# Patient Record
Sex: MALE | Race: WHITE | Employment: FULL TIME | URBAN - METROPOLITAN AREA
[De-identification: names, ages, dates, MRNs, and addresses within clinical notes are randomized per-mention and may not be internally consistent; named-entity substitution may affect disease eponyms.]

---

## 2017-01-11 RX ORDER — TAMSULOSIN HYDROCHLORIDE 0.4 MG/1
CAPSULE ORAL
Qty: 180 CAPSULE | Refills: 0 | Status: SHIPPED | OUTPATIENT
Start: 2017-01-11 | End: 2017-04-19 | Stop reason: SDUPTHER

## 2017-01-11 RX ORDER — VENLAFAXINE HYDROCHLORIDE 75 MG/1
CAPSULE, EXTENDED RELEASE ORAL
Qty: 90 CAPSULE | Refills: 0 | Status: SHIPPED | OUTPATIENT
Start: 2017-01-11 | End: 2017-05-06 | Stop reason: SDUPTHER

## 2017-01-19 ENCOUNTER — OFFICE VISIT (OUTPATIENT)
Dept: FAMILY MEDICINE CLINIC | Age: 62
End: 2017-01-19

## 2017-01-19 VITALS
HEART RATE: 93 BPM | WEIGHT: 283 LBS | TEMPERATURE: 98.8 F | SYSTOLIC BLOOD PRESSURE: 130 MMHG | RESPIRATION RATE: 16 BRPM | BODY MASS INDEX: 41.92 KG/M2 | HEIGHT: 69 IN | DIASTOLIC BLOOD PRESSURE: 80 MMHG | OXYGEN SATURATION: 97 %

## 2017-01-19 DIAGNOSIS — E66.01 OBESITY, CLASS III, BMI 40-49.9 (MORBID OBESITY) (HCC): Primary | ICD-10-CM

## 2017-01-19 DIAGNOSIS — F41.9 ANXIETY: ICD-10-CM

## 2017-01-19 DIAGNOSIS — M48.061 SPINAL STENOSIS OF LUMBAR REGION: ICD-10-CM

## 2017-01-19 PROCEDURE — 99214 OFFICE O/P EST MOD 30 MIN: CPT | Performed by: FAMILY MEDICINE

## 2017-01-19 PROCEDURE — 3017F COLORECTAL CA SCREEN DOC REV: CPT | Performed by: FAMILY MEDICINE

## 2017-01-19 PROCEDURE — G8484 FLU IMMUNIZE NO ADMIN: HCPCS | Performed by: FAMILY MEDICINE

## 2017-01-19 PROCEDURE — G8427 DOCREV CUR MEDS BY ELIG CLIN: HCPCS | Performed by: FAMILY MEDICINE

## 2017-01-19 PROCEDURE — G8419 CALC BMI OUT NRM PARAM NOF/U: HCPCS | Performed by: FAMILY MEDICINE

## 2017-01-19 PROCEDURE — 1036F TOBACCO NON-USER: CPT | Performed by: FAMILY MEDICINE

## 2017-01-19 RX ORDER — PHENTERMINE HYDROCHLORIDE 37.5 MG/1
37.5 CAPSULE ORAL EVERY MORNING
Qty: 30 CAPSULE | Refills: 0 | Status: SHIPPED | OUTPATIENT
Start: 2017-01-19 | End: 2017-02-21 | Stop reason: SDUPTHER

## 2017-01-19 RX ORDER — CLONAZEPAM 1 MG/1
TABLET ORAL
Qty: 30 TABLET | Refills: 2 | Status: SHIPPED | OUTPATIENT
Start: 2017-01-19 | End: 2017-01-24 | Stop reason: SDUPTHER

## 2017-01-19 ASSESSMENT — ENCOUNTER SYMPTOMS
NAUSEA: 0
GASTROINTESTINAL NEGATIVE: 1
VOMITING: 0
RESPIRATORY NEGATIVE: 1
ALLERGIC/IMMUNOLOGIC NEGATIVE: 1
COUGH: 0
SORE THROAT: 0
ABDOMINAL PAIN: 0
EYES NEGATIVE: 1

## 2017-02-02 PROBLEM — M47.816 SPONDYLOSIS OF LUMBAR REGION WITHOUT MYELOPATHY OR RADICULOPATHY: Status: ACTIVE | Noted: 2017-02-02

## 2017-02-21 ENCOUNTER — OFFICE VISIT (OUTPATIENT)
Dept: FAMILY MEDICINE CLINIC | Age: 62
End: 2017-02-21

## 2017-02-21 VITALS
WEIGHT: 273 LBS | DIASTOLIC BLOOD PRESSURE: 66 MMHG | HEIGHT: 69 IN | SYSTOLIC BLOOD PRESSURE: 120 MMHG | OXYGEN SATURATION: 99 % | BODY MASS INDEX: 40.43 KG/M2 | TEMPERATURE: 98.1 F | HEART RATE: 99 BPM | RESPIRATION RATE: 12 BRPM

## 2017-02-21 DIAGNOSIS — E66.01 OBESITY, CLASS III, BMI 40-49.9 (MORBID OBESITY) (HCC): ICD-10-CM

## 2017-02-21 DIAGNOSIS — L03.032: ICD-10-CM

## 2017-02-21 DIAGNOSIS — F41.9 ANXIETY: Primary | ICD-10-CM

## 2017-02-21 PROBLEM — E66.813 OBESITY, CLASS III, BMI 40-49.9 (MORBID OBESITY) (HCC): Status: ACTIVE | Noted: 2017-02-21

## 2017-02-21 PROCEDURE — 99213 OFFICE O/P EST LOW 20 MIN: CPT | Performed by: FAMILY MEDICINE

## 2017-02-21 PROCEDURE — G8427 DOCREV CUR MEDS BY ELIG CLIN: HCPCS | Performed by: FAMILY MEDICINE

## 2017-02-21 PROCEDURE — G8417 CALC BMI ABV UP PARAM F/U: HCPCS | Performed by: FAMILY MEDICINE

## 2017-02-21 PROCEDURE — 1036F TOBACCO NON-USER: CPT | Performed by: FAMILY MEDICINE

## 2017-02-21 PROCEDURE — G8484 FLU IMMUNIZE NO ADMIN: HCPCS | Performed by: FAMILY MEDICINE

## 2017-02-21 PROCEDURE — 3017F COLORECTAL CA SCREEN DOC REV: CPT | Performed by: FAMILY MEDICINE

## 2017-02-21 RX ORDER — PHENTERMINE HYDROCHLORIDE 37.5 MG/1
37.5 CAPSULE ORAL EVERY MORNING
Qty: 30 CAPSULE | Refills: 0 | Status: SHIPPED | OUTPATIENT
Start: 2017-02-21 | End: 2017-03-22 | Stop reason: SDUPTHER

## 2017-02-21 ASSESSMENT — ENCOUNTER SYMPTOMS
RESPIRATORY NEGATIVE: 1
VOMITING: 0
EYES NEGATIVE: 1
ALLERGIC/IMMUNOLOGIC NEGATIVE: 1
GASTROINTESTINAL NEGATIVE: 1
ABDOMINAL PAIN: 0
COUGH: 0
SORE THROAT: 0
NAUSEA: 0

## 2017-03-09 RX ORDER — AMLODIPINE BESYLATE AND BENAZEPRIL HYDROCHLORIDE 10; 20 MG/1; MG/1
CAPSULE ORAL
Qty: 90 CAPSULE | Refills: 1 | Status: SHIPPED | OUTPATIENT
Start: 2017-03-09 | End: 2017-09-05 | Stop reason: SDUPTHER

## 2017-03-12 ENCOUNTER — HOSPITAL ENCOUNTER (OUTPATIENT)
Dept: MRI IMAGING | Age: 62
Discharge: HOME | End: 2017-03-12
Payer: COMMERCIAL

## 2017-03-12 DIAGNOSIS — M47.816 SPONDYLOSIS OF LUMBAR REGION WITHOUT MYELOPATHY OR RADICULOPATHY: ICD-10-CM

## 2017-03-12 DIAGNOSIS — M48.061 SPINAL STENOSIS OF LUMBAR REGION: ICD-10-CM

## 2017-03-12 PROCEDURE — 72148 MRI LUMBAR SPINE W/O DYE: CPT

## 2017-03-16 DIAGNOSIS — L30.0 NUMMULAR ECZEMA: ICD-10-CM

## 2017-03-16 RX ORDER — BETAMETHASONE DIPROPIONATE 0.05 %
OINTMENT (GRAM) TOPICAL
Qty: 45 G | Refills: 1 | Status: SHIPPED | OUTPATIENT
Start: 2017-03-16 | End: 2018-02-19 | Stop reason: SDUPTHER

## 2017-03-22 ENCOUNTER — OFFICE VISIT (OUTPATIENT)
Dept: FAMILY MEDICINE CLINIC | Age: 62
End: 2017-03-22

## 2017-03-22 VITALS
HEART RATE: 80 BPM | HEIGHT: 69 IN | SYSTOLIC BLOOD PRESSURE: 108 MMHG | BODY MASS INDEX: 39.25 KG/M2 | WEIGHT: 265 LBS | DIASTOLIC BLOOD PRESSURE: 60 MMHG | TEMPERATURE: 96.7 F | RESPIRATION RATE: 14 BRPM

## 2017-03-22 DIAGNOSIS — E66.01 OBESITY, CLASS III, BMI 40-49.9 (MORBID OBESITY) (HCC): ICD-10-CM

## 2017-03-22 PROCEDURE — G8417 CALC BMI ABV UP PARAM F/U: HCPCS | Performed by: FAMILY MEDICINE

## 2017-03-22 PROCEDURE — 3017F COLORECTAL CA SCREEN DOC REV: CPT | Performed by: FAMILY MEDICINE

## 2017-03-22 PROCEDURE — 1036F TOBACCO NON-USER: CPT | Performed by: FAMILY MEDICINE

## 2017-03-22 PROCEDURE — G8484 FLU IMMUNIZE NO ADMIN: HCPCS | Performed by: FAMILY MEDICINE

## 2017-03-22 PROCEDURE — G8427 DOCREV CUR MEDS BY ELIG CLIN: HCPCS | Performed by: FAMILY MEDICINE

## 2017-03-22 PROCEDURE — 99213 OFFICE O/P EST LOW 20 MIN: CPT | Performed by: FAMILY MEDICINE

## 2017-03-22 RX ORDER — PHENTERMINE HYDROCHLORIDE 37.5 MG/1
37.5 CAPSULE ORAL EVERY MORNING
Qty: 30 CAPSULE | Refills: 0 | Status: SHIPPED | OUTPATIENT
Start: 2017-03-22 | End: 2017-04-21

## 2017-03-22 ASSESSMENT — ENCOUNTER SYMPTOMS
ABDOMINAL PAIN: 0
GASTROINTESTINAL NEGATIVE: 1
SORE THROAT: 0
VOMITING: 0
RESPIRATORY NEGATIVE: 1
EYES NEGATIVE: 1
ALLERGIC/IMMUNOLOGIC NEGATIVE: 1
COUGH: 0
NAUSEA: 0

## 2017-04-19 RX ORDER — TAMSULOSIN HYDROCHLORIDE 0.4 MG/1
CAPSULE ORAL
Qty: 180 CAPSULE | Refills: 0 | Status: SHIPPED | OUTPATIENT
Start: 2017-04-19 | End: 2017-08-07 | Stop reason: SDUPTHER

## 2017-04-24 ENCOUNTER — OFFICE VISIT (OUTPATIENT)
Dept: FAMILY MEDICINE CLINIC | Age: 62
End: 2017-04-24

## 2017-04-24 VITALS
WEIGHT: 258 LBS | TEMPERATURE: 97.7 F | HEIGHT: 69 IN | DIASTOLIC BLOOD PRESSURE: 80 MMHG | OXYGEN SATURATION: 99 % | HEART RATE: 105 BPM | SYSTOLIC BLOOD PRESSURE: 140 MMHG | BODY MASS INDEX: 38.21 KG/M2 | RESPIRATION RATE: 16 BRPM

## 2017-04-24 DIAGNOSIS — F41.9 ANXIETY: ICD-10-CM

## 2017-04-24 DIAGNOSIS — E66.9 OBESITY (BMI 30-39.9): ICD-10-CM

## 2017-04-24 DIAGNOSIS — L57.0 AK (ACTINIC KERATOSIS): Primary | ICD-10-CM

## 2017-04-24 PROCEDURE — 3017F COLORECTAL CA SCREEN DOC REV: CPT | Performed by: FAMILY MEDICINE

## 2017-04-24 PROCEDURE — 1036F TOBACCO NON-USER: CPT | Performed by: FAMILY MEDICINE

## 2017-04-24 PROCEDURE — G8427 DOCREV CUR MEDS BY ELIG CLIN: HCPCS | Performed by: FAMILY MEDICINE

## 2017-04-24 PROCEDURE — G8417 CALC BMI ABV UP PARAM F/U: HCPCS | Performed by: FAMILY MEDICINE

## 2017-04-24 PROCEDURE — 99214 OFFICE O/P EST MOD 30 MIN: CPT | Performed by: FAMILY MEDICINE

## 2017-04-24 RX ORDER — CLONAZEPAM 1 MG/1
TABLET ORAL
Qty: 30 TABLET | Refills: 2 | Status: SHIPPED | OUTPATIENT
Start: 2017-04-24 | End: 2017-07-21 | Stop reason: SDUPTHER

## 2017-04-24 ASSESSMENT — ENCOUNTER SYMPTOMS
ABDOMINAL PAIN: 0
EYES NEGATIVE: 1
GASTROINTESTINAL NEGATIVE: 1
NAUSEA: 0
VOMITING: 0
RESPIRATORY NEGATIVE: 1
SORE THROAT: 0
COUGH: 0
ALLERGIC/IMMUNOLOGIC NEGATIVE: 1

## 2017-05-08 RX ORDER — VENLAFAXINE HYDROCHLORIDE 75 MG/1
CAPSULE, EXTENDED RELEASE ORAL
Qty: 90 CAPSULE | Refills: 1 | Status: SHIPPED | OUTPATIENT
Start: 2017-05-08 | End: 2017-10-30 | Stop reason: SDUPTHER

## 2017-05-20 DIAGNOSIS — E29.1 HYPOGONADISM MALE: ICD-10-CM

## 2017-05-20 DIAGNOSIS — R97.20 ELEVATED PSA: ICD-10-CM

## 2017-05-20 LAB — PROSTATE SPECIFIC ANTIGEN: 0.73 NG/ML (ref 0–5.4)

## 2017-05-23 ENCOUNTER — OFFICE VISIT (OUTPATIENT)
Dept: FAMILY MEDICINE CLINIC | Age: 62
End: 2017-05-23

## 2017-05-23 VITALS
BODY MASS INDEX: 37.47 KG/M2 | SYSTOLIC BLOOD PRESSURE: 120 MMHG | TEMPERATURE: 98.2 F | HEIGHT: 69 IN | DIASTOLIC BLOOD PRESSURE: 80 MMHG | HEART RATE: 90 BPM | RESPIRATION RATE: 16 BRPM | WEIGHT: 253 LBS | OXYGEN SATURATION: 98 %

## 2017-05-23 DIAGNOSIS — F41.9 ANXIETY: ICD-10-CM

## 2017-05-23 DIAGNOSIS — E66.9 OBESITY (BMI 30-39.9): ICD-10-CM

## 2017-05-23 DIAGNOSIS — E29.1 HYPOGONADISM MALE: Primary | ICD-10-CM

## 2017-05-23 DIAGNOSIS — M47.816 SPONDYLOSIS OF LUMBAR REGION WITHOUT MYELOPATHY OR RADICULOPATHY: ICD-10-CM

## 2017-05-23 PROBLEM — S83.289A TEAR OF LATERAL MENISCUS OF KNEE: Status: ACTIVE | Noted: 2017-03-07

## 2017-05-23 PROBLEM — S83.249A TEAR OF MEDIAL MENISCUS OF KNEE: Status: ACTIVE | Noted: 2017-03-07

## 2017-05-23 PROCEDURE — G8417 CALC BMI ABV UP PARAM F/U: HCPCS | Performed by: FAMILY MEDICINE

## 2017-05-23 PROCEDURE — 99213 OFFICE O/P EST LOW 20 MIN: CPT | Performed by: FAMILY MEDICINE

## 2017-05-23 PROCEDURE — 1036F TOBACCO NON-USER: CPT | Performed by: FAMILY MEDICINE

## 2017-05-23 PROCEDURE — G8427 DOCREV CUR MEDS BY ELIG CLIN: HCPCS | Performed by: FAMILY MEDICINE

## 2017-05-23 PROCEDURE — 3017F COLORECTAL CA SCREEN DOC REV: CPT | Performed by: FAMILY MEDICINE

## 2017-05-23 ASSESSMENT — ENCOUNTER SYMPTOMS
ABDOMINAL PAIN: 0
ALLERGIC/IMMUNOLOGIC NEGATIVE: 1
VOMITING: 0
NAUSEA: 0
RESPIRATORY NEGATIVE: 1
COUGH: 0
SORE THROAT: 0
GASTROINTESTINAL NEGATIVE: 1
EYES NEGATIVE: 1

## 2017-05-23 ASSESSMENT — PATIENT HEALTH QUESTIONNAIRE - PHQ9
SUM OF ALL RESPONSES TO PHQ QUESTIONS 1-9: 0
1. LITTLE INTEREST OR PLEASURE IN DOING THINGS: 0
SUM OF ALL RESPONSES TO PHQ9 QUESTIONS 1 & 2: 0
2. FEELING DOWN, DEPRESSED OR HOPELESS: 0

## 2017-06-23 ENCOUNTER — OFFICE VISIT (OUTPATIENT)
Dept: FAMILY MEDICINE CLINIC | Age: 62
End: 2017-06-23

## 2017-06-23 VITALS
SYSTOLIC BLOOD PRESSURE: 120 MMHG | RESPIRATION RATE: 16 BRPM | WEIGHT: 252 LBS | OXYGEN SATURATION: 98 % | BODY MASS INDEX: 37.33 KG/M2 | HEIGHT: 69 IN | TEMPERATURE: 98.7 F | DIASTOLIC BLOOD PRESSURE: 80 MMHG | HEART RATE: 87 BPM

## 2017-06-23 DIAGNOSIS — E66.9 OBESITY (BMI 30-39.9): ICD-10-CM

## 2017-06-23 DIAGNOSIS — L03.032 PARONYCHIA OF GREAT TOE, LEFT: Primary | ICD-10-CM

## 2017-06-23 PROCEDURE — 99213 OFFICE O/P EST LOW 20 MIN: CPT | Performed by: FAMILY MEDICINE

## 2017-06-23 PROCEDURE — G8427 DOCREV CUR MEDS BY ELIG CLIN: HCPCS | Performed by: FAMILY MEDICINE

## 2017-06-23 PROCEDURE — 3017F COLORECTAL CA SCREEN DOC REV: CPT | Performed by: FAMILY MEDICINE

## 2017-06-23 PROCEDURE — 1036F TOBACCO NON-USER: CPT | Performed by: FAMILY MEDICINE

## 2017-06-23 PROCEDURE — G8417 CALC BMI ABV UP PARAM F/U: HCPCS | Performed by: FAMILY MEDICINE

## 2017-06-23 RX ORDER — DOXYCYCLINE HYCLATE 100 MG
100 TABLET ORAL 2 TIMES DAILY
Qty: 20 TABLET | Refills: 0 | Status: SHIPPED | OUTPATIENT
Start: 2017-06-23 | End: 2017-07-03

## 2017-06-23 ASSESSMENT — ENCOUNTER SYMPTOMS
EYES NEGATIVE: 1
ALLERGIC/IMMUNOLOGIC NEGATIVE: 1
GASTROINTESTINAL NEGATIVE: 1
VOMITING: 0
COUGH: 0
NAUSEA: 0
ABDOMINAL PAIN: 0
SORE THROAT: 0
RESPIRATORY NEGATIVE: 1

## 2017-07-21 ENCOUNTER — OFFICE VISIT (OUTPATIENT)
Dept: FAMILY MEDICINE CLINIC | Age: 62
End: 2017-07-21

## 2017-07-21 VITALS
BODY MASS INDEX: 37.18 KG/M2 | SYSTOLIC BLOOD PRESSURE: 126 MMHG | TEMPERATURE: 98.3 F | RESPIRATION RATE: 14 BRPM | HEART RATE: 74 BPM | DIASTOLIC BLOOD PRESSURE: 80 MMHG | WEIGHT: 251 LBS | HEIGHT: 69 IN

## 2017-07-21 DIAGNOSIS — E66.9 OBESITY (BMI 30-39.9): ICD-10-CM

## 2017-07-21 DIAGNOSIS — F41.9 ANXIETY: ICD-10-CM

## 2017-07-21 DIAGNOSIS — J45.30 MILD PERSISTENT ASTHMA WITHOUT COMPLICATION: Primary | ICD-10-CM

## 2017-07-21 PROCEDURE — 1036F TOBACCO NON-USER: CPT | Performed by: FAMILY MEDICINE

## 2017-07-21 PROCEDURE — 3017F COLORECTAL CA SCREEN DOC REV: CPT | Performed by: FAMILY MEDICINE

## 2017-07-21 PROCEDURE — G8417 CALC BMI ABV UP PARAM F/U: HCPCS | Performed by: FAMILY MEDICINE

## 2017-07-21 PROCEDURE — 99213 OFFICE O/P EST LOW 20 MIN: CPT | Performed by: FAMILY MEDICINE

## 2017-07-21 PROCEDURE — G8427 DOCREV CUR MEDS BY ELIG CLIN: HCPCS | Performed by: FAMILY MEDICINE

## 2017-07-21 RX ORDER — DICYCLOMINE HCL 20 MG
20 TABLET ORAL 3 TIMES DAILY
COMMUNITY
Start: 2017-07-15

## 2017-07-21 RX ORDER — CLONAZEPAM 1 MG/1
TABLET ORAL
Qty: 30 TABLET | Refills: 2 | Status: SHIPPED | OUTPATIENT
Start: 2017-07-21 | End: 2017-10-26 | Stop reason: SDUPTHER

## 2017-07-21 ASSESSMENT — ENCOUNTER SYMPTOMS
EYES NEGATIVE: 1
RESPIRATORY NEGATIVE: 1
GASTROINTESTINAL NEGATIVE: 1
SINUS PRESSURE: 1
ALLERGIC/IMMUNOLOGIC NEGATIVE: 1

## 2017-08-07 RX ORDER — TAMSULOSIN HYDROCHLORIDE 0.4 MG/1
CAPSULE ORAL
Qty: 180 CAPSULE | Refills: 0 | Status: SHIPPED | OUTPATIENT
Start: 2017-08-07 | End: 2017-12-13 | Stop reason: SDUPTHER

## 2017-08-22 ENCOUNTER — OFFICE VISIT (OUTPATIENT)
Dept: FAMILY MEDICINE CLINIC | Age: 62
End: 2017-08-22

## 2017-08-22 VITALS
HEIGHT: 69 IN | RESPIRATION RATE: 16 BRPM | BODY MASS INDEX: 37.56 KG/M2 | SYSTOLIC BLOOD PRESSURE: 122 MMHG | OXYGEN SATURATION: 98 % | WEIGHT: 253.6 LBS | DIASTOLIC BLOOD PRESSURE: 86 MMHG | TEMPERATURE: 98.4 F | HEART RATE: 88 BPM

## 2017-08-22 DIAGNOSIS — J01.90 ACUTE NON-RECURRENT SINUSITIS, UNSPECIFIED LOCATION: Primary | ICD-10-CM

## 2017-08-22 DIAGNOSIS — E66.9 OBESITY (BMI 30-39.9): ICD-10-CM

## 2017-08-22 PROCEDURE — G8427 DOCREV CUR MEDS BY ELIG CLIN: HCPCS | Performed by: FAMILY MEDICINE

## 2017-08-22 PROCEDURE — G8417 CALC BMI ABV UP PARAM F/U: HCPCS | Performed by: FAMILY MEDICINE

## 2017-08-22 PROCEDURE — 99213 OFFICE O/P EST LOW 20 MIN: CPT | Performed by: FAMILY MEDICINE

## 2017-08-22 PROCEDURE — 3017F COLORECTAL CA SCREEN DOC REV: CPT | Performed by: FAMILY MEDICINE

## 2017-08-22 PROCEDURE — 1036F TOBACCO NON-USER: CPT | Performed by: FAMILY MEDICINE

## 2017-08-22 RX ORDER — DOXYCYCLINE HYCLATE 100 MG
100 TABLET ORAL 2 TIMES DAILY
Qty: 30 TABLET | Refills: 0 | Status: SHIPPED | OUTPATIENT
Start: 2017-08-22 | End: 2017-09-01

## 2017-08-22 ASSESSMENT — ENCOUNTER SYMPTOMS
HOARSE VOICE: 0
SWOLLEN GLANDS: 0
COUGH: 0
RESPIRATORY NEGATIVE: 1
SORE THROAT: 1
SINUS PRESSURE: 1
GASTROINTESTINAL NEGATIVE: 1
SHORTNESS OF BREATH: 0

## 2017-09-06 RX ORDER — AMLODIPINE BESYLATE AND BENAZEPRIL HYDROCHLORIDE 10; 20 MG/1; MG/1
CAPSULE ORAL
Qty: 90 CAPSULE | Refills: 1 | Status: SHIPPED | OUTPATIENT
Start: 2017-09-06 | End: 2018-02-20 | Stop reason: SDUPTHER

## 2017-09-18 RX ORDER — OXYBUTYNIN CHLORIDE 10 MG/1
TABLET, EXTENDED RELEASE ORAL
Qty: 90 TABLET | Refills: 3 | Status: SHIPPED | OUTPATIENT
Start: 2017-09-18 | End: 2018-09-02 | Stop reason: SDUPTHER

## 2017-09-19 DIAGNOSIS — M15.9 PRIMARY OSTEOARTHRITIS INVOLVING MULTIPLE JOINTS: ICD-10-CM

## 2017-09-19 RX ORDER — MELOXICAM 15 MG/1
TABLET ORAL
Qty: 90 TABLET | Refills: 1 | Status: SHIPPED | OUTPATIENT
Start: 2017-09-19 | End: 2018-03-19 | Stop reason: SDUPTHER

## 2017-09-27 ENCOUNTER — OFFICE VISIT (OUTPATIENT)
Dept: FAMILY MEDICINE CLINIC | Age: 62
End: 2017-09-27

## 2017-09-27 VITALS
BODY MASS INDEX: 38.51 KG/M2 | HEIGHT: 69 IN | WEIGHT: 260 LBS | TEMPERATURE: 98.5 F | HEART RATE: 85 BPM | SYSTOLIC BLOOD PRESSURE: 126 MMHG | DIASTOLIC BLOOD PRESSURE: 70 MMHG | RESPIRATION RATE: 12 BRPM

## 2017-09-27 DIAGNOSIS — E66.9 OBESITY (BMI 30-39.9): ICD-10-CM

## 2017-09-27 DIAGNOSIS — J30.1 SEASONAL ALLERGIC RHINITIS DUE TO POLLEN: Primary | ICD-10-CM

## 2017-09-27 PROCEDURE — G8427 DOCREV CUR MEDS BY ELIG CLIN: HCPCS | Performed by: FAMILY MEDICINE

## 2017-09-27 PROCEDURE — 99213 OFFICE O/P EST LOW 20 MIN: CPT | Performed by: FAMILY MEDICINE

## 2017-09-27 PROCEDURE — 1036F TOBACCO NON-USER: CPT | Performed by: FAMILY MEDICINE

## 2017-09-27 PROCEDURE — 3017F COLORECTAL CA SCREEN DOC REV: CPT | Performed by: FAMILY MEDICINE

## 2017-09-27 PROCEDURE — G8417 CALC BMI ABV UP PARAM F/U: HCPCS | Performed by: FAMILY MEDICINE

## 2017-09-27 ASSESSMENT — ENCOUNTER SYMPTOMS
RESPIRATORY NEGATIVE: 1
EYE ITCHING: 1
SINUS PRESSURE: 1
SORE THROAT: 1
GASTROINTESTINAL NEGATIVE: 1
SHORTNESS OF BREATH: 0
COUGH: 0

## 2017-10-26 ENCOUNTER — OFFICE VISIT (OUTPATIENT)
Dept: FAMILY MEDICINE CLINIC | Age: 62
End: 2017-10-26

## 2017-10-26 VITALS
TEMPERATURE: 98 F | HEART RATE: 91 BPM | WEIGHT: 257 LBS | SYSTOLIC BLOOD PRESSURE: 122 MMHG | OXYGEN SATURATION: 98 % | BODY MASS INDEX: 38.06 KG/M2 | HEIGHT: 69 IN | DIASTOLIC BLOOD PRESSURE: 80 MMHG | RESPIRATION RATE: 16 BRPM

## 2017-10-26 DIAGNOSIS — T48.5X5A RHINITIS MEDICAMENTOSA: ICD-10-CM

## 2017-10-26 DIAGNOSIS — J01.91 ACUTE RECURRENT SINUSITIS, UNSPECIFIED LOCATION: Primary | ICD-10-CM

## 2017-10-26 DIAGNOSIS — J31.0 RHINITIS MEDICAMENTOSA: ICD-10-CM

## 2017-10-26 DIAGNOSIS — Z23 NEED FOR INFLUENZA VACCINATION: ICD-10-CM

## 2017-10-26 DIAGNOSIS — E66.9 OBESITY (BMI 30-39.9): ICD-10-CM

## 2017-10-26 DIAGNOSIS — F41.9 ANXIETY: ICD-10-CM

## 2017-10-26 DIAGNOSIS — I10 ESSENTIAL HYPERTENSION: ICD-10-CM

## 2017-10-26 PROCEDURE — G8417 CALC BMI ABV UP PARAM F/U: HCPCS | Performed by: FAMILY MEDICINE

## 2017-10-26 PROCEDURE — G8484 FLU IMMUNIZE NO ADMIN: HCPCS | Performed by: FAMILY MEDICINE

## 2017-10-26 PROCEDURE — 1036F TOBACCO NON-USER: CPT | Performed by: FAMILY MEDICINE

## 2017-10-26 PROCEDURE — 99214 OFFICE O/P EST MOD 30 MIN: CPT | Performed by: FAMILY MEDICINE

## 2017-10-26 PROCEDURE — G8427 DOCREV CUR MEDS BY ELIG CLIN: HCPCS | Performed by: FAMILY MEDICINE

## 2017-10-26 PROCEDURE — 90471 IMMUNIZATION ADMIN: CPT | Performed by: FAMILY MEDICINE

## 2017-10-26 PROCEDURE — 90688 IIV4 VACCINE SPLT 0.5 ML IM: CPT | Performed by: FAMILY MEDICINE

## 2017-10-26 PROCEDURE — 3017F COLORECTAL CA SCREEN DOC REV: CPT | Performed by: FAMILY MEDICINE

## 2017-10-26 RX ORDER — DOXYCYCLINE HYCLATE 100 MG
100 TABLET ORAL 2 TIMES DAILY
Qty: 20 TABLET | Refills: 0 | Status: SHIPPED | OUTPATIENT
Start: 2017-10-26 | End: 2017-11-05

## 2017-10-26 RX ORDER — CLONAZEPAM 1 MG/1
TABLET ORAL
Qty: 30 TABLET | Refills: 2 | Status: SHIPPED | OUTPATIENT
Start: 2017-10-26 | End: 2017-12-27 | Stop reason: SDUPTHER

## 2017-10-26 ASSESSMENT — ENCOUNTER SYMPTOMS
ALLERGIC/IMMUNOLOGIC NEGATIVE: 1
SORE THROAT: 1
COUGH: 0
GASTROINTESTINAL NEGATIVE: 1
SHORTNESS OF BREATH: 0
SINUS PRESSURE: 1
EYES NEGATIVE: 1
RESPIRATORY NEGATIVE: 1

## 2017-10-26 NOTE — PROGRESS NOTES
injected. No middle ear effusion. Nose: Nose normal. No mucosal edema or rhinorrhea. Right sinus exhibits no maxillary sinus tenderness and no frontal sinus tenderness. Left sinus exhibits no maxillary sinus tenderness and no frontal sinus tenderness. Mouth/Throat: Mucous membranes are normal. No uvula swelling (absent uvula). Posterior oropharyngeal edema and posterior oropharyngeal erythema present. Oropharyngeal exudate: PND. Eyes: Conjunctivae, EOM and lids are normal. Pupils are equal, round, and reactive to light. Neck: Normal range of motion. Neck supple. No JVD present. No muscular tenderness present. No neck rigidity. No tracheal deviation present. No thyroid mass and no thyromegaly present. Cardiovascular: Normal rate, regular rhythm and intact distal pulses. Pulmonary/Chest: Effort normal and breath sounds normal. He has no decreased breath sounds. He has no wheezes. He has no rhonchi. He has no rales. He exhibits no tenderness and no deformity. Abdominal: Soft. Normal appearance and bowel sounds are normal. He exhibits no distension and no mass. There is no splenomegaly or hepatomegaly. There is no tenderness. There is no rigidity, no rebound and no guarding. No hernia. Musculoskeletal: Normal range of motion. Right knee: Normal.        Left knee: Normal.        Cervical back: Normal.        Thoracic back: Normal.        Lumbar back: Normal.        Lymphadenopathy:     He has no cervical adenopathy. Neurological: He is alert and oriented to person, place, and time. He has normal strength. He displays no atrophy and no tremor. No cranial nerve deficit or sensory deficit. Coordination and gait normal.   Skin: Skin is warm, dry and intact. No rash noted. He is not diaphoretic. Psychiatric: He has a normal mood and affect. His speech is normal and behavior is normal. Judgment and thought content normal. Cognition and memory are normal.   Nursing note and vitals reviewed. Assessment & Plan   1. Acute recurrent sinusitis, unspecified location  doxycycline hyclate (VIBRA-TABS) 100 MG tablet   2. Need for influenza vaccination  INFLUENZA, QUADV, 3 YRS AND OLDER, IM, MDV, 0.5ML (FLUZONE QUADV)   3. Anxiety  clonazePAM (KLONOPIN) 1 MG tablet   4. Obesity (BMI 30-39. 9)  Phentermine-Topiramate (QSYMIA) 15-92 MG CP24   5. Essential hypertension  CBC Auto Differential    Comprehensive Metabolic Panel    Lipid Panel    TSH ULTRASENSITIVE, DIRECTED   6. Rhinitis medicamentosa  doxycycline hyclate (VIBRA-TABS) 100 MG tablet     Orders Placed This Encounter   Procedures    INFLUENZA, QUADV, 3 YRS AND OLDER, IM, MDV, 0.5ML (FLUZONE QUADV)    CBC Auto Differential     Standing Status:   Future     Standing Expiration Date:   10/26/2018    Comprehensive Metabolic Panel     Standing Status:   Future     Standing Expiration Date:   10/26/2018    Lipid Panel     Standing Status:   Future     Standing Expiration Date:   10/26/2018     Order Specific Question:   Is Patient Fasting?/# of Hours     Answer:   8    TSH ULTRASENSITIVE, DIRECTED     Standing Status:   Future     Standing Expiration Date:   10/26/2018     Orders Placed This Encounter   Medications    clonazePAM (KLONOPIN) 1 MG tablet     Sig: TAKE 1 TABLET EVERY DAY AS NEEDED FOR ANXIETY.      Dispense:  30 tablet     Refill:  2    Phentermine-Topiramate (QSYMIA) 15-92 MG CP24     Sig: Take 1 capsule by mouth daily     Dispense:  30 capsule     Refill:  0    doxycycline hyclate (VIBRA-TABS) 100 MG tablet     Sig: Take 1 tablet by mouth 2 times daily for 10 days     Dispense:  20 tablet     Refill:  0     Medications Discontinued During This Encounter   Medication Reason    clonazePAM (KLONOPIN) 1 MG tablet Reorder    Phentermine-Topiramate (QSYMIA) 15-92 MG CP24 Reorder   Recommend OTC neilmed neti pot/ sinus rinse at least 4 times daily follow instructions on package  May use afrin ONLY 3 doses weekly to prevent rebound nasal congestion  Counseling given: Yes      Return in about 1 month (around 11/26/2017).     Mark Ferrer, DO

## 2017-10-30 RX ORDER — OMEPRAZOLE 20 MG/1
CAPSULE, DELAYED RELEASE ORAL
Qty: 90 CAPSULE | Refills: 3 | Status: SHIPPED | OUTPATIENT
Start: 2017-10-30 | End: 2018-10-30 | Stop reason: SDUPTHER

## 2017-10-30 RX ORDER — VENLAFAXINE HYDROCHLORIDE 75 MG/1
CAPSULE, EXTENDED RELEASE ORAL
Qty: 90 CAPSULE | Refills: 3 | Status: SHIPPED | OUTPATIENT
Start: 2017-10-30 | End: 2018-01-29 | Stop reason: SDUPTHER

## 2017-11-18 DIAGNOSIS — I10 ESSENTIAL HYPERTENSION: ICD-10-CM

## 2017-11-18 LAB
ALBUMIN SERPL-MCNC: 4.1 G/DL (ref 3.9–4.9)
ALP BLD-CCNC: 91 U/L (ref 35–104)
ALT SERPL-CCNC: 16 U/L (ref 0–41)
ANION GAP SERPL CALCULATED.3IONS-SCNC: 12 MEQ/L (ref 7–13)
AST SERPL-CCNC: 15 U/L (ref 0–40)
BASOPHILS ABSOLUTE: 0 K/UL (ref 0–0.2)
BASOPHILS RELATIVE PERCENT: 0.6 %
BILIRUB SERPL-MCNC: 0.4 MG/DL (ref 0–1.2)
BUN BLDV-MCNC: 19 MG/DL (ref 8–23)
CALCIUM SERPL-MCNC: 8.3 MG/DL (ref 8.6–10.2)
CHLORIDE BLD-SCNC: 106 MEQ/L (ref 98–107)
CHOLESTEROL, TOTAL: 149 MG/DL (ref 0–199)
CO2: 21 MEQ/L (ref 22–29)
CREAT SERPL-MCNC: 0.83 MG/DL (ref 0.7–1.2)
EOSINOPHILS ABSOLUTE: 0.2 K/UL (ref 0–0.7)
EOSINOPHILS RELATIVE PERCENT: 2.8 %
GFR AFRICAN AMERICAN: >60
GFR NON-AFRICAN AMERICAN: >60
GLOBULIN: 2.1 G/DL (ref 2.3–3.5)
GLUCOSE BLD-MCNC: 85 MG/DL (ref 74–109)
HCT VFR BLD CALC: 43.2 % (ref 42–52)
HDLC SERPL-MCNC: 48 MG/DL (ref 40–59)
HEMOGLOBIN: 14 G/DL (ref 14–18)
LDL CHOLESTEROL CALCULATED: 89 MG/DL (ref 0–129)
LYMPHOCYTES ABSOLUTE: 2 K/UL (ref 1–4.8)
LYMPHOCYTES RELATIVE PERCENT: 26.6 %
MCH RBC QN AUTO: 27.3 PG (ref 27–31.3)
MCHC RBC AUTO-ENTMCNC: 32.5 % (ref 33–37)
MCV RBC AUTO: 84.3 FL (ref 80–100)
MONOCYTES ABSOLUTE: 0.5 K/UL (ref 0.2–0.8)
MONOCYTES RELATIVE PERCENT: 7.2 %
NEUTROPHILS ABSOLUTE: 4.7 K/UL (ref 1.4–6.5)
NEUTROPHILS RELATIVE PERCENT: 62.8 %
PDW BLD-RTO: 13.9 % (ref 11.5–14.5)
PLATELET # BLD: 211 K/UL (ref 130–400)
POTASSIUM SERPL-SCNC: 3.8 MEQ/L (ref 3.5–5.1)
RBC # BLD: 5.13 M/UL (ref 4.7–6.1)
SODIUM BLD-SCNC: 139 MEQ/L (ref 132–144)
TOTAL PROTEIN: 6.2 G/DL (ref 6.4–8.1)
TRIGL SERPL-MCNC: 58 MG/DL (ref 0–200)
TSH REFLEX: 2.65 UIU/ML (ref 0.27–4.2)
WBC # BLD: 7.5 K/UL (ref 4.8–10.8)

## 2017-11-27 ENCOUNTER — OFFICE VISIT (OUTPATIENT)
Dept: FAMILY MEDICINE CLINIC | Age: 62
End: 2017-11-27

## 2017-11-27 VITALS
HEART RATE: 87 BPM | DIASTOLIC BLOOD PRESSURE: 80 MMHG | SYSTOLIC BLOOD PRESSURE: 120 MMHG | TEMPERATURE: 97.5 F | RESPIRATION RATE: 16 BRPM | BODY MASS INDEX: 37.92 KG/M2 | HEIGHT: 69 IN | WEIGHT: 256 LBS | OXYGEN SATURATION: 98 %

## 2017-11-27 DIAGNOSIS — E55.9 VITAMIN D DEFICIENCY: ICD-10-CM

## 2017-11-27 DIAGNOSIS — J31.0 CHRONIC RHINITIS, UNSPECIFIED TYPE: ICD-10-CM

## 2017-11-27 DIAGNOSIS — E83.51 HYPOCALCEMIA: ICD-10-CM

## 2017-11-27 DIAGNOSIS — E66.9 OBESITY (BMI 30-39.9): ICD-10-CM

## 2017-11-27 DIAGNOSIS — I10 ESSENTIAL HYPERTENSION: Primary | ICD-10-CM

## 2017-11-27 DIAGNOSIS — Z12.5 SPECIAL SCREENING EXAMINATION FOR NEOPLASM OF PROSTATE: ICD-10-CM

## 2017-11-27 PROCEDURE — G8484 FLU IMMUNIZE NO ADMIN: HCPCS | Performed by: FAMILY MEDICINE

## 2017-11-27 PROCEDURE — G8427 DOCREV CUR MEDS BY ELIG CLIN: HCPCS | Performed by: FAMILY MEDICINE

## 2017-11-27 PROCEDURE — G8417 CALC BMI ABV UP PARAM F/U: HCPCS | Performed by: FAMILY MEDICINE

## 2017-11-27 PROCEDURE — 1036F TOBACCO NON-USER: CPT | Performed by: FAMILY MEDICINE

## 2017-11-27 PROCEDURE — 99214 OFFICE O/P EST MOD 30 MIN: CPT | Performed by: FAMILY MEDICINE

## 2017-11-27 PROCEDURE — 3017F COLORECTAL CA SCREEN DOC REV: CPT | Performed by: FAMILY MEDICINE

## 2017-11-27 ASSESSMENT — ENCOUNTER SYMPTOMS
SINUS COMPLAINT: 1
EYES NEGATIVE: 1
ALLERGIC/IMMUNOLOGIC NEGATIVE: 1
HOARSE VOICE: 1
GASTROINTESTINAL NEGATIVE: 1
SHORTNESS OF BREATH: 0
SINUS PRESSURE: 0
COUGH: 0
RESPIRATORY NEGATIVE: 1
SORE THROAT: 1
SWOLLEN GLANDS: 0

## 2017-11-27 NOTE — PROGRESS NOTES
Subjective  Rosario Alba, 58 y.o. male presents today with:  Chief Complaint   Patient presents with    Follow-up     sinus     Results     labs        Sinus Problem   This is a chronic problem. The current episode started more than 1 month ago. The problem is unchanged. There has been no fever. The pain is mild. Associated symptoms include congestion, a hoarse voice and a sore throat. Pertinent negatives include no chills, coughing, diaphoresis, ear pain, headaches, neck pain, shortness of breath, sinus pressure, sneezing or swollen glands. Past treatments include antibiotics, saline sprays, sitting up, spray decongestants, oral decongestants, acetaminophen and lying down. The treatment provided mild relief. Review of Systems   Constitutional: Negative. Negative for chills, diaphoresis and fever. HENT: Positive for congestion, hoarse voice and sore throat. Negative for ear pain, sinus pressure and sneezing. Eyes: Negative. Respiratory: Negative. Negative for cough and shortness of breath. Cardiovascular: Negative. Gastrointestinal: Negative. Endocrine: Negative. Genitourinary: Negative. Musculoskeletal: Negative. Negative for arthralgias, joint swelling, myalgias and neck pain. Skin: Negative. Allergic/Immunologic: Negative. Neurological: Negative. Negative for weakness, numbness and headaches. Hematological: Negative. Psychiatric/Behavioral: Nervous/anxious: anxiety, well controlled with meds.           Past Medical History:   Diagnosis Date    Anxiety     Asthma     Depression     DVT (deep venous thrombosis) ([facility])     L LEG    Hypertension     Hypogonadism male     Overactive bladder      Past Surgical History:   Procedure Laterality Date    KNEE SURGERY      right    NASAL SEPTUM SURGERY      TONSILLECTOMY AND ADENOIDECTOMY  1961    UPPER GASTROINTESTINAL ENDOSCOPY  1/20/14    DR. Chacko He     Social History     Social History    Marital status: 87   Resp: 16   Temp: 97.5 °F (36.4 °C)   TempSrc: Tympanic   SpO2: 98%   Weight: 256 lb (116.1 kg)   Height: 5' 9\" (1.753 m)     Physical Exam   Constitutional: He is oriented to person, place, and time. Vital signs are normal. He appears well-developed and well-nourished. No distress. HENT:   Head: Normocephalic and atraumatic. Right Ear: Tympanic membrane, external ear and ear canal normal. Tympanic membrane is not injected. No middle ear effusion. Left Ear: Tympanic membrane, external ear and ear canal normal. Tympanic membrane is not injected. No middle ear effusion. Nose: Mucosal edema and rhinorrhea present. Right sinus exhibits no maxillary sinus tenderness and no frontal sinus tenderness. Left sinus exhibits no maxillary sinus tenderness and no frontal sinus tenderness. Mouth/Throat: Mucous membranes are normal. Uvula swelling: absent uvula. Posterior oropharyngeal erythema present. No posterior oropharyngeal edema. Oropharyngeal exudate: PND. Eyes: Conjunctivae, EOM and lids are normal. Pupils are equal, round, and reactive to light. Neck: Normal range of motion. Neck supple. No JVD present. No muscular tenderness present. No neck rigidity. No tracheal deviation present. No thyroid mass and no thyromegaly present. Cardiovascular: Normal rate, regular rhythm and intact distal pulses. Pulmonary/Chest: Effort normal and breath sounds normal. He has no decreased breath sounds. He has no wheezes. He has no rhonchi. He has no rales. He exhibits no tenderness and no deformity. Abdominal: Soft. Normal appearance and bowel sounds are normal. He exhibits no distension and no mass. There is no splenomegaly or hepatomegaly. There is no tenderness. There is no rigidity, no rebound and no guarding. No hernia. Musculoskeletal: Normal range of motion.         Right knee: Normal.        Left knee: Normal.        Cervical back: Normal.        Thoracic back: Normal.        Lumbar back: Normal. Lymphadenopathy:     He has no cervical adenopathy. Neurological: He is alert and oriented to person, place, and time. He has normal strength. He displays no atrophy and no tremor. No cranial nerve deficit or sensory deficit. Coordination and gait normal.   Skin: Skin is warm, dry and intact. No rash noted. He is not diaphoretic. Psychiatric: He has a normal mood and affect. His speech is normal and behavior is normal. Judgment and thought content normal. Cognition and memory are normal.   Nursing note and vitals reviewed. Assessment & Plan   1. Essential hypertension  CBC Auto Differential    Comprehensive Metabolic Panel    Lipid Panel    TSH ULTRASENSITIVE, DIRECTED   2. Vitamin D deficiency  Comprehensive Metabolic Panel    Vitamin D 25 Hydrox, D2 & D3   3. Hypocalcemia  Comprehensive Metabolic Panel    Vitamin D 25 Hydrox, D2 & D3   4. Special screening examination for neoplasm of prostate  Psa screening   5. Obesity (BMI 30-39. 9)  Comprehensive Metabolic Panel    Lipid Panel    TSH ULTRASENSITIVE, DIRECTED    Phentermine-Topiramate (QSYMIA) 15-92 MG CP24   6.  Chronic rhinitis, unspecified type  Allergen, Region 5 Respiratory Panel    Common Food Allergen Profile     Orders Placed This Encounter   Procedures    CBC Auto Differential     Standing Status:   Future     Standing Expiration Date:   11/27/2018    Comprehensive Metabolic Panel     Standing Status:   Future     Standing Expiration Date:   11/27/2018    Lipid Panel     Standing Status:   Future     Standing Expiration Date:   11/27/2018     Order Specific Question:   Is Patient Fasting?/# of Hours     Answer:   8    TSH ULTRASENSITIVE, DIRECTED     Standing Status:   Future     Standing Expiration Date:   11/27/2018    Psa screening     Standing Status:   Future     Standing Expiration Date:   11/27/2018    Vitamin D 25 Hydrox, D2 & D3     Standing Status:   Future     Standing Expiration Date:   11/27/2018    Allergen, Region 5 Respiratory Panel     Standing Status:   Future     Standing Expiration Date:   11/27/2018    Common Food Allergen Profile     Standing Status:   Future     Standing Expiration Date:   11/27/2018     Orders Placed This Encounter   Medications    Phentermine-Topiramate (QSYMIA) 15-92 MG CP24     Sig: Take 1 capsule by mouth daily . Dispense:  30 capsule     Refill:  0     Medications Discontinued During This Encounter   Medication Reason    Phentermine-Topiramate (QSYMIA) 15-92 MG CP24 Reorder   chronicity of uri symptoms likely d/t , vasomotor, allergic or reflux etiology    Counseling given: Yes      Return in about 6 months (around 5/27/2018).     Mark Ferrer, DO

## 2017-11-30 LAB
2000687N OAK TREE IGE: <0.1 KU/L
ALLERGEN ASPERGILLUS ALTERNATA IGE: <0.1 KU/L
ALLERGEN ASPERGILLUS FUMIGATUS IGE: <0.1 KU/L
ALLERGEN BERMUDA GRASS IGE: <0.1 KU/L
ALLERGEN BIRCH IGE: <0.1 KU/L
ALLERGEN CAT DANDER IGE: <0.1 KU/L
ALLERGEN CLAMS IGE: <0.1 KU/L
ALLERGEN CODFISH IGE: <0.1 KU/L
ALLERGEN COMMON SHORT RAGWEED IGE: <0.1 KU/L
ALLERGEN CORN IGE: <0.1 KU/L
ALLERGEN COTTONWOOD: <0.1 KU/L
ALLERGEN COW MILK IGE: <0.1 KU/L
ALLERGEN DOG DANDER IGE: <0.1 KU/L
ALLERGEN EGG WHITE IGE: <0.1 KU/L
ALLERGEN ELM IGE: <0.1 KU/L
ALLERGEN FUNGI/MOLD M.RACEMOSUS IGE: <0.1 KU/L
ALLERGEN GERMAN COCKROACH IGE: <0.1 KU/L
ALLERGEN HORMODENDRUM HORDEI IGE: <0.1 KU/L
ALLERGEN MAPLE/BOX ELDER IGE: <0.1 KU/L
ALLERGEN MITE DUST FARINAE IGE: <0.1 KU/L
ALLERGEN MITE DUST PTERONYSSINUS IGE: <0.1 KU/L
ALLERGEN MOUNTAIN CEDAR: <0.1 KU/L
ALLERGEN MOUSE EPITHELIA IGE: <0.1 KU/L
ALLERGEN PEANUT (F13) IGE: <0.1 KU/L
ALLERGEN PECAN TREE IGE: <0.1 KU/L
ALLERGEN PENICILLIUM NOTATUM: <0.1 KU/L
ALLERGEN ROUGH PIGWEED (W14) IGE: <0.1 KU/L
ALLERGEN RUSSIAN THISTLE IGE: <0.1 KU/L
ALLERGEN SCALLOP IGE: <0.1 KU/L
ALLERGEN SEE NOTE: NORMAL
ALLERGEN SHEEP SORREL (W18) IGE: <0.1 KU/L
ALLERGEN SHRIMP IGE: <0.1 KU/L
ALLERGEN SOYBEAN IGE: <0.1 KU/L
ALLERGEN TIMOTHY GRASS: <0.1 KU/L
ALLERGEN TREE SYCAMORE: <0.1 KU/L
ALLERGEN WALNUT IGE: <0.1 KU/L
ALLERGEN WALNUT TREE IGE: <0.1 KU/L
ALLERGEN WHEAT IGE: <0.1 KU/L
ALLERGEN WHITE MULBERRY TREE, IGE: <0.1 KU/L
ALLERGEN, TREE, WHITE ASH IGE: <0.1 KU/L
IGE: 144 KU/L

## 2017-12-13 RX ORDER — TAMSULOSIN HYDROCHLORIDE 0.4 MG/1
CAPSULE ORAL
Qty: 180 CAPSULE | Refills: 1 | Status: SHIPPED | OUTPATIENT
Start: 2017-12-13 | End: 2018-09-02 | Stop reason: SDUPTHER

## 2017-12-27 ENCOUNTER — OFFICE VISIT (OUTPATIENT)
Dept: FAMILY MEDICINE CLINIC | Age: 62
End: 2017-12-27

## 2017-12-27 VITALS
HEART RATE: 90 BPM | RESPIRATION RATE: 16 BRPM | BODY MASS INDEX: 38.8 KG/M2 | WEIGHT: 256 LBS | TEMPERATURE: 97.3 F | HEIGHT: 68 IN | DIASTOLIC BLOOD PRESSURE: 80 MMHG | SYSTOLIC BLOOD PRESSURE: 130 MMHG

## 2017-12-27 DIAGNOSIS — G47.00 INSOMNIA, UNSPECIFIED TYPE: ICD-10-CM

## 2017-12-27 DIAGNOSIS — F41.9 ANXIETY: ICD-10-CM

## 2017-12-27 DIAGNOSIS — E66.9 OBESITY (BMI 30-39.9): Primary | ICD-10-CM

## 2017-12-27 PROCEDURE — G8484 FLU IMMUNIZE NO ADMIN: HCPCS | Performed by: FAMILY MEDICINE

## 2017-12-27 PROCEDURE — 99213 OFFICE O/P EST LOW 20 MIN: CPT | Performed by: FAMILY MEDICINE

## 2017-12-27 PROCEDURE — 3017F COLORECTAL CA SCREEN DOC REV: CPT | Performed by: FAMILY MEDICINE

## 2017-12-27 PROCEDURE — G8417 CALC BMI ABV UP PARAM F/U: HCPCS | Performed by: FAMILY MEDICINE

## 2017-12-27 PROCEDURE — 1036F TOBACCO NON-USER: CPT | Performed by: FAMILY MEDICINE

## 2017-12-27 PROCEDURE — G8427 DOCREV CUR MEDS BY ELIG CLIN: HCPCS | Performed by: FAMILY MEDICINE

## 2017-12-27 RX ORDER — CLONAZEPAM 1 MG/1
TABLET ORAL
Qty: 30 TABLET | Refills: 2 | Status: SHIPPED | OUTPATIENT
Start: 2017-12-27 | End: 2018-04-24 | Stop reason: SDUPTHER

## 2017-12-27 ASSESSMENT — ENCOUNTER SYMPTOMS
GASTROINTESTINAL NEGATIVE: 1
RESPIRATORY NEGATIVE: 1

## 2017-12-27 NOTE — PROGRESS NOTES
Subjective  Yanira So, 58 y.o. male presents today with:  Chief Complaint   Patient presents with    Medication Refill     csm       Here for refill on qsymia. Weight has maintained over the past month. Also needs refill on Klonopin for anxiety. Has been having some issues with insomnia. He has no problems getting to sleep, but he has been having a lot of early morning awakening. Review of Systems   Constitutional: Positive for fatigue. Respiratory: Negative. Cardiovascular: Negative. Gastrointestinal: Negative. Musculoskeletal: Negative. Skin: Negative. Psychiatric/Behavioral: Positive for sleep disturbance. The patient is nervous/anxious. Past Medical History:   Diagnosis Date    Anxiety     Asthma     Depression     [facility]deep [facility] ([facility])     L LEG    Hypertension     Hypogonadism male     Overactive bladder      Past Surgical History:   Procedure Laterality Date    KNEE SURGERY      right    NASAL SEPTUM SURGERY      TONSILLECTOMY AND ADENOIDECTOMY  1961    UPPER GASTROINTESTINAL ENDOSCOPY  1/20/14    DR. Tejinder Mesa     Social History     Social History    Marital status:      Spouse name: N/A    Number of children: N/A    Years of education: N/A     Occupational History    Not on file.      Social History Main Topics    Smoking status: Never Smoker    Smokeless tobacco: Never Used    Alcohol use Not on file    Drug use: Unknown    Sexual activity: Not on file     Other Topics Concern    Not on file     Social History Narrative    No narrative on file     Family History   Problem Relation Age of Onset    High Blood Pressure Mother     Emphysema Father      Allergies   Allergen Reactions    Belviq [Lorcaserin Hcl]      Increased appetite and weight gain     Current Outpatient Prescriptions on File Prior to Visit   Medication Sig Dispense Refill    tamsulosin (FLOMAX) 0.4 MG capsule TAKE 1 CAPSULE TWICE A  capsule 1    omeprazole (PRILOSEC) 20 MG delayed release capsule TAKE 1 CAPSULE DAILY 90 capsule 3    venlafaxine (EFFEXOR XR) 75 MG extended release capsule TAKE 1 CAPSULE DAILY 90 capsule 3    meloxicam (MOBIC) 15 MG tablet TAKE 1 TABLET BY MOUTH DAILY 90 tablet 1    oxybutynin (DITROPAN-XL) 10 MG extended release tablet TAKE 1 TABLET DAILY 90 tablet 3    amLODIPine-benazepril (LOTREL) 10-20 MG per capsule TAKE 1 CAPSULE DAILY 90 capsule 1    dicyclomine (BENTYL) 20 MG tablet       mupirocin (BACTROBAN) 2 % ointment APPLY TO NOSE THREE TIMES A DAY. 0    betamethasone dipropionate (DIPROLENE) 0.05 % ointment Apply topically daily. 45 g 1    buPROPion (WELLBUTRIN SR) 150 MG extended release tablet TAKE 1 TABLET TWICE A  tablet 3    Cholecalciferol (VITAMIN D) 2000 UNITS CAPS capsule Take  by mouth daily. 30 capsule     [DISCONTINUED] oxybutynin (DITROPAN-XL) 10 MG CR tablet TAKE 1 TABLET DAILY 90 tablet 1     No current facility-administered medications on file prior to visit. Objective    Vitals:    12/27/17 1615   BP: 130/80   Pulse: 90   Resp: 16   Temp: 97.3 °F (36.3 °C)   TempSrc: Tympanic   Weight: 256 lb (116.1 kg)   Height: 5' 8\" (1.727 m)     Physical Exam   Constitutional: He is oriented to person, place, and time. He appears well-developed and well-nourished. No distress. HENT:   Head: Normocephalic and atraumatic. Cardiovascular: Normal rate and regular rhythm. Pulmonary/Chest: Effort normal and breath sounds normal.   Abdominal: Soft. Bowel sounds are normal.   Neurological: He is alert and oriented to person, place, and time. Skin: Skin is warm and dry. He is not diaphoretic. Nursing note and vitals reviewed. Assessment & Plan   1. Obesity (BMI 30-39. 9)  Phentermine-Topiramate (QSYMIA) 15-92 MG CP24   2. Anxiety  clonazePAM (KLONOPIN) 1 MG tablet    Aaron Ortiz, PhD [facility])   3.  Insomnia, unspecified type  Aaron Ortiz, PhD [facility]) Orders Placed This Encounter   Procedures   [address] [employer] Tevin, PhD [facility])     Referral Priority:   Routine     Referral Type:   Consult for Advice and Opinion     Referral Reason:   Specialty Services Required     Referred to Provider:   Sally Escobar, PhD     Requested Specialty:   Psychology     Number of Visits Requested:   1     Orders Placed This Encounter   Medications    clonazePAM (KLONOPIN) 1 MG tablet     Sig: TAKE 1 TABLET EVERY DAY AS NEEDED FOR ANXIETY. .     Dispense:  30 tablet     Refill:  2    Phentermine-Topiramate (QSYMIA) 15-92 MG CP24     Sig: Take 1 capsule by mouth daily . Dispense:  30 capsule     Refill:  0     Medications Discontinued During This Encounter   Medication Reason    clonazePAM (KLONOPIN) 1 MG tablet Reorder    Phentermine-Topiramate (QSYMIA) 15-92 MG CP24 Reorder   arrange for behavioral health for insomnia issues  Diet and exercise again reviewed-. Reminded patient to make sure he \"double down \"in January/February and March with diet and exercise    Counseling given: Not Answered      Return in about 1 month (around 1/27/2018).     Nick Hernandez, DO

## 2018-01-16 RX ORDER — BUPROPION HYDROCHLORIDE 150 MG/1
TABLET, EXTENDED RELEASE ORAL
Qty: 180 TABLET | Refills: 3 | Status: SHIPPED | OUTPATIENT
Start: 2018-01-16 | End: 2019-03-11 | Stop reason: SDUPTHER

## 2018-01-29 ENCOUNTER — OFFICE VISIT (OUTPATIENT)
Dept: FAMILY MEDICINE CLINIC | Age: 63
End: 2018-01-29
Payer: COMMERCIAL

## 2018-01-29 VITALS
RESPIRATION RATE: 16 BRPM | BODY MASS INDEX: 38.8 KG/M2 | OXYGEN SATURATION: 99 % | HEIGHT: 68 IN | SYSTOLIC BLOOD PRESSURE: 122 MMHG | HEART RATE: 99 BPM | WEIGHT: 256 LBS | TEMPERATURE: 97.2 F | DIASTOLIC BLOOD PRESSURE: 64 MMHG

## 2018-01-29 DIAGNOSIS — F41.9 ANXIETY: ICD-10-CM

## 2018-01-29 DIAGNOSIS — E66.9 OBESITY (BMI 30-39.9): Primary | ICD-10-CM

## 2018-01-29 DIAGNOSIS — F34.1 DYSTHYMIA: ICD-10-CM

## 2018-01-29 DIAGNOSIS — L30.9 ECZEMA OF SCALP: ICD-10-CM

## 2018-01-29 PROCEDURE — G8427 DOCREV CUR MEDS BY ELIG CLIN: HCPCS | Performed by: FAMILY MEDICINE

## 2018-01-29 PROCEDURE — 99214 OFFICE O/P EST MOD 30 MIN: CPT | Performed by: FAMILY MEDICINE

## 2018-01-29 PROCEDURE — G8417 CALC BMI ABV UP PARAM F/U: HCPCS | Performed by: FAMILY MEDICINE

## 2018-01-29 PROCEDURE — G8484 FLU IMMUNIZE NO ADMIN: HCPCS | Performed by: FAMILY MEDICINE

## 2018-01-29 PROCEDURE — 3017F COLORECTAL CA SCREEN DOC REV: CPT | Performed by: FAMILY MEDICINE

## 2018-01-29 PROCEDURE — 1036F TOBACCO NON-USER: CPT | Performed by: FAMILY MEDICINE

## 2018-01-29 RX ORDER — VENLAFAXINE HYDROCHLORIDE 75 MG/1
150 CAPSULE, EXTENDED RELEASE ORAL DAILY
Qty: 180 CAPSULE | Refills: 3 | Status: SHIPPED | OUTPATIENT
Start: 2018-01-29 | End: 2018-01-30 | Stop reason: SDUPTHER

## 2018-01-29 RX ORDER — CLOBETASOL PROPIONATE 0.5 MG/G
AEROSOL, FOAM TOPICAL
Qty: 100 G | Refills: 5 | Status: SHIPPED | OUTPATIENT
Start: 2018-01-29 | End: 2018-10-02

## 2018-01-29 ASSESSMENT — ENCOUNTER SYMPTOMS
GASTROINTESTINAL NEGATIVE: 1
EYES NEGATIVE: 1
ALLERGIC/IMMUNOLOGIC NEGATIVE: 1
RESPIRATORY NEGATIVE: 1

## 2018-01-29 NOTE — PROGRESS NOTES
Subjective  Otero Fly, 58 y.o. male presents today with:  Chief Complaint   Patient presents with    1 Month Follow-Up    Weight Management    Rash     neck x 10 days        Patient in follow-up on qsymia/weight management. He has been maintaining his weight at the same level over the past 2-3 months. He also has some increase in seasonal depression and would like to increase Effexor. also has rash on scalp. We will also do controlled substance monitoring for anxiety/Klonopin therapy. Patient reports good improvement in his underlying anxiety with no medication side effects noted. Review of Systems   Constitutional: Negative. HENT: Negative. Eyes: Negative. Respiratory: Negative. Cardiovascular: Negative. Gastrointestinal: Negative. Endocrine: Negative. Genitourinary: Negative. Musculoskeletal: Negative. Skin: Positive for rash (scalp). Allergic/Immunologic: Negative. Neurological: Negative. Hematological: Negative. Psychiatric/Behavioral: Positive for dysphoric mood. Negative for agitation, decreased concentration, self-injury, sleep disturbance and suicidal ideas. Nervous/anxious: well-controlled with medications. Past Medical History:   Diagnosis Date    Anxiety     Asthma     Depression     DVT (deep venous thrombosis) ([facility])     L LEG    Hypertension     Hypogonadism male     Overactive bladder      Past Surgical History:   Procedure Laterality Date    KNEE SURGERY      right    NASAL SEPTUM SURGERY      TONSILLECTOMY AND ADENOIDECTOMY  1961    UPPER GASTROINTESTINAL ENDOSCOPY  1/20/14    DR. Kai Chandra     Social History     Social History    Marital status:      Spouse name: N/A    Number of children: N/A    Years of education: N/A     Occupational History    Not on file.      Social History Main Topics    Smoking status: Never Smoker    Smokeless tobacco: Never Used    Alcohol use Not on file    Drug use: Unknown    Sexual activity: Not on file     Other Topics Concern    Not on file     Social History Narrative    No narrative on file     Family History   Problem Relation Age of Onset    High Blood Pressure Mother     Emphysema Father      Allergies   Allergen Reactions    Belviq [Lorcaserin Hcl]      Increased appetite and weight gain     Current Outpatient Prescriptions on File Prior to Visit   Medication Sig Dispense Refill    buPROPion (WELLBUTRIN SR) 150 MG extended release tablet TAKE 1 TABLET TWICE A  tablet 3    clonazePAM (KLONOPIN) 1 MG tablet TAKE 1 TABLET EVERY DAY AS NEEDED FOR ANXIETY. . 30 tablet 2    tamsulosin (FLOMAX) 0.4 MG capsule TAKE 1 CAPSULE TWICE A  capsule 1    omeprazole (PRILOSEC) 20 MG delayed release capsule TAKE 1 CAPSULE DAILY 90 capsule 3    meloxicam (MOBIC) 15 MG tablet TAKE 1 TABLET BY MOUTH DAILY 90 tablet 1    oxybutynin (DITROPAN-XL) 10 MG extended release tablet TAKE 1 TABLET DAILY 90 tablet 3    amLODIPine-benazepril (LOTREL) 10-20 MG per capsule TAKE 1 CAPSULE DAILY 90 capsule 1    dicyclomine (BENTYL) 20 MG tablet       mupirocin (BACTROBAN) 2 % ointment APPLY TO NOSE THREE TIMES A DAY. 0    betamethasone dipropionate (DIPROLENE) 0.05 % ointment Apply topically daily. 45 g 1    Cholecalciferol (VITAMIN D) 2000 UNITS CAPS capsule Take  by mouth daily. 30 capsule     [DISCONTINUED] oxybutynin (DITROPAN-XL) 10 MG CR tablet TAKE 1 TABLET DAILY 90 tablet 1     No current facility-administered medications on file prior to visit. Objective    Vitals:    01/29/18 1609   BP: 122/64   Pulse: 99   Resp: 16   Temp: 97.2 °F (36.2 °C)   TempSrc: Tympanic   SpO2: 99%   Weight: 256 lb (116.1 kg)   Height: 5' 8\" (1.727 m)     Physical Exam   Constitutional: Vital signs are normal. He appears well-developed. HENT:   Head: Normocephalic and atraumatic. Right Ear: Tympanic membrane, external ear and ear canal normal. Tympanic membrane is not injected.  No middle ear 2. Anxiety  venlafaxine (EFFEXOR XR) 75 MG extended release capsule   3. Eczema of scalp  clobetasol (OLUX) 0.05 % foam   4. Dysthymia  venlafaxine (EFFEXOR XR) 75 MG extended release capsule     No orders of the defined types were placed in this encounter. Orders Placed This Encounter   Medications    clobetasol (OLUX) 0.05 % foam     Sig: Apply topically 2 times daily. Dispense:  100 g     Refill:  5    venlafaxine (EFFEXOR XR) 75 MG extended release capsule     Sig: Take 2 capsules by mouth daily TAKE 1 CAPSULE DAILY     Dispense:  180 capsule     Refill:  3    Phentermine-Topiramate (QSYMIA) 15-92 MG CP24     Sig: Take 1 capsule by mouth daily for 30 days. Dispense:  30 capsule     Refill:  0     Medications Discontinued During This Encounter   Medication Reason    venlafaxine (EFFEXOR XR) 75 MG extended release capsule Reorder    Phentermine-Topiramate (QSYMIA) 15-92 MG CP24 Reorder   discussed light therapy as well but will increase Effexor XR to 2 tablets of the 75's daily I.e. 150 mg. Once patient is out and exercising on a daily basis. In April, when he starts umpiring again, I would like him to taper back to the 75 mg daily at that point.-Patient stated understanding and acceptance of that plan. Counseling given: Yes      Return if symptoms worsen or fail to improve.     Marco Gift, DO

## 2018-01-30 ENCOUNTER — TELEPHONE (OUTPATIENT)
Dept: FAMILY MEDICINE CLINIC | Age: 63
End: 2018-01-30

## 2018-01-30 DIAGNOSIS — F41.9 ANXIETY: ICD-10-CM

## 2018-01-30 DIAGNOSIS — F34.1 DYSTHYMIA: ICD-10-CM

## 2018-01-30 RX ORDER — VENLAFAXINE HYDROCHLORIDE 75 MG/1
150 CAPSULE, EXTENDED RELEASE ORAL DAILY
Qty: 180 CAPSULE | Refills: 3 | Status: SHIPPED | OUTPATIENT
Start: 2018-01-30 | End: 2019-02-28 | Stop reason: SDUPTHER

## 2018-02-19 DIAGNOSIS — L30.0 NUMMULAR ECZEMA: ICD-10-CM

## 2018-02-19 RX ORDER — BETAMETHASONE DIPROPIONATE 0.05 %
OINTMENT (GRAM) TOPICAL
Qty: 45 G | Refills: 2 | Status: SHIPPED | OUTPATIENT
Start: 2018-02-19 | End: 2018-10-02

## 2018-02-20 RX ORDER — AMLODIPINE BESYLATE AND BENAZEPRIL HYDROCHLORIDE 10; 20 MG/1; MG/1
CAPSULE ORAL
Qty: 90 CAPSULE | Refills: 1 | Status: SHIPPED | OUTPATIENT
Start: 2018-02-20 | End: 2018-08-25 | Stop reason: SDUPTHER

## 2018-03-05 PROBLEM — M47.817 LUMBOSACRAL SPONDYLOSIS WITHOUT MYELOPATHY: Status: ACTIVE | Noted: 2017-02-02

## 2018-03-19 DIAGNOSIS — M15.9 PRIMARY OSTEOARTHRITIS INVOLVING MULTIPLE JOINTS: ICD-10-CM

## 2018-03-19 RX ORDER — MELOXICAM 15 MG/1
TABLET ORAL
Qty: 90 TABLET | Refills: 0 | Status: SHIPPED | OUTPATIENT
Start: 2018-03-19 | End: 2018-06-15 | Stop reason: SDUPTHER

## 2018-04-24 DIAGNOSIS — F41.9 ANXIETY: ICD-10-CM

## 2018-04-24 RX ORDER — CLONAZEPAM 1 MG/1
TABLET ORAL
Qty: 30 TABLET | Refills: 2 | OUTPATIENT
Start: 2018-04-24

## 2018-04-24 RX ORDER — CLONAZEPAM 1 MG/1
TABLET ORAL
Qty: 5 TABLET | Refills: 0 | Status: SHIPPED | OUTPATIENT
Start: 2018-04-24 | End: 2018-04-30 | Stop reason: SDUPTHER

## 2018-04-30 ENCOUNTER — OFFICE VISIT (OUTPATIENT)
Dept: FAMILY MEDICINE CLINIC | Age: 63
End: 2018-04-30
Payer: COMMERCIAL

## 2018-04-30 VITALS
TEMPERATURE: 98.7 F | HEIGHT: 69 IN | WEIGHT: 268 LBS | OXYGEN SATURATION: 98 % | HEART RATE: 98 BPM | SYSTOLIC BLOOD PRESSURE: 122 MMHG | DIASTOLIC BLOOD PRESSURE: 80 MMHG | RESPIRATION RATE: 16 BRPM | BODY MASS INDEX: 39.69 KG/M2

## 2018-04-30 DIAGNOSIS — F41.9 ANXIETY: Primary | ICD-10-CM

## 2018-04-30 PROCEDURE — G8417 CALC BMI ABV UP PARAM F/U: HCPCS | Performed by: FAMILY MEDICINE

## 2018-04-30 PROCEDURE — 99213 OFFICE O/P EST LOW 20 MIN: CPT | Performed by: FAMILY MEDICINE

## 2018-04-30 PROCEDURE — 3017F COLORECTAL CA SCREEN DOC REV: CPT | Performed by: FAMILY MEDICINE

## 2018-04-30 PROCEDURE — 1036F TOBACCO NON-USER: CPT | Performed by: FAMILY MEDICINE

## 2018-04-30 PROCEDURE — G8427 DOCREV CUR MEDS BY ELIG CLIN: HCPCS | Performed by: FAMILY MEDICINE

## 2018-04-30 RX ORDER — CLONAZEPAM 1 MG/1
1 TABLET ORAL DAILY PRN
Qty: 30 TABLET | Refills: 2 | Status: SHIPPED | OUTPATIENT
Start: 2018-04-30 | End: 2018-07-29 | Stop reason: SDUPTHER

## 2018-04-30 ASSESSMENT — ENCOUNTER SYMPTOMS
RESPIRATORY NEGATIVE: 1
GASTROINTESTINAL NEGATIVE: 1

## 2018-05-24 ENCOUNTER — TELEPHONE (OUTPATIENT)
Dept: FAMILY MEDICINE CLINIC | Age: 63
End: 2018-05-24

## 2018-05-24 DIAGNOSIS — F34.1 DYSTHYMIA: ICD-10-CM

## 2018-05-24 DIAGNOSIS — I10 ESSENTIAL HYPERTENSION: Primary | ICD-10-CM

## 2018-05-24 DIAGNOSIS — F41.9 ANXIETY: ICD-10-CM

## 2018-05-26 DIAGNOSIS — F34.1 DYSTHYMIA: ICD-10-CM

## 2018-05-26 DIAGNOSIS — E55.9 VITAMIN D DEFICIENCY: ICD-10-CM

## 2018-05-26 DIAGNOSIS — I10 ESSENTIAL HYPERTENSION: ICD-10-CM

## 2018-05-26 DIAGNOSIS — F41.9 ANXIETY: ICD-10-CM

## 2018-05-26 DIAGNOSIS — Z12.5 SPECIAL SCREENING EXAMINATION FOR NEOPLASM OF PROSTATE: ICD-10-CM

## 2018-05-26 DIAGNOSIS — E83.51 HYPOCALCEMIA: ICD-10-CM

## 2018-05-26 DIAGNOSIS — E66.9 OBESITY (BMI 30-39.9): ICD-10-CM

## 2018-05-26 LAB
ALBUMIN SERPL-MCNC: 4.3 G/DL (ref 3.9–4.9)
ALP BLD-CCNC: 94 U/L (ref 35–104)
ALT SERPL-CCNC: 20 U/L (ref 0–41)
ANION GAP SERPL CALCULATED.3IONS-SCNC: 14 MEQ/L (ref 7–13)
AST SERPL-CCNC: 16 U/L (ref 0–40)
BILIRUB SERPL-MCNC: 0.3 MG/DL (ref 0–1.2)
BUN BLDV-MCNC: 21 MG/DL (ref 8–23)
CALCIUM SERPL-MCNC: 8.7 MG/DL (ref 8.6–10.2)
CHLORIDE BLD-SCNC: 103 MEQ/L (ref 98–107)
CHOLESTEROL, TOTAL: 179 MG/DL (ref 0–199)
CO2: 24 MEQ/L (ref 22–29)
CREAT SERPL-MCNC: 0.81 MG/DL (ref 0.7–1.2)
GFR AFRICAN AMERICAN: >60
GFR NON-AFRICAN AMERICAN: >60
GLOBULIN: 2 G/DL (ref 2.3–3.5)
GLUCOSE BLD-MCNC: 86 MG/DL (ref 74–109)
HDLC SERPL-MCNC: 63 MG/DL (ref 40–59)
LDL CHOLESTEROL CALCULATED: 106 MG/DL (ref 0–129)
POTASSIUM SERPL-SCNC: 4.3 MEQ/L (ref 3.5–5.1)
PROSTATE SPECIFIC ANTIGEN: 1.03 NG/ML (ref 0–5.4)
SODIUM BLD-SCNC: 141 MEQ/L (ref 132–144)
TOTAL PROTEIN: 6.3 G/DL (ref 6.4–8.1)
TRIGL SERPL-MCNC: 48 MG/DL (ref 0–200)
TSH SERPL DL<=0.05 MIU/L-ACNC: 2.44 UIU/ML (ref 0.27–4.2)

## 2018-05-29 ENCOUNTER — OFFICE VISIT (OUTPATIENT)
Dept: FAMILY MEDICINE CLINIC | Age: 63
End: 2018-05-29
Payer: COMMERCIAL

## 2018-05-29 VITALS
HEART RATE: 105 BPM | SYSTOLIC BLOOD PRESSURE: 102 MMHG | RESPIRATION RATE: 16 BRPM | OXYGEN SATURATION: 98 % | HEIGHT: 69 IN | TEMPERATURE: 99 F | BODY MASS INDEX: 40.61 KG/M2 | DIASTOLIC BLOOD PRESSURE: 70 MMHG | WEIGHT: 274.2 LBS

## 2018-05-29 DIAGNOSIS — Z12.5 SPECIAL SCREENING EXAMINATION FOR NEOPLASM OF PROSTATE: ICD-10-CM

## 2018-05-29 DIAGNOSIS — I10 ESSENTIAL HYPERTENSION: ICD-10-CM

## 2018-05-29 DIAGNOSIS — F41.9 ANXIETY: Primary | ICD-10-CM

## 2018-05-29 PROCEDURE — 1036F TOBACCO NON-USER: CPT | Performed by: FAMILY MEDICINE

## 2018-05-29 PROCEDURE — G8427 DOCREV CUR MEDS BY ELIG CLIN: HCPCS | Performed by: FAMILY MEDICINE

## 2018-05-29 PROCEDURE — G8417 CALC BMI ABV UP PARAM F/U: HCPCS | Performed by: FAMILY MEDICINE

## 2018-05-29 PROCEDURE — 3017F COLORECTAL CA SCREEN DOC REV: CPT | Performed by: FAMILY MEDICINE

## 2018-05-29 PROCEDURE — 99214 OFFICE O/P EST MOD 30 MIN: CPT | Performed by: FAMILY MEDICINE

## 2018-05-29 ASSESSMENT — ENCOUNTER SYMPTOMS
ALLERGIC/IMMUNOLOGIC NEGATIVE: 1
RESPIRATORY NEGATIVE: 1
EYES NEGATIVE: 1
GASTROINTESTINAL NEGATIVE: 1

## 2018-05-29 ASSESSMENT — PATIENT HEALTH QUESTIONNAIRE - PHQ9
1. LITTLE INTEREST OR PLEASURE IN DOING THINGS: 0
SUM OF ALL RESPONSES TO PHQ9 QUESTIONS 1 & 2: 0
2. FEELING DOWN, DEPRESSED OR HOPELESS: 0
SUM OF ALL RESPONSES TO PHQ QUESTIONS 1-9: 0

## 2018-05-31 LAB
VITAMIN D2 AND D3, TOTAL: 29.9 NG/ML (ref 30–80)
VITAMIN D2, 25 HYDROXY: <1 NG/ML
VITAMIN D3,25 HYDROXY: 29.9 NG/ML

## 2018-07-18 ENCOUNTER — OFFICE VISIT (OUTPATIENT)
Dept: FAMILY MEDICINE CLINIC | Age: 63
End: 2018-07-18
Payer: COMMERCIAL

## 2018-07-18 VITALS
RESPIRATION RATE: 14 BRPM | TEMPERATURE: 98.6 F | OXYGEN SATURATION: 97 % | DIASTOLIC BLOOD PRESSURE: 80 MMHG | BODY MASS INDEX: 41.47 KG/M2 | SYSTOLIC BLOOD PRESSURE: 140 MMHG | HEART RATE: 78 BPM | WEIGHT: 280 LBS | HEIGHT: 69 IN

## 2018-07-18 DIAGNOSIS — M15.9 PRIMARY OSTEOARTHRITIS INVOLVING MULTIPLE JOINTS: ICD-10-CM

## 2018-07-18 DIAGNOSIS — R60.0 EDEMA OF LOWER EXTREMITY: ICD-10-CM

## 2018-07-18 DIAGNOSIS — L03.116 CELLULITIS OF LEFT LOWER EXTREMITY: Primary | ICD-10-CM

## 2018-07-18 DIAGNOSIS — M48.062 SPINAL STENOSIS OF LUMBAR REGION WITH NEUROGENIC CLAUDICATION: ICD-10-CM

## 2018-07-18 PROCEDURE — G8427 DOCREV CUR MEDS BY ELIG CLIN: HCPCS | Performed by: FAMILY MEDICINE

## 2018-07-18 PROCEDURE — 3017F COLORECTAL CA SCREEN DOC REV: CPT | Performed by: FAMILY MEDICINE

## 2018-07-18 PROCEDURE — 1036F TOBACCO NON-USER: CPT | Performed by: FAMILY MEDICINE

## 2018-07-18 PROCEDURE — G8417 CALC BMI ABV UP PARAM F/U: HCPCS | Performed by: FAMILY MEDICINE

## 2018-07-18 PROCEDURE — 99214 OFFICE O/P EST MOD 30 MIN: CPT | Performed by: FAMILY MEDICINE

## 2018-07-18 RX ORDER — DOXYCYCLINE HYCLATE 100 MG
100 TABLET ORAL 2 TIMES DAILY
Qty: 20 TABLET | Refills: 0 | Status: SHIPPED | OUTPATIENT
Start: 2018-07-18 | End: 2018-07-28

## 2018-07-18 RX ORDER — POTASSIUM CHLORIDE 20 MEQ/1
20 TABLET, EXTENDED RELEASE ORAL DAILY PRN
Qty: 30 TABLET | Refills: 0 | Status: SHIPPED | OUTPATIENT
Start: 2018-07-18 | End: 2018-08-14 | Stop reason: SDUPTHER

## 2018-07-18 RX ORDER — FUROSEMIDE 40 MG/1
40 TABLET ORAL DAILY PRN
Qty: 30 TABLET | Refills: 0 | Status: SHIPPED | OUTPATIENT
Start: 2018-07-18 | End: 2018-08-14 | Stop reason: SDUPTHER

## 2018-07-18 ASSESSMENT — ENCOUNTER SYMPTOMS
EYES NEGATIVE: 1
RESPIRATORY NEGATIVE: 1
EYE PAIN: 0
SORE THROAT: 0
SHORTNESS OF BREATH: 0
ALLERGIC/IMMUNOLOGIC NEGATIVE: 1
DIARRHEA: 0
VOMITING: 0
GASTROINTESTINAL NEGATIVE: 1
RHINORRHEA: 0
COUGH: 0
NAIL CHANGES: 0

## 2018-07-18 NOTE — PROGRESS NOTES
CAPSULE DAILY 90 capsule 3    oxybutynin (DITROPAN-XL) 10 MG extended release tablet TAKE 1 TABLET DAILY 90 tablet 3    dicyclomine (BENTYL) 20 MG tablet       mupirocin (BACTROBAN) 2 % ointment APPLY TO NOSE THREE TIMES A DAY. 0    Cholecalciferol (VITAMIN D) 2000 UNITS CAPS capsule Take  by mouth daily. 30 capsule     [DISCONTINUED] oxybutynin (DITROPAN-XL) 10 MG CR tablet TAKE 1 TABLET DAILY 90 tablet 1     No current facility-administered medications on file prior to visit. Objective    Vitals:    07/18/18 1606 07/18/18 1609   BP: (!) 142/80 (!) 140/80   Pulse: 78    Resp: 14    Temp: 98.6 °F (37 °C)    SpO2: 97%    Weight: 280 lb (127 kg)    Height: 5' 9\" (1.753 m)      Physical Exam   Constitutional: Vital signs are normal. He appears well-developed. HENT:   Head: Normocephalic and atraumatic. Right Ear: Tympanic membrane, external ear and ear canal normal. Tympanic membrane is not injected. No middle ear effusion. Left Ear: Tympanic membrane, external ear and ear canal normal. Tympanic membrane is not injected. No middle ear effusion. Nose: Nose normal. No mucosal edema or rhinorrhea. Right sinus exhibits no maxillary sinus tenderness and no frontal sinus tenderness. Left sinus exhibits no maxillary sinus tenderness and no frontal sinus tenderness. Eyes: Conjunctivae, EOM and lids are normal. Pupils are equal, round, and reactive to light. Neck: Normal range of motion. Neck supple. No JVD present. No muscular tenderness present. No neck rigidity. No tracheal deviation present. No thyroid mass and no thyromegaly present. Cardiovascular: Normal rate, regular rhythm and intact distal pulses. Pulmonary/Chest: Effort normal. He has no decreased breath sounds. He has no wheezes. He has no rhonchi. He has no rales. He exhibits no tenderness and no deformity. Abdominal: Soft. Normal appearance and bowel sounds are normal. He exhibits no distension and no mass.  There is no splenomegaly or hepatomegaly. There is no tenderness. There is no rigidity, no rebound and no guarding. No hernia. Musculoskeletal: Normal range of motion. Right knee: Normal.        Left knee: Normal.        Cervical back: Normal.        Thoracic back: Normal.        Lumbar back: Normal.        Lymphadenopathy:     He has no cervical adenopathy. Neurological: He is alert. He has normal strength. He displays no atrophy and no tremor. No cranial nerve deficit or sensory deficit. Coordination and gait normal.   Skin: Skin is warm, dry and intact. Rash noted. There is erythema. Psychiatric: He has a normal mood and affect. His speech is normal and behavior is normal. Judgment and thought content normal. Cognition and memory are normal.            Assessment & Plan    Diagnosis Orders   1. Cellulitis of left lower extremity  doxycycline hyclate (VIBRA-TABS) 100 MG tablet   2. Edema of lower extremity  furosemide (LASIX) 40 MG tablet    potassium chloride (KLOR-CON M) 20 MEQ extended release tablet   3. Primary osteoarthritis involving multiple joints     4. Spinal stenosis of lumbar region with neurogenic claudication       No orders of the defined types were placed in this encounter. Orders Placed This Encounter   Medications    furosemide (LASIX) 40 MG tablet     Sig: Take 1 tablet by mouth daily as needed (edema)     Dispense:  30 tablet     Refill:  0    potassium chloride (KLOR-CON M) 20 MEQ extended release tablet     Sig: Take 1 tablet by mouth daily as needed (edema)     Dispense:  30 tablet     Refill:  0    doxycycline hyclate (VIBRA-TABS) 100 MG tablet     Sig: Take 1 tablet by mouth 2 times daily for 10 days     Dispense:  20 tablet     Refill:  0     There are no discontinued medications. Counseling given: Yes  keep legs elevated x 1-2 days then start using compression stockins    Return if symptoms worsen or fail to improve.     Mani Cabrera, DO

## 2018-07-29 DIAGNOSIS — F41.9 ANXIETY: ICD-10-CM

## 2018-07-30 RX ORDER — CLONAZEPAM 1 MG/1
TABLET ORAL
Qty: 30 TABLET | Refills: 0 | Status: SHIPPED | OUTPATIENT
Start: 2018-07-30 | End: 2018-08-29 | Stop reason: SDUPTHER

## 2018-08-27 RX ORDER — AMLODIPINE BESYLATE AND BENAZEPRIL HYDROCHLORIDE 10; 20 MG/1; MG/1
CAPSULE ORAL
Qty: 90 CAPSULE | Refills: 3 | Status: SHIPPED | OUTPATIENT
Start: 2018-08-27

## 2018-08-29 ENCOUNTER — OFFICE VISIT (OUTPATIENT)
Dept: FAMILY MEDICINE CLINIC | Age: 63
End: 2018-08-29
Payer: COMMERCIAL

## 2018-08-29 VITALS
RESPIRATION RATE: 15 BRPM | DIASTOLIC BLOOD PRESSURE: 86 MMHG | TEMPERATURE: 97.8 F | WEIGHT: 288 LBS | HEART RATE: 80 BPM | BODY MASS INDEX: 42.65 KG/M2 | HEIGHT: 69 IN | SYSTOLIC BLOOD PRESSURE: 138 MMHG

## 2018-08-29 DIAGNOSIS — F41.9 ANXIETY: ICD-10-CM

## 2018-08-29 DIAGNOSIS — R60.0 EDEMA OF LOWER EXTREMITY: ICD-10-CM

## 2018-08-29 DIAGNOSIS — M54.16 LUMBAR RADICULOPATHY: Primary | ICD-10-CM

## 2018-08-29 PROCEDURE — G8427 DOCREV CUR MEDS BY ELIG CLIN: HCPCS | Performed by: FAMILY MEDICINE

## 2018-08-29 PROCEDURE — 1036F TOBACCO NON-USER: CPT | Performed by: FAMILY MEDICINE

## 2018-08-29 PROCEDURE — 3017F COLORECTAL CA SCREEN DOC REV: CPT | Performed by: FAMILY MEDICINE

## 2018-08-29 PROCEDURE — G8417 CALC BMI ABV UP PARAM F/U: HCPCS | Performed by: FAMILY MEDICINE

## 2018-08-29 PROCEDURE — 99214 OFFICE O/P EST MOD 30 MIN: CPT | Performed by: FAMILY MEDICINE

## 2018-08-29 RX ORDER — GABAPENTIN 300 MG/1
300 CAPSULE ORAL 3 TIMES DAILY
Qty: 90 CAPSULE | Refills: 2 | Status: SHIPPED | OUTPATIENT
Start: 2018-08-29 | End: 2018-09-07

## 2018-08-29 RX ORDER — POTASSIUM CHLORIDE 20 MEQ/1
20 TABLET, EXTENDED RELEASE ORAL DAILY PRN
Qty: 30 TABLET | Refills: 11 | Status: SHIPPED | OUTPATIENT
Start: 2018-08-29

## 2018-08-29 RX ORDER — CLONAZEPAM 0.5 MG/1
0.5 TABLET ORAL 2 TIMES DAILY PRN
Qty: 60 TABLET | Refills: 2 | Status: SHIPPED | OUTPATIENT
Start: 2018-08-29 | End: 2018-10-02

## 2018-08-29 ASSESSMENT — ENCOUNTER SYMPTOMS
GASTROINTESTINAL NEGATIVE: 1
ALLERGIC/IMMUNOLOGIC NEGATIVE: 1
RESPIRATORY NEGATIVE: 1
EYES NEGATIVE: 1

## 2018-09-05 RX ORDER — OXYBUTYNIN CHLORIDE 10 MG/1
TABLET, EXTENDED RELEASE ORAL
Qty: 90 TABLET | Refills: 3 | Status: SHIPPED | OUTPATIENT
Start: 2018-09-05

## 2018-09-05 RX ORDER — TAMSULOSIN HYDROCHLORIDE 0.4 MG/1
CAPSULE ORAL
Qty: 180 CAPSULE | Refills: 3 | Status: SHIPPED | OUTPATIENT
Start: 2018-09-05

## 2018-09-07 ENCOUNTER — OFFICE VISIT (OUTPATIENT)
Dept: FAMILY MEDICINE CLINIC | Age: 63
End: 2018-09-07
Payer: COMMERCIAL

## 2018-09-07 VITALS
OXYGEN SATURATION: 98 % | DIASTOLIC BLOOD PRESSURE: 80 MMHG | HEART RATE: 68 BPM | HEIGHT: 69 IN | BODY MASS INDEX: 42.8 KG/M2 | TEMPERATURE: 98.6 F | WEIGHT: 289 LBS | SYSTOLIC BLOOD PRESSURE: 122 MMHG | RESPIRATION RATE: 16 BRPM

## 2018-09-07 DIAGNOSIS — Z23 NEED FOR INFLUENZA VACCINATION: ICD-10-CM

## 2018-09-07 DIAGNOSIS — I87.2 VENOUS (PERIPHERAL) INSUFFICIENCY: ICD-10-CM

## 2018-09-07 DIAGNOSIS — M48.062 SPINAL STENOSIS OF LUMBAR REGION WITH NEUROGENIC CLAUDICATION: ICD-10-CM

## 2018-09-07 DIAGNOSIS — E66.01 CLASS 3 SEVERE OBESITY DUE TO EXCESS CALORIES WITH SERIOUS COMORBIDITY AND BODY MASS INDEX (BMI) OF 40.0 TO 44.9 IN ADULT (HCC): Primary | ICD-10-CM

## 2018-09-07 DIAGNOSIS — R60.0 EDEMA OF LOWER EXTREMITY: ICD-10-CM

## 2018-09-07 PROBLEM — E66.813 CLASS 3 SEVERE OBESITY DUE TO EXCESS CALORIES WITH SERIOUS COMORBIDITY AND BODY MASS INDEX (BMI) OF 40.0 TO 44.9 IN ADULT: Status: ACTIVE | Noted: 2017-02-21

## 2018-09-07 PROCEDURE — G8417 CALC BMI ABV UP PARAM F/U: HCPCS | Performed by: FAMILY MEDICINE

## 2018-09-07 PROCEDURE — 3017F COLORECTAL CA SCREEN DOC REV: CPT | Performed by: FAMILY MEDICINE

## 2018-09-07 PROCEDURE — 90686 IIV4 VACC NO PRSV 0.5 ML IM: CPT | Performed by: FAMILY MEDICINE

## 2018-09-07 PROCEDURE — 99214 OFFICE O/P EST MOD 30 MIN: CPT | Performed by: FAMILY MEDICINE

## 2018-09-07 PROCEDURE — 1036F TOBACCO NON-USER: CPT | Performed by: FAMILY MEDICINE

## 2018-09-07 PROCEDURE — 90471 IMMUNIZATION ADMIN: CPT | Performed by: FAMILY MEDICINE

## 2018-09-07 PROCEDURE — G8427 DOCREV CUR MEDS BY ELIG CLIN: HCPCS | Performed by: FAMILY MEDICINE

## 2018-09-07 RX ORDER — METOLAZONE 5 MG/1
5 TABLET ORAL DAILY
Qty: 30 TABLET | Refills: 0 | Status: SHIPPED | OUTPATIENT
Start: 2018-09-07 | End: 2018-10-11 | Stop reason: SDUPTHER

## 2018-09-07 ASSESSMENT — ENCOUNTER SYMPTOMS
ALLERGIC/IMMUNOLOGIC NEGATIVE: 1
GASTROINTESTINAL NEGATIVE: 1
RESPIRATORY NEGATIVE: 1
EYES NEGATIVE: 1

## 2018-09-07 NOTE — PROGRESS NOTES
Occupational History    Not on file. Social History Main Topics    Smoking status: Never Smoker    Smokeless tobacco: Never Used    Alcohol use Not on file    Drug use: Unknown    Sexual activity: Not on file     Other Topics Concern    Not on file     Social History Narrative    No narrative on file     Family History   Problem Relation Age of Onset    High Blood Pressure Mother     Emphysema Father      Allergies   Allergen Reactions    Belviq [Lorcaserin Hcl]      Increased appetite and weight gain     Current Outpatient Prescriptions on File Prior to Visit   Medication Sig Dispense Refill    oxybutynin (DITROPAN-XL) 10 MG extended release tablet TAKE 1 TABLET DAILY 90 tablet 3    tamsulosin (FLOMAX) 0.4 MG capsule TAKE 1 CAPSULE TWICE A  capsule 3    potassium chloride (KLOR-CON M20) 20 MEQ extended release tablet Take 1 tablet by mouth daily as needed (edema) 30 tablet 11    clonazePAM (KLONOPIN) 0.5 MG tablet Take 1 tablet by mouth 2 times daily as needed for Anxiety for up to 90 days. . 60 tablet 2    amLODIPine-benazepril (LOTREL) 10-20 MG per capsule TAKE 1 CAPSULE DAILY 90 capsule 3    furosemide (LASIX) 40 MG tablet TAKE 1 TABLET BY MOUTH DAILY AS NEEDED (EDEMA) 30 tablet 11    meloxicam (MOBIC) 15 MG tablet TAKE 1 TABLET BY MOUTH DAILY 90 tablet 3    betamethasone dipropionate (DIPROLENE) 0.05 % ointment Apply topically daily. 45 g 2    venlafaxine (EFFEXOR XR) 75 MG extended release capsule Take 2 capsules by mouth daily 180 capsule 3    clobetasol (OLUX) 0.05 % foam Apply topically 2 times daily. 100 g 5    buPROPion (WELLBUTRIN SR) 150 MG extended release tablet TAKE 1 TABLET TWICE A  tablet 3    omeprazole (PRILOSEC) 20 MG delayed release capsule TAKE 1 CAPSULE DAILY 90 capsule 3    dicyclomine (BENTYL) 20 MG tablet       mupirocin (BACTROBAN) 2 % ointment APPLY TO NOSE THREE TIMES A DAY.   0    Cholecalciferol (VITAMIN D) 2000 UNITS CAPS capsule Take  by He has no cervical adenopathy. Neurological: He is alert. He has normal strength. He displays no atrophy and no tremor. No cranial nerve deficit or sensory deficit. Coordination and gait normal.   Skin: Skin is warm, dry and intact. No rash noted. Psychiatric: He has a normal mood and affect. His speech is normal and behavior is normal. Judgment and thought content normal. Cognition and memory are normal.            Assessment & Plan    Diagnosis Orders   1. Class 3 severe obesity due to excess calories with serious comorbidity and body mass index (BMI) of 40.0 to 44.9 in adult [facility])  Amb Referral to Nutrition Services   2. Need for influenza vaccination  INFLUENZA, QUADV, 3 YRS AND OLDER, IM, PF, PREFILL SYR OR SDV, 0.5ML (FLUZONE QUADV, PF)   3. Spinal stenosis of lumbar region with neurogenic claudication     4. Edema of lower extremity  metolazone (ZAROXOLYN) 5 MG tablet    Ambulatory referral to Interventional Radiology   5.  Venous (peripheral) insufficiency  metolazone (ZAROXOLYN) 5 MG tablet    Ambulatory referral to Interventional Radiology     Orders Placed This Encounter   Procedures    INFLUENZA, QUADV, 3 YRS AND OLDER, IM, PF, PREFILL SYR OR SDV, 0.5ML (FLUZONE QUADV, PF)    Amb Referral to Nutrition Services     Referral Priority:   Routine     Referral Type:   Consult for Advice and Opinion     Referral Reason:   Specialty Services Required     Requested Specialty:   Dietitian     Number of Visits Requested:   1    Ambulatory referral to Interventional Radiology     Referral Priority:   Routine     Referral Type:   Consult for Advice and Opinion     Referral Reason:   Specialty Services Required     Referred to Provider:   Mary Rust MD     Requested Specialty:   Radiology     Number of Visits Requested:   1     Orders Placed This Encounter   Medications    metolazone (ZAROXOLYN) 5 MG tablet     Sig: Take 1 tablet by mouth daily     Dispense:  30 tablet     Refill:  0     Medications

## 2018-09-07 NOTE — PROGRESS NOTES
Vaccine Information Sheet, \"Influenza - Inactivated\"  given to Gilberto Johnson, or parent/legal guardian of  Gilberto Johnson and verbalized understanding. Patient responses:    Have you ever had a reaction to a flu vaccine? NO  Are you able to eat eggs without adverse effects? YES Do you have any current illness? NO  Have you ever had Guillian Avalon Syndrome? NO    Flu vaccine given per order. Please see immunization tab.

## 2018-10-02 ENCOUNTER — OFFICE VISIT (OUTPATIENT)
Dept: INTERVENTIONAL RADIOLOGY/VASCULAR | Age: 63
End: 2018-10-02
Payer: COMMERCIAL

## 2018-10-02 VITALS
WEIGHT: 281.8 LBS | OXYGEN SATURATION: 98 % | RESPIRATION RATE: 14 BRPM | BODY MASS INDEX: 41.61 KG/M2 | HEART RATE: 94 BPM | DIASTOLIC BLOOD PRESSURE: 60 MMHG | SYSTOLIC BLOOD PRESSURE: 118 MMHG

## 2018-10-02 DIAGNOSIS — M79.669 PAIN AND SWELLING OF LOWER LEG, UNSPECIFIED LATERALITY: Primary | ICD-10-CM

## 2018-10-02 DIAGNOSIS — M47.817 LUMBOSACRAL SPONDYLOSIS WITHOUT MYELOPATHY: ICD-10-CM

## 2018-10-02 DIAGNOSIS — M79.606 PAIN AND SWELLING OF LOWER EXTREMITY, UNSPECIFIED LATERALITY: Primary | ICD-10-CM

## 2018-10-02 DIAGNOSIS — M79.89 PAIN AND SWELLING OF LOWER LEG, UNSPECIFIED LATERALITY: Primary | ICD-10-CM

## 2018-10-02 DIAGNOSIS — M79.89 PAIN AND SWELLING OF LOWER EXTREMITY, UNSPECIFIED LATERALITY: Primary | ICD-10-CM

## 2018-10-02 DIAGNOSIS — E66.01 CLASS 3 SEVERE OBESITY DUE TO EXCESS CALORIES WITH SERIOUS COMORBIDITY AND BODY MASS INDEX (BMI) OF 40.0 TO 44.9 IN ADULT (HCC): ICD-10-CM

## 2018-10-02 PROCEDURE — G8482 FLU IMMUNIZE ORDER/ADMIN: HCPCS | Performed by: NURSE PRACTITIONER

## 2018-10-02 PROCEDURE — G8417 CALC BMI ABV UP PARAM F/U: HCPCS | Performed by: NURSE PRACTITIONER

## 2018-10-02 PROCEDURE — 99244 OFF/OP CNSLTJ NEW/EST MOD 40: CPT | Performed by: NURSE PRACTITIONER

## 2018-10-02 PROCEDURE — G8427 DOCREV CUR MEDS BY ELIG CLIN: HCPCS | Performed by: NURSE PRACTITIONER

## 2018-10-02 PROCEDURE — 3017F COLORECTAL CA SCREEN DOC REV: CPT | Performed by: NURSE PRACTITIONER

## 2018-10-02 ASSESSMENT — ENCOUNTER SYMPTOMS
EYE PAIN: 0
SORE THROAT: 0
NAUSEA: 0
HEARTBURN: 0
ABDOMINAL PAIN: 0
BACK PAIN: 1
COUGH: 0
DIARRHEA: 0
BLURRED VISION: 0
CONSTIPATION: 0
SINUS PAIN: 0
WHEEZING: 0
DOUBLE VISION: 0
VOMITING: 0
SHORTNESS OF BREATH: 0

## 2018-10-11 ENCOUNTER — OFFICE VISIT (OUTPATIENT)
Dept: FAMILY MEDICINE CLINIC | Age: 63
End: 2018-10-11
Payer: COMMERCIAL

## 2018-10-11 VITALS
DIASTOLIC BLOOD PRESSURE: 74 MMHG | BODY MASS INDEX: 41.95 KG/M2 | HEIGHT: 69 IN | OXYGEN SATURATION: 97 % | SYSTOLIC BLOOD PRESSURE: 132 MMHG | HEART RATE: 90 BPM | TEMPERATURE: 98.7 F | RESPIRATION RATE: 20 BRPM | WEIGHT: 283.2 LBS

## 2018-10-11 DIAGNOSIS — F41.9 ANXIETY: ICD-10-CM

## 2018-10-11 DIAGNOSIS — E66.01 CLASS 3 SEVERE OBESITY DUE TO EXCESS CALORIES WITH SERIOUS COMORBIDITY AND BODY MASS INDEX (BMI) OF 40.0 TO 44.9 IN ADULT (HCC): ICD-10-CM

## 2018-10-11 DIAGNOSIS — I87.2 VENOUS (PERIPHERAL) INSUFFICIENCY: ICD-10-CM

## 2018-10-11 DIAGNOSIS — R60.0 EDEMA OF LOWER EXTREMITY: Primary | ICD-10-CM

## 2018-10-11 PROCEDURE — G8482 FLU IMMUNIZE ORDER/ADMIN: HCPCS | Performed by: FAMILY MEDICINE

## 2018-10-11 PROCEDURE — G8417 CALC BMI ABV UP PARAM F/U: HCPCS | Performed by: FAMILY MEDICINE

## 2018-10-11 PROCEDURE — G8427 DOCREV CUR MEDS BY ELIG CLIN: HCPCS | Performed by: FAMILY MEDICINE

## 2018-10-11 PROCEDURE — 3017F COLORECTAL CA SCREEN DOC REV: CPT | Performed by: FAMILY MEDICINE

## 2018-10-11 PROCEDURE — 1036F TOBACCO NON-USER: CPT | Performed by: FAMILY MEDICINE

## 2018-10-11 PROCEDURE — 99214 OFFICE O/P EST MOD 30 MIN: CPT | Performed by: FAMILY MEDICINE

## 2018-10-11 RX ORDER — PHENTERMINE HYDROCHLORIDE 37.5 MG/1
37.5 TABLET ORAL
Qty: 30 TABLET | Refills: 0 | Status: SHIPPED | OUTPATIENT
Start: 2018-10-11 | End: 2018-11-12 | Stop reason: SDUPTHER

## 2018-10-11 RX ORDER — CLONAZEPAM 0.5 MG/1
1 TABLET ORAL 2 TIMES DAILY PRN
Refills: 2 | COMMUNITY
Start: 2018-08-30 | End: 2018-12-05 | Stop reason: SDUPTHER

## 2018-10-11 RX ORDER — CLONAZEPAM 0.5 MG/1
0.5 TABLET ORAL DAILY
Qty: 30 TABLET | Refills: 2 | Status: SHIPPED | OUTPATIENT
Start: 2018-10-11 | End: 2018-11-30 | Stop reason: SDUPTHER

## 2018-10-11 RX ORDER — METOLAZONE 5 MG/1
5 TABLET ORAL DAILY
Qty: 30 TABLET | Refills: 0 | Status: SHIPPED | OUTPATIENT
Start: 2018-10-11 | End: 2018-12-15 | Stop reason: SDUPTHER

## 2018-10-11 ASSESSMENT — ENCOUNTER SYMPTOMS
ALLERGIC/IMMUNOLOGIC NEGATIVE: 1
EYES NEGATIVE: 1
GASTROINTESTINAL NEGATIVE: 1
RESPIRATORY NEGATIVE: 1

## 2018-10-16 ENCOUNTER — TELEPHONE (OUTPATIENT)
Dept: FAMILY MEDICINE CLINIC | Age: 63
End: 2018-10-16

## 2018-10-16 DIAGNOSIS — R60.0 EDEMA OF LOWER EXTREMITY: ICD-10-CM

## 2018-10-30 RX ORDER — OMEPRAZOLE 20 MG/1
CAPSULE, DELAYED RELEASE ORAL
Qty: 90 CAPSULE | Refills: 3 | Status: SHIPPED | OUTPATIENT
Start: 2018-10-30

## 2018-11-10 DIAGNOSIS — I10 ESSENTIAL HYPERTENSION: ICD-10-CM

## 2018-11-10 DIAGNOSIS — E66.01 CLASS 3 SEVERE OBESITY DUE TO EXCESS CALORIES WITH SERIOUS COMORBIDITY AND BODY MASS INDEX (BMI) OF 40.0 TO 44.9 IN ADULT (HCC): ICD-10-CM

## 2018-11-10 DIAGNOSIS — Z12.5 SPECIAL SCREENING EXAMINATION FOR NEOPLASM OF PROSTATE: ICD-10-CM

## 2018-11-10 LAB
ALBUMIN SERPL-MCNC: 4 G/DL (ref 3.9–4.9)
ALP BLD-CCNC: 98 U/L (ref 35–104)
ALT SERPL-CCNC: 25 U/L (ref 0–41)
ANION GAP SERPL CALCULATED.3IONS-SCNC: 12 MEQ/L (ref 7–13)
AST SERPL-CCNC: 19 U/L (ref 0–40)
BASOPHILS ABSOLUTE: 0.1 K/UL (ref 0–0.2)
BASOPHILS RELATIVE PERCENT: 0.8 %
BILIRUB SERPL-MCNC: <0.2 MG/DL (ref 0–1.2)
BUN BLDV-MCNC: 17 MG/DL (ref 8–23)
CALCIUM SERPL-MCNC: 8.6 MG/DL (ref 8.6–10.2)
CHLORIDE BLD-SCNC: 105 MEQ/L (ref 98–107)
CHOLESTEROL, TOTAL: 163 MG/DL (ref 0–199)
CO2: 24 MEQ/L (ref 22–29)
CREAT SERPL-MCNC: 0.93 MG/DL (ref 0.7–1.2)
EOSINOPHILS ABSOLUTE: 0.3 K/UL (ref 0–0.7)
EOSINOPHILS RELATIVE PERCENT: 3.5 %
GFR AFRICAN AMERICAN: >60
GFR NON-AFRICAN AMERICAN: >60
GLOBULIN: 2.5 G/DL (ref 2.3–3.5)
GLUCOSE BLD-MCNC: 98 MG/DL (ref 74–109)
HCT VFR BLD CALC: 43.1 % (ref 42–52)
HDLC SERPL-MCNC: 49 MG/DL (ref 40–59)
HEMOGLOBIN: 14.7 G/DL (ref 14–18)
LDL CHOLESTEROL CALCULATED: 105 MG/DL (ref 0–129)
LYMPHOCYTES ABSOLUTE: 1.7 K/UL (ref 1–4.8)
LYMPHOCYTES RELATIVE PERCENT: 22.2 %
MCH RBC QN AUTO: 28.5 PG (ref 27–31.3)
MCHC RBC AUTO-ENTMCNC: 34.2 % (ref 33–37)
MCV RBC AUTO: 83.3 FL (ref 80–100)
MONOCYTES ABSOLUTE: 0.6 K/UL (ref 0.2–0.8)
MONOCYTES RELATIVE PERCENT: 8.6 %
NEUTROPHILS ABSOLUTE: 4.9 K/UL (ref 1.4–6.5)
NEUTROPHILS RELATIVE PERCENT: 64.9 %
PDW BLD-RTO: 14.9 % (ref 11.5–14.5)
PLATELET # BLD: 221 K/UL (ref 130–400)
POTASSIUM SERPL-SCNC: 4 MEQ/L (ref 3.5–5.1)
PROSTATE SPECIFIC ANTIGEN: 1.14 NG/ML (ref 0–5.4)
RBC # BLD: 5.18 M/UL (ref 4.7–6.1)
SODIUM BLD-SCNC: 141 MEQ/L (ref 132–144)
TOTAL PROTEIN: 6.5 G/DL (ref 6.4–8.1)
TRIGL SERPL-MCNC: 45 MG/DL (ref 0–200)
TSH REFLEX: 2.46 UIU/ML (ref 0.27–4.2)
WBC # BLD: 7.5 K/UL (ref 4.8–10.8)

## 2018-11-12 ENCOUNTER — OFFICE VISIT (OUTPATIENT)
Dept: FAMILY MEDICINE CLINIC | Age: 63
End: 2018-11-12
Payer: COMMERCIAL

## 2018-11-12 VITALS
HEIGHT: 69 IN | BODY MASS INDEX: 41.77 KG/M2 | OXYGEN SATURATION: 98 % | DIASTOLIC BLOOD PRESSURE: 70 MMHG | RESPIRATION RATE: 16 BRPM | SYSTOLIC BLOOD PRESSURE: 122 MMHG | HEART RATE: 92 BPM | WEIGHT: 282 LBS | TEMPERATURE: 97.8 F

## 2018-11-12 DIAGNOSIS — Z12.5 SPECIAL SCREENING EXAMINATION FOR NEOPLASM OF PROSTATE: ICD-10-CM

## 2018-11-12 DIAGNOSIS — F41.9 ANXIETY: ICD-10-CM

## 2018-11-12 DIAGNOSIS — E66.01 CLASS 3 SEVERE OBESITY DUE TO EXCESS CALORIES WITH SERIOUS COMORBIDITY AND BODY MASS INDEX (BMI) OF 40.0 TO 44.9 IN ADULT (HCC): ICD-10-CM

## 2018-11-12 DIAGNOSIS — I10 ESSENTIAL HYPERTENSION: Primary | ICD-10-CM

## 2018-11-12 PROCEDURE — 3017F COLORECTAL CA SCREEN DOC REV: CPT | Performed by: FAMILY MEDICINE

## 2018-11-12 PROCEDURE — G8427 DOCREV CUR MEDS BY ELIG CLIN: HCPCS | Performed by: FAMILY MEDICINE

## 2018-11-12 PROCEDURE — G8482 FLU IMMUNIZE ORDER/ADMIN: HCPCS | Performed by: FAMILY MEDICINE

## 2018-11-12 PROCEDURE — 1036F TOBACCO NON-USER: CPT | Performed by: FAMILY MEDICINE

## 2018-11-12 PROCEDURE — G8417 CALC BMI ABV UP PARAM F/U: HCPCS | Performed by: FAMILY MEDICINE

## 2018-11-12 PROCEDURE — 99214 OFFICE O/P EST MOD 30 MIN: CPT | Performed by: FAMILY MEDICINE

## 2018-11-12 RX ORDER — PHENTERMINE HYDROCHLORIDE 37.5 MG/1
37.5 TABLET ORAL
Qty: 30 TABLET | Refills: 0 | Status: SHIPPED | OUTPATIENT
Start: 2018-11-12 | End: 2018-12-05

## 2018-11-12 ASSESSMENT — ENCOUNTER SYMPTOMS
RESPIRATORY NEGATIVE: 1
ALLERGIC/IMMUNOLOGIC NEGATIVE: 1
EYES NEGATIVE: 1
GASTROINTESTINAL NEGATIVE: 1

## 2018-11-12 NOTE — PROGRESS NOTES
Emphysema Father      Allergies   Allergen Reactions    Belviq [Lorcaserin Hcl]      Increased appetite and weight gain     Current Outpatient Prescriptions on File Prior to Visit   Medication Sig Dispense Refill    omeprazole (PRILOSEC) 20 MG delayed release capsule TAKE 1 CAPSULE DAILY 90 capsule 3    Elastic Bandages & Supports (MEDICAL COMPRESSION STOCKINGS) MISC Thigh high 18-22 mmHg on in am off at hs (IE HORACIO hose) 3 each 3    clonazePAM (KLONOPIN) 0.5 MG tablet Take 1 tablet by mouth 2 times daily as needed. .  2    clonazePAM (KLONOPIN) 0.5 MG tablet Take 1 tablet by mouth daily for 90 days. . 30 tablet 2    metolazone (ZAROXOLYN) 5 MG tablet Take 1 tablet by mouth daily 30 tablet 0    oxybutynin (DITROPAN-XL) 10 MG extended release tablet TAKE 1 TABLET DAILY 90 tablet 3    tamsulosin (FLOMAX) 0.4 MG capsule TAKE 1 CAPSULE TWICE A  capsule 3    potassium chloride (KLOR-CON M20) 20 MEQ extended release tablet Take 1 tablet by mouth daily as needed (edema) 30 tablet 11    amLODIPine-benazepril (LOTREL) 10-20 MG per capsule TAKE 1 CAPSULE DAILY 90 capsule 3    meloxicam (MOBIC) 15 MG tablet TAKE 1 TABLET BY MOUTH DAILY 90 tablet 3    venlafaxine (EFFEXOR XR) 75 MG extended release capsule Take 2 capsules by mouth daily 180 capsule 3    buPROPion (WELLBUTRIN SR) 150 MG extended release tablet TAKE 1 TABLET TWICE A  tablet 3    dicyclomine (BENTYL) 20 MG tablet       Cholecalciferol (VITAMIN D) 2000 UNITS CAPS capsule Take  by mouth daily. 30 capsule     [DISCONTINUED] oxybutynin (DITROPAN-XL) 10 MG CR tablet TAKE 1 TABLET DAILY 90 tablet 1     No current facility-administered medications on file prior to visit.         Objective    Vitals:    11/12/18 1515   BP: 122/70   Pulse: 92   Resp: 16   Temp: 97.8 °F (36.6 °C)   TempSrc: Tympanic   SpO2: 98%   Weight: 282 lb (127.9 kg)   Height: 5' 9\" (1.753 m)     Physical Exam   Constitutional: Vital signs are normal. He appears Yes      Return in about 1 year (around 11/12/2019), or if symptoms worsen or fail to improve.     Evelyn Antes, DO

## 2018-11-27 ENCOUNTER — TELEPHONE (OUTPATIENT)
Dept: FAMILY MEDICINE CLINIC | Age: 63
End: 2018-11-27

## 2018-11-30 DIAGNOSIS — F41.9 ANXIETY: ICD-10-CM

## 2018-11-30 RX ORDER — CLONAZEPAM 0.5 MG/1
0.5 TABLET ORAL DAILY
Qty: 30 TABLET | Refills: 2 | Status: SHIPPED | OUTPATIENT
Start: 2018-11-30 | End: 2018-12-05 | Stop reason: SDUPTHER

## 2018-12-05 ENCOUNTER — OFFICE VISIT (OUTPATIENT)
Dept: FAMILY MEDICINE CLINIC | Age: 63
End: 2018-12-05
Payer: COMMERCIAL

## 2018-12-05 VITALS
SYSTOLIC BLOOD PRESSURE: 132 MMHG | HEART RATE: 103 BPM | DIASTOLIC BLOOD PRESSURE: 72 MMHG | TEMPERATURE: 97.4 F | BODY MASS INDEX: 40.88 KG/M2 | OXYGEN SATURATION: 97 % | HEIGHT: 69 IN | WEIGHT: 276 LBS | RESPIRATION RATE: 15 BRPM

## 2018-12-05 DIAGNOSIS — M79.18 RHOMBOID MUSCLE PAIN: ICD-10-CM

## 2018-12-05 DIAGNOSIS — F41.9 ANXIETY: ICD-10-CM

## 2018-12-05 DIAGNOSIS — J45.21 MILD INTERMITTENT ASTHMA WITH ACUTE EXACERBATION: Primary | ICD-10-CM

## 2018-12-05 PROCEDURE — 99214 OFFICE O/P EST MOD 30 MIN: CPT | Performed by: FAMILY MEDICINE

## 2018-12-05 PROCEDURE — G8427 DOCREV CUR MEDS BY ELIG CLIN: HCPCS | Performed by: FAMILY MEDICINE

## 2018-12-05 PROCEDURE — 1036F TOBACCO NON-USER: CPT | Performed by: FAMILY MEDICINE

## 2018-12-05 PROCEDURE — 3017F COLORECTAL CA SCREEN DOC REV: CPT | Performed by: FAMILY MEDICINE

## 2018-12-05 PROCEDURE — G8417 CALC BMI ABV UP PARAM F/U: HCPCS | Performed by: FAMILY MEDICINE

## 2018-12-05 PROCEDURE — G8482 FLU IMMUNIZE ORDER/ADMIN: HCPCS | Performed by: FAMILY MEDICINE

## 2018-12-05 RX ORDER — ALBUTEROL SULFATE 90 UG/1
2 AEROSOL, METERED RESPIRATORY (INHALATION) EVERY 6 HOURS PRN
Qty: 1 INHALER | Refills: 3 | Status: SHIPPED | OUTPATIENT
Start: 2018-12-05

## 2018-12-05 RX ORDER — CLONAZEPAM 0.5 MG/1
0.5 TABLET ORAL 2 TIMES DAILY PRN
Qty: 60 TABLET | Refills: 2 | Status: SHIPPED | OUTPATIENT
Start: 2018-12-05 | End: 2019-03-11 | Stop reason: SDUPTHER

## 2018-12-05 ASSESSMENT — ENCOUNTER SYMPTOMS
SHORTNESS OF BREATH: 1
BACK PAIN: 1
GASTROINTESTINAL NEGATIVE: 1
HEMOPTYSIS: 0
HEARTBURN: 0
WHEEZING: 1
RHINORRHEA: 0
EYES NEGATIVE: 1
SORE THROAT: 0
COUGH: 1

## 2018-12-05 NOTE — PROGRESS NOTES
Subjective  Delonte Mendez, 61 y.o. male presents today with:  Chief Complaint   Patient presents with    Chest Congestion     productive cough with clear mucous x 5 days. Has taken OTC meds mucinex    Shoulder Pain     left side pain towards his shoulder blade x 4 days        Comes into the office today for follow-up on anxiety. He has been getting more agitated over the past month. Has done trial of tapering off of Klonopin and is currently on 0.5 mg daily. His wife is asking if he should do anything different, because he has been getting unbearable to live with      Shoulder Pain    The pain is present in the left shoulder and back. This is a new problem. The current episode started in the past 7 days. There has been no history of extremity trauma. The problem occurs constantly. The problem has been unchanged. The quality of the pain is described as aching. The pain is mild. Associated symptoms include stiffness. Pertinent negatives include no fever, inability to bear weight, itching, joint locking, joint swelling, limited range of motion, numbness or tingling. The symptoms are aggravated by activity and lying down. He has tried cold, heat and movement for the symptoms. The treatment provided no relief. Family history does not include gout or rheumatoid arthritis. His past medical history is significant for osteoarthritis. There is no history of diabetes, gout or rheumatoid arthritis. Cough   This is a recurrent problem. The current episode started 1 to 4 weeks ago. The problem has been waxing and waning. The problem occurs hourly. The cough is productive of sputum. Associated symptoms include myalgias, shortness of breath and wheezing. Pertinent negatives include no chest pain, chills, ear congestion, ear pain, fever, headaches, heartburn, hemoptysis, nasal congestion, postnasal drip, rash, rhinorrhea, sore throat, sweats or weight loss.  The symptoms are aggravated by cold air, pollens and dust. He has Concern    Not on file     Social History Narrative    No narrative on file     Family History   Problem Relation Age of Onset    High Blood Pressure Mother     Emphysema Father      Allergies   Allergen Reactions    Belviq [Lorcaserin Hcl]      Increased appetite and weight gain     Current Outpatient Prescriptions on File Prior to Visit   Medication Sig Dispense Refill    omeprazole (PRILOSEC) 20 MG delayed release capsule TAKE 1 CAPSULE DAILY 90 capsule 3    Elastic Bandages & Supports (MEDICAL COMPRESSION STOCKINGS) MISC Thigh high 18-22 mmHg on in am off at hs (IE HORACIO hose) 3 each 3    metolazone (ZAROXOLYN) 5 MG tablet Take 1 tablet by mouth daily 30 tablet 0    oxybutynin (DITROPAN-XL) 10 MG extended release tablet TAKE 1 TABLET DAILY 90 tablet 3    tamsulosin (FLOMAX) 0.4 MG capsule TAKE 1 CAPSULE TWICE A  capsule 3    amLODIPine-benazepril (LOTREL) 10-20 MG per capsule TAKE 1 CAPSULE DAILY 90 capsule 3    meloxicam (MOBIC) 15 MG tablet TAKE 1 TABLET BY MOUTH DAILY 90 tablet 3    venlafaxine (EFFEXOR XR) 75 MG extended release capsule Take 2 capsules by mouth daily 180 capsule 3    buPROPion (WELLBUTRIN SR) 150 MG extended release tablet TAKE 1 TABLET TWICE A  tablet 3    dicyclomine (BENTYL) 20 MG tablet       Cholecalciferol (VITAMIN D) 2000 UNITS CAPS capsule Take  by mouth daily. 30 capsule     potassium chloride (KLOR-CON M20) 20 MEQ extended release tablet Take 1 tablet by mouth daily as needed (edema) 30 tablet 11    [DISCONTINUED] oxybutynin (DITROPAN-XL) 10 MG CR tablet TAKE 1 TABLET DAILY 90 tablet 1     No current facility-administered medications on file prior to visit.         Objective    Vitals:    12/05/18 1555   BP: 132/72   Site: Left Upper Arm   Position: Sitting   Cuff Size: Large Adult   Pulse: 103   Resp: 15   Temp: 97.4 °F (36.3 °C)   TempSrc: Tympanic   SpO2: 97%   Weight: 276 lb (125.2 kg)   Height: 5' 9\" (1.753 m)     Physical Exam   Constitutional:

## 2018-12-06 ENCOUNTER — TELEPHONE (OUTPATIENT)
Dept: FAMILY MEDICINE CLINIC | Age: 63
End: 2018-12-06

## 2018-12-06 NOTE — TELEPHONE ENCOUNTER
Pt called stated that he was swabbed for DNA testing and was told to call in after checking with his insurance. He said he already checked and it's a \"go\". You can send in his sample.

## 2018-12-10 ENCOUNTER — TELEPHONE (OUTPATIENT)
Dept: FAMILY MEDICINE CLINIC | Age: 63
End: 2018-12-10

## 2018-12-10 NOTE — TELEPHONE ENCOUNTER
Qvar 2 puffs twice daily at all times.-Baseline, regular, Inhaler  Ventolin 2 puffs 4 times daily only as needed for wheezing or shortness of breath.  May be used at the same time as Qvar but not necessary

## 2018-12-15 DIAGNOSIS — I87.2 VENOUS (PERIPHERAL) INSUFFICIENCY: ICD-10-CM

## 2018-12-15 DIAGNOSIS — R60.0 EDEMA OF LOWER EXTREMITY: ICD-10-CM

## 2018-12-17 RX ORDER — METOLAZONE 5 MG/1
TABLET ORAL
Qty: 30 TABLET | Refills: 5 | Status: SHIPPED | OUTPATIENT
Start: 2018-12-17

## 2019-01-22 ENCOUNTER — OFFICE VISIT (OUTPATIENT)
Dept: FAMILY MEDICINE CLINIC | Age: 64
End: 2019-01-22
Payer: COMMERCIAL

## 2019-01-22 VITALS
DIASTOLIC BLOOD PRESSURE: 80 MMHG | OXYGEN SATURATION: 98 % | SYSTOLIC BLOOD PRESSURE: 150 MMHG | HEART RATE: 95 BPM | HEIGHT: 69 IN | BODY MASS INDEX: 42.65 KG/M2 | TEMPERATURE: 97.8 F | RESPIRATION RATE: 16 BRPM | WEIGHT: 288 LBS

## 2019-01-22 DIAGNOSIS — G89.29 CHRONIC RIGHT-SIDED LOW BACK PAIN WITH RIGHT-SIDED SCIATICA: Primary | ICD-10-CM

## 2019-01-22 DIAGNOSIS — M54.41 CHRONIC RIGHT-SIDED LOW BACK PAIN WITH RIGHT-SIDED SCIATICA: Primary | ICD-10-CM

## 2019-01-22 DIAGNOSIS — F41.9 ANXIETY: ICD-10-CM

## 2019-01-22 DIAGNOSIS — Z15.89 HETEROZYGOUS MTHFR MUTATION C677T: ICD-10-CM

## 2019-01-22 PROCEDURE — 99214 OFFICE O/P EST MOD 30 MIN: CPT | Performed by: FAMILY MEDICINE

## 2019-01-22 PROCEDURE — G8427 DOCREV CUR MEDS BY ELIG CLIN: HCPCS | Performed by: FAMILY MEDICINE

## 2019-01-22 PROCEDURE — 3017F COLORECTAL CA SCREEN DOC REV: CPT | Performed by: FAMILY MEDICINE

## 2019-01-22 PROCEDURE — G8482 FLU IMMUNIZE ORDER/ADMIN: HCPCS | Performed by: FAMILY MEDICINE

## 2019-01-22 PROCEDURE — 1036F TOBACCO NON-USER: CPT | Performed by: FAMILY MEDICINE

## 2019-01-22 PROCEDURE — G8417 CALC BMI ABV UP PARAM F/U: HCPCS | Performed by: FAMILY MEDICINE

## 2019-01-22 RX ORDER — FOLIC ACID 1 MG/1
1 TABLET ORAL DAILY
Qty: 90 TABLET | Refills: 11 | Status: SHIPPED | OUTPATIENT
Start: 2019-01-22

## 2019-01-22 ASSESSMENT — ENCOUNTER SYMPTOMS
BOWEL INCONTINENCE: 0
GASTROINTESTINAL NEGATIVE: 1
EYES NEGATIVE: 1
ALLERGIC/IMMUNOLOGIC NEGATIVE: 1
ABDOMINAL PAIN: 0
BACK PAIN: 1
RESPIRATORY NEGATIVE: 1

## 2019-01-25 DIAGNOSIS — L30.0 NUMMULAR ECZEMA: ICD-10-CM

## 2019-01-25 RX ORDER — BETAMETHASONE DIPROPIONATE 0.05 %
OINTMENT (GRAM) TOPICAL
Qty: 45 G | Refills: 1 | Status: SHIPPED | OUTPATIENT
Start: 2019-01-25

## 2019-02-28 ENCOUNTER — TELEPHONE (OUTPATIENT)
Dept: ADMINISTRATIVE | Age: 64
End: 2019-02-28

## 2019-02-28 DIAGNOSIS — F41.9 ANXIETY: ICD-10-CM

## 2019-02-28 DIAGNOSIS — F34.1 DYSTHYMIA: ICD-10-CM

## 2019-02-28 RX ORDER — VENLAFAXINE HYDROCHLORIDE 75 MG/1
CAPSULE, EXTENDED RELEASE ORAL
Qty: 180 CAPSULE | Refills: 3 | Status: SHIPPED | OUTPATIENT
Start: 2019-02-28

## 2019-03-11 ENCOUNTER — OFFICE VISIT (OUTPATIENT)
Dept: FAMILY MEDICINE CLINIC | Age: 64
End: 2019-03-11
Payer: COMMERCIAL

## 2019-03-11 VITALS
HEIGHT: 69 IN | HEART RATE: 102 BPM | DIASTOLIC BLOOD PRESSURE: 70 MMHG | WEIGHT: 292 LBS | RESPIRATION RATE: 16 BRPM | BODY MASS INDEX: 43.25 KG/M2 | SYSTOLIC BLOOD PRESSURE: 122 MMHG | TEMPERATURE: 98.4 F | OXYGEN SATURATION: 97 %

## 2019-03-11 DIAGNOSIS — M54.41 CHRONIC BILATERAL LOW BACK PAIN WITH RIGHT-SIDED SCIATICA: ICD-10-CM

## 2019-03-11 DIAGNOSIS — F41.9 ANXIETY: ICD-10-CM

## 2019-03-11 DIAGNOSIS — G89.29 CHRONIC BILATERAL LOW BACK PAIN WITH RIGHT-SIDED SCIATICA: ICD-10-CM

## 2019-03-11 DIAGNOSIS — J45.40 MODERATE PERSISTENT ASTHMA WITHOUT COMPLICATION: Primary | ICD-10-CM

## 2019-03-11 PROCEDURE — G8482 FLU IMMUNIZE ORDER/ADMIN: HCPCS | Performed by: FAMILY MEDICINE

## 2019-03-11 PROCEDURE — G8427 DOCREV CUR MEDS BY ELIG CLIN: HCPCS | Performed by: FAMILY MEDICINE

## 2019-03-11 PROCEDURE — 99214 OFFICE O/P EST MOD 30 MIN: CPT | Performed by: FAMILY MEDICINE

## 2019-03-11 PROCEDURE — G8417 CALC BMI ABV UP PARAM F/U: HCPCS | Performed by: FAMILY MEDICINE

## 2019-03-11 PROCEDURE — 3017F COLORECTAL CA SCREEN DOC REV: CPT | Performed by: FAMILY MEDICINE

## 2019-03-11 PROCEDURE — 1036F TOBACCO NON-USER: CPT | Performed by: FAMILY MEDICINE

## 2019-03-11 RX ORDER — BUPROPION HYDROCHLORIDE 150 MG/1
TABLET, EXTENDED RELEASE ORAL
Qty: 180 TABLET | Refills: 3 | Status: SHIPPED | OUTPATIENT
Start: 2019-03-11

## 2019-03-11 RX ORDER — BUDESONIDE AND FORMOTEROL FUMARATE DIHYDRATE 160; 4.5 UG/1; UG/1
2 AEROSOL RESPIRATORY (INHALATION) 2 TIMES DAILY
Qty: 1 INHALER | Refills: 5 | Status: SHIPPED | OUTPATIENT
Start: 2019-03-11

## 2019-03-11 RX ORDER — BUPROPION HYDROCHLORIDE 150 MG/1
TABLET, EXTENDED RELEASE ORAL
Qty: 180 TABLET | Refills: 3 | Status: SHIPPED | OUTPATIENT
Start: 2019-03-11 | End: 2019-03-11 | Stop reason: SDUPTHER

## 2019-03-11 RX ORDER — CLONAZEPAM 0.5 MG/1
0.5 TABLET ORAL 2 TIMES DAILY PRN
Qty: 60 TABLET | Refills: 2 | Status: SHIPPED | OUTPATIENT
Start: 2019-03-11 | End: 2019-06-09

## 2019-03-11 ASSESSMENT — ENCOUNTER SYMPTOMS
BACK PAIN: 1
ALLERGIC/IMMUNOLOGIC NEGATIVE: 1
GASTROINTESTINAL NEGATIVE: 1
RESPIRATORY NEGATIVE: 1
EYES NEGATIVE: 1
ABDOMINAL PAIN: 0

## 2019-06-11 ENCOUNTER — TELEPHONE (OUTPATIENT)
Dept: ADMINISTRATIVE | Age: 64
End: 2019-06-11

## 2019-06-19 PROBLEM — G58.7 MONONEURITIS MULTIPLEX: Chronic | Status: ACTIVE | Noted: 2019-06-19

## 2019-06-19 PROBLEM — M43.06 LUMBAR SPONDYLOLYSIS: Status: ACTIVE | Noted: 2019-06-19

## 2019-09-13 ENCOUNTER — OFFICE VISIT (OUTPATIENT)
Dept: PODIATRY | Facility: CLINIC | Age: 64
End: 2019-09-13

## 2019-09-13 VITALS — WEIGHT: 285 LBS | HEIGHT: 68 IN | BODY MASS INDEX: 43.19 KG/M2 | TEMPERATURE: 98.5 F

## 2019-09-13 DIAGNOSIS — I87.009 POST-PHLEBITIC SYNDROME: ICD-10-CM

## 2019-09-13 DIAGNOSIS — M79.674 PAIN IN TOES OF BOTH FEET: Primary | ICD-10-CM

## 2019-09-13 DIAGNOSIS — L60.3 NAIL DYSTROPHY: ICD-10-CM

## 2019-09-13 DIAGNOSIS — I87.2 VENOUS STASIS ULCER OF LEG WITHOUT VARICOSE VEINS (HCC): ICD-10-CM

## 2019-09-13 DIAGNOSIS — L97.909 VENOUS STASIS ULCER OF LEG WITHOUT VARICOSE VEINS (HCC): ICD-10-CM

## 2019-09-13 DIAGNOSIS — M79.675 PAIN IN TOES OF BOTH FEET: Primary | ICD-10-CM

## 2019-09-13 DIAGNOSIS — I89.0 SECONDARY LYMPHEDEMA: ICD-10-CM

## 2019-09-13 DIAGNOSIS — L60.0 INGROWING NAIL: ICD-10-CM

## 2019-09-13 ASSESSMENT — ENCOUNTER SYMPTOMS
NAUSEA: 0
DIARRHEA: 0
CONSTIPATION: 0
BACK PAIN: 1
SHORTNESS OF BREATH: 0

## 2019-09-13 NOTE — PROGRESS NOTES
daily for 30 days. 60 capsule 0    gabapentin (NEURONTIN) 300 MG capsule Take 1 capsule by mouth nightly for 30 days. 30 capsule 0    clonazePAM (KLONOPIN) 0.5 MG tablet Take 1 tablet by mouth 2 times daily as needed for Anxiety for up to 90 days. 60 tablet 2    budesonide-formoterol (SYMBICORT) 160-4.5 MCG/ACT AERO Inhale 2 puffs into the lungs 2 times daily 1 Inhaler 5    buPROPion (WELLBUTRIN SR) 150 MG extended release tablet TAKE 1 TABLET TWICE A  tablet 3    venlafaxine (EFFEXOR XR) 75 MG extended release capsule TAKE 2 CAPSULES DAILY 180 capsule 3    betamethasone dipropionate (DIPROLENE) 0.05 % ointment Apply topically daily. 45 g 1    folic acid (FOLVITE) 1 MG tablet Take 1 tablet by mouth daily 90 tablet 11    metolazone (ZAROXOLYN) 5 MG tablet TAKE 1 TABLET BY MOUTH EVERY DAY 30 tablet 5    albuterol sulfate HFA (VENTOLIN HFA) 108 (90 Base) MCG/ACT inhaler Inhale 2 puffs into the lungs every 6 hours as needed for Wheezing or Shortness of Breath 1 Inhaler 3    omeprazole (PRILOSEC) 20 MG delayed release capsule TAKE 1 CAPSULE DAILY 90 capsule 3    Elastic Bandages & Supports (MEDICAL COMPRESSION STOCKINGS) MISC Thigh high 18-22 mmHg on in am off at hs (IE HORACIO hose) 3 each 3    oxybutynin (DITROPAN-XL) 10 MG extended release tablet TAKE 1 TABLET DAILY 90 tablet 3    tamsulosin (FLOMAX) 0.4 MG capsule TAKE 1 CAPSULE TWICE A  capsule 3    potassium chloride (KLOR-CON M20) 20 MEQ extended release tablet Take 1 tablet by mouth daily as needed (edema) 30 tablet 11    amLODIPine-benazepril (LOTREL) 10-20 MG per capsule TAKE 1 CAPSULE DAILY 90 capsule 3    meloxicam (MOBIC) 15 MG tablet TAKE 1 TABLET BY MOUTH DAILY 90 tablet 3    dicyclomine (BENTYL) 20 MG tablet Take 20 mg by mouth 3 times daily       Cholecalciferol (VITAMIN D) 2000 UNITS CAPS capsule Take  by mouth daily.  30 capsule     [DISCONTINUED] oxybutynin (DITROPAN-XL) 10 MG CR tablet TAKE 1 TABLET DAILY 90 tablet 1 Objective:   Vitals:  Temp 98.5 °F (36.9 °C)   Ht 5' 8\" (1.727 m)   Wt 285 lb (129.3 kg)   BMI 43.33 kg/m² Pain Score:   4    Physical Exam    Constitutional:     Well nourished and well developed, obese. Appears neat and clean. Patient is alert, oriented x3, and in no apparent distress. Vascular:   Dorsalis pedis pulse is palpable bilateral foot. Posterior tibial pulse is palpable bilateral foot. There is 2+ pitting edema noted to the bilateral lower extremity. Capillary refill is immediate bilateral hallux. There are no varicosities noted bilaterally. There are no telangiectasia noted bilaterally. Skin temperature gradient is noted to be warm to warm bilaterally. There are no signs of ischemia or skin atrophy noted bilaterally. The mild signs of chronic venous stasis changes to the skin at the bilateral leg and medial ankle. Hair growth is present bilaterally. Neurological:   Light touch and protective sensation is intact bilaterally. Vibratory sensation is intact bilaterally. Sharp/dull sensation is intact bilaterally. Patellar deep tendon reflex is graded at 2/4 bilaterally. Achilles tendon deep tendon reflex is graded at 2/4 bilaterally. The Babinski response is negative bilaterally. No ankle clonus is noted bilaterally. Positive Tinel sign elicited with percussion of the bilateral deep peroneal nerve at the dorsum of the foot. Dermatological:  Multiple small venous ulcerations noted to the bilateral lower extremity (anterior right leg, posterior left leg). No lesions are larger than 0.5 cm in diameter. There are no signs of infection. Stable crusts noted to the lesions, no serous weeping noted. The toenails are dystrophic and are elongated bilaterally. The nail plates are clear, thickened, are incurvated, and there is no subungual debris.   There is pain upon compression of the bilateral nail groove of the bilateral hallux, there are no apparent signs of secondary lymphedema with recurrent venous ulcerations bilateral lower extremity: The patient is instructed to monitor the resolving wounds for signs of infection or recurrence of serous weeping. Call immediately if this occurs. Patient started to continue elevation and use of compression garments. As elevation and use of compression garments has been ineffective in stopping the recurrence of the venous ulcerations I recommended/ ordered an in home lymphedema pump. Follow up:  Return in about 3 months (around 12/13/2019). Chele Jones DPM      Please note that this report has been partially produced using speech recognition software which may cause errors including grammar, punctuation, and spelling or words and phrases that may seem inappropriate. If there are questions or concerns please feel free to contact me for clarification.

## 2019-09-15 ASSESSMENT — ENCOUNTER SYMPTOMS: COLOR CHANGE: 1

## 2022-06-05 ENCOUNTER — HOSPITAL ENCOUNTER (EMERGENCY)
Age: 67
Discharge: HOME | End: 2022-06-05
Attending: EMERGENCY MEDICINE
Payer: COMMERCIAL

## 2022-06-05 ENCOUNTER — APPOINTMENT (OUTPATIENT)
Dept: GENERAL RADIOLOGY | Age: 67
End: 2022-06-05
Payer: COMMERCIAL

## 2022-06-05 VITALS
BODY MASS INDEX: 43.95 KG/M2 | HEART RATE: 76 BPM | WEIGHT: 290 LBS | SYSTOLIC BLOOD PRESSURE: 100 MMHG | TEMPERATURE: 97.8 F | OXYGEN SATURATION: 98 % | RESPIRATION RATE: 16 BRPM | DIASTOLIC BLOOD PRESSURE: 65 MMHG | HEIGHT: 68 IN

## 2022-06-05 DIAGNOSIS — R10.13 EPIGASTRIC PAIN: Primary | ICD-10-CM

## 2022-06-05 LAB
ALBUMIN SERPL-MCNC: 4.3 G/DL (ref 3.5–4.6)
ALP BLD-CCNC: 119 U/L (ref 35–104)
ALT SERPL-CCNC: 36 U/L (ref 0–41)
ANION GAP SERPL CALCULATED.3IONS-SCNC: 10 MEQ/L (ref 9–15)
AST SERPL-CCNC: 39 U/L (ref 0–40)
BASOPHILS ABSOLUTE: 0.1 K/UL (ref 0–0.2)
BASOPHILS RELATIVE PERCENT: 1.3 %
BILIRUB SERPL-MCNC: 0.5 MG/DL (ref 0.2–0.7)
BUN BLDV-MCNC: 20 MG/DL (ref 8–23)
CALCIUM SERPL-MCNC: 8.7 MG/DL (ref 8.5–9.9)
CHLORIDE BLD-SCNC: 100 MEQ/L (ref 95–107)
CO2: 24 MEQ/L (ref 20–31)
CREAT SERPL-MCNC: 0.88 MG/DL (ref 0.7–1.2)
EOSINOPHILS ABSOLUTE: 0.2 K/UL (ref 0–0.7)
EOSINOPHILS RELATIVE PERCENT: 2.1 %
GFR AFRICAN AMERICAN: >60
GFR NON-AFRICAN AMERICAN: >60
GLOBULIN: 2.5 G/DL (ref 2.3–3.5)
GLUCOSE BLD-MCNC: 137 MG/DL (ref 70–99)
HCT VFR BLD CALC: 45 % (ref 42–52)
HEMOGLOBIN: 14.8 G/DL (ref 14–18)
LIPASE: 407 U/L (ref 12–95)
LYMPHOCYTES ABSOLUTE: 2.1 K/UL (ref 1–4.8)
LYMPHOCYTES RELATIVE PERCENT: 21.5 %
MCH RBC QN AUTO: 27 PG (ref 27–31.3)
MCHC RBC AUTO-ENTMCNC: 33 % (ref 33–37)
MCV RBC AUTO: 81.9 FL (ref 80–100)
MONOCYTES ABSOLUTE: 0.7 K/UL (ref 0.2–0.8)
MONOCYTES RELATIVE PERCENT: 6.9 %
NEUTROPHILS ABSOLUTE: 6.8 K/UL (ref 1.4–6.5)
NEUTROPHILS RELATIVE PERCENT: 68.2 %
PDW BLD-RTO: 15.4 % (ref 11.5–14.5)
PLATELET # BLD: 228 K/UL (ref 130–400)
POTASSIUM SERPL-SCNC: 3.8 MEQ/L (ref 3.4–4.9)
RBC # BLD: 5.49 M/UL (ref 4.7–6.1)
SODIUM BLD-SCNC: 134 MEQ/L (ref 135–144)
TOTAL PROTEIN: 6.8 G/DL (ref 6.3–8)
TROPONIN: <0.01 NG/ML (ref 0–0.01)
WBC # BLD: 10 K/UL (ref 4.8–10.8)

## 2022-06-05 PROCEDURE — 85025 COMPLETE CBC W/AUTO DIFF WBC: CPT

## 2022-06-05 PROCEDURE — 80053 COMPREHEN METABOLIC PANEL: CPT

## 2022-06-05 PROCEDURE — 36415 COLL VENOUS BLD VENIPUNCTURE: CPT

## 2022-06-05 PROCEDURE — 84484 ASSAY OF TROPONIN QUANT: CPT

## 2022-06-05 PROCEDURE — 6370000000 HC RX 637 (ALT 250 FOR IP): Performed by: EMERGENCY MEDICINE

## 2022-06-05 PROCEDURE — 93005 ELECTROCARDIOGRAM TRACING: CPT

## 2022-06-05 PROCEDURE — 71045 X-RAY EXAM CHEST 1 VIEW: CPT

## 2022-06-05 PROCEDURE — 99284 EMERGENCY DEPT VISIT MOD MDM: CPT

## 2022-06-05 PROCEDURE — 83690 ASSAY OF LIPASE: CPT

## 2022-06-05 RX ORDER — ASPIRIN 81 MG/1
162 TABLET, CHEWABLE ORAL ONCE
Status: COMPLETED | OUTPATIENT
Start: 2022-06-05 | End: 2022-06-05

## 2022-06-05 RX ORDER — FAMOTIDINE 20 MG/1
20 TABLET, FILM COATED ORAL 2 TIMES DAILY
Qty: 14 TABLET | Refills: 1 | Status: SHIPPED | OUTPATIENT
Start: 2022-06-05

## 2022-06-05 RX ADMIN — LIDOCAINE HYDROCHLORIDE: 20 SOLUTION ORAL; TOPICAL at 02:52

## 2022-06-05 RX ADMIN — ASPIRIN 162 MG: 81 TABLET, CHEWABLE ORAL at 02:52

## 2022-06-05 ASSESSMENT — PAIN DESCRIPTION - FREQUENCY
FREQUENCY: CONTINUOUS
FREQUENCY: INTERMITTENT

## 2022-06-05 ASSESSMENT — PAIN - FUNCTIONAL ASSESSMENT
PAIN_FUNCTIONAL_ASSESSMENT: ACTIVITIES ARE NOT PREVENTED
PAIN_FUNCTIONAL_ASSESSMENT: 0-10
PAIN_FUNCTIONAL_ASSESSMENT: NONE - DENIES PAIN

## 2022-06-05 ASSESSMENT — PAIN DESCRIPTION - LOCATION: LOCATION: CHEST

## 2022-06-05 ASSESSMENT — PAIN SCALES - GENERAL
PAINLEVEL_OUTOF10: 4
PAINLEVEL_OUTOF10: 6

## 2022-06-05 ASSESSMENT — ENCOUNTER SYMPTOMS: ABDOMINAL PAIN: 1

## 2022-06-05 ASSESSMENT — PAIN DESCRIPTION - DESCRIPTORS: DESCRIPTORS: BURNING;ACHING

## 2022-06-05 NOTE — ED TRIAGE NOTES
Pt c/o chest pain that started approx 2 hours PTA, Pt is A&OX3, calm, ambulatory, afebrile, breathes are equal and unlabored,

## 2022-06-05 NOTE — ED PROVIDER NOTES
[address] [facility] ED  EMERGENCY DEPARTMENT ENCOUNTER      Pt Name: Willy Rushing  MRN: [id number]  Jeffreygfvonnie 1955  Date of evaluation: 6/5/2022  Provider: Giuliano Huerta MD    [address] [place] [address]       Chief Complaint   Patient presents with    Chest Pain     pt c/o chest pain that started tonight         HISTORY OF PRESENT ILLNESS   (Location/Symptom, Timing/Onset, Context/Setting, Quality, Duration, Modifying Factors, Severity)  Note limiting factors. 25-year-old male presenting with epigastric abdominal pain. Symptoms started about 3 hours prior to arrival in the ER. Notes an intense pain that is nonradiating. No history of this in the past.  Took Maalox prior to arrival without much improvement. No noted heart or lung problems. Symptoms were nonexertional.  Not made better or worse by anything in particular. Nursing Notes were reviewed. REVIEW OF SYSTEMS    (2-9 systems for level 4, 10 or more for level 5)     Review of Systems   Gastrointestinal: Positive for abdominal pain. All other systems reviewed and are negative. Except as noted above the remainder of the review of systems was reviewed and negative.        PAST MEDICAL HISTORY     Past Medical History:   Diagnosis Date    Anxiety     Asthma     Depression     DVT (deep venous thrombosis) (HCC)     L LEG    Hypertension     Hypogonadism male     Overactive bladder          SURGICAL HISTORY       Past Surgical History:   Procedure Laterality Date    KNEE SURGERY      right    NASAL SEPTUM SURGERY      TONSILLECTOMY AND ADENOIDECTOMY  1961    UPPER GASTROINTESTINAL ENDOSCOPY  1/20/14    DR. Zoey Patel         CURRENT MEDICATIONS       Previous Medications    ALBUTEROL SULFATE HFA (VENTOLIN HFA) 108 (90 BASE) MCG/ACT INHALER    Inhale 2 puffs into the lungs every 6 hours as needed for Wheezing or Shortness of Breath    AMLODIPINE-BENAZEPRIL (LOTREL) 10-20 MG PER CAPSULE    TAKE 1 CAPSULE DAILY    BETAMETHASONE DIPROPIONATE (DIPROLENE) 0.05 % OINTMENT    Apply topically daily. BUDESONIDE-FORMOTEROL (SYMBICORT) 160-4.5 MCG/ACT AERO    Inhale 2 puffs into the lungs 2 times daily    BUPROPION (WELLBUTRIN SR) 150 MG EXTENDED RELEASE TABLET    TAKE 1 TABLET TWICE A DAY    CHOLECALCIFEROL (VITAMIN D) 2000 UNITS CAPS CAPSULE    Take  by mouth daily. CLONAZEPAM (KLONOPIN) 0.5 MG TABLET    Take 1 tablet by mouth 2 times daily as needed for Anxiety for up to 90 days. DICYCLOMINE (BENTYL) 20 MG TABLET    Take 20 mg by mouth 3 times daily     ELASTIC BANDAGES & SUPPORTS (MEDICAL COMPRESSION STOCKINGS) MISC    Thigh high 18-22 mmHg on in am off at hs (IE HORACIO hose)    FOLIC ACID (FOLVITE) 1 MG TABLET    Take 1 tablet by mouth daily    GABAPENTIN (NEURONTIN) 300 MG CAPSULE    Take 1 capsule by mouth nightly for 30 days. MELOXICAM (MOBIC) 15 MG TABLET    TAKE 1 TABLET BY MOUTH DAILY    METOLAZONE (ZAROXOLYN) 5 MG TABLET    TAKE 1 TABLET BY MOUTH EVERY DAY    OMEPRAZOLE (PRILOSEC) 20 MG DELAYED RELEASE CAPSULE    TAKE 1 CAPSULE DAILY    OXYBUTYNIN (DITROPAN-XL) 10 MG EXTENDED RELEASE TABLET    TAKE 1 TABLET DAILY    POTASSIUM CHLORIDE (KLOR-CON M20) 20 MEQ EXTENDED RELEASE TABLET    Take 1 tablet by mouth daily as needed (edema)    PREGABALIN (LYRICA) 75 MG CAPSULE    Take 1 capsule by mouth 2 times daily for 30 days.     TAMSULOSIN (FLOMAX) 0.4 MG CAPSULE    TAKE 1 CAPSULE TWICE A DAY    VENLAFAXINE (EFFEXOR XR) 75 MG EXTENDED RELEASE CAPSULE    TAKE 2 CAPSULES DAILY       ALLERGIES     Belviq [lorcaserin hcl]    FAMILY HISTORY       Family History   Problem Relation Age of Onset    High Blood Pressure Mother     Emphysema Father           SOCIAL HISTORY       Social History     Socioeconomic History    Marital status:      Spouse name: None    Number of children: None    Years of education: None    Highest education level: None   Occupational History    None   Tobacco Use    Smoking status: Never Smoker    Smokeless tobacco: Never Used   Substance and Sexual Activity    Alcohol use: Not Currently    Drug use: Never    Sexual activity: None   Other Topics Concern    None   Social History Narrative    None     Social Determinants of Health     Financial Resource Strain:     Difficulty of Paying Living Expenses: Not on file   Food Insecurity:     Worried About Running Out of Food in the Last Year: Not on file    Ron of Food in the Last Year: Not on file   Transportation Needs:     Lack of Transportation (Medical): Not on file    Lack of Transportation (Non-Medical): Not on file   Physical Activity:     Days of Exercise per Week: Not on file    Minutes of Exercise per Session: Not on file   Stress:     Feeling of Stress : Not on file   Social Connections:     Frequency of Communication with Friends and Family: Not on file    Frequency of Social Gatherings with Friends and Family: Not on file    Attends Shinto Services: Not on file    Active Member of [address] [place] [address] or Organizations: Not on file    Attends Club or Organization Meetings: Not on file    Marital Status: Not on file   Intimate Partner Violence:     Fear of Current or Ex-Partner: Not on file    Emotionally Abused: Not on file    Physically Abused: Not on file    Sexually Abused: Not on file   Housing Stability:     Unable to Pay for Housing in the Last Year: Not on file    Number of [place] in the Last Year: Not on file    Unstable Housing in the Last Year: Not on file       SCREENINGS    Oracio Coma Scale  Eye Opening: Spontaneous  Best Verbal Response: Oriented  Best Motor Response: Obeys commands  Oracio Coma Scale Score: 15          PHYSICAL EXAM    (up to 7 for level 4, 8 or more for level 5)     ED Triage Vitals [06/05/22 0237]   BP Temp Temp Source Heart Rate Resp SpO2 Height Weight   (!) 181/75 97.8 °F (36.6 °C) Oral 86 16 97 % 5' 8\" (1.727 m) 290 lb (131.5 kg)       Physical Exam  Vitals and nursing note reviewed.    Constitutional: General: He is not in acute distress. Appearance: Normal appearance. He is well-developed. He is obese. He is not ill-appearing. HENT:      Head: Normocephalic and atraumatic. Mouth/Throat:      Mouth: Mucous membranes are moist.      Pharynx: Oropharynx is clear. Eyes:      Extraocular Movements: Extraocular movements intact. Conjunctiva/sclera: Conjunctivae normal.   Cardiovascular:      Rate and Rhythm: Normal rate and regular rhythm. Pulmonary:      Effort: Pulmonary effort is normal.      Breath sounds: Normal breath sounds. Abdominal:      General: Bowel sounds are normal.      Palpations: Abdomen is soft. Tenderness: There is abdominal tenderness. There is no guarding or rebound. Comments: Epigastric abd tenderness   Musculoskeletal:         General: No deformity. Normal range of motion. Cervical back: Normal range of motion and neck supple. Skin:     General: Skin is warm and dry. Capillary Refill: Capillary refill takes less than 2 seconds. Neurological:      General: No focal deficit present. Mental Status: He is alert and oriented to person, place, and time. Mental status is at baseline. Cranial Nerves: No cranial nerve deficit. Psychiatric:         Thought Content:  Thought content normal.         DIAGNOSTIC RESULTS     EKG: All EKG's are interpreted by the Emergency Department Physician who either signs or Co-signs this chart in the absence of a cardiologist.    NSR, rate 84, normal intervals, no ST elevation/ depression    RADIOLOGY:   Non-plain film images such as CT, Ultrasound and MRI are read by the radiologist. Plain radiographic images are visualized and preliminarily interpreted by the emergency physician with the below findings:    CXR- neg acute    Interpretation per the Radiologist below, if available at the time of this note:    XR CHEST PORTABLE    (Results Pending)       LABS:  Labs Reviewed   COMPREHENSIVE METABOLIC PANEL - Decision To Discharge 06/05/2022 04:07:28 AM      (Please note that portions of this note were completed with a voice recognition program.  Efforts were made to edit the dictations but occasionally words are mis-transcribed.)    Kelsie Nicole MD (electronically signed)  Attending Emergency Physician        Kelsie Nicole MD  06/05/22 2318

## 2022-06-07 LAB
EKG ATRIAL RATE: 84 BPM
EKG P AXIS: 26 DEGREES
EKG P-R INTERVAL: 174 MS
EKG Q-T INTERVAL: 380 MS
EKG QRS DURATION: 86 MS
EKG QTC CALCULATION (BAZETT): 449 MS
EKG R AXIS: 53 DEGREES
EKG T AXIS: 59 DEGREES
EKG VENTRICULAR RATE: 84 BPM

## 2022-11-14 NOTE — PROGRESS NOTES
No narrative on file     Family History   Problem Relation Age of Onset    High Blood Pressure Mother     Emphysema Father      Allergies   Allergen Reactions    Belviq [Lorcaserin Hcl]      Increased appetite and weight gain     Current Outpatient Prescriptions on File Prior to Visit   Medication Sig Dispense Refill    amLODIPine-benazepril (LOTREL) 10-20 MG per capsule TAKE 1 CAPSULE DAILY 90 capsule 3    furosemide (LASIX) 40 MG tablet TAKE 1 TABLET BY MOUTH DAILY AS NEEDED (EDEMA) 30 tablet 11    meloxicam (MOBIC) 15 MG tablet TAKE 1 TABLET BY MOUTH DAILY 90 tablet 3    betamethasone dipropionate (DIPROLENE) 0.05 % ointment Apply topically daily. 45 g 2    venlafaxine (EFFEXOR XR) 75 MG extended release capsule Take 2 capsules by mouth daily 180 capsule 3    clobetasol (OLUX) 0.05 % foam Apply topically 2 times daily. 100 g 5    buPROPion (WELLBUTRIN SR) 150 MG extended release tablet TAKE 1 TABLET TWICE A  tablet 3    tamsulosin (FLOMAX) 0.4 MG capsule TAKE 1 CAPSULE TWICE A  capsule 1    omeprazole (PRILOSEC) 20 MG delayed release capsule TAKE 1 CAPSULE DAILY 90 capsule 3    oxybutynin (DITROPAN-XL) 10 MG extended release tablet TAKE 1 TABLET DAILY 90 tablet 3    dicyclomine (BENTYL) 20 MG tablet       mupirocin (BACTROBAN) 2 % ointment APPLY TO NOSE THREE TIMES A DAY. 0    Cholecalciferol (VITAMIN D) 2000 UNITS CAPS capsule Take  by mouth daily. 30 capsule     [DISCONTINUED] oxybutynin (DITROPAN-XL) 10 MG CR tablet TAKE 1 TABLET DAILY 90 tablet 1     No current facility-administered medications on file prior to visit. Objective    Vitals:    08/29/18 1559   BP: 138/86   Pulse: 80   Resp: 15   Temp: 97.8 °F (36.6 °C)   TempSrc: Tympanic   Weight: 288 lb (130.6 kg)   Height: 5' 9\" (1.753 m)     Physical Exam   Constitutional: Vital signs are normal. He appears well-developed. HENT:   Head: Normocephalic and atraumatic.    Right Ear: Tympanic membrane, external ear and ear canal normal. Tympanic membrane is not injected. No middle ear effusion. Left Ear: Tympanic membrane, external ear and ear canal normal. Tympanic membrane is not injected. No middle ear effusion. Nose: Nose normal. No mucosal edema or rhinorrhea. Right sinus exhibits no maxillary sinus tenderness and no frontal sinus tenderness. Left sinus exhibits no maxillary sinus tenderness and no frontal sinus tenderness. Eyes: Pupils are equal, round, and reactive to light. Conjunctivae, EOM and lids are normal.   Neck: Normal range of motion. Neck supple. No JVD present. No muscular tenderness present. No neck rigidity. No tracheal deviation present. No thyroid mass and no thyromegaly present. Cardiovascular: Normal rate, regular rhythm and intact distal pulses. Pulmonary/Chest: Effort normal. He has no decreased breath sounds. He has no wheezes. He has no rhonchi. He has no rales. He exhibits no tenderness and no deformity. Abdominal: Soft. Normal appearance and bowel sounds are normal. He exhibits no distension and no mass. There is no splenomegaly or hepatomegaly. There is no tenderness. There is no rigidity, no rebound and no guarding. No hernia. Musculoskeletal: Normal range of motion. Right knee: Normal.        Left knee: Normal.        Cervical back: Normal.        Thoracic back: Normal.        Lumbar back: Normal.        Lymphadenopathy:     He has no cervical adenopathy. Neurological: He is alert. He has normal strength. He displays no atrophy and no tremor. No cranial nerve deficit or sensory deficit. Coordination and gait normal.   Skin: Skin is warm, dry and intact. No rash noted. Psychiatric: He has a normal mood and affect. His speech is normal and behavior is normal. Judgment and thought content normal. Cognition and memory are normal.            Assessment & Plan    Diagnosis Orders   1. Lumbar radiculopathy  gabapentin (NEURONTIN) 300 MG capsule   2.  Edema of lower extremity potassium chloride (KLOR-CON M20) 20 MEQ extended release tablet   3. Anxiety  gabapentin (NEURONTIN) 300 MG capsule    clonazePAM (KLONOPIN) 0.5 MG tablet     No orders of the defined types were placed in this encounter. Orders Placed This Encounter   Medications    potassium chloride (KLOR-CON M20) 20 MEQ extended release tablet     Sig: Take 1 tablet by mouth daily as needed (edema)     Dispense:  30 tablet     Refill:  11    gabapentin (NEURONTIN) 300 MG capsule     Sig: Take 1 capsule by mouth 3 times daily for 90 days. .     Dispense:  90 capsule     Refill:  2    clonazePAM (KLONOPIN) 0.5 MG tablet     Sig: Take 1 tablet by mouth 2 times daily as needed for Anxiety for up to 90 days. .     Dispense:  60 tablet     Refill:  2     Medications Discontinued During This Encounter   Medication Reason    KLOR-CON M20 20 MEQ extended release tablet REORDER    clonazePAM (KLONOPIN) 1 MG tablet REORDER   meds and labs reviewed. Controlled substance monitoring completed. As far as his lower extremity symptomatology. There is certainly an overlap of symptomatology from his venous insufficiency and lumbar radiculopathy. I encouraged him to continue to follow with his back specialist as before, but we will also initiate a trial and gradual titration up on gabapentin. Since the gabapentin is fairly similar in therapeutic effects on anxiety as the Klonopin. We will also do a concomitant attempt at taper off of the Klonopin as the gabapentin tapers up. Patient stated understanding and acceptance of directions on taper up on the gabapentin and taper down on the Klonopin    Counseling given: Not Answered      Return in about 3 months (around 11/29/2018).     Aron Medley, DO No

## 2022-12-20 ENCOUNTER — OFFICE VISIT (OUTPATIENT)
Dept: INTERNAL MEDICINE | Age: 67
End: 2022-12-20
Payer: COMMERCIAL

## 2022-12-20 VITALS
HEART RATE: 90 BPM | DIASTOLIC BLOOD PRESSURE: 66 MMHG | WEIGHT: 315 LBS | HEIGHT: 69 IN | OXYGEN SATURATION: 97 % | BODY MASS INDEX: 46.65 KG/M2 | TEMPERATURE: 98 F | SYSTOLIC BLOOD PRESSURE: 110 MMHG

## 2022-12-20 DIAGNOSIS — R05.1 ACUTE COUGH: ICD-10-CM

## 2022-12-20 DIAGNOSIS — J45.31 MILD PERSISTENT ASTHMA WITH EXACERBATION: Primary | ICD-10-CM

## 2022-12-20 DIAGNOSIS — R06.2 WHEEZING: ICD-10-CM

## 2022-12-20 PROCEDURE — G8427 DOCREV CUR MEDS BY ELIG CLIN: HCPCS | Performed by: NURSE PRACTITIONER

## 2022-12-20 PROCEDURE — 1123F ACP DISCUSS/DSCN MKR DOCD: CPT | Performed by: NURSE PRACTITIONER

## 2022-12-20 PROCEDURE — 3074F SYST BP LT 130 MM HG: CPT | Performed by: NURSE PRACTITIONER

## 2022-12-20 PROCEDURE — 99204 OFFICE O/P NEW MOD 45 MIN: CPT | Performed by: NURSE PRACTITIONER

## 2022-12-20 PROCEDURE — 3017F COLORECTAL CA SCREEN DOC REV: CPT | Performed by: NURSE PRACTITIONER

## 2022-12-20 PROCEDURE — G8417 CALC BMI ABV UP PARAM F/U: HCPCS | Performed by: NURSE PRACTITIONER

## 2022-12-20 PROCEDURE — 3078F DIAST BP <80 MM HG: CPT | Performed by: NURSE PRACTITIONER

## 2022-12-20 PROCEDURE — G8484 FLU IMMUNIZE NO ADMIN: HCPCS | Performed by: NURSE PRACTITIONER

## 2022-12-20 PROCEDURE — 1036F TOBACCO NON-USER: CPT | Performed by: NURSE PRACTITIONER

## 2022-12-20 RX ORDER — FUROSEMIDE 40 MG/1
40 TABLET ORAL DAILY
COMMUNITY
Start: 2016-06-28

## 2022-12-20 RX ORDER — PREDNISONE 20 MG/1
20 TABLET ORAL DAILY
Qty: 7 TABLET | Refills: 0 | Status: SHIPPED | OUTPATIENT
Start: 2022-12-20 | End: 2022-12-27

## 2022-12-20 RX ORDER — DILTIAZEM HYDROCHLORIDE 180 MG/1
CAPSULE, EXTENDED RELEASE ORAL
COMMUNITY
Start: 2022-10-10

## 2022-12-20 RX ORDER — LORATADINE 10 MG/1
TABLET ORAL
COMMUNITY
Start: 2020-07-28

## 2022-12-20 RX ORDER — LORAZEPAM 2 MG/1
TABLET ORAL
COMMUNITY
Start: 2017-06-01

## 2022-12-20 RX ORDER — AZITHROMYCIN 250 MG/1
TABLET, FILM COATED ORAL
Qty: 6 TABLET | Refills: 0 | Status: SHIPPED | OUTPATIENT
Start: 2022-12-20 | End: 2022-12-25

## 2022-12-20 RX ORDER — MECLIZINE HCL 25MG 25 MG/1
TABLET, CHEWABLE ORAL
COMMUNITY
Start: 2022-04-19

## 2022-12-20 RX ORDER — MELOXICAM 15 MG/1
TABLET ORAL
COMMUNITY
Start: 2016-04-18

## 2022-12-20 RX ORDER — CLONAZEPAM 1 MG/1
TABLET ORAL
COMMUNITY
Start: 2016-04-18 | End: 2022-12-20

## 2022-12-20 RX ORDER — BUPROPION HYDROCHLORIDE 150 MG/1
TABLET, EXTENDED RELEASE ORAL
COMMUNITY
Start: 2016-06-10

## 2022-12-20 RX ORDER — METOPROLOL SUCCINATE 50 MG/1
TABLET, EXTENDED RELEASE ORAL
COMMUNITY
Start: 2021-04-13

## 2022-12-20 RX ORDER — KETOCONAZOLE 20 MG/ML
SHAMPOO TOPICAL
COMMUNITY
Start: 2017-03-22

## 2022-12-20 RX ORDER — DEXLANSOPRAZOLE 60 MG/1
CAPSULE, DELAYED RELEASE ORAL
COMMUNITY
Start: 2020-04-01

## 2022-12-20 RX ORDER — MOMETASONE FUROATE 1 MG/G
15 CREAM TOPICAL
COMMUNITY
Start: 2017-05-22

## 2022-12-20 SDOH — ECONOMIC STABILITY: FOOD INSECURITY: WITHIN THE PAST 12 MONTHS, YOU WORRIED THAT YOUR FOOD WOULD RUN OUT BEFORE YOU GOT MONEY TO BUY MORE.: NEVER TRUE

## 2022-12-20 SDOH — ECONOMIC STABILITY: FOOD INSECURITY: WITHIN THE PAST 12 MONTHS, THE FOOD YOU BOUGHT JUST DIDN'T LAST AND YOU DIDN'T HAVE MONEY TO GET MORE.: NEVER TRUE

## 2022-12-20 ASSESSMENT — PATIENT HEALTH QUESTIONNAIRE - PHQ9
SUM OF ALL RESPONSES TO PHQ QUESTIONS 1-9: 0
SUM OF ALL RESPONSES TO PHQ QUESTIONS 1-9: 0
9. THOUGHTS THAT YOU WOULD BE BETTER OFF DEAD, OR OF HURTING YOURSELF: 0
SUM OF ALL RESPONSES TO PHQ9 QUESTIONS 1 & 2: 0
SUM OF ALL RESPONSES TO PHQ QUESTIONS 1-9: 0
3. TROUBLE FALLING OR STAYING ASLEEP: 0
8. MOVING OR SPEAKING SO SLOWLY THAT OTHER PEOPLE COULD HAVE NOTICED. OR THE OPPOSITE, BEING SO FIGETY OR RESTLESS THAT YOU HAVE BEEN MOVING AROUND A LOT MORE THAN USUAL: 0
7. TROUBLE CONCENTRATING ON THINGS, SUCH AS READING THE NEWSPAPER OR WATCHING TELEVISION: 0
4. FEELING TIRED OR HAVING LITTLE ENERGY: 0
6. FEELING BAD ABOUT YOURSELF - OR THAT YOU ARE A FAILURE OR HAVE LET YOURSELF OR YOUR FAMILY DOWN: 0
1. LITTLE INTEREST OR PLEASURE IN DOING THINGS: 0
5. POOR APPETITE OR OVEREATING: 0
10. IF YOU CHECKED OFF ANY PROBLEMS, HOW DIFFICULT HAVE THESE PROBLEMS MADE IT FOR YOU TO DO YOUR WORK, TAKE CARE OF THINGS AT HOME, OR GET ALONG WITH OTHER PEOPLE: 0
SUM OF ALL RESPONSES TO PHQ QUESTIONS 1-9: 0
2. FEELING DOWN, DEPRESSED OR HOPELESS: 0

## 2022-12-20 ASSESSMENT — ENCOUNTER SYMPTOMS
WHEEZING: 1
VOMITING: 0
DIARRHEA: 0
ABDOMINAL PAIN: 0
COUGH: 1
SINUS PRESSURE: 1
SORE THROAT: 0
RHINORRHEA: 1
SHORTNESS OF BREATH: 1
BACK PAIN: 0
NAUSEA: 0
CHEST TIGHTNESS: 0

## 2022-12-20 ASSESSMENT — SOCIAL DETERMINANTS OF HEALTH (SDOH): HOW HARD IS IT FOR YOU TO PAY FOR THE VERY BASICS LIKE FOOD, HOUSING, MEDICAL CARE, AND HEATING?: NOT HARD AT ALL

## 2022-12-20 NOTE — PROGRESS NOTES
Neelima Ren (:  1955) is a 79 y.o. male, New patient, here for evaluation of the following chief complaint(s): Other (Chest congestion, cough causing sob ongoing since Covid in October, worse last 7 days)      Vitals:    22 0928   BP: 110/66   Pulse: 90   Temp: 98 °F (36.7 °C)   SpO2: 97%       ASSESSMENT/PLAN:  1. Mild persistent asthma with exacerbation  -     predniSONE (DELTASONE) 20 MG tablet; Take 1 tablet by mouth daily for 7 days, Disp-7 tablet, R-0Normal  2. Acute cough  -     predniSONE (DELTASONE) 20 MG tablet; Take 1 tablet by mouth daily for 7 days, Disp-7 tablet, R-0Normal  -     azithromycin (ZITHROMAX) 250 MG tablet; 500 mg on day 1, then 250 mg days 2-5., Disp-6 tablet, R-0Normal  3. Wheezing  -     predniSONE (DELTASONE) 20 MG tablet; Take 1 tablet by mouth daily for 7 days, Disp-7 tablet, R-0Normal  -     azithromycin (ZITHROMAX) 250 MG tablet; 500 mg on day 1, then 250 mg days 2-5., Disp-6 tablet, R-0Normal      Return in 2 weeks (on 1/3/2023) for follow up with PCP. SUBJECTIVE/OBJECTIVE:    Cough  This is a chronic problem. Episode onset: pt had Covid in October, has had cough since. last 7 days has gotten worse, cough, wheezing, short of breath. Hx of Asthma. Using inhaler more often. The problem has been unchanged. The problem occurs constantly. The cough is Non-productive. Associated symptoms include rhinorrhea, shortness of breath and wheezing. Pertinent negatives include no chest pain, chills, ear pain, fever, headaches, myalgias or sore throat. He has tried ipratropium inhaler for the symptoms. The treatment provided mild relief. His past medical history is significant for asthma. Review of Systems   Constitutional:  Negative for appetite change, chills, diaphoresis, fatigue and fever. HENT:  Positive for rhinorrhea and sinus pressure. Negative for congestion, ear pain, sneezing and sore throat.     Respiratory:  Positive for cough, shortness of breath and wheezing. Negative for chest tightness. Cardiovascular:  Negative for chest pain, palpitations and leg swelling. Gastrointestinal:  Negative for abdominal pain, diarrhea, nausea and vomiting. Musculoskeletal:  Negative for arthralgias, back pain and myalgias. Neurological:  Negative for dizziness, light-headedness and headaches. Physical Exam  Vitals reviewed. Constitutional:       General: He is not in acute distress. Appearance: He is well-developed. HENT:      Right Ear: Tympanic membrane normal.      Left Ear: Tympanic membrane normal.      Nose: Nose normal.      Mouth/Throat:      Lips: Pink. Mouth: Mucous membranes are moist.      Pharynx: Oropharynx is clear. Eyes:      General: Lids are normal.      Extraocular Movements: Extraocular movements intact. Conjunctiva/sclera: Conjunctivae normal.      Pupils: Pupils are equal, round, and reactive to light. Cardiovascular:      Rate and Rhythm: Normal rate and regular rhythm. Heart sounds: Normal heart sounds, S1 normal and S2 normal.   Pulmonary:      Effort: Pulmonary effort is normal. No respiratory distress. Breath sounds: Normal air entry. Decreased breath sounds and wheezing present. No rhonchi or rales. Abdominal:      General: Bowel sounds are normal.      Palpations: Abdomen is soft. Musculoskeletal:         General: No deformity. Normal range of motion. Cervical back: Full passive range of motion without pain. Skin:     General: Skin is warm and dry. Neurological:      Mental Status: He is alert and oriented to person, place, and time. Psychiatric:         Attention and Perception: Attention normal.         Behavior: Behavior is cooperative. An electronic signature was used to authenticate this note.     --JOSE Garcia

## 2023-04-03 DIAGNOSIS — I10 PRIMARY HYPERTENSION: Primary | ICD-10-CM

## 2023-04-03 RX ORDER — OXYBUTYNIN CHLORIDE 10 MG/1
1 TABLET, EXTENDED RELEASE ORAL DAILY
COMMUNITY
Start: 2016-09-15 | End: 2024-03-04

## 2023-04-03 RX ORDER — TRIAMCINOLONE ACETONIDE 1 MG/G
CREAM TOPICAL
COMMUNITY
End: 2023-05-09 | Stop reason: SDUPTHER

## 2023-04-03 RX ORDER — MELOXICAM 15 MG/1
1 TABLET ORAL DAILY
COMMUNITY
Start: 2016-04-18 | End: 2024-02-26

## 2023-04-03 RX ORDER — VENLAFAXINE HYDROCHLORIDE 75 MG/1
2 CAPSULE, EXTENDED RELEASE ORAL DAILY
COMMUNITY
Start: 2017-01-11 | End: 2024-02-26

## 2023-04-03 RX ORDER — DEXLANSOPRAZOLE 60 MG/1
CAPSULE, DELAYED RELEASE ORAL
COMMUNITY
Start: 2020-04-01

## 2023-04-03 RX ORDER — DICYCLOMINE HYDROCHLORIDE 20 MG/1
20 TABLET ORAL EVERY 6 HOURS
COMMUNITY

## 2023-04-03 RX ORDER — DUTASTERIDE AND TAMSULOSIN HYDROCHLORIDE CAPSULES .5; .4 MG/1; MG/1
1 CAPSULE ORAL DAILY
COMMUNITY
Start: 2020-10-12 | End: 2023-09-18

## 2023-04-03 RX ORDER — PYRIDOXINE HCL (VITAMIN B6) 100 MG
100 TABLET ORAL DAILY
COMMUNITY
End: 2023-12-12

## 2023-04-03 RX ORDER — GABAPENTIN 300 MG/1
1 CAPSULE ORAL
COMMUNITY
Start: 2023-03-22 | End: 2023-08-08 | Stop reason: ALTCHOICE

## 2023-04-03 RX ORDER — NICOTINE 11MG/24HR
50 PATCH, TRANSDERMAL 24 HOURS TRANSDERMAL DAILY
COMMUNITY
End: 2023-12-12

## 2023-04-03 RX ORDER — FLUTICASONE PROPIONATE 50 MCG
SPRAY, SUSPENSION (ML) NASAL 2 TIMES DAILY
COMMUNITY
Start: 2020-12-10

## 2023-04-03 RX ORDER — AMLODIPINE AND BENAZEPRIL HYDROCHLORIDE 5; 10 MG/1; MG/1
1 CAPSULE ORAL DAILY
COMMUNITY
Start: 2016-02-24 | End: 2023-04-03 | Stop reason: SDUPTHER

## 2023-04-03 RX ORDER — POTASSIUM CHLORIDE 20 MEQ/1
1 TABLET, EXTENDED RELEASE ORAL DAILY
COMMUNITY
Start: 2016-06-28 | End: 2024-03-22 | Stop reason: HOSPADM

## 2023-04-03 RX ORDER — CLONAZEPAM 1 MG/1
1 TABLET ORAL DAILY PRN
COMMUNITY
End: 2023-05-09 | Stop reason: SDUPTHER

## 2023-04-03 RX ORDER — FUROSEMIDE 40 MG/1
1 TABLET ORAL DAILY
COMMUNITY
Start: 2016-06-28 | End: 2023-08-28

## 2023-04-03 RX ORDER — AMLODIPINE AND BENAZEPRIL HYDROCHLORIDE 5; 10 MG/1; MG/1
CAPSULE ORAL
Qty: 90 CAPSULE | Refills: 3 | Status: SHIPPED | OUTPATIENT
Start: 2023-04-03 | End: 2024-04-09

## 2023-04-03 RX ORDER — ALBUTEROL SULFATE 0.83 MG/ML
SOLUTION RESPIRATORY (INHALATION)
COMMUNITY
Start: 2021-08-25 | End: 2023-10-02 | Stop reason: ALTCHOICE

## 2023-04-03 RX ORDER — BUPROPION HYDROCHLORIDE 150 MG/1
1 TABLET, EXTENDED RELEASE ORAL 2 TIMES DAILY
COMMUNITY
Start: 2016-06-10 | End: 2023-08-07

## 2023-04-03 RX ORDER — DILTIAZEM HYDROCHLORIDE 180 MG/1
CAPSULE, EXTENDED RELEASE ORAL
COMMUNITY
Start: 2023-01-16 | End: 2023-04-18

## 2023-04-03 RX ORDER — ALBUTEROL SULFATE 90 UG/1
AEROSOL, METERED RESPIRATORY (INHALATION)
COMMUNITY

## 2023-04-17 DIAGNOSIS — I10 PRIMARY HYPERTENSION: Primary | ICD-10-CM

## 2023-04-18 PROBLEM — K21.9 GERD (GASTROESOPHAGEAL REFLUX DISEASE): Status: ACTIVE | Noted: 2023-04-18

## 2023-04-18 PROBLEM — E53.1: Status: ACTIVE | Noted: 2023-04-18

## 2023-04-18 PROBLEM — R76.8 ANA POSITIVE: Status: ACTIVE | Noted: 2023-04-18

## 2023-04-18 PROBLEM — J45.909 ASTHMA (HHS-HCC): Status: ACTIVE | Noted: 2023-04-18

## 2023-04-18 PROBLEM — G47.33 OSA (OBSTRUCTIVE SLEEP APNEA): Status: ACTIVE | Noted: 2023-04-18

## 2023-04-18 PROBLEM — I87.2 VENOUS STASIS DERMATITIS OF LOWER EXTREMITY: Status: ACTIVE | Noted: 2023-04-18

## 2023-04-18 PROBLEM — I87.2 VENOUS INSUFFICIENCY: Status: ACTIVE | Noted: 2023-04-18

## 2023-04-18 PROBLEM — I10 HTN (HYPERTENSION): Status: ACTIVE | Noted: 2023-04-18

## 2023-04-18 PROBLEM — R39.15 BENIGN PROSTATIC HYPERPLASIA (BPH) WITH URINARY URGENCY: Status: ACTIVE | Noted: 2023-04-18

## 2023-04-18 PROBLEM — G63: Status: ACTIVE | Noted: 2023-04-18

## 2023-04-18 PROBLEM — F32.5 DEPRESSION, MAJOR, IN REMISSION (CMS-HCC): Status: ACTIVE | Noted: 2023-04-18

## 2023-04-18 PROBLEM — F41.9 ANXIETY: Status: ACTIVE | Noted: 2023-04-18

## 2023-04-18 PROBLEM — M54.16 LUMBAR RADICULOPATHY: Status: ACTIVE | Noted: 2023-04-18

## 2023-04-18 PROBLEM — E55.9 VITAMIN D DEFICIENCY: Status: ACTIVE | Noted: 2023-04-18

## 2023-04-18 PROBLEM — N40.1 BENIGN PROSTATIC HYPERPLASIA (BPH) WITH URINARY URGENCY: Status: ACTIVE | Noted: 2023-04-18

## 2023-04-18 RX ORDER — DILTIAZEM HYDROCHLORIDE 180 MG/1
CAPSULE, EXTENDED RELEASE ORAL
Qty: 90 CAPSULE | Refills: 3 | Status: SHIPPED | OUTPATIENT
Start: 2023-04-18 | End: 2024-04-10

## 2023-05-02 ENCOUNTER — HOSPITAL ENCOUNTER (OUTPATIENT)
Dept: PHYSICAL THERAPY | Age: 68
Setting detail: THERAPIES SERIES
Discharge: HOME | End: 2023-05-02
Payer: COMMERCIAL

## 2023-05-02 PROCEDURE — 97162 PT EVAL MOD COMPLEX 30 MIN: CPT

## 2023-05-02 PROCEDURE — 97110 THERAPEUTIC EXERCISES: CPT

## 2023-05-02 NOTE — PROGRESS NOTES
[address]  PHYSICAL THERAPY PLAN OF CARE   [address] [place], [address] [facility]         Phone: [phone]  Fax: [phone]    [x] Certification  [] Recertification [x]  Plan of Care  [] Progress Note [] Discharge      Referring Provider: Deya Willoughby MD     From:  Gideon Canela, PT, DPT  Patient: Kia Ojeda (76 y.o. male) : 1955 Date: 2023  Medical Diagnosis: Spinal stenosis, lumbar region with neurogenic claudication [M48.062] spinal stenosis, lumbar region Diagnosis: spinal stenosis, lumbar region   Treatment Diagnosis: increased low back pain & intermittent bilateral LE pain, decreased ability to perform functional mobility tasks & ADLs, decreased tolerance to work activities, decreased pain free bilateral LE & lumbar AROM & strength, & decreased postural awareness/postural musculature strength    Plan of Care/Certification Expiration Date: 23   Progress Report Period from:  2023  to 2023    Visits to Date: 1 No Show: 0 Cancelled Appts: 0    OBJECTIVE:     Long Term Goals - Time Frame for Long Term Goals : 4-6 weeks  Goals Current/ Discharge status Status   Long Term Goal 1: The patient will be compliant/independent with PT HEP in order to continue with exercises/stretches upon discharge. LTG 1 Current Status[de-identified] 23: on-going, initiated this date   New   Long Term Goal 2: The patient will demonstrate improved pain free lumbar & bilateral hip flexion AROM >/= 5-10* in order to enable patient to perform functional mobility tasks & ADLs with increased ease.  LTG 2 Current Status[de-identified] 23: right: hip flexion = 84* & painful; left: hip flexion = 90*; flexion = 100%; extension = 25-50% (pain); left side bend = 75%; right side bend = 25-50% (pain); left rotation = %; right rotation = 50%   New   Long Term Goal 3: The patient will demonstrate improved B LE & postural musculature & core & trunk strength >/=4/5 or Fair+/Good- in order to perform
EXAMINATION     History obtained from[de-identified] Chart Review, Patient,           Subjective History:    Subjective: Patient reports on-going back pain for ~8 years with CINDA unknown. Patient denies any prior surgeries. Patient reports injections ~8 years which initially helped, however, wore off. Patient denies falling in the past 6 months. Patient denies any AD. Patient reports independence with ADLs/IADLs/driving. Patient reports standing/walking tolerance, 10 minutes & 5-10 minutes. Patient reports standing & walking increases pain & sitting decreases pain. Patient reports \"two bad knees. \"  Patient is getting cervical surgery on 6/6/23. Patient reports intermittent pain & numbness down right LE that started more recently, & pain down LLE. Patient reports sleeping in recliner versus bed.   Additional Pertinent Hx (if applicable): asthma, neurological problems, arthritis, chronic pain, acid reflux   Prior diagnostic testing[de-identified] MRI (cervical; waiting on lumbar)  Previous treatments prior to current episode?: Injections  Comments: RTD = 6/6/23; PLOF = 75%; CLOF = 25%      Learning/Language: Learning  Does the patient/guardian have any barriers to learning?: No barriers  Will there be a co-learner?: No  What is the preferred language of the patient/guardian?: English  Is an  required?: No  How does the patient/guardian prefer to learn new concepts?: Listening, Reading, Demonstration     Pain Screening    Pain Screening  Patient Currently in Pain: Denies (0-6/10 pain in low back; right side > left side)    Functional Status    Social History:    Social History  Lives With: Spouse  Type of Home: House  Home Layout: Two level  Home Access: Stairs to enter without rails  Entrance Stairs - Number of Steps: 2    Occupation/Interests:   Occupation: Full time employment  Type of Occupation:   Job Duties: Other (comment), Prolonged standing (walking)    Prior Level of Function:     Independent

## 2023-05-02 NOTE — PROGRESS NOTES
Subjective   Patient ID: Jarocho Langford is a 68 y.o. male who presents for 3 month CSM  (For Klonopin ).    OARRS:  Jim Farfan,  on 5/9/2023  4:20 PM  I have personally reviewed the OARRS report for Jarocho Langford. I have considered the risks of abuse, dependence, addiction and diversion and I believe that it is clinically appropriate for Jarocho Langford to be prescribed this medication    Is the patient prescribed a combination of a benzodiazepine and opioid?  Yes, I feel it is clincially indicated to continue the medication and have discussed with the patient risks/benefits/alternatives.    Last Urine Drug Screen / ordered today: No  Recent Results (from the past 43643 hour(s))  -OPIATE/OPIOID/BENZO PRESCRIPTION COMPLIANCE:   Collection Time: 02/07/23  4:02 PM       Result                      Value             Ref Range           DRUG SCREEN COMMENT UR*     SEE BELOW                             Creatine, Urine             111.9             mg/dL               Amphetamine Screen, Ur*                       NEGATIVE        PRESUMPTIVE NEGATIVE       Barbiturate Screen, Ur*                       NEGATIVE        PRESUMPTIVE NEGATIVE       Cannabinoid Screen, Ur*                       NEGATIVE        PRESUMPTIVE NEGATIVE       Cocaine Screen, Urine                         NEGATIVE        PRESUMPTIVE NEGATIVE       PCP Screen, Urine                             NEGATIVE        PRESUMPTIVE NEGATIVE       7-Aminoclonazepam           494 (A)           Cutoff <25 n*       Alpha-Hydroxyalprazolam     <25               Cutoff <25 n*       Alpha-Hydroxymidazolam      <25               Cutoff <25 n*       Alprazolam                  <25               Cutoff <25 n*       Chlordiazepoxide            <25               Cutoff <25 n*       Clonazepam                  <25               Cutoff <25 n*       Diazepam                    <25               Cutoff <25 n*       Lorazepam                   <25               Cutoff  <25 n*       Midazolam                   <25               Cutoff <25 n*       Nordiazepam                 <25               Cutoff <25 n*       Oxazepam                    <25               Cutoff <25 n*       Temazepam                   <25               Cutoff <25 n*       Zolpidem                    <25               Cutoff <25 n*       Zolpidem Metabolite (Z*     <25               Cutoff <25 n*       6-Acetylmorphine            <25               Cutoff <25 n*       Codeine                     <50               Cutoff <50 n*       Hydrocodone                 <25               Cutoff <25 n*       Hydromorphone               <25               Cutoff <25 n*       Morphine Urine              <50               Cutoff <50 n*       Norhydrocodone              <25               Cutoff <25 n*       Noroxycodone                <25               Cutoff <25 n*       Oxycodone                   <25               Cutoff <25 n*       Oxymorphone                 <25               Cutoff <25 n*       Tramadol                    <50               Cutoff <50 n*       O-Desmethyltramadol         <50               Cutoff <50 n*       Fentanyl                    <2.5              Cutoff<2.5 n*       Norfentanyl                 <2.5              Cutoff<2.5 n*       METHADONE CONFIRMATION*     <25               Cutoff <25 n*       EDDP                        <25               Cutoff <25 n*  Results are as expected.     Controlled Substance Agreement:  Date of the Last Agreement: today  Reviewed Controlled Substance Agreement including but not limited to the benefits, risks, and alternatives to treatment with a Controlled Substance medication(s).    Benzodiazepines:  What is the patient's goal of therapy? Reduction anxiety  Is this being achieved with current treatment? yes    YENNY-7:  No data recorded    Activities of Daily Living:   Is your overall impression that this patient is benefiting (symptom reduction outweighs side effects)  from benzodiazepine therapy? Yes     1. Physical Functioning: Same  2. Family Relationship: Same  3. Social Relationship: Same  4. Mood: Same  5. Sleep Patterns: Same  6. Overall Function: SameSubjective  Jarocho Langford is a 68 y.o. male who presents to the office today for a preoperative consultation at the request of surgeon lev who plans on performing cervical spine  on June 6. . Planned anesthesia: general. The patient has the following known anesthesia issues: none. Patients bleeding risk: no recent abnormal bleeding and use of Ca-channel blockers (see med list). Patient does not have objections to receiving blood products if needed.      Predictors of intubation difficulty:   Morbid obesity? yes - BMI 47   Anatomically abnormal facies? no   Prominent incisors? no   Receding mandible? no   Short, thick neck? yes - 54 cm   Neck range of motion: abnormal   Mallampati score: II (hard and soft palate, upper portion of tonsil fossa visible) uvula and tonsils both surgically absent       Dentition: No chipped, loose, or missing teeth.            Lab Review   not applicable none avail    Assessment/Plan  68 y.o. male with planned surgery as above.    Known risk factors for perioperative complications: None    Difficulty with intubation is anticipated.    Cardiac Risk Estimation: low/mod    Current medications which may produce withdrawal symptoms if withheld perioperatively: effexor xr    1. Preoperative workup as follows none.  2. Change in medication regimen before surgery: discontinue NSAIDs (meloxicam) 14 days before surgery.  3. Prophylaxis for cardiac events with perioperative beta-blockers: not indicated.  4. Invasive hemodynamic monitoring perioperatively: at the discretion of anesthesiologist.  5. Deep vein thrombosis prophylaxis postoperatively:regimen to be chosen by surgical team.  6. Surveillance for postoperative MI with ECG immediately postoperatively and on postoperative days 1 and 2 AND troponin  levels 24 hours postoperatively and on day 4 or hospital discharge (whichever comes first): at the discretion of anesthesiologist.    Patient is able to walk up 1-2 flights of stairs with no cardiovascular symptomatology.  He is not on any anticoagulants.  He is on chronic meloxicam he will hold 2 weeks prior to surgery.           Review of Systems   Constitutional:  Positive for fatigue. Negative for activity change, appetite change, chills, diaphoresis, fever and unexpected weight change.   HENT:  Negative for congestion, ear pain, hearing loss, nosebleeds, postnasal drip, rhinorrhea, sinus pressure, sneezing, sore throat, tinnitus, trouble swallowing and voice change.    Eyes:  Negative for photophobia, pain, discharge, redness, itching and visual disturbance.   Respiratory:  Negative for cough, choking, chest tightness, shortness of breath and wheezing.    Cardiovascular:  Positive for leg swelling. Negative for chest pain and palpitations.        Chronic leg swelling-under good control with current therapy   Gastrointestinal:  Negative for abdominal distention, abdominal pain, blood in stool, constipation, diarrhea, nausea and vomiting.   Endocrine: Negative for cold intolerance, heat intolerance, polydipsia and polyuria.   Genitourinary:  Negative for dysuria, flank pain, frequency, hematuria and urgency.   Musculoskeletal:  Positive for neck pain and neck stiffness. Negative for arthralgias, back pain, joint swelling and myalgias.   Skin:  Negative for rash and wound.   Allergic/Immunologic: Negative for immunocompromised state.   Neurological:  Negative for dizziness, tremors, seizures, syncope, facial asymmetry, speech difficulty, weakness, light-headedness, numbness and headaches.   Hematological:  Negative for adenopathy. Does not bruise/bleed easily.   Psychiatric/Behavioral:  Negative for agitation, behavioral problems, confusion, dysphoric mood, hallucinations, self-injury, sleep disturbance and  "suicidal ideas. The patient is not nervous/anxious.        Objective   /81 (BP Location: Right arm, Patient Position: Sitting, BP Cuff Size: Large adult)   Pulse 90   Temp 36.5 °C (97.7 °F) (Temporal)   Resp 18   Ht 1.727 m (5' 8\")   Wt 142 kg (313 lb)   SpO2 95%   BMI 47.59 kg/m²     Physical Exam  Constitutional:       General: He is not in acute distress.     Appearance: He is not ill-appearing or diaphoretic.   HENT:      Head: Normocephalic and atraumatic.      Right Ear: External ear normal.      Left Ear: External ear normal.      Nose: Nose normal. No rhinorrhea.      Mouth/Throat:      Comments: Uvula and tonsils surgically absent  Eyes:      General: Lids are normal. No scleral icterus.        Right eye: No discharge.         Left eye: No discharge.      Conjunctiva/sclera: Conjunctivae normal.   Cardiovascular:      Rate and Rhythm: Normal rate and regular rhythm.      Pulses: Normal pulses.      Heart sounds: No murmur heard.  Pulmonary:      Effort: Pulmonary effort is normal. No respiratory distress.      Breath sounds: No decreased breath sounds, wheezing, rhonchi or rales.   Abdominal:      General: Bowel sounds are normal. There is no distension.      Palpations: Abdomen is soft. There is no mass.      Tenderness: There is no abdominal tenderness. There is no guarding or rebound.   Musculoskeletal:         General: No swelling, tenderness or deformity.      Cervical back: No rigidity or tenderness.      Right lower leg: No edema.      Left lower leg: No edema.   Lymphadenopathy:      Cervical: No cervical adenopathy.      Upper Body:      Right upper body: No supraclavicular adenopathy.      Left upper body: No supraclavicular adenopathy.   Skin:     General: Skin is warm and dry.      Coloration: Skin is not jaundiced or pale.      Findings: No erythema, lesion or rash.   Neurological:      General: No focal deficit present.      Mental Status: He is alert and oriented to person, " place, and time.      Sensory: No sensory deficit.      Motor: No weakness or tremor.      Coordination: Coordination normal.      Gait: Gait normal.   Psychiatric:         Mood and Affect: Mood normal. Affect is not inappropriate.         Behavior: Behavior normal.         Assessment/Plan   Diagnoses and all orders for this visit:  Anxiety  -     clonazePAM (KlonoPIN) 1 mg tablet; Take 1 tablet (1 mg) by mouth once daily as needed for anxiety.  -     Follow Up In [facility] - PCP; Future  Venous stasis dermatitis of lower extremity  -     triamcinolone (Kenalog) 0.1 % cream; APPLY A THIN LAYER TO AFFECTED AREA(S) TWICE DAILY AS NEEDED.  Medication management  -     Follow Up In [facility] - PCP; Future  Cervical stenosis of spine

## 2023-05-05 ENCOUNTER — HOSPITAL ENCOUNTER (OUTPATIENT)
Dept: PHYSICAL THERAPY | Age: 68
Setting detail: THERAPIES SERIES
Discharge: HOME | End: 2023-05-05
Payer: COMMERCIAL

## 2023-05-09 ENCOUNTER — OFFICE VISIT (OUTPATIENT)
Dept: PRIMARY CARE | Facility: CLINIC | Age: 68
End: 2023-05-09
Payer: COMMERCIAL

## 2023-05-09 ENCOUNTER — APPOINTMENT (OUTPATIENT)
Dept: PHYSICAL THERAPY | Age: 68
End: 2023-05-09
Payer: COMMERCIAL

## 2023-05-09 VITALS
DIASTOLIC BLOOD PRESSURE: 81 MMHG | BODY MASS INDEX: 47.44 KG/M2 | WEIGHT: 313 LBS | HEART RATE: 90 BPM | TEMPERATURE: 97.7 F | HEIGHT: 68 IN | SYSTOLIC BLOOD PRESSURE: 125 MMHG | RESPIRATION RATE: 18 BRPM | OXYGEN SATURATION: 95 %

## 2023-05-09 DIAGNOSIS — Z79.899 MEDICATION MANAGEMENT: ICD-10-CM

## 2023-05-09 DIAGNOSIS — F41.9 ANXIETY: Primary | ICD-10-CM

## 2023-05-09 DIAGNOSIS — I87.2 VENOUS STASIS DERMATITIS OF LOWER EXTREMITY: ICD-10-CM

## 2023-05-09 DIAGNOSIS — M48.02 CERVICAL STENOSIS OF SPINE: ICD-10-CM

## 2023-05-09 PROCEDURE — 3079F DIAST BP 80-89 MM HG: CPT | Performed by: FAMILY MEDICINE

## 2023-05-09 PROCEDURE — 1159F MED LIST DOCD IN RCRD: CPT | Performed by: FAMILY MEDICINE

## 2023-05-09 PROCEDURE — 3074F SYST BP LT 130 MM HG: CPT | Performed by: FAMILY MEDICINE

## 2023-05-09 PROCEDURE — 99214 OFFICE O/P EST MOD 30 MIN: CPT | Performed by: FAMILY MEDICINE

## 2023-05-09 RX ORDER — TRIAMCINOLONE ACETONIDE 1 MG/G
CREAM TOPICAL
Qty: 1 G | Refills: 11 | Status: SHIPPED | OUTPATIENT
Start: 2023-05-09 | End: 2023-05-11 | Stop reason: SDUPTHER

## 2023-05-09 RX ORDER — CLONAZEPAM 1 MG/1
1 TABLET ORAL DAILY PRN
Qty: 30 TABLET | Refills: 2 | Status: SHIPPED | OUTPATIENT
Start: 2023-05-09 | End: 2023-08-08 | Stop reason: SDUPTHER

## 2023-05-09 ASSESSMENT — ENCOUNTER SYMPTOMS
CONSTIPATION: 0
FACIAL ASYMMETRY: 0
UNEXPECTED WEIGHT CHANGE: 0
ACTIVITY CHANGE: 0
ADENOPATHY: 0
EYE REDNESS: 0
BACK PAIN: 0
SPEECH DIFFICULTY: 0
BLOOD IN STOOL: 0
FATIGUE: 1
CONFUSION: 0
EYE PAIN: 0
NECK PAIN: 1
NAUSEA: 0
FEVER: 0
VOICE CHANGE: 0
SORE THROAT: 0
ARTHRALGIAS: 0
DYSPHORIC MOOD: 0
CHEST TIGHTNESS: 0
NUMBNESS: 0
PALPITATIONS: 0
SEIZURES: 0
EYE DISCHARGE: 0
DIAPHORESIS: 0
WHEEZING: 0
EYE ITCHING: 0
ABDOMINAL PAIN: 0
NERVOUS/ANXIOUS: 0
BRUISES/BLEEDS EASILY: 0
WOUND: 0
NECK STIFFNESS: 1
FLANK PAIN: 0
HEMATURIA: 0
PHOTOPHOBIA: 0
FREQUENCY: 0
LIGHT-HEADEDNESS: 0
APPETITE CHANGE: 0
HALLUCINATIONS: 0
CHILLS: 0
TREMORS: 0
SINUS PRESSURE: 0
WEAKNESS: 0
MYALGIAS: 0
HEADACHES: 0
POLYDIPSIA: 0
COUGH: 0
SHORTNESS OF BREATH: 0
DYSURIA: 0
CHOKING: 0
JOINT SWELLING: 0
SLEEP DISTURBANCE: 0
DIZZINESS: 0
DIARRHEA: 0
ABDOMINAL DISTENTION: 0
VOMITING: 0
AGITATION: 0
RHINORRHEA: 0
TROUBLE SWALLOWING: 0

## 2023-05-09 NOTE — PATIENT INSTRUCTIONS
Patient has surgery scheduled 6/6.  No preop testing currently.  Last dose of meloxicam 5/22.  May hold all other medications on morning of surgery and resume all medications postoperatively when okay with surgeon.

## 2023-05-10 DIAGNOSIS — I87.2 VENOUS STASIS DERMATITIS OF LOWER EXTREMITY: ICD-10-CM

## 2023-05-11 RX ORDER — TRIAMCINOLONE ACETONIDE 1 MG/G
CREAM TOPICAL
Qty: 454 G | Refills: 11 | Status: SHIPPED | OUTPATIENT
Start: 2023-05-11

## 2023-05-12 ENCOUNTER — APPOINTMENT (OUTPATIENT)
Dept: PHYSICAL THERAPY | Age: 68
End: 2023-05-12
Payer: COMMERCIAL

## 2023-05-16 ENCOUNTER — APPOINTMENT (OUTPATIENT)
Dept: PHYSICAL THERAPY | Age: 68
End: 2023-05-16
Payer: COMMERCIAL

## 2023-05-18 ENCOUNTER — APPOINTMENT (OUTPATIENT)
Dept: PHYSICAL THERAPY | Age: 68
End: 2023-05-18
Payer: COMMERCIAL

## 2023-05-23 ENCOUNTER — APPOINTMENT (OUTPATIENT)
Dept: PHYSICAL THERAPY | Age: 68
End: 2023-05-23
Payer: COMMERCIAL

## 2023-05-25 ENCOUNTER — APPOINTMENT (OUTPATIENT)
Dept: PHYSICAL THERAPY | Age: 68
End: 2023-05-25
Payer: COMMERCIAL

## 2023-05-26 LAB
ABO GROUP (TYPE) IN BLOOD: NORMAL
ACTIVATED PARTIAL THROMBOPLASTIN TIME IN PPP BY COAGULATION ASSAY: 33 SEC (ref 26–39)
ALANINE AMINOTRANSFERASE (SGPT) (U/L) IN SER/PLAS: 21 U/L (ref 10–52)
ALBUMIN (G/DL) IN SER/PLAS: 4.1 G/DL (ref 3.4–5)
ALKALINE PHOSPHATASE (U/L) IN SER/PLAS: 81 U/L (ref 33–136)
ANION GAP IN SER/PLAS: 10 MMOL/L (ref 10–20)
ANTIBODY SCREEN: NORMAL
ASPARTATE AMINOTRANSFERASE (SGOT) (U/L) IN SER/PLAS: 15 U/L (ref 9–39)
BASOPHILS (10*3/UL) IN BLOOD BY AUTOMATED COUNT: 0.04 X10E9/L (ref 0–0.1)
BASOPHILS/100 LEUKOCYTES IN BLOOD BY AUTOMATED COUNT: 0.5 % (ref 0–2)
BILIRUBIN TOTAL (MG/DL) IN SER/PLAS: 0.4 MG/DL (ref 0–1.2)
CALCIUM (MG/DL) IN SER/PLAS: 8.7 MG/DL (ref 8.6–10.3)
CARBON DIOXIDE, TOTAL (MMOL/L) IN SER/PLAS: 30 MMOL/L (ref 21–32)
CHLORIDE (MMOL/L) IN SER/PLAS: 103 MMOL/L (ref 98–107)
CREATININE (MG/DL) IN SER/PLAS: 0.96 MG/DL (ref 0.5–1.3)
EOSINOPHILS (10*3/UL) IN BLOOD BY AUTOMATED COUNT: 0.23 X10E9/L (ref 0–0.7)
EOSINOPHILS/100 LEUKOCYTES IN BLOOD BY AUTOMATED COUNT: 2.7 % (ref 0–6)
ERYTHROCYTE DISTRIBUTION WIDTH (RATIO) BY AUTOMATED COUNT: 14.6 % (ref 11.5–14.5)
ERYTHROCYTE MEAN CORPUSCULAR HEMOGLOBIN CONCENTRATION (G/DL) BY AUTOMATED: 32.3 G/DL (ref 32–36)
ERYTHROCYTE MEAN CORPUSCULAR VOLUME (FL) BY AUTOMATED COUNT: 83 FL (ref 80–100)
ERYTHROCYTES (10*6/UL) IN BLOOD BY AUTOMATED COUNT: 5.46 X10E12/L (ref 4.5–5.9)
GFR MALE: 86 ML/MIN/1.73M2
GLUCOSE (MG/DL) IN SER/PLAS: 86 MG/DL (ref 74–99)
HEMATOCRIT (%) IN BLOOD BY AUTOMATED COUNT: 45.5 % (ref 41–52)
HEMOGLOBIN (G/DL) IN BLOOD: 14.7 G/DL (ref 13.5–17.5)
IMMATURE GRANULOCYTES/100 LEUKOCYTES IN BLOOD BY AUTOMATED COUNT: 0.4 % (ref 0–0.9)
INR IN PPP BY COAGULATION ASSAY: 1.1 (ref 0.9–1.1)
LEUKOCYTES (10*3/UL) IN BLOOD BY AUTOMATED COUNT: 8.5 X10E9/L (ref 4.4–11.3)
LYMPHOCYTES (10*3/UL) IN BLOOD BY AUTOMATED COUNT: 2.06 X10E9/L (ref 1.2–4.8)
LYMPHOCYTES/100 LEUKOCYTES IN BLOOD BY AUTOMATED COUNT: 24.3 % (ref 13–44)
MONOCYTES (10*3/UL) IN BLOOD BY AUTOMATED COUNT: 0.67 X10E9/L (ref 0.1–1)
MONOCYTES/100 LEUKOCYTES IN BLOOD BY AUTOMATED COUNT: 7.9 % (ref 2–10)
NEUTROPHILS (10*3/UL) IN BLOOD BY AUTOMATED COUNT: 5.43 X10E9/L (ref 1.2–7.7)
NEUTROPHILS/100 LEUKOCYTES IN BLOOD BY AUTOMATED COUNT: 64.2 % (ref 40–80)
NRBC (PER 100 WBCS) BY AUTOMATED COUNT: 0 /100 WBC (ref 0–0)
PLATELETS (10*3/UL) IN BLOOD AUTOMATED COUNT: 292 X10E9/L (ref 150–450)
POTASSIUM (MMOL/L) IN SER/PLAS: 4.4 MMOL/L (ref 3.5–5.3)
PROTEIN TOTAL: 6.9 G/DL (ref 6.4–8.2)
PROTHROMBIN TIME (PT) IN PPP BY COAGULATION ASSAY: 12.3 SEC (ref 9.8–13.4)
RBC, URINE: NORMAL /HPF (ref 0–5)
RH FACTOR: NORMAL
SODIUM (MMOL/L) IN SER/PLAS: 139 MMOL/L (ref 136–145)
SQUAMOUS EPITHELIAL CELLS, URINE: <1 /HPF
STAPH/MRSA SCREEN, CULTURE: NORMAL
UREA NITROGEN (MG/DL) IN SER/PLAS: 20 MG/DL (ref 6–23)
WBC, URINE: NORMAL /HPF (ref 0–5)

## 2023-06-03 LAB — SARS-COV-2 RESULT: NOT DETECTED

## 2023-06-28 ENCOUNTER — HOSPITAL ENCOUNTER (OUTPATIENT)
Dept: PHYSICAL THERAPY | Age: 68
Setting detail: THERAPIES SERIES
Discharge: HOME | End: 2023-06-28
Payer: COMMERCIAL

## 2023-06-28 PROCEDURE — 97162 PT EVAL MOD COMPLEX 30 MIN: CPT

## 2023-06-28 ASSESSMENT — PAIN DESCRIPTION - ORIENTATION: ORIENTATION: LOWER;RIGHT;LEFT

## 2023-06-28 ASSESSMENT — PAIN DESCRIPTION - PAIN TYPE: TYPE: CHRONIC PAIN;SURGICAL PAIN

## 2023-06-28 ASSESSMENT — PAIN DESCRIPTION - LOCATION: LOCATION: NECK;BACK;LEG

## 2023-06-28 ASSESSMENT — PAIN DESCRIPTION - DESCRIPTORS: DESCRIPTORS: ACHING

## 2023-06-28 ASSESSMENT — PAIN SCALES - GENERAL: PAINLEVEL_OUTOF10: 0

## 2023-06-29 ENCOUNTER — HOSPITAL ENCOUNTER (OUTPATIENT)
Dept: PHYSICAL THERAPY | Age: 68
Setting detail: THERAPIES SERIES
Discharge: HOME | End: 2023-06-29
Payer: COMMERCIAL

## 2023-06-29 PROCEDURE — 97110 THERAPEUTIC EXERCISES: CPT

## 2023-06-29 ASSESSMENT — PAIN DESCRIPTION - ORIENTATION: ORIENTATION: LEFT;RIGHT;LOWER

## 2023-06-29 ASSESSMENT — PAIN DESCRIPTION - PAIN TYPE: TYPE: CHRONIC PAIN;SURGICAL PAIN

## 2023-06-29 ASSESSMENT — PAIN SCALES - GENERAL: PAINLEVEL_OUTOF10: 5

## 2023-06-29 ASSESSMENT — PAIN DESCRIPTION - LOCATION: LOCATION: NECK;BACK;LEG

## 2023-07-05 ENCOUNTER — HOSPITAL ENCOUNTER (OUTPATIENT)
Dept: PHYSICAL THERAPY | Age: 68
Setting detail: THERAPIES SERIES
Discharge: HOME | End: 2023-07-05
Payer: COMMERCIAL

## 2023-07-05 PROCEDURE — 97110 THERAPEUTIC EXERCISES: CPT

## 2023-07-05 ASSESSMENT — PAIN DESCRIPTION - PAIN TYPE: TYPE: CHRONIC PAIN;SURGICAL PAIN

## 2023-07-05 ASSESSMENT — PAIN DESCRIPTION - LOCATION: LOCATION: NECK;BACK;LEG

## 2023-07-05 ASSESSMENT — PAIN DESCRIPTION - ORIENTATION: ORIENTATION: RIGHT;LEFT;LOWER

## 2023-07-05 ASSESSMENT — PAIN SCALES - GENERAL: PAINLEVEL_OUTOF10: 7

## 2023-07-05 NOTE — PROGRESS NOTES
[facility]  Outpatient Physical Therapy    Treatment Note        Date: 2023  Patient: Oliva Terrazas  : 1955   Confirmed: Yes  MRN: [id number]  Referring Provider: BEBETO Cutler    Medical Diagnosis: Cervical myelopathy ([address]) [G95.9]  Radiculopathy, lumbar region [M54.16]  Spinal stenosis, lumbar region with neurogenic claudication [M48.062]       Treatment Diagnosis: decreased cervical AROM, decreased thoracic AROM, decreased lumbar ROM, decreased B LE strength, decreased core/trunk extensor strength, decreased gait mechanics, decreased posture, decreased tolerance to standing/walking, and increased pain    Visit Information:  Insurance: Payor: [employer] / Plan: [employer] [address] / Product Type: *No Product type* /   PT Visit Information  Onset Date: 23 (cervical fusion 23; LBP onset ~8 years with no CINDA)  Total # of Visits Approved: 39  Total # of Visits to Date: 3  Plan of Care/Certification Expiration Date: 23  No Show: 0  Canceled Appointment: 0  Progress Note Counter: 3/12-16    Subjective Information:  Subjective:  It has been a rough few weeks  HEP Compliance:  [x] Good [] Fair [] Poor [] Reports not doing due to:    Pain Screening  Patient Currently in Pain: Yes  Pain Level: 7  Pain Type: Chronic pain, Surgical pain  Pain Location: Neck, Back, Leg  Pain Orientation: Right, Left, Lower    Treatment:  Exercises:  Exercises  Exercise 1: TA iso with bridges, 1 set x 10 reps x 5 second hold  Exercise 2: open book x 10 x 10\"  Exercise 3: SLR, bilaterally, 1 set x 10 reps x 5 second hold  Exercise 4: LTR x 10 x 5\"  Exercise 5: hip 4-way*  Exercise 6: cervical rotation, supine, 1 set x 10 reps x 5 second hold bilaterally  Exercise 7: paloff press*  Exercise 8: HS stretch*  Exercise 9: hip flexor stretch x 30\" x 3  Exercise 10: UT stretch 6 x 15\"  Exercise 11: thoracic extension*  Exercise 12: pulleys, 2 minutes flexion & 2 minute

## 2023-07-07 ENCOUNTER — HOSPITAL ENCOUNTER (OUTPATIENT)
Dept: PHYSICAL THERAPY | Age: 68
Setting detail: THERAPIES SERIES
Discharge: HOME | End: 2023-07-07
Payer: COMMERCIAL

## 2023-07-07 PROCEDURE — 97110 THERAPEUTIC EXERCISES: CPT

## 2023-07-07 ASSESSMENT — PAIN DESCRIPTION - LOCATION: LOCATION: NECK

## 2023-07-07 ASSESSMENT — PAIN DESCRIPTION - PAIN TYPE: TYPE: CHRONIC PAIN

## 2023-07-07 ASSESSMENT — PAIN DESCRIPTION - DESCRIPTORS: DESCRIPTORS: ACHING;SORE

## 2023-07-07 ASSESSMENT — PAIN SCALES - GENERAL: PAINLEVEL_OUTOF10: 4

## 2023-07-07 ASSESSMENT — PAIN DESCRIPTION - ORIENTATION: ORIENTATION: RIGHT;LEFT

## 2023-07-07 NOTE — PROGRESS NOTES
walking and other functional activities. In progress        Long Term Goals Completed by 6-8 weeks Goal Status   LTG 1 Patient will be compliant and demonstrate independent understanding of HEP in order to self-manage symptoms at time of discharge. In progress   LTG 2 Patient will demonstrate increase PROM B SLR by >/=15* cervical AROM flexion, extension, and B SB by >/=15*, cervical AROM B rotation by >/= 25%, lumbar AROM by >/=25% in all planes and B thoracic AROM rotation and extension by >/=25% in order to perform functional mobility tasks with increased ease. In progress   LTG 3 Patient will demonstrate B shoulder flexion 5/5, B LE strength >/=4+/5 and trunk/core strength >/=3/5 in order to improve ease with performing usual activities and ADL's. In progress   LTG 4 Patient will decrease score on NDI by >/=6 points in order to improve functional activity tolerance. In progress   LTG 5 Patient will be able to walk community distances with minimal gait deviations in order to return to usual community activities. In progress   LTG 6 Patient will demonstrate B SLS for >/=3 s in order to improve stability during ambulation and stairs. In progress   LTG 7 Patient will demonstrate good postural alignment requiring </= 25% verbal cueing in order to increase postural awareness.  In progress               Plan:  Frequency/Duration:  Plan  Plan Frequency: 1-2  Plan weeks: 6-8  Current Treatment Recommendations: Strengthening, ROM, Balance training, Functional mobility training, Return to work related activity, Pain management, Manual, Neuromuscular re-education, Stair training, Gait training, Home exercise program, Safety education & training, Patient/Caregiver education & training, Modalities, Positioning, Dry needling  Modalities: Heat/Cold, E-stim - unattended, Ultrasound  Additional Comments: Potentially decrease to once per week once patient returns to work depending on schedule availability  Pt to continue current

## 2023-07-10 ENCOUNTER — HOSPITAL ENCOUNTER (OUTPATIENT)
Dept: PHYSICAL THERAPY | Age: 68
Setting detail: THERAPIES SERIES
Discharge: HOME | End: 2023-07-10
Payer: COMMERCIAL

## 2023-07-10 NOTE — PROGRESS NOTES
Therapy                            Cancellation/No-show Note      Date: 07/10/2023  Patient: Ima Epley (51 y.o. male)  : 1955  MRN:  [id number]  Referring Physician: BEBETO Ogden    Medical Diagnosis: Cervical myelopathy ([address]) [G95.9]  Radiculopathy, lumbar region [M54.16]  Spinal stenosis, lumbar region with neurogenic claudication [M48.062]      Visit Information:  Visits to Date 4   No Show/Cancelled Appts: 0       For today's appointment patient:  [x]  Cancelled  []  Rescheduled appointment  []  No-show   []  Called pt to remind of next appointment     Reason given by patient:  []  Patient ill  [x]  Conflicting appointment  []  No transportation    []  Conflict with work  []  No reason given  []  Other:      [x] Pt has future appointments scheduled, no follow up needed  [] Pt requests to be on hold.     Reason:   If > 2 weeks please discuss with therapist.  [] Therapist to call pt for follow up     Comments:       Signature: Electronically signed by Jose Aly PTA on 7/10/23 at 1:20 PM EDT

## 2023-07-12 NOTE — PROGRESS NOTES
Therapy                            Cancellation/No-show Note      Date: 2023  Patient: Ima Epley (70 y.o. male)  : 1955  MRN:  [id number]  Referring Physician: BEBETO Ogden    Medical Diagnosis: Cervical myelopathy ([address]) [G95.9]  Radiculopathy, lumbar region [M54.16]  Spinal stenosis, lumbar region with neurogenic claudication [M48.062]      Visit Information:  Visits to Date 4   No Show/Cancelled Appts: 0 / 2      For today's appointment patient:  [x]  Cancelled  []  Rescheduled appointment  []  No-show   []  Called pt to remind of next appointment     Reason given by patient:  []  Patient ill  []  Conflicting appointment  []  No transportation    []  Conflict with work  []  No reason given  [x]  Other:  out of town    [x] Pt has future appointments scheduled, no follow up needed  [] Pt requests to be on hold.     Reason:   If > 2 weeks please discuss with therapist.  [] Therapist to call pt for follow up     Comments:       Signature: Electronically signed by Monica Ross PT on 23 at 1:55 PM EDT

## 2023-07-13 ENCOUNTER — HOSPITAL ENCOUNTER (OUTPATIENT)
Dept: PHYSICAL THERAPY | Age: 68
Setting detail: THERAPIES SERIES
Discharge: HOME | End: 2023-07-13
Payer: COMMERCIAL

## 2023-07-20 ENCOUNTER — HOSPITAL ENCOUNTER (OUTPATIENT)
Dept: PHYSICAL THERAPY | Age: 68
Setting detail: THERAPIES SERIES
Discharge: HOME | End: 2023-07-20
Payer: COMMERCIAL

## 2023-07-20 NOTE — PROGRESS NOTES
Therapy                            Cancellation/No-show Note      Date: 2023  Patient: Susana Shook (40 y.o. male)  : 1955  MRN:  [id number]  Referring Physician: BEBETO Jeffers    Medical Diagnosis: Cervical myelopathy ([address]) [G95.9]  Radiculopathy, lumbar region [M54.16]  Spinal stenosis, lumbar region with neurogenic claudication [M48.062]      Visit Information:  Visits to Date 4   No Show/Cancelled Appts: 1 / 3      For today's appointment patient:  [x]  Cancelled  []  Rescheduled appointment  []  No-show   []  Called pt to remind of next appointment     Reason given by patient:  []  Patient ill  []  Conflicting appointment  []  No transportation    [x]  Conflict with work  []  No reason given  []  Other:      [] Pt has future appointments scheduled, no follow up needed  [x] Pt requests to be on hold. Reason:   If > 2 weeks please discuss with therapist.  [] Therapist to call pt for follow up     Comments:   Hold for 2 weeks per patient's request as he has returned back to wo work.     Signature: Electronically signed by Stacy Leal PT on 23 at 7:43 AM EDT

## 2023-07-24 ENCOUNTER — APPOINTMENT (OUTPATIENT)
Dept: PHYSICAL THERAPY | Age: 68
End: 2023-07-24
Payer: COMMERCIAL

## 2023-07-25 NOTE — PROGRESS NOTES
Subjective   Patient ID: Jarocho Langford is a 68 y.o. male who presents for 3 month CSM (For Klonopin ).    OARRS:  No data recorded  I have personally reviewed the OARRS report for Jarocho Langford. I have considered the risks of abuse, dependence, addiction and diversion and I believe that it is clinically appropriate for Jarocho Langford to be prescribed this medication    Is the patient prescribed a combination of a benzodiazepine and opioid?  No    Last Urine Drug Screen / ordered today: No  Recent Results (from the past 83509 hour(s))  -OPIATE/OPIOID/BENZO PRESCRIPTION COMPLIANCE:   Collection Time: 02/07/23  4:02 PM       Result                      Value             Ref Range           DRUG SCREEN COMMENT UR*     SEE BELOW                             Creatine, Urine             111.9             mg/dL               Amphetamine Screen, Ur*                       NEGATIVE        PRESUMPTIVE NEGATIVE       Barbiturate Screen, Ur*                       NEGATIVE        PRESUMPTIVE NEGATIVE       Cannabinoid Screen, Ur*                       NEGATIVE        PRESUMPTIVE NEGATIVE       Cocaine Screen, Urine                         NEGATIVE        PRESUMPTIVE NEGATIVE       PCP Screen, Urine                             NEGATIVE        PRESUMPTIVE NEGATIVE       7-Aminoclonazepam           494 (A)           Cutoff <25 n*       Alpha-Hydroxyalprazolam     <25               Cutoff <25 n*       Alpha-Hydroxymidazolam      <25               Cutoff <25 n*       Alprazolam                  <25               Cutoff <25 n*       Chlordiazepoxide            <25               Cutoff <25 n*       Clonazepam                  <25               Cutoff <25 n*       Diazepam                    <25               Cutoff <25 n*       Lorazepam                   <25               Cutoff <25 n*       Midazolam                   <25               Cutoff <25 n*       Nordiazepam                 <25               Cutoff <25 n*       Oxazepam                     <25               Cutoff <25 n*       Temazepam                   <25               Cutoff <25 n*       Zolpidem                    <25               Cutoff <25 n*       Zolpidem Metabolite (Z*     <25               Cutoff <25 n*       6-Acetylmorphine            <25               Cutoff <25 n*       Codeine                     <50               Cutoff <50 n*       Hydrocodone                 <25               Cutoff <25 n*       Hydromorphone               <25               Cutoff <25 n*       Morphine Urine              <50               Cutoff <50 n*       Norhydrocodone              <25               Cutoff <25 n*       Noroxycodone                <25               Cutoff <25 n*       Oxycodone                   <25               Cutoff <25 n*       Oxymorphone                 <25               Cutoff <25 n*       Tramadol                    <50               Cutoff <50 n*       O-Desmethyltramadol         <50               Cutoff <50 n*       Fentanyl                    <2.5              Cutoff<2.5 n*       Norfentanyl                 <2.5              Cutoff<2.5 n*       METHADONE CONFIRMATION*     <25               Cutoff <25 n*       EDDP                        <25               Cutoff <25 n*  Results are as expected.     Controlled Substance Agreement:  Date of the Last Agreement: 2023  Reviewed Controlled Substance Agreement including but not limited to the benefits, risks, and alternatives to treatment with a Controlled Substance medication(s).    Benzodiazepines:  What is the patient's goal of therapy? Anxiety reduction  Is this being achieved with current treatment? yes    YENNY-7:  Over the last 2 weeks, how often have you been bothered by any of the following problems?  Feeling nervous, anxious, or on edge: 1  Not being able to stop or control worryin  Worrying too much about different things: 1  Trouble relaxin  Being so restless that it is hard to sit still: 0  Becoming  easily annoyed or irritable: 2  Feeling afraid as if something awful might happen: 0  YENNY-7 Total Score: 6      Activities of Daily Living:   Is your overall impression that this patient is benefiting (symptom reduction outweighs side effects) from benzodiazepine therapy? Yes     1. Physical Functioning: Same  2. Family Relationship: Same  3. Social Relationship: Same  4. Mood: Same  5. Sleep Patterns: Same  6. Overall Function: Same           Review of Systems   Constitutional:  Positive for fatigue. Negative for activity change, appetite change, chills, diaphoresis, fever and unexpected weight change.   HENT:  Negative for congestion, ear pain, hearing loss, nosebleeds, postnasal drip, rhinorrhea, sinus pressure, sneezing, sore throat, tinnitus, trouble swallowing and voice change.    Eyes:  Negative for photophobia, pain, discharge, redness, itching and visual disturbance.   Respiratory:  Negative for cough, choking, chest tightness, shortness of breath and wheezing.    Cardiovascular:  Positive for leg swelling. Negative for chest pain and palpitations.        Chronic leg swelling-under good control with current therapy   Gastrointestinal:  Negative for abdominal distention, abdominal pain, blood in stool, constipation, diarrhea, nausea and vomiting.   Endocrine: Negative for cold intolerance, heat intolerance, polydipsia and polyuria.   Genitourinary:  Negative for dysuria, flank pain, frequency, hematuria and urgency.   Musculoskeletal:  Positive for neck pain and neck stiffness. Negative for arthralgias, back pain, joint swelling and myalgias.   Skin:  Negative for rash and wound.   Allergic/Immunologic: Negative for immunocompromised state.   Neurological:  Negative for dizziness, tremors, seizures, syncope, facial asymmetry, speech difficulty, weakness, light-headedness, numbness and headaches.   Hematological:  Negative for adenopathy. Does not bruise/bleed easily.   Psychiatric/Behavioral:  Negative for  "agitation, behavioral problems, confusion, dysphoric mood, hallucinations, self-injury, sleep disturbance and suicidal ideas. The patient is not nervous/anxious.        Objective   /83 (BP Location: Left arm, Patient Position: Sitting, BP Cuff Size: Large adult)   Pulse 95   Temp 37 °C (98.6 °F) (Temporal)   Resp 16   Ht 1.715 m (5' 7.5\")   Wt 140 kg (307 lb 12.8 oz)   SpO2 96%   BMI 47.50 kg/m²     Physical Exam  Constitutional:       General: He is not in acute distress.     Appearance: He is not ill-appearing or diaphoretic.   HENT:      Head: Normocephalic and atraumatic.      Right Ear: External ear normal.      Left Ear: External ear normal.      Nose: Nose normal. No rhinorrhea.   Eyes:      General: Lids are normal. No scleral icterus.        Right eye: No discharge.         Left eye: No discharge.      Conjunctiva/sclera: Conjunctivae normal.   Cardiovascular:      Rate and Rhythm: Normal rate and regular rhythm.      Pulses: Normal pulses.      Heart sounds: No murmur heard.  Pulmonary:      Effort: Pulmonary effort is normal. No respiratory distress.      Breath sounds: No decreased breath sounds, wheezing, rhonchi or rales.   Abdominal:      General: Bowel sounds are normal. There is no distension.      Palpations: Abdomen is soft. There is no mass.      Tenderness: There is no abdominal tenderness. There is no guarding or rebound.   Musculoskeletal:         General: No swelling, tenderness or deformity.      Cervical back: No rigidity or tenderness.      Right lower leg: No edema.      Left lower leg: No edema.   Lymphadenopathy:      Cervical: No cervical adenopathy.      Upper Body:      Right upper body: No supraclavicular adenopathy.      Left upper body: No supraclavicular adenopathy.   Skin:     General: Skin is warm and dry.      Coloration: Skin is not jaundiced or pale.      Findings: No erythema, lesion or rash.   Neurological:      General: No focal deficit present.      Mental " Status: He is alert and oriented to person, place, and time.      Sensory: No sensory deficit.      Motor: No weakness or tremor.      Coordination: Coordination normal.      Gait: Gait normal.   Psychiatric:         Mood and Affect: Mood normal. Affect is not inappropriate.         Behavior: Behavior normal.         Assessment/Plan   Diagnoses and all orders for this visit:  Primary hypertension  -     Follow Up In [facility] - PCP - Established; Future  -     CBC and Auto Differential; Future  -     Comprehensive Metabolic Panel; Future  -     Lipid Panel; Future  -     Magnesium; Future  -     TSH with reflex to Free T4 if abnormal; Future  Anxiety  -     Follow Up In [facility] - PCP  -     clonazePAM (KlonoPIN) 1 mg tablet; Take 1 tablet (1 mg) by mouth once daily as needed for anxiety.  -     Follow Up In [facility] - PCP - Established; Future  -     Follow Up In [facility]  PCP - Established; Future  -     Opiate/Opioid/Benzo Extended Prescription Compliance; Future  Medication management  -     Follow Up In [facility] - PCP  -     Follow Up In [facility]  PCP - Established; Future  -     Follow Up In [facility] - PCP - Established; Future  -     Opiate/Opioid/Benzo Extended Prescription Compliance; Future  Vitamin B6 deficiency neuropathy (CMS/HCC)  -     Follow Up In [facility] - PCP - Established; Future  -     CBC and Auto Differential; Future  -     Vitamin B6; Future

## 2023-07-27 ENCOUNTER — APPOINTMENT (OUTPATIENT)
Dept: PHYSICAL THERAPY | Age: 68
End: 2023-07-27
Payer: COMMERCIAL

## 2023-07-31 ENCOUNTER — APPOINTMENT (OUTPATIENT)
Dept: PHYSICAL THERAPY | Age: 68
End: 2023-07-31
Payer: COMMERCIAL

## 2023-08-06 DIAGNOSIS — F32.5 DEPRESSION, MAJOR, IN REMISSION (CMS-HCC): Primary | ICD-10-CM

## 2023-08-07 RX ORDER — BUPROPION HYDROCHLORIDE 150 MG/1
150 TABLET, EXTENDED RELEASE ORAL 2 TIMES DAILY
Qty: 180 TABLET | Refills: 3 | Status: SHIPPED | OUTPATIENT
Start: 2023-08-07

## 2023-08-08 ENCOUNTER — OFFICE VISIT (OUTPATIENT)
Dept: PRIMARY CARE | Facility: CLINIC | Age: 68
End: 2023-08-08
Payer: COMMERCIAL

## 2023-08-08 VITALS
HEIGHT: 68 IN | HEART RATE: 95 BPM | OXYGEN SATURATION: 96 % | TEMPERATURE: 98.6 F | BODY MASS INDEX: 46.65 KG/M2 | DIASTOLIC BLOOD PRESSURE: 83 MMHG | WEIGHT: 307.8 LBS | SYSTOLIC BLOOD PRESSURE: 120 MMHG | RESPIRATION RATE: 16 BRPM

## 2023-08-08 DIAGNOSIS — I10 PRIMARY HYPERTENSION: Primary | ICD-10-CM

## 2023-08-08 DIAGNOSIS — G63 VITAMIN B6 DEFICIENCY NEUROPATHY (MULTI): ICD-10-CM

## 2023-08-08 DIAGNOSIS — Z79.899 MEDICATION MANAGEMENT: ICD-10-CM

## 2023-08-08 DIAGNOSIS — E53.1 VITAMIN B6 DEFICIENCY NEUROPATHY (MULTI): ICD-10-CM

## 2023-08-08 DIAGNOSIS — F41.9 ANXIETY: ICD-10-CM

## 2023-08-08 PROCEDURE — 3008F BODY MASS INDEX DOCD: CPT | Performed by: FAMILY MEDICINE

## 2023-08-08 PROCEDURE — 1160F RVW MEDS BY RX/DR IN RCRD: CPT | Performed by: FAMILY MEDICINE

## 2023-08-08 PROCEDURE — 1125F AMNT PAIN NOTED PAIN PRSNT: CPT | Performed by: FAMILY MEDICINE

## 2023-08-08 PROCEDURE — 1036F TOBACCO NON-USER: CPT | Performed by: FAMILY MEDICINE

## 2023-08-08 PROCEDURE — 3074F SYST BP LT 130 MM HG: CPT | Performed by: FAMILY MEDICINE

## 2023-08-08 PROCEDURE — 1159F MED LIST DOCD IN RCRD: CPT | Performed by: FAMILY MEDICINE

## 2023-08-08 PROCEDURE — 3079F DIAST BP 80-89 MM HG: CPT | Performed by: FAMILY MEDICINE

## 2023-08-08 PROCEDURE — 99213 OFFICE O/P EST LOW 20 MIN: CPT | Performed by: FAMILY MEDICINE

## 2023-08-08 RX ORDER — PREGABALIN 50 MG/1
50 CAPSULE ORAL 2 TIMES DAILY
COMMUNITY
End: 2023-11-07 | Stop reason: ALTCHOICE

## 2023-08-08 RX ORDER — CLONAZEPAM 1 MG/1
1 TABLET ORAL DAILY PRN
Qty: 30 TABLET | Refills: 2 | Status: SHIPPED | OUTPATIENT
Start: 2023-08-08 | End: 2023-11-07 | Stop reason: SDUPTHER

## 2023-08-08 ASSESSMENT — ENCOUNTER SYMPTOMS
FLANK PAIN: 0
SPEECH DIFFICULTY: 0
CHILLS: 0
CHOKING: 0
EYE ITCHING: 0
DYSURIA: 0
FEVER: 0
JOINT SWELLING: 0
ARTHRALGIAS: 0
DIARRHEA: 0
PHOTOPHOBIA: 0
NERVOUS/ANXIOUS: 0
FREQUENCY: 0
WOUND: 0
SEIZURES: 0
BRUISES/BLEEDS EASILY: 0
LIGHT-HEADEDNESS: 0
UNEXPECTED WEIGHT CHANGE: 0
APPETITE CHANGE: 0
EYE DISCHARGE: 0
SINUS PRESSURE: 0
DYSPHORIC MOOD: 0
MYALGIAS: 0
ACTIVITY CHANGE: 0
HEMATURIA: 0
AGITATION: 0
DIZZINESS: 0
SORE THROAT: 0
VOMITING: 0
FACIAL ASYMMETRY: 0
VOICE CHANGE: 0
COUGH: 0
OCCASIONAL FEELINGS OF UNSTEADINESS: 0
TROUBLE SWALLOWING: 0
POLYDIPSIA: 0
DIAPHORESIS: 0
CONSTIPATION: 0
ABDOMINAL PAIN: 0
FATIGUE: 1
LOSS OF SENSATION IN FEET: 1
ABDOMINAL DISTENTION: 0
EYE PAIN: 0
CONFUSION: 0
DEPRESSION: 0
HEADACHES: 0
BLOOD IN STOOL: 0
EYE REDNESS: 0
TREMORS: 0
NUMBNESS: 0
WEAKNESS: 0
NECK STIFFNESS: 1
NAUSEA: 0
SHORTNESS OF BREATH: 0
CHEST TIGHTNESS: 0
HALLUCINATIONS: 0
NECK PAIN: 1
SLEEP DISTURBANCE: 0
BACK PAIN: 0
ADENOPATHY: 0
PALPITATIONS: 0
RHINORRHEA: 0
WHEEZING: 0

## 2023-08-08 ASSESSMENT — ANXIETY QUESTIONNAIRES
5. BEING SO RESTLESS THAT IT IS HARD TO SIT STILL: NOT AT ALL
IF YOU CHECKED OFF ANY PROBLEMS ON THIS QUESTIONNAIRE, HOW DIFFICULT HAVE THESE PROBLEMS MADE IT FOR YOU TO DO YOUR WORK, TAKE CARE OF THINGS AT HOME, OR GET ALONG WITH OTHER PEOPLE: NOT DIFFICULT AT ALL
1. FEELING NERVOUS, ANXIOUS, OR ON EDGE: SEVERAL DAYS
2. NOT BEING ABLE TO STOP OR CONTROL WORRYING: SEVERAL DAYS
3. WORRYING TOO MUCH ABOUT DIFFERENT THINGS: SEVERAL DAYS
6. BECOMING EASILY ANNOYED OR IRRITABLE: MORE THAN HALF THE DAYS
4. TROUBLE RELAXING: SEVERAL DAYS
GAD7 TOTAL SCORE: 6
7. FEELING AFRAID AS IF SOMETHING AWFUL MIGHT HAPPEN: NOT AT ALL

## 2023-08-08 ASSESSMENT — PATIENT HEALTH QUESTIONNAIRE - PHQ9
SUM OF ALL RESPONSES TO PHQ9 QUESTIONS 1 AND 2: 0
1. LITTLE INTEREST OR PLEASURE IN DOING THINGS: NOT AT ALL
2. FEELING DOWN, DEPRESSED OR HOPELESS: NOT AT ALL

## 2023-08-25 DIAGNOSIS — I87.2 VENOUS INSUFFICIENCY: Primary | ICD-10-CM

## 2023-08-28 RX ORDER — FUROSEMIDE 40 MG/1
40 TABLET ORAL DAILY
Qty: 30 TABLET | Refills: 0 | Status: SHIPPED | OUTPATIENT
Start: 2023-08-28 | End: 2024-03-13 | Stop reason: ALTCHOICE

## 2023-08-30 ENCOUNTER — TELEPHONE (OUTPATIENT)
Dept: PRIMARY CARE | Facility: CLINIC | Age: 68
End: 2023-08-30
Payer: COMMERCIAL

## 2023-09-08 ENCOUNTER — OFFICE VISIT (OUTPATIENT)
Age: 68
End: 2023-09-08
Payer: COMMERCIAL

## 2023-09-08 VITALS — WEIGHT: 301 LBS | BODY MASS INDEX: 45.62 KG/M2 | HEIGHT: 68 IN | TEMPERATURE: 96.9 F

## 2023-09-08 DIAGNOSIS — L60.0 INGROWING NAIL: ICD-10-CM

## 2023-09-08 DIAGNOSIS — L60.3 NAIL DYSTROPHY: ICD-10-CM

## 2023-09-08 DIAGNOSIS — M20.5X2 HALLUX LIMITUS, LEFT: ICD-10-CM

## 2023-09-08 DIAGNOSIS — M79.674 PAIN IN TOES OF BOTH FEET: Primary | ICD-10-CM

## 2023-09-08 DIAGNOSIS — M79.675 PAIN IN TOES OF BOTH FEET: Primary | ICD-10-CM

## 2023-09-08 PROCEDURE — 1036F TOBACCO NON-USER: CPT | Performed by: PODIATRIST

## 2023-09-08 PROCEDURE — 1123F ACP DISCUSS/DSCN MKR DOCD: CPT | Performed by: PODIATRIST

## 2023-09-08 PROCEDURE — 99203 OFFICE O/P NEW LOW 30 MIN: CPT | Performed by: PODIATRIST

## 2023-09-08 PROCEDURE — 3017F COLORECTAL CA SCREEN DOC REV: CPT | Performed by: PODIATRIST

## 2023-09-08 PROCEDURE — G8417 CALC BMI ABV UP PARAM F/U: HCPCS | Performed by: PODIATRIST

## 2023-09-08 PROCEDURE — G8427 DOCREV CUR MEDS BY ELIG CLIN: HCPCS | Performed by: PODIATRIST

## 2023-09-09 ASSESSMENT — ENCOUNTER SYMPTOMS
VOMITING: 0
NAUSEA: 0
BACK PAIN: 0
SHORTNESS OF BREATH: 0

## 2023-09-17 DIAGNOSIS — N40.1 BENIGN PROSTATIC HYPERPLASIA (BPH) WITH URINARY URGENCY: Primary | ICD-10-CM

## 2023-09-17 DIAGNOSIS — R39.15 BENIGN PROSTATIC HYPERPLASIA (BPH) WITH URINARY URGENCY: Primary | ICD-10-CM

## 2023-09-18 RX ORDER — DUTASTERIDE AND TAMSULOSIN HYDROCHLORIDE CAPSULES .5; .4 MG/1; MG/1
1 CAPSULE ORAL DAILY
Qty: 90 CAPSULE | Refills: 3 | Status: SHIPPED | OUTPATIENT
Start: 2023-09-18

## 2023-09-27 PROBLEM — R09.82 PND (POST-NASAL DRIP): Status: ACTIVE | Noted: 2023-09-27

## 2023-09-27 PROBLEM — J34.3 HYPERTROPHY OF NASAL TURBINATES: Status: ACTIVE | Noted: 2023-09-27

## 2023-09-27 PROBLEM — M23.40 BODY, LOOSE, KNEE: Status: ACTIVE | Noted: 2023-09-27

## 2023-09-27 PROBLEM — R13.10 DYSPHAGIA: Status: ACTIVE | Noted: 2023-09-27

## 2023-09-27 PROBLEM — S83.289A TEAR OF LATERAL MENISCUS OF KNEE: Status: RESOLVED | Noted: 2017-03-07 | Resolved: 2023-09-27

## 2023-09-27 PROBLEM — Z98.1 STATUS POST CERVICAL SPINAL FUSION: Status: RESOLVED | Noted: 2023-09-27 | Resolved: 2023-09-27

## 2023-09-27 PROBLEM — R26.89 BALANCE PROBLEM: Status: ACTIVE | Noted: 2023-09-27

## 2023-09-27 PROBLEM — R29.2 HYPERREFLEXIA: Status: ACTIVE | Noted: 2023-09-27

## 2023-09-27 PROBLEM — I82.409 DVT (DEEP VENOUS THROMBOSIS) (MULTI): Status: RESOLVED | Noted: 2023-09-27 | Resolved: 2023-09-27

## 2023-10-02 ENCOUNTER — OFFICE VISIT (OUTPATIENT)
Dept: PRIMARY CARE | Facility: CLINIC | Age: 68
End: 2023-10-02
Payer: COMMERCIAL

## 2023-10-02 VITALS
DIASTOLIC BLOOD PRESSURE: 76 MMHG | HEIGHT: 68 IN | SYSTOLIC BLOOD PRESSURE: 126 MMHG | TEMPERATURE: 97.6 F | OXYGEN SATURATION: 94 % | HEART RATE: 86 BPM | RESPIRATION RATE: 16 BRPM | BODY MASS INDEX: 45.53 KG/M2 | WEIGHT: 300.4 LBS

## 2023-10-02 DIAGNOSIS — G63 VITAMIN B6 DEFICIENCY NEUROPATHY (MULTI): ICD-10-CM

## 2023-10-02 DIAGNOSIS — G99.2 MYELOPATHY IN DISEASES CLASSIFIED ELSEWHERE (MULTI): ICD-10-CM

## 2023-10-02 DIAGNOSIS — Z23 NEED FOR VACCINATION: ICD-10-CM

## 2023-10-02 DIAGNOSIS — B00.2 ACUTE PHARYNGITIS DUE TO HERPES SIMPLEX VIRUS (HSV): ICD-10-CM

## 2023-10-02 DIAGNOSIS — E53.1 VITAMIN B6 DEFICIENCY NEUROPATHY (MULTI): ICD-10-CM

## 2023-10-02 DIAGNOSIS — E66.01 CLASS 3 SEVERE OBESITY DUE TO EXCESS CALORIES WITH SERIOUS COMORBIDITY AND BODY MASS INDEX (BMI) OF 45.0 TO 49.9 IN ADULT (MULTI): Primary | ICD-10-CM

## 2023-10-02 DIAGNOSIS — G47.33 OSA (OBSTRUCTIVE SLEEP APNEA): ICD-10-CM

## 2023-10-02 PROCEDURE — 3078F DIAST BP <80 MM HG: CPT | Performed by: FAMILY MEDICINE

## 2023-10-02 PROCEDURE — 90662 IIV NO PRSV INCREASED AG IM: CPT | Performed by: FAMILY MEDICINE

## 2023-10-02 PROCEDURE — 1159F MED LIST DOCD IN RCRD: CPT | Performed by: FAMILY MEDICINE

## 2023-10-02 PROCEDURE — 90471 IMMUNIZATION ADMIN: CPT | Performed by: FAMILY MEDICINE

## 2023-10-02 PROCEDURE — 1160F RVW MEDS BY RX/DR IN RCRD: CPT | Performed by: FAMILY MEDICINE

## 2023-10-02 PROCEDURE — 1125F AMNT PAIN NOTED PAIN PRSNT: CPT | Performed by: FAMILY MEDICINE

## 2023-10-02 PROCEDURE — 3074F SYST BP LT 130 MM HG: CPT | Performed by: FAMILY MEDICINE

## 2023-10-02 PROCEDURE — 1036F TOBACCO NON-USER: CPT | Performed by: FAMILY MEDICINE

## 2023-10-02 PROCEDURE — 3008F BODY MASS INDEX DOCD: CPT | Performed by: FAMILY MEDICINE

## 2023-10-02 PROCEDURE — 99214 OFFICE O/P EST MOD 30 MIN: CPT | Performed by: FAMILY MEDICINE

## 2023-10-02 RX ORDER — NYSTATIN 100000 [USP'U]/ML
5 SUSPENSION ORAL 4 TIMES DAILY
COMMUNITY
Start: 2023-09-28 | End: 2023-10-12

## 2023-10-02 RX ORDER — ACYCLOVIR 800 MG/1
800 TABLET ORAL
Qty: 35 TABLET | Refills: 0 | Status: SHIPPED | OUTPATIENT
Start: 2023-10-02 | End: 2023-10-09

## 2023-10-02 ASSESSMENT — ENCOUNTER SYMPTOMS
DYSPHORIC MOOD: 0
POLYDIPSIA: 0
FREQUENCY: 0
CONFUSION: 0
ARTHRALGIAS: 0
UNEXPECTED WEIGHT CHANGE: 0
EYE PAIN: 0
JOINT SWELLING: 0
VOICE CHANGE: 0
EYE DISCHARGE: 0
EYE ITCHING: 0
FLANK PAIN: 0
CHOKING: 0
CHILLS: 0
TROUBLE SWALLOWING: 0
VOMITING: 0
WHEEZING: 0
APPETITE CHANGE: 0
DIAPHORESIS: 0
AGITATION: 0
MYALGIAS: 0
LIGHT-HEADEDNESS: 0
BRUISES/BLEEDS EASILY: 0
SPEECH DIFFICULTY: 0
TREMORS: 0
ABDOMINAL DISTENTION: 0
WOUND: 0
SLEEP DISTURBANCE: 0
SORE THROAT: 1
FEVER: 0
NAUSEA: 0
DIARRHEA: 0
HEMATURIA: 0
SINUS PRESSURE: 0
NECK PAIN: 0
SEIZURES: 0
CHEST TIGHTNESS: 0
ACTIVITY CHANGE: 0
FACIAL ASYMMETRY: 0
PALPITATIONS: 0
ADENOPATHY: 0
DYSURIA: 0
BLOOD IN STOOL: 0
DIZZINESS: 0
EYE REDNESS: 0
CONSTIPATION: 0
ABDOMINAL PAIN: 0
WEAKNESS: 0
BACK PAIN: 0
HEADACHES: 0
NUMBNESS: 0
RHINORRHEA: 0
FATIGUE: 0
HALLUCINATIONS: 0
PHOTOPHOBIA: 0
COUGH: 0
NERVOUS/ANXIOUS: 0
NECK STIFFNESS: 0
SHORTNESS OF BREATH: 0

## 2023-10-02 NOTE — PROGRESS NOTES
Subjective   Patient ID: Jarocho Langford is a 68 y.o. male who presents for Annual Exam (Patient is being seen today for yearly physical. Patient will be getting lower back surgery, (not scheduled yet) and would like routine check up before the surgery. ) and Oral Pain (Patient states he has a sore in the roof, in the back of mouth  x 1 week. Patient went to walk in clinic on 09/29/23 patient was given mouth wash to gargle. Patient states not any better. ).  Subjective  Jarocho Langford is a 68 y.o. male who presents to the office today for a preoperative consultation at the request of surgeon  dr ohara who plans on performing diskectomy and fusion on November approx.  This consultation is requested for the specific conditions prompting preoperative evaluation (i.e. because of potential effect on operative risk): obesity. Planned anesthesia: general. The patient has the following known anesthesia issues: past endotracheal intubation with complications (none). Patients bleeding risk: use of Ca-channel blockers (see med list). Patient does not have objections to receiving blood products if needed.        Predictors of intubation difficulty:   Morbid obesity? yes - bmi 45   Anatomically abnormal facies? no   Prominent incisors? no   Receding mandible? no   Short, thick neck? yes - 53 cm   Neck range of motion: normal   Dentition: No chipped, loose, or missing teeth.      Lab Review   not applicable.    Current medications which may produce withdrawal symptoms if withheld perioperatively: Effexor, Klonopin    1. Preoperative workup as follows none.  2. Change in medication regimen before surgery: discontinue NSAIDs (meloxicam) 14 days before surgery and discontinue Ca-blockers (diltiazem) 2 weeks before surgery to reduce bleeding risk.  3. Prophylaxis for cardiac events with perioperative beta-blockers: At discretion of anesthesia .  4. Invasive hemodynamic monitoring perioperatively: at the discretion of  anesthesiologist.  5. Deep vein thrombosis prophylaxis postoperatively:regimen to be chosen by surgical team.  6. Surveillance for postoperative MI with ECG immediately postoperatively and on postoperative days 1 and 2 AND troponin levels 24 hours postoperatively and on day 4 or hospital discharge (whichever comes first): at the discretion of anesthesiologist.  7.  Weight loss  Sore throat.  Was treated with nystatin with no improvement.    Patient has lumbar spine disease and his neurosurgeon is planning on surgery but recommended he lose some weight prior to surgery.  They would like to go to surgery before the end of the year and patient would like to get weight loss medication started as soon as possible.          Review of Systems   Constitutional:  Negative for activity change, appetite change, chills, diaphoresis, fatigue, fever and unexpected weight change.   HENT:  Positive for sore throat. Negative for congestion, ear pain, hearing loss, nosebleeds, postnasal drip, rhinorrhea, sinus pressure, sneezing, tinnitus, trouble swallowing and voice change.         Was treated with nystatin with no improvement   Eyes:  Negative for photophobia, pain, discharge, redness, itching and visual disturbance.   Respiratory:  Negative for cough, choking, chest tightness, shortness of breath and wheezing.    Cardiovascular:  Negative for chest pain, palpitations and leg swelling.   Gastrointestinal:  Negative for abdominal distention, abdominal pain, blood in stool, constipation, diarrhea, nausea and vomiting.   Endocrine: Negative for cold intolerance, heat intolerance, polydipsia and polyuria.   Genitourinary:  Negative for dysuria, flank pain, frequency, hematuria and urgency.   Musculoskeletal:  Negative for arthralgias, back pain, joint swelling, myalgias, neck pain and neck stiffness.   Skin:  Negative for rash and wound.   Allergic/Immunologic: Negative for immunocompromised state.   Neurological:  Negative for  "dizziness, tremors, seizures, syncope, facial asymmetry, speech difficulty, weakness, light-headedness, numbness and headaches.   Hematological:  Negative for adenopathy. Does not bruise/bleed easily.   Psychiatric/Behavioral:  Negative for agitation, behavioral problems, confusion, dysphoric mood, hallucinations, self-injury, sleep disturbance and suicidal ideas. The patient is not nervous/anxious.        Objective   /76 (BP Location: Right arm, Patient Position: Sitting)   Pulse 86   Temp 36.4 °C (97.6 °F)   Resp 16   Ht 1.727 m (5' 8\")   Wt 136 kg (300 lb 6.4 oz)   SpO2 94%   BMI 45.68 kg/m²   Physical Exam  Constitutional:       General: He is not in acute distress.     Appearance: He is not ill-appearing or diaphoretic.   HENT:      Head: Normocephalic and atraumatic.      Right Ear: External ear normal.      Left Ear: External ear normal.      Nose: Nose normal. No rhinorrhea.      Mouth/Throat:      Palate: Lesions present.      Pharynx: No oropharyngeal exudate, posterior oropharyngeal erythema or uvula swelling.        Comments: Grouped vesicles soft palate on the right-do not cross midline.  Eyes:      General: Lids are normal. No scleral icterus.        Right eye: No discharge.         Left eye: No discharge.      Conjunctiva/sclera: Conjunctivae normal.   Cardiovascular:      Rate and Rhythm: Normal rate and regular rhythm.      Pulses: Normal pulses.      Heart sounds: No murmur heard.  Pulmonary:      Effort: Pulmonary effort is normal. No respiratory distress.      Breath sounds: No decreased breath sounds, wheezing, rhonchi or rales.   Abdominal:      General: Bowel sounds are normal. There is no distension.      Palpations: Abdomen is soft. There is no mass.      Tenderness: There is no abdominal tenderness. There is no guarding or rebound.   Musculoskeletal:         General: No swelling, tenderness or deformity.      Cervical back: No rigidity or tenderness.      Right lower leg: No " edema.      Left lower leg: No edema.   Lymphadenopathy:      Cervical: No cervical adenopathy.      Upper Body:      Right upper body: No supraclavicular adenopathy.      Left upper body: No supraclavicular adenopathy.   Skin:     General: Skin is warm and dry.      Coloration: Skin is not jaundiced or pale.      Findings: No erythema, lesion or rash.   Neurological:      General: No focal deficit present.      Mental Status: He is alert and oriented to person, place, and time.      Sensory: No sensory deficit.      Motor: No weakness or tremor.      Coordination: Coordination normal.      Gait: Gait normal.   Psychiatric:         Mood and Affect: Mood normal. Affect is not inappropriate.         Behavior: Behavior normal.         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[facility])     Continue to follow with neurosurgery as recommended          Other Visit Diagnoses       Class 3 severe obesity due to excess calories with serious comorbidity and body mass index (BMI) of 45.0 to 49.9 in adult (CMS/[facility])    -  Primary    Relevant Medications    semaglutide 0.25 mg or 0.5 mg (2 mg/3 mL) pen injector    Need for vaccination        Relevant Orders    Flu vaccine, quadrivalent, high-dose, preservative free, age 65y+ (FLUZONE) (Completed)    Acute pharyngitis due to herpes simplex virus (HSV)        Relevant Medications    acyclovir (Zovirax) 800 mg tablet         Assessment/Plan  68 y.o. male with planned surgery as above.    Known risk factors for perioperative complications:   Obesity with BMI of 45, obstructive sleep apnea  Difficulty with intubation is anticipated.  As far as palatal ulcerations likely HSV versus enterovirus.  We will attempt a trial of acyclovir to see if that gives any benefit if not then likely  enterovirus

## 2023-10-30 ENCOUNTER — OFFICE VISIT (OUTPATIENT)
Dept: PODIATRY | Age: 68
End: 2023-10-30
Payer: COMMERCIAL

## 2023-10-30 VITALS — BODY MASS INDEX: 42.44 KG/M2 | WEIGHT: 280 LBS | HEIGHT: 68 IN | TEMPERATURE: 97.4 F

## 2023-10-30 DIAGNOSIS — L60.0 INGROWING NAIL: ICD-10-CM

## 2023-10-30 DIAGNOSIS — M79.674 PAIN IN TOE OF RIGHT FOOT: Primary | ICD-10-CM

## 2023-10-30 PROCEDURE — G8417 CALC BMI ABV UP PARAM F/U: HCPCS | Performed by: PODIATRIST

## 2023-10-30 PROCEDURE — 3017F COLORECTAL CA SCREEN DOC REV: CPT | Performed by: PODIATRIST

## 2023-10-30 PROCEDURE — 1123F ACP DISCUSS/DSCN MKR DOCD: CPT | Performed by: PODIATRIST

## 2023-10-30 PROCEDURE — 99213 OFFICE O/P EST LOW 20 MIN: CPT | Performed by: PODIATRIST

## 2023-10-30 PROCEDURE — 1036F TOBACCO NON-USER: CPT | Performed by: PODIATRIST

## 2023-10-30 PROCEDURE — G8427 DOCREV CUR MEDS BY ELIG CLIN: HCPCS | Performed by: PODIATRIST

## 2023-10-30 PROCEDURE — G8484 FLU IMMUNIZE NO ADMIN: HCPCS | Performed by: PODIATRIST

## 2023-10-30 ASSESSMENT — ENCOUNTER SYMPTOMS
NAUSEA: 0
SHORTNESS OF BREATH: 1
BACK PAIN: 0
VOMITING: 0

## 2023-10-30 NOTE — PROGRESS NOTES
[id number] [address] [address]  Dept: [phone]  Loc: [phone]       Anais Pascal  ([birth year])    10/30/23    Subjective     Anais Pascal is 76 y.o. male who complains today of:    Chief Complaint   Patient presents with    Nail Problem     Right big toe nail        HPI: Patient presents with complaint of a painful ingrown nail to the right big toe, he points to the lateral nail border as the painful area. He reports that the pain began about a week ago. Patient has not observed signs of infection. Patient states there is aching pain at rest and sharp pain with direct pressure to the toe such as bumping the toe. Patient states that he attempted use a toe spacer but this did not provide any relief. Patient states that he tried attempted to treat the nail plate himself but he was unable to get the piece that is digging into the skin. Review of Systems   Constitutional:  Negative for chills and fever. HENT:  Negative for hearing loss. Respiratory:  Positive for shortness of breath. Cardiovascular:  Negative for chest pain. Gastrointestinal:  Negative for nausea and vomiting. Genitourinary:  Negative for difficulty urinating. Musculoskeletal:  Negative for back pain and gait problem. Skin:  Negative for wound. Neurological:  Negative for numbness. Hematological:  Does not bruise/bleed easily. Psychiatric/Behavioral:  Negative for sleep disturbance.         Allergies:  Metoprolol and Belviq [lorcaserin hcl]    Current Outpatient Medications on File Prior to Visit   Medication Sig Dispense Refill    dexlansoprazole (DEXILANT) 60 MG CPDR delayed release capsule TAKE 1 CAPSULE (60 MG) ONCE DAILY      buPROPion (WELLBUTRIN SR) 150 MG extended release tablet Take by mouth      dilTIAZem (DILACOR XR) 180 MG extended release capsule       furosemide (LASIX) 40 MG tablet

## 2023-11-04 ENCOUNTER — LAB (OUTPATIENT)
Dept: LAB | Facility: LAB | Age: 68
End: 2023-11-04
Payer: COMMERCIAL

## 2023-11-04 DIAGNOSIS — F41.9 ANXIETY: ICD-10-CM

## 2023-11-04 DIAGNOSIS — G63 VITAMIN B6 DEFICIENCY NEUROPATHY (MULTI): ICD-10-CM

## 2023-11-04 DIAGNOSIS — I10 PRIMARY HYPERTENSION: ICD-10-CM

## 2023-11-04 DIAGNOSIS — Z79.899 MEDICATION MANAGEMENT: ICD-10-CM

## 2023-11-04 DIAGNOSIS — E53.1 VITAMIN B6 DEFICIENCY NEUROPATHY (MULTI): ICD-10-CM

## 2023-11-04 LAB
ALBUMIN SERPL BCP-MCNC: 4.7 G/DL (ref 3.4–5)
ALP SERPL-CCNC: 104 U/L (ref 33–136)
ALT SERPL W P-5'-P-CCNC: 30 U/L (ref 10–52)
AMPHETAMINES UR QL SCN: NORMAL
ANION GAP SERPL CALC-SCNC: 15 MMOL/L (ref 10–20)
AST SERPL W P-5'-P-CCNC: 24 U/L (ref 9–39)
BARBITURATES UR QL SCN: NORMAL
BASOPHILS # BLD AUTO: 0.05 X10*3/UL (ref 0–0.1)
BASOPHILS NFR BLD AUTO: 0.6 %
BILIRUB SERPL-MCNC: 0.4 MG/DL (ref 0–1.2)
BUN SERPL-MCNC: 17 MG/DL (ref 6–23)
BZE UR QL SCN: NORMAL
CALCIUM SERPL-MCNC: 9.1 MG/DL (ref 8.6–10.3)
CANNABINOIDS UR QL SCN: NORMAL
CHLORIDE SERPL-SCNC: 103 MMOL/L (ref 98–107)
CHOLEST SERPL-MCNC: 170 MG/DL (ref 0–199)
CHOLESTEROL/HDL RATIO: 3.4
CO2 SERPL-SCNC: 27 MMOL/L (ref 21–32)
CREAT SERPL-MCNC: 0.93 MG/DL (ref 0.5–1.3)
CREAT UR-MCNC: 79 MG/DL (ref 20–370)
EOSINOPHIL # BLD AUTO: 0.25 X10*3/UL (ref 0–0.7)
EOSINOPHIL NFR BLD AUTO: 2.9 %
ERYTHROCYTE [DISTWIDTH] IN BLOOD BY AUTOMATED COUNT: 14.7 % (ref 11.5–14.5)
GFR SERPL CREATININE-BSD FRML MDRD: 89 ML/MIN/1.73M*2
GLUCOSE SERPL-MCNC: 81 MG/DL (ref 74–99)
HCT VFR BLD AUTO: 47.6 % (ref 41–52)
HDLC SERPL-MCNC: 50.2 MG/DL
HGB BLD-MCNC: 15.6 G/DL (ref 13.5–17.5)
IMM GRANULOCYTES # BLD AUTO: 0.03 X10*3/UL (ref 0–0.7)
IMM GRANULOCYTES NFR BLD AUTO: 0.3 % (ref 0–0.9)
LDLC SERPL CALC-MCNC: 105 MG/DL
LYMPHOCYTES # BLD AUTO: 2.11 X10*3/UL (ref 1.2–4.8)
LYMPHOCYTES NFR BLD AUTO: 24.6 %
MAGNESIUM SERPL-MCNC: 2.25 MG/DL (ref 1.6–2.4)
MCH RBC QN AUTO: 27.7 PG (ref 26–34)
MCHC RBC AUTO-ENTMCNC: 32.8 G/DL (ref 32–36)
MCV RBC AUTO: 85 FL (ref 80–100)
MONOCYTES # BLD AUTO: 0.67 X10*3/UL (ref 0.1–1)
MONOCYTES NFR BLD AUTO: 7.8 %
NEUTROPHILS # BLD AUTO: 5.47 X10*3/UL (ref 1.2–7.7)
NEUTROPHILS NFR BLD AUTO: 63.8 %
NON HDL CHOLESTEROL: 120 MG/DL (ref 0–149)
NRBC BLD-RTO: 0 /100 WBCS (ref 0–0)
PCP UR QL SCN: NORMAL
PLATELET # BLD AUTO: 272 X10*3/UL (ref 150–450)
POTASSIUM SERPL-SCNC: 4.3 MMOL/L (ref 3.5–5.3)
PROT SERPL-MCNC: 7.3 G/DL (ref 6.4–8.2)
RBC # BLD AUTO: 5.63 X10*6/UL (ref 4.5–5.9)
SODIUM SERPL-SCNC: 141 MMOL/L (ref 136–145)
TRIGL SERPL-MCNC: 74 MG/DL (ref 0–149)
TSH SERPL-ACNC: 2.64 MIU/L (ref 0.44–3.98)
VLDL: 15 MG/DL (ref 0–40)
WBC # BLD AUTO: 8.6 X10*3/UL (ref 4.4–11.3)

## 2023-11-04 PROCEDURE — 85025 COMPLETE CBC W/AUTO DIFF WBC: CPT

## 2023-11-04 PROCEDURE — 80346 BENZODIAZEPINES1-12: CPT

## 2023-11-04 PROCEDURE — 80361 OPIATES 1 OR MORE: CPT

## 2023-11-04 PROCEDURE — 83735 ASSAY OF MAGNESIUM: CPT

## 2023-11-04 PROCEDURE — 84207 ASSAY OF VITAMIN B-6: CPT

## 2023-11-04 PROCEDURE — 80354 DRUG SCREENING FENTANYL: CPT

## 2023-11-04 PROCEDURE — 80053 COMPREHEN METABOLIC PANEL: CPT

## 2023-11-04 PROCEDURE — 82570 ASSAY OF URINE CREATININE: CPT

## 2023-11-04 PROCEDURE — 80307 DRUG TEST PRSMV CHEM ANLYZR: CPT

## 2023-11-04 PROCEDURE — 80373 DRUG SCREENING TRAMADOL: CPT

## 2023-11-04 PROCEDURE — 80365 DRUG SCREENING OXYCODONE: CPT

## 2023-11-04 PROCEDURE — 80368 SEDATIVE HYPNOTICS: CPT

## 2023-11-04 PROCEDURE — 84443 ASSAY THYROID STIM HORMONE: CPT

## 2023-11-04 PROCEDURE — 36415 COLL VENOUS BLD VENIPUNCTURE: CPT

## 2023-11-04 PROCEDURE — 80061 LIPID PANEL: CPT

## 2023-11-04 PROCEDURE — 80358 DRUG SCREENING METHADONE: CPT

## 2023-11-07 ENCOUNTER — OFFICE VISIT (OUTPATIENT)
Dept: PRIMARY CARE | Facility: CLINIC | Age: 68
End: 2023-11-07
Payer: COMMERCIAL

## 2023-11-07 VITALS
RESPIRATION RATE: 18 BRPM | OXYGEN SATURATION: 94 % | HEART RATE: 90 BPM | TEMPERATURE: 97.7 F | BODY MASS INDEX: 45.31 KG/M2 | SYSTOLIC BLOOD PRESSURE: 118 MMHG | DIASTOLIC BLOOD PRESSURE: 77 MMHG | WEIGHT: 299 LBS | HEIGHT: 68 IN

## 2023-11-07 DIAGNOSIS — Z79.899 MEDICATION MANAGEMENT: ICD-10-CM

## 2023-11-07 DIAGNOSIS — G63 VITAMIN B6 DEFICIENCY NEUROPATHY (MULTI): ICD-10-CM

## 2023-11-07 DIAGNOSIS — E66.01 CLASS 3 SEVERE OBESITY DUE TO EXCESS CALORIES WITH SERIOUS COMORBIDITY AND BODY MASS INDEX (BMI) OF 45.0 TO 49.9 IN ADULT (MULTI): ICD-10-CM

## 2023-11-07 DIAGNOSIS — Z00.00 ROUTINE GENERAL MEDICAL EXAMINATION AT A HEALTH CARE FACILITY: Primary | ICD-10-CM

## 2023-11-07 DIAGNOSIS — Z23 ENCOUNTER FOR IMMUNIZATION: ICD-10-CM

## 2023-11-07 DIAGNOSIS — F41.9 ANXIETY: ICD-10-CM

## 2023-11-07 DIAGNOSIS — I10 PRIMARY HYPERTENSION: ICD-10-CM

## 2023-11-07 DIAGNOSIS — E53.1 VITAMIN B6 DEFICIENCY NEUROPATHY (MULTI): ICD-10-CM

## 2023-11-07 PROBLEM — E66.813 CLASS 3 SEVERE OBESITY DUE TO EXCESS CALORIES WITH SERIOUS COMORBIDITY AND BODY MASS INDEX (BMI) OF 45.0 TO 49.9 IN ADULT: Status: ACTIVE | Noted: 2023-11-07

## 2023-11-07 LAB
1OH-MIDAZOLAM UR CFM-MCNC: <25 NG/ML
6MAM UR CFM-MCNC: <25 NG/ML
7AMINOCLONAZEPAM UR CFM-MCNC: 141 NG/ML
A-OH ALPRAZ UR CFM-MCNC: <25 NG/ML
ALPRAZ UR CFM-MCNC: <25 NG/ML
CHLORDIAZEP UR CFM-MCNC: <25 NG/ML
CLONAZEPAM UR CFM-MCNC: <25 NG/ML
CODEINE UR CFM-MCNC: <50 NG/ML
DIAZEPAM UR CFM-MCNC: <25 NG/ML
EDDP UR CFM-MCNC: <25 NG/ML
FENTANYL UR CFM-MCNC: <2.5 NG/ML
HYDROCODONE CTO UR CFM-MCNC: <25 NG/ML
HYDROMORPHONE UR CFM-MCNC: <25 NG/ML
LORAZEPAM UR CFM-MCNC: <25 NG/ML
METHADONE UR CFM-MCNC: <25 NG/ML
MIDAZOLAM UR CFM-MCNC: <25 NG/ML
MORPHINE UR CFM-MCNC: <50 NG/ML
NORDIAZEPAM UR CFM-MCNC: <25 NG/ML
NORFENTANYL UR CFM-MCNC: <2.5 NG/ML
NORHYDROCODONE UR CFM-MCNC: <25 NG/ML
NOROXYCODONE UR CFM-MCNC: <25 NG/ML
NORTRAMADOL UR-MCNC: <50 NG/ML
OXAZEPAM UR CFM-MCNC: <25 NG/ML
OXYCODONE UR CFM-MCNC: <25 NG/ML
OXYMORPHONE UR CFM-MCNC: <25 NG/ML
TEMAZEPAM UR CFM-MCNC: <25 NG/ML
TRAMADOL UR CFM-MCNC: <50 NG/ML
ZOLPIDEM UR CFM-MCNC: <25 NG/ML
ZOLPIDEM UR-MCNC: <25 NG/ML

## 2023-11-07 PROCEDURE — 90480 ADMN SARSCOV2 VAC 1/ONLY CMP: CPT | Performed by: FAMILY MEDICINE

## 2023-11-07 PROCEDURE — 1159F MED LIST DOCD IN RCRD: CPT | Performed by: FAMILY MEDICINE

## 2023-11-07 PROCEDURE — 1036F TOBACCO NON-USER: CPT | Performed by: FAMILY MEDICINE

## 2023-11-07 PROCEDURE — 90471 IMMUNIZATION ADMIN: CPT | Performed by: FAMILY MEDICINE

## 2023-11-07 PROCEDURE — 3078F DIAST BP <80 MM HG: CPT | Performed by: FAMILY MEDICINE

## 2023-11-07 PROCEDURE — 3074F SYST BP LT 130 MM HG: CPT | Performed by: FAMILY MEDICINE

## 2023-11-07 PROCEDURE — 3008F BODY MASS INDEX DOCD: CPT | Performed by: FAMILY MEDICINE

## 2023-11-07 PROCEDURE — 99397 PER PM REEVAL EST PAT 65+ YR: CPT | Performed by: FAMILY MEDICINE

## 2023-11-07 PROCEDURE — 1160F RVW MEDS BY RX/DR IN RCRD: CPT | Performed by: FAMILY MEDICINE

## 2023-11-07 PROCEDURE — 91320 SARSCV2 VAC 30MCG TRS-SUC IM: CPT | Performed by: FAMILY MEDICINE

## 2023-11-07 PROCEDURE — 90679 RSV VACC PREF RECOMB ADJT IM: CPT | Performed by: FAMILY MEDICINE

## 2023-11-07 PROCEDURE — 90677 PCV20 VACCINE IM: CPT | Performed by: FAMILY MEDICINE

## 2023-11-07 PROCEDURE — 1125F AMNT PAIN NOTED PAIN PRSNT: CPT | Performed by: FAMILY MEDICINE

## 2023-11-07 RX ORDER — CLONAZEPAM 1 MG/1
1 TABLET ORAL DAILY PRN
Qty: 30 TABLET | Refills: 2 | Status: SHIPPED | OUTPATIENT
Start: 2023-11-07 | End: 2024-02-06 | Stop reason: SDUPTHER

## 2023-11-07 ASSESSMENT — ENCOUNTER SYMPTOMS
FATIGUE: 1
WHEEZING: 0
WEAKNESS: 0
UNEXPECTED WEIGHT CHANGE: 0
ARTHRALGIAS: 0
HYPERTENSION: 1
LIGHT-HEADEDNESS: 0
ACTIVITY CHANGE: 0
DYSURIA: 0
MYALGIAS: 0
HALLUCINATIONS: 0
SINUS PRESSURE: 0
TREMORS: 0
SLEEP DISTURBANCE: 0
VOMITING: 0
COUGH: 0
CHEST TIGHTNESS: 0
ADENOPATHY: 0
JOINT SWELLING: 0
ABDOMINAL DISTENTION: 0
POLYDIPSIA: 0
PHOTOPHOBIA: 0
DIARRHEA: 0
FEVER: 0
TROUBLE SWALLOWING: 0
CONSTIPATION: 0
NERVOUS/ANXIOUS: 0
CHOKING: 0
SHORTNESS OF BREATH: 0
NECK PAIN: 1
APPETITE CHANGE: 0
NUMBNESS: 0
HEMATURIA: 0
AGITATION: 0
NECK STIFFNESS: 1
DIAPHORESIS: 0
SEIZURES: 0
EYE REDNESS: 0
BACK PAIN: 0
FACIAL ASYMMETRY: 0
DIZZINESS: 0
VOICE CHANGE: 0
PALPITATIONS: 0
FLANK PAIN: 0
DYSPHORIC MOOD: 0
ABDOMINAL PAIN: 0
BRUISES/BLEEDS EASILY: 0
EYE ITCHING: 0
CHILLS: 0
EYE DISCHARGE: 0
WOUND: 0
SPEECH DIFFICULTY: 0
EYE PAIN: 0
SORE THROAT: 0
NAUSEA: 0
FREQUENCY: 0
BLOOD IN STOOL: 0
HEADACHES: 0
RHINORRHEA: 0
CONFUSION: 0

## 2023-11-07 NOTE — PROGRESS NOTES
Subjective   Patient ID: Jarocho Langford is a 68 y.o. male who presents for Med Management (CSM: Clonazepam) and Annual Exam (And review labs ).    OARRS:  Jim Farfan DO on 11/7/2023  4:16 PM  I have personally reviewed the OARRS report for Jarocho Langford. I have considered the risks of abuse, dependence, addiction and diversion and I believe that it is clinically appropriate for Jarocho Langford to be prescribed this medication    Is the patient prescribed a combination of a benzodiazepine and opioid?  No    Last Urine Drug Screen / ordered today: No  Recent Results (from the past 8760 hour(s))  -Screen Opiate/Opioid/Benzo Prescription Compliance:   Collection Time: 11/04/23 11:05 AM       Result                      Value             Ref Range           Creatinine, Urine Rand*     79.0              20.0 - 370.0*       Amphetamine Screen, Ur*                       Presumptive *   Presumptive Negative       Barbiturate Screen, Ur*                       Presumptive *   Presumptive Negative       Cannabinoid Screen, Ur*                       Presumptive *   Presumptive Negative       Cocaine Metabolite Scr*                       Presumptive *   Presumptive Negative       PCP Screen, Urine                             Presumptive *   Presumptive Negative  Results are as expected.         Controlled Substance Agreement:  Date of the Last Agreement: today  Reviewed Controlled Substance Agreement including but not limited to the benefits, risks, and alternatives to treatment with a Controlled Substance medication(s).    Benzodiazepines:  What is the patient's goal of therapy?  Reduction of anxiety  Is this being achieved with current treatment?  Yes    YENNY-7:  No data recorded    Activities of Daily Living:   Is your overall impression that this patient is benefiting (symptom reduction outweighs side effects) from benzodiazepine therapy? Yes     1. Physical Functioning: Same  2. Family Relationship: Same  3. Social  Relationship: Same  4. Mood: Same  5. Sleep Patterns: Same  6. Overall Function: Same  Adequate improvement with current therapy.    Subjective  Jarocho Langford is a 68 y.o. male and is here for a comprehensive physical exam. The patient reports see below.    Do you take any herbs or supplements that were not prescribed by a doctor? no  Are you taking calcium supplements? no  Are you taking aspirin daily? no      History:  Date last PSA: 9/15/2023-follows with Dr. Aguila at [facility] for elevated PSA.             Anxiety  Presents for follow-up visit. Patient reports no chest pain, confusion, dizziness, nausea, nervous/anxious behavior, palpitations, shortness of breath or suicidal ideas. The quality of sleep is good.       Hypertension  This is a chronic problem. The current episode started more than 1 year ago. The problem is unchanged. The problem is controlled. Associated symptoms include anxiety, malaise/fatigue, neck pain and peripheral edema. Pertinent negatives include no chest pain, headaches, palpitations or shortness of breath. Agents associated with hypertension include NSAIDs. Risk factors for coronary artery disease include dyslipidemia, male gender and obesity. Past treatments include calcium channel blockers, ACE inhibitors and diuretics. The current treatment provides significant improvement. There are no compliance problems.         Review of Systems   Constitutional:  Positive for fatigue and malaise/fatigue. Negative for activity change, appetite change, chills, diaphoresis, fever and unexpected weight change.   HENT:  Negative for congestion, ear pain, hearing loss, nosebleeds, postnasal drip, rhinorrhea, sinus pressure, sneezing, sore throat, tinnitus, trouble swallowing and voice change.    Eyes:  Negative for photophobia, pain, discharge, redness, itching and visual disturbance.   Respiratory:  Negative for cough, choking, chest tightness, shortness of breath and wheezing.    Cardiovascular:   "Positive for leg swelling. Negative for chest pain and palpitations.   Gastrointestinal:  Negative for abdominal distention, abdominal pain, blood in stool, constipation, diarrhea, nausea and vomiting.   Endocrine: Negative for cold intolerance, heat intolerance, polydipsia and polyuria.   Genitourinary:  Negative for dysuria, flank pain, frequency, hematuria and urgency.   Musculoskeletal:  Positive for neck pain and neck stiffness. Negative for arthralgias, back pain, joint swelling and myalgias.   Skin:  Negative for rash and wound.   Allergic/Immunologic: Negative for immunocompromised state.   Neurological:  Negative for dizziness, tremors, seizures, syncope, facial asymmetry, speech difficulty, weakness, light-headedness, numbness and headaches.   Hematological:  Negative for adenopathy. Does not bruise/bleed easily.   Psychiatric/Behavioral:  Negative for agitation, behavioral problems, confusion, dysphoric mood, hallucinations, self-injury, sleep disturbance and suicidal ideas. The patient is not nervous/anxious.        Objective   /77 (BP Location: Right arm, Patient Position: Sitting, BP Cuff Size: Large adult)   Pulse 90   Temp 36.5 °C (97.7 °F) (Temporal)   Resp 18   Ht 1.727 m (5' 8\")   Wt 136 kg (299 lb)   SpO2 94%   BMI 45.46 kg/m²     Physical Exam  Constitutional:       General: He is not in acute distress.     Appearance: He is not ill-appearing or diaphoretic.   HENT:      Head: Normocephalic and atraumatic.      Right Ear: External ear normal.      Left Ear: External ear normal.      Nose: Nose normal. No rhinorrhea.   Eyes:      General: Lids are normal. No scleral icterus.        Right eye: No discharge.         Left eye: No discharge.      Conjunctiva/sclera: Conjunctivae normal.   Cardiovascular:      Rate and Rhythm: Normal rate and regular rhythm.      Pulses: Normal pulses.      Heart sounds: No murmur heard.  Pulmonary:      Effort: Pulmonary effort is normal. No respiratory " distress.      Breath sounds: No decreased breath sounds, wheezing, rhonchi or rales.   Abdominal:      General: Bowel sounds are normal. There is no distension.      Palpations: Abdomen is soft. There is no mass.      Tenderness: There is no abdominal tenderness. There is no guarding or rebound.   Musculoskeletal:         General: No swelling, tenderness or deformity.      Cervical back: No rigidity or tenderness.      Right lower leg: No edema.      Left lower leg: No edema.   Lymphadenopathy:      Cervical: No cervical adenopathy.      Upper Body:      Right upper body: No supraclavicular adenopathy.      Left upper body: No supraclavicular adenopathy.   Skin:     General: Skin is warm and dry.      Coloration: Skin is not jaundiced or pale.      Findings: No erythema, lesion or rash.   Neurological:      General: No focal deficit present.      Mental Status: He is alert and oriented to person, place, and time.      Sensory: No sensory deficit.      Motor: No weakness or tremor.      Coordination: Coordination normal.      Gait: Gait normal.   Psychiatric:         Mood and Affect: Mood normal. Affect is not inappropriate.         Behavior: Behavior normal.         Assessment/Plan   Diagnoses and all orders for this visit:  Routine general medical examination at a health care facility  -     Follow Up In [facility] - PCP - Established; Future  Anxiety  -     Follow Up In [facility] - PCP - Established  -     clonazePAM (KlonoPIN) 1 mg tablet; Take 1 tablet (1 mg) by mouth once daily as needed for anxiety.  -     Follow Up In [facility] - PCP - Established; Future  -     Follow Up In [facility] - PCP - Established; Future  Medication management  -     Follow Up In [facility] - PCP - Established  -     clonazePAM (KlonoPIN) 1 mg tablet; Take 1 tablet (1 mg) by mouth once daily as needed for anxiety.  -     Follow Up In [facility] - PCP - Established;  Future  -     Follow Up In [facility] - PCP - Established; Future  Encounter for immunization  -     Pneumococcal conjugate vaccine, 20-valent (PREVNAR 20)  -     Pfizer COVID-19 vaccine, [id number], monovalent, age 12 years and older (30 mcg/0.3 mL)  -     RSV, Recombinant, 60 years and older (AREXVY)  -     Follow Up In [facility] - PCP - Established; Future  Primary hypertension  -     Follow Up In [facility] - PCP - Established; Future  -     Vitamin B6; Future  -     Vitamin B12; Future  -     CBC and Auto Differential; Future  -     Comprehensive Metabolic Panel; Future  -     Lipid Panel; Future  -     Magnesium; Future  -     TSH with reflex to Free T4 if abnormal; Future  Vitamin B6 deficiency neuropathy (CMS/HCC)  -     Follow Up In [facility] - PCP - Established; Future  -     Vitamin B6; Future  -     Vitamin B12; Future  -     CBC and Auto Differential; Future  Class 3 severe obesity due to excess calories with serious comorbidity and body mass index (BMI) of 45.0 to 49.9 in adult (CMS/HCC)  -     Follow Up In [facility] - PCP - Established; Future  -     Vitamin B6; Future  -     Vitamin B12; Future  -     CBC and Auto Differential; Future  -     Comprehensive Metabolic Panel; Future  -     Lipid Panel; Future  -     Magnesium; Future  -     TSH with reflex to Free T4 if abnormal; Future  2. Patient Counseling:  --Nutrition: Stressed importance of moderation in sodium/caffeine intake, saturated fat and cholesterol, caloric balance, sufficient intake of fresh fruits, vegetables, fiber, calcium, iron.  --Exercise: Stressed the importance of regular exercise.   --Substance Abuse: Discussed cessation/primary prevention of tobacco, alcohol, or other drug use; driving or other dangerous activities under the influence; availability of treatment for abuse.   --Injury prevention: Discussed safety belts, safety helmets, smoke detector, smoking near bedding or  kamiholsylvia.   --Dental health: Discussed importance of regular tooth brushing, flossing, and dental visits.  --Immunizations reviewed.  --Discussed benefits of screening colonoscopy. due 4/2030  3. Discussed the patient's BMI with him.  The BMI is above average. The patient received Current weight: 136 kg (299 lb)  Weight change since last visit (-) denotes wt loss -1.4 lbs   Weight loss needed to achieve BMI 25: 134.9 Lbs  Weight loss needed to achieve BMI 30: 102.1 Lbs    Provided instructions on dietary changes  Provided instructions on exercise  Advised to Increase physical activity because they have an above normal BMI.  4. Follow up 6 mos wlabs will also need 3 months CSM

## 2023-11-08 LAB — PYRIDOXAL PHOS SERPL-SCNC: 134.2 NMOL/L (ref 20–125)

## 2023-11-22 ENCOUNTER — TELEMEDICINE (OUTPATIENT)
Dept: PRIMARY CARE CLINIC | Age: 68
End: 2023-11-22
Payer: COMMERCIAL

## 2023-11-22 DIAGNOSIS — J20.9 ACUTE BRONCHITIS, UNSPECIFIED ORGANISM: Primary | ICD-10-CM

## 2023-11-22 PROCEDURE — 99441 PR PHYS/QHP TELEPHONE EVALUATION 5-10 MIN: CPT | Performed by: INTERNAL MEDICINE

## 2023-11-22 RX ORDER — PREGABALIN 50 MG/1
CAPSULE ORAL
COMMUNITY
Start: 2023-06-20

## 2023-11-22 RX ORDER — CLONAZEPAM 1 MG/1
TABLET ORAL
COMMUNITY
Start: 2023-11-07

## 2023-11-22 RX ORDER — CODEINE PHOSPHATE/GUAIFENESIN 10-100MG/5
10 LIQUID (ML) ORAL 2 TIMES DAILY PRN
Qty: 118 ML | Refills: 0 | Status: SHIPPED | OUTPATIENT
Start: 2023-11-22 | End: 2023-11-29

## 2023-11-22 RX ORDER — SOLIFENACIN SUCCINATE 10 MG/1
10 TABLET, FILM COATED ORAL DAILY
COMMUNITY
Start: 2023-10-30

## 2023-11-22 RX ORDER — AZITHROMYCIN 250 MG/1
TABLET, FILM COATED ORAL
Qty: 1 PACKET | Refills: 0 | Status: SHIPPED | OUTPATIENT
Start: 2023-11-22

## 2023-11-22 RX ORDER — MULTIVITAMIN WITH IRON
TABLET ORAL
COMMUNITY
Start: 2023-09-14

## 2023-11-22 SDOH — ECONOMIC STABILITY: FOOD INSECURITY: WITHIN THE PAST 12 MONTHS, YOU WORRIED THAT YOUR FOOD WOULD RUN OUT BEFORE YOU GOT MONEY TO BUY MORE.: NEVER TRUE

## 2023-11-22 SDOH — ECONOMIC STABILITY: FOOD INSECURITY: WITHIN THE PAST 12 MONTHS, THE FOOD YOU BOUGHT JUST DIDN'T LAST AND YOU DIDN'T HAVE MONEY TO GET MORE.: NEVER TRUE

## 2023-11-22 SDOH — ECONOMIC STABILITY: HOUSING INSECURITY
IN THE LAST 12 MONTHS, WAS THERE A TIME WHEN YOU DID NOT HAVE A STEADY PLACE TO SLEEP OR SLEPT IN A SHELTER (INCLUDING NOW)?: NO

## 2023-11-22 SDOH — ECONOMIC STABILITY: INCOME INSECURITY: HOW HARD IS IT FOR YOU TO PAY FOR THE VERY BASICS LIKE FOOD, HOUSING, MEDICAL CARE, AND HEATING?: NOT HARD AT ALL

## 2023-11-22 ASSESSMENT — COLUMBIA-SUICIDE SEVERITY RATING SCALE - C-SSRS
7. DID THIS OCCUR IN THE LAST THREE MONTHS: NO
4. HAVE YOU HAD THESE THOUGHTS AND HAD SOME INTENTION OF ACTING ON THEM?: NO
3. HAVE YOU BEEN THINKING ABOUT HOW YOU MIGHT KILL YOURSELF?: NO
5. HAVE YOU STARTED TO WORK OUT OR WORKED OUT THE DETAILS OF HOW TO KILL YOURSELF? DO YOU INTEND TO CARRY OUT THIS PLAN?: NO

## 2023-11-22 ASSESSMENT — PATIENT HEALTH QUESTIONNAIRE - PHQ9
10. IF YOU CHECKED OFF ANY PROBLEMS, HOW DIFFICULT HAVE THESE PROBLEMS MADE IT FOR YOU TO DO YOUR WORK, TAKE CARE OF THINGS AT HOME, OR GET ALONG WITH OTHER PEOPLE: 0
7. TROUBLE CONCENTRATING ON THINGS, SUCH AS READING THE NEWSPAPER OR WATCHING TELEVISION: 0
9. THOUGHTS THAT YOU WOULD BE BETTER OFF DEAD, OR OF HURTING YOURSELF: 0
SUM OF ALL RESPONSES TO PHQ QUESTIONS 1-9: 0
6. FEELING BAD ABOUT YOURSELF - OR THAT YOU ARE A FAILURE OR HAVE LET YOURSELF OR YOUR FAMILY DOWN: 0
SUM OF ALL RESPONSES TO PHQ9 QUESTIONS 1 & 2: 0
4. FEELING TIRED OR HAVING LITTLE ENERGY: 0
1. LITTLE INTEREST OR PLEASURE IN DOING THINGS: 0
5. POOR APPETITE OR OVEREATING: 0
SUM OF ALL RESPONSES TO PHQ QUESTIONS 1-9: 0
8. MOVING OR SPEAKING SO SLOWLY THAT OTHER PEOPLE COULD HAVE NOTICED. OR THE OPPOSITE, BEING SO FIGETY OR RESTLESS THAT YOU HAVE BEEN MOVING AROUND A LOT MORE THAN USUAL: 0
SUM OF ALL RESPONSES TO PHQ QUESTIONS 1-9: 0
SUM OF ALL RESPONSES TO PHQ QUESTIONS 1-9: 0
3. TROUBLE FALLING OR STAYING ASLEEP: 0
2. FEELING DOWN, DEPRESSED OR HOPELESS: 0

## 2023-11-22 NOTE — PROGRESS NOTES
Documentation:  I communicated with the patient and/or health care decision maker  Details of this discussion including any medical advice provided    Pt presents with 1 day of cough productive of thick yellow sputum. No chest pain but attests to chest congestion. Assoc sinus congestion and headache. No SOB but attests to wheezing. Hx asthma, no fever but had chills. Assoc generalized body aches, no sore throat, no nausea or vomiting. No known sick contacts. No help from OTC meds     Total Time: minutes: 5-10 minutes    Srinivas Davila was evaluated through a synchronous (real-time) audio encounter. Patient identification was verified at the start of the visit. He (or guardian if applicable) is aware that this is a billable service, which includes applicable co-pays. This visit was conducted with the patient's (and/or legal guardian's) verbal consent. He has not had a related appointment within my department in the past 7 days or scheduled within the next 24 hours. The patient was located at Home: [facility]. The provider was located at [facility] ([address] [place] [address]): [address] [place] [address],  [address] [place] [address].     Note: not billable if this call serves to triage the patient into an appointment for the relevant concern    Srinivas Davila is a 76 y.o. male evaluated via telephone on 11/22/2023 for Congestion (Cough, congestion, loss voice, onset yesterday, some production, thick yellow, body aches, denies chills and fever )          Shaina Lind MD

## 2023-11-28 DIAGNOSIS — E66.01 CLASS 3 SEVERE OBESITY DUE TO EXCESS CALORIES WITH SERIOUS COMORBIDITY AND BODY MASS INDEX (BMI) OF 45.0 TO 49.9 IN ADULT (MULTI): ICD-10-CM

## 2023-12-12 DIAGNOSIS — E53.1 VITAMIN B6 DEFICIENCY NEUROPATHY (MULTI): ICD-10-CM

## 2023-12-12 DIAGNOSIS — G63 VITAMIN B6 DEFICIENCY NEUROPATHY (MULTI): ICD-10-CM

## 2023-12-12 DIAGNOSIS — E55.9 VITAMIN D DEFICIENCY: Primary | ICD-10-CM

## 2023-12-12 RX ORDER — PYRIDOXINE HCL (VITAMIN B6) 100 MG
100 TABLET ORAL DAILY
Qty: 90 TABLET | Refills: 0 | Status: SHIPPED | OUTPATIENT
Start: 2023-12-12 | End: 2023-12-18

## 2023-12-12 RX ORDER — ACETAMINOPHEN 500 MG
TABLET ORAL DAILY
Qty: 90 CAPSULE | Refills: 0 | Status: SHIPPED | OUTPATIENT
Start: 2023-12-12 | End: 2024-04-01

## 2023-12-18 DIAGNOSIS — E53.1 VITAMIN B6 DEFICIENCY NEUROPATHY (MULTI): ICD-10-CM

## 2023-12-18 DIAGNOSIS — G63 VITAMIN B6 DEFICIENCY NEUROPATHY (MULTI): ICD-10-CM

## 2023-12-18 RX ORDER — PYRIDOXINE HCL (VITAMIN B6) 100 MG
100 TABLET ORAL DAILY
Qty: 90 TABLET | Refills: 0 | Status: SHIPPED | OUTPATIENT
Start: 2023-12-18

## 2024-02-06 ENCOUNTER — OFFICE VISIT (OUTPATIENT)
Dept: PRIMARY CARE | Facility: CLINIC | Age: 69
End: 2024-02-06
Payer: COMMERCIAL

## 2024-02-06 VITALS
HEIGHT: 68 IN | WEIGHT: 285 LBS | HEART RATE: 79 BPM | BODY MASS INDEX: 43.19 KG/M2 | OXYGEN SATURATION: 95 % | SYSTOLIC BLOOD PRESSURE: 115 MMHG | DIASTOLIC BLOOD PRESSURE: 71 MMHG | RESPIRATION RATE: 18 BRPM | TEMPERATURE: 97.5 F

## 2024-02-06 DIAGNOSIS — Z23 ENCOUNTER FOR IMMUNIZATION: ICD-10-CM

## 2024-02-06 DIAGNOSIS — F41.9 ANXIETY: Primary | ICD-10-CM

## 2024-02-06 DIAGNOSIS — E66.01 CLASS 3 SEVERE OBESITY DUE TO EXCESS CALORIES WITH SERIOUS COMORBIDITY AND BODY MASS INDEX (BMI) OF 40.0 TO 44.9 IN ADULT (MULTI): ICD-10-CM

## 2024-02-06 DIAGNOSIS — J45.909 UNCOMPLICATED ASTHMA, UNSPECIFIED ASTHMA SEVERITY, UNSPECIFIED WHETHER PERSISTENT (HHS-HCC): ICD-10-CM

## 2024-02-06 DIAGNOSIS — Z11.59 ENCOUNTER FOR HEPATITIS C SCREENING TEST FOR LOW RISK PATIENT: ICD-10-CM

## 2024-02-06 DIAGNOSIS — G63 VITAMIN B6 DEFICIENCY NEUROPATHY (MULTI): ICD-10-CM

## 2024-02-06 DIAGNOSIS — Z79.899 MEDICATION MANAGEMENT: ICD-10-CM

## 2024-02-06 DIAGNOSIS — E53.1 VITAMIN B6 DEFICIENCY NEUROPATHY (MULTI): ICD-10-CM

## 2024-02-06 PROBLEM — G99.2 MYELOPATHY IN DISEASES CLASSIFIED ELSEWHERE (MULTI): Status: RESOLVED | Noted: 2023-10-02 | Resolved: 2024-02-06

## 2024-02-06 PROCEDURE — 3078F DIAST BP <80 MM HG: CPT | Performed by: FAMILY MEDICINE

## 2024-02-06 PROCEDURE — 90715 TDAP VACCINE 7 YRS/> IM: CPT | Performed by: FAMILY MEDICINE

## 2024-02-06 PROCEDURE — 1125F AMNT PAIN NOTED PAIN PRSNT: CPT | Performed by: FAMILY MEDICINE

## 2024-02-06 PROCEDURE — 3074F SYST BP LT 130 MM HG: CPT | Performed by: FAMILY MEDICINE

## 2024-02-06 PROCEDURE — 1036F TOBACCO NON-USER: CPT | Performed by: FAMILY MEDICINE

## 2024-02-06 PROCEDURE — 99213 OFFICE O/P EST LOW 20 MIN: CPT | Performed by: FAMILY MEDICINE

## 2024-02-06 PROCEDURE — 90471 IMMUNIZATION ADMIN: CPT | Performed by: FAMILY MEDICINE

## 2024-02-06 PROCEDURE — 3008F BODY MASS INDEX DOCD: CPT | Performed by: FAMILY MEDICINE

## 2024-02-06 PROCEDURE — 1159F MED LIST DOCD IN RCRD: CPT | Performed by: FAMILY MEDICINE

## 2024-02-06 RX ORDER — CLONAZEPAM 1 MG/1
1 TABLET ORAL DAILY PRN
Qty: 30 TABLET | Refills: 2 | Status: SHIPPED | OUTPATIENT
Start: 2024-02-06 | End: 2024-05-06 | Stop reason: SDUPTHER

## 2024-02-06 ASSESSMENT — ENCOUNTER SYMPTOMS
MYALGIAS: 0
EYE PAIN: 0
HEMATURIA: 0
WHEEZING: 0
SINUS PRESSURE: 0
FLANK PAIN: 0
HYPERTENSION: 1
UNEXPECTED WEIGHT CHANGE: 0
ARTHRALGIAS: 0
SPEECH DIFFICULTY: 0
CHILLS: 0
NUMBNESS: 0
EYE REDNESS: 0
COUGH: 0
DYSPHORIC MOOD: 0
JOINT SWELLING: 0
PHOTOPHOBIA: 0
LIGHT-HEADEDNESS: 0
TROUBLE SWALLOWING: 0
DIAPHORESIS: 0
BACK PAIN: 0
POLYDIPSIA: 0
NERVOUS/ANXIOUS: 0
WOUND: 0
SORE THROAT: 0
ACTIVITY CHANGE: 0
ABDOMINAL PAIN: 0
NAUSEA: 0
DIZZINESS: 0
CHEST TIGHTNESS: 0
BRUISES/BLEEDS EASILY: 0
SLEEP DISTURBANCE: 0
DIARRHEA: 0
SEIZURES: 0
ADENOPATHY: 0
TREMORS: 0
FREQUENCY: 0
AGITATION: 0
APPETITE CHANGE: 0
FATIGUE: 1
FACIAL ASYMMETRY: 0
VOMITING: 0
HALLUCINATIONS: 0
NECK STIFFNESS: 1
BLOOD IN STOOL: 0
DYSURIA: 0
CONSTIPATION: 0
VOICE CHANGE: 0
RHINORRHEA: 0
EYE ITCHING: 0
CONFUSION: 0
CHOKING: 0
EYE DISCHARGE: 0
ABDOMINAL DISTENTION: 0
FEVER: 0
WEAKNESS: 0

## 2024-02-06 NOTE — PROGRESS NOTES
Subjective   Patient ID: Jarocho Langford is a 68 y.o. male who presents for Med Management (3 month CSM for Klonopin ).    OARRS:  No data recorded  I have personally reviewed the OARRS report for Jarocho Langford. I have considered the risks of abuse, dependence, addiction and diversion and I believe that it is clinically appropriate for Jarocho Langford to be prescribed this medication    Is the patient prescribed a combination of a benzodiazepine and opioid?  No    Last Urine Drug Screen / ordered today: No  Recent Results (from the past 8760 hour(s))  -Confirmation Opiate/Opioid/Benzo Prescription Compliance:   Collection Time: 11/04/23 11:05 AM       Result                      Value             Ref Range           Clonazepam                  <25               <25 ng/mL           7-Aminoclonazepam           141 (H)           <25 ng/mL           Alprazolam                  <25               <25 ng/mL           Alpha-Hydroxyalprazolam     <25               <25 ng/mL           Midazolam                   <25               <25 ng/mL           Alpha-Hydroxymidazolam      <25               <25 ng/mL           Chlordiazepoxide            <25               <25 ng/mL           Diazepam                    <25               <25 ng/mL           Nordiazepam                 <25               <25 ng/mL           Temazepam                   <25               <25 ng/mL           Oxazepam                    <25               <25 ng/mL           Lorazepam                   <25               <25 ng/mL           Methadone                   <25               <25 ng/mL           EDDP                        <25               <25 ng/mL           6-Acetylmorphine            <25               <25 ng/mL           Codeine                     <50               <50 ng/mL           Hydrocodone                 <25               <25 ng/mL           Hydromorphone               <25               <25 ng/mL           Morphine                    <50                <50 ng/mL           Norhydrocodone              <25               <25 ng/mL           Noroxycodone                <25               <25 ng/mL           Oxycodone                   <25               <25 ng/mL           Oxymorphone                 <25               <25 ng/mL           Fentanyl                    <2.5              <2.5 ng/mL          Norfentanyl                 <2.5              <2.5 ng/mL          Tramadol                    <50               <50 ng/mL           O-Desmethyltramadol         <50               <50 ng/mL           Zolpidem                    <25               <25 ng/mL           Zolpidem Metabolite (Z*     <25               <25 ng/mL      -Screen Opiate/Opioid/Benzo Prescription Compliance:   Collection Time: 11/04/23 11:05 AM       Result                      Value             Ref Range           Creatinine, Urine Rand*     79.0              20.0 - 370.0*       Amphetamine Screen, Ur*                       Presumptive *   Presumptive Negative       Barbiturate Screen, Ur*                       Presumptive *   Presumptive Negative       Cannabinoid Screen, Ur*                       Presumptive *   Presumptive Negative       Cocaine Metabolite Scr*                       Presumptive *   Presumptive Negative       PCP Screen, Urine                             Presumptive *   Presumptive Negative  Results are as expected.         Controlled Substance Agreement:  Date of the Last Agreement: 11/7/23  Reviewed Controlled Substance Agreement including but not limited to the benefits, risks, and alternatives to treatment with a Controlled Substance medication(s).    Benzodiazepines:  What is the patient's goal of therapy?  Reduction of anxiety and insomnia  Is this being achieved with current treatment?  Yes    YENNY-7:  No data recorded    Activities of Daily Living:   Is your overall impression that this patient is benefiting (symptom reduction outweighs side effects) from  benzodiazepine therapy? Yes     1. Physical Functioning: Same  2. Family Relationship: Same  3. Social Relationship: Same  4. Mood: Same  5. Sleep Patterns: Same  6. Overall Function: Same      Anxiety  Presents for follow-up visit. Patient reports no confusion, dizziness, nausea, nervous/anxious behavior or suicidal ideas. The quality of sleep is good.            Review of Systems   Constitutional:  Positive for fatigue. Negative for activity change, appetite change, chills, diaphoresis, fever and unexpected weight change.   HENT:  Negative for congestion, ear pain, hearing loss, nosebleeds, postnasal drip, rhinorrhea, sinus pressure, sneezing, sore throat, tinnitus, trouble swallowing and voice change.    Eyes:  Negative for photophobia, pain, discharge, redness, itching and visual disturbance.   Respiratory:  Negative for cough, choking, chest tightness and wheezing.    Cardiovascular:  Positive for leg swelling.   Gastrointestinal:  Negative for abdominal distention, abdominal pain, blood in stool, constipation, diarrhea, nausea and vomiting.   Endocrine: Negative for cold intolerance, heat intolerance, polydipsia and polyuria.   Genitourinary:  Negative for dysuria, flank pain, frequency, hematuria and urgency.   Musculoskeletal:  Positive for neck stiffness. Negative for arthralgias, back pain, joint swelling and myalgias.   Skin:  Negative for rash and wound.   Allergic/Immunologic: Negative for immunocompromised state.   Neurological:  Negative for dizziness, tremors, seizures, syncope, facial asymmetry, speech difficulty, weakness, light-headedness and numbness.   Hematological:  Negative for adenopathy. Does not bruise/bleed easily.   Psychiatric/Behavioral:  Negative for agitation, behavioral problems, confusion, dysphoric mood, hallucinations, self-injury, sleep disturbance and suicidal ideas. The patient is not nervous/anxious.        Objective   /71 (BP Location: Right arm, Patient Position:  "Sitting, BP Cuff Size: Large adult)   Pulse 79   Temp 36.4 °C (97.5 °F) (Temporal)   Resp 18   Ht 1.727 m (5' 8\")   Wt 129 kg (285 lb)   SpO2 95%   BMI 43.33 kg/m²     Physical Exam  Constitutional:       General: He is not in acute distress.     Appearance: He is not ill-appearing or diaphoretic.   HENT:      Head: Normocephalic and atraumatic.      Right Ear: External ear normal.      Left Ear: External ear normal.      Nose: Nose normal. No rhinorrhea.   Eyes:      General: Lids are normal. No scleral icterus.        Right eye: No discharge.         Left eye: No discharge.      Conjunctiva/sclera: Conjunctivae normal.   Cardiovascular:      Rate and Rhythm: Normal rate and regular rhythm.      Pulses: Normal pulses.      Heart sounds: No murmur heard.  Pulmonary:      Effort: Pulmonary effort is normal. No respiratory distress.      Breath sounds: No decreased breath sounds, wheezing, rhonchi or rales.   Abdominal:      General: Bowel sounds are normal. There is no distension.      Palpations: Abdomen is soft. There is no mass.      Tenderness: There is no abdominal tenderness. There is no guarding or rebound.   Musculoskeletal:         General: No swelling, tenderness or deformity.      Cervical back: No rigidity or tenderness.      Right lower leg: No edema.      Left lower leg: No edema.   Lymphadenopathy:      Cervical: No cervical adenopathy.      Upper Body:      Right upper body: No supraclavicular adenopathy.      Left upper body: No supraclavicular adenopathy.   Skin:     General: Skin is warm and dry.      Coloration: Skin is not jaundiced or pale.      Findings: No erythema, lesion or rash.   Neurological:      General: No focal deficit present.      Mental Status: He is alert and oriented to person, place, and time.      Sensory: No sensory deficit.      Motor: No weakness or tremor.      Coordination: Coordination normal.      Gait: Gait normal.   Psychiatric:         Mood and Affect: Mood " normal. Affect is not inappropriate.         Behavior: Behavior normal.         Assessment/Plan   Diagnoses and all orders for this visit:  Anxiety  -     Follow Up In [facility] - PCP - Established  -     clonazePAM (KlonoPIN) 1 mg tablet; Take 1 tablet (1 mg) by mouth once daily as needed for anxiety.  Medication management  -     Follow Up In [facility] - PCP - Established  -     clonazePAM (KlonoPIN) 1 mg tablet; Take 1 tablet (1 mg) by mouth once daily as needed for anxiety.  Encounter for immunization  -     Tdap vaccine, age 7 years and older  (BOOSTRIX)  Encounter for hepatitis C screening test for low risk patient  -     Hepatitis C antibody; Future  Class 3 severe obesity due to excess calories with serious comorbidity and body mass index (BMI) of 40.0 to 44.9 in adult (CMS/[facility])  -     semaglutide 0.25 mg or 0.5 mg (2 mg/3 mL) pen injector; Inject 0.5 mg under the skin 1 (one) time per week.  Vitamin B6 deficiency neuropathy (CMS/[facility])  Uncomplicated asthma, unspecified asthma severity, unspecified whether persistent

## 2024-02-12 ENCOUNTER — OFFICE VISIT (OUTPATIENT)
Dept: OTOLARYNGOLOGY | Facility: CLINIC | Age: 69
End: 2024-02-12
Payer: COMMERCIAL

## 2024-02-12 DIAGNOSIS — R43.2 ALTERED TASTE: ICD-10-CM

## 2024-02-12 DIAGNOSIS — J32.4 CHRONIC PANSINUSITIS: Primary | ICD-10-CM

## 2024-02-12 DIAGNOSIS — J32.4 CHRONIC PANSINUSITIS: ICD-10-CM

## 2024-02-12 DIAGNOSIS — R09.81 NASAL CONGESTION: Primary | ICD-10-CM

## 2024-02-12 PROCEDURE — 99213 OFFICE O/P EST LOW 20 MIN: CPT | Performed by: OTOLARYNGOLOGY

## 2024-02-12 PROCEDURE — 3008F BODY MASS INDEX DOCD: CPT | Performed by: OTOLARYNGOLOGY

## 2024-02-12 PROCEDURE — 1125F AMNT PAIN NOTED PAIN PRSNT: CPT | Performed by: OTOLARYNGOLOGY

## 2024-02-12 PROCEDURE — 1036F TOBACCO NON-USER: CPT | Performed by: OTOLARYNGOLOGY

## 2024-02-12 PROCEDURE — 1159F MED LIST DOCD IN RCRD: CPT | Performed by: OTOLARYNGOLOGY

## 2024-02-12 PROCEDURE — 1160F RVW MEDS BY RX/DR IN RCRD: CPT | Performed by: OTOLARYNGOLOGY

## 2024-02-12 NOTE — PROGRESS NOTES
The patient returns.  We are seeing him back today for evaluation of his sinuses as well as evident congestion and a foul taste that is persistent in his mouth.  This patient has had previous septoplasty and has done relatively well with that at least grossly.  He has been noted to have a foul taste which even alters his perception of water.  He stipulates that he has congestion worse on the right-hand side.  All remaining ENT inquiry is otherwise grossly clear.  He has a history of acid reflux but that has been treated accordingly.  He denies any worrisome reflux symptoms currently on Dexilant.  There have been no significant changes in past medical or past surgical histories except as mentioned.    Physical exam:  No acute distress.  The external ear structures appear normal. The ear canals patent and the tympanic membranes are intact without evidence of air-fluid levels, retraction, or congenital defects.  Nasal exam excellent on the right and very good on the left.  There is no significant nasal septal deformity of significance that would require revision surgery.  The tongue is normally mobile. There are no lesions on the gingiva, buccal, or oral mucosa. There are no oral cavity masses.  He is status post tonsillectomy and UPPP.  The neck is negative for mass lymphadenopathy. The trachea and parotid are clear. The thyroid bed is grossly unremarkable. The salivary gland structures are grossly unremarkable.    In order to assess the larynx, flexible laryngoscopy was performed based on the patient's history.  After topical anesthesia nasal endoscopy/flexible laryngoscopy reveals no evidence of any concerning mass or irregularity.  There is no evidence of any worrisome reflux grossly seen.    Assessment and plan:  History of chronic nasal obstruction with persistent sinusitis.  I did review CT scans of the sinuses obtained in October 2020 as well as January 2023 which both confirm evidence of persistent  pansinusitis of a low-grade nature.  It is intrinsically my suspicion that he may have chronic inflammation from this infection leading to the congestion symptoms superimposed with the foul odor/taste that he perceives.  He had previously seen our colleague Dr. Morocho prior to his departure to [place] and he had recommended surgery.  In light of that I am going to speak with and have the patient see my colleague Dr. Lovell for assessment to see what his thought is in this regard.  All questions were answered in this regard accordingly.

## 2024-02-22 ENCOUNTER — OFFICE VISIT (OUTPATIENT)
Dept: OTOLARYNGOLOGY | Facility: CLINIC | Age: 69
End: 2024-02-22
Payer: COMMERCIAL

## 2024-02-22 VITALS — BODY MASS INDEX: 43.12 KG/M2 | TEMPERATURE: 97.8 F | HEIGHT: 68 IN | WEIGHT: 284.5 LBS

## 2024-02-22 DIAGNOSIS — J32.2 CHRONIC ETHMOIDAL SINUSITIS: ICD-10-CM

## 2024-02-22 DIAGNOSIS — J32.0 CHRONIC MAXILLARY SINUSITIS: Primary | ICD-10-CM

## 2024-02-22 DIAGNOSIS — R43.9 DISTURBANCES OF SENSATION OF SMELL AND TASTE: ICD-10-CM

## 2024-02-22 DIAGNOSIS — J32.4 CHRONIC PANSINUSITIS: ICD-10-CM

## 2024-02-22 DIAGNOSIS — R43.9 TASTE DISORDER: ICD-10-CM

## 2024-02-22 PROCEDURE — 1036F TOBACCO NON-USER: CPT | Performed by: OTOLARYNGOLOGY

## 2024-02-22 PROCEDURE — 99204 OFFICE O/P NEW MOD 45 MIN: CPT | Performed by: OTOLARYNGOLOGY

## 2024-02-22 PROCEDURE — 1125F AMNT PAIN NOTED PAIN PRSNT: CPT | Performed by: OTOLARYNGOLOGY

## 2024-02-22 PROCEDURE — 3008F BODY MASS INDEX DOCD: CPT | Performed by: OTOLARYNGOLOGY

## 2024-02-22 PROCEDURE — 1160F RVW MEDS BY RX/DR IN RCRD: CPT | Performed by: OTOLARYNGOLOGY

## 2024-02-22 PROCEDURE — 31231 NASAL ENDOSCOPY DX: CPT | Performed by: OTOLARYNGOLOGY

## 2024-02-22 PROCEDURE — 1159F MED LIST DOCD IN RCRD: CPT | Performed by: OTOLARYNGOLOGY

## 2024-02-22 RX ORDER — AMOXICILLIN 875 MG/1
875 TABLET, FILM COATED ORAL 2 TIMES DAILY
Qty: 20 TABLET | Refills: 0 | Status: SHIPPED | OUTPATIENT
Start: 2024-02-22 | End: 2024-03-03

## 2024-02-22 NOTE — LETTER
February 22, 2024     Patient: Jarocho Langford   YOB: 1955   Date of Visit: 2/22/2024       To Whom It May Concern:    Jarocho Langford was seen in my clinic on 2/22/2024 at 11:00 am. Please excuse Jarocho for his absence from work on this day to make the appointment.    If you have any questions or concerns, please don't hesitate to call.         Sincerely,         Pete Lovell MD        CC: No Recipients

## 2024-02-22 NOTE — PROGRESS NOTES
Chief Complaint:  Chronic sinus problems    History Of Present Illness:  Reason For Visit:   Patient presents to Otolaryngology Clinic in consultation at request of Dr. Babin for chronic sinus problems.    The Symptoms Are: persistent and have been present for years.    Main Symptoms:  Patient does not have anterior nasal drainage.     Patient does not have  posterior nasal drainage.    Patient has nasal airway obstruction. right worse than left   Patient has  facial pain.  over forehead, left side of face   Patient does not have  facial pressure.    Patient does not have decreased sense of smell.   Associated Symptoms:   Patient has  headaches.  over forehead, left side of face   Patient has throat clearing.    Patient has coughing.    Patient has dysphonia.   Patient does not have nasal bleeding.     Medications currently on for sinonasal symptoms: Flonase (2 puffs each side 1x daily), Afrin (2 puffs each side 1x daily)  Medications tried in the past for sinonasal symptoms:  Prednisone, oral antibiotic therapy     Other Pertinent Medical Conditions:   Patient has asthma.  does not use medical therapy regularly   Patient does not have aspirin sensitivity.    Patient does not have migraines.    Patient has history of allergy testing. When: 7-8 years ago, negative  Patient does not have history of nasal fracture.    Patient has heartburn.    The patient is on medical therapy for heartburn.  on dexilant  The patient does have imaging of sinuses. When: 1/26/2023    Jarocho Langford presents to me as a new patient in consultation of Dr. Babin for chronic sinus problems.    He has been followed by Dr. Morocho in the past.  He recommended surgical intervention based on his imaging findings.    He has been having some ongoing nasal breathing issues particularly worse on the right-hand side.  He has undergone previous nasal airway surgery and had benefit but his symptoms have slowly recurred over time.  During the same  anesthetic he also had his uvula trimmed.  He mentioned that following that procedure he has had a persistent bad taste in his mouth that has gotten progressively worse and it is unclear if there is direct cause-and-effect.  Things like bread, crackers, and sweets can make it worse.  He finds that even water can taste sour.  He has found some benefit gargling baking soda and water but it only works for about 15 to 20 minutes and then his symptoms recur.      The remainder of a full 10 systems review of systems was pan negative outside of that stated in the history of present illness.       Active Problems:  Patient Active Problem List   Diagnosis    Anxiety    Benign prostatic hyperplasia (BPH) with urinary urgency    GERD (gastroesophageal reflux disease)    HTN (hypertension)    Vitamin D deficiency    Asthma    Depression, major, in remission (CMS/[facility])    Lumbar radiculopathy    RAISSA positive    RUSH (obstructive sleep apnea)    Venous stasis dermatitis of lower extremity    Venous insufficiency    Vitamin B6 deficiency neuropathy (CMS/[facility])    Cervical stenosis of spine    Balance problem    Body, loose, knee    Dysphagia    Hyperreflexia    Hypertrophy of nasal turbinates    PND (post-nasal drip)    Class 3 severe obesity due to excess calories with serious comorbidity and body mass index (BMI) of 40.0 to 44.9 in adult (CMS/[facility])    Nasal congestion    Chronic pansinusitis    Altered taste      Past Medical History:  He has a past medical history of Acute gastritis without bleeding (02/18/2020), Arthritis, Chronic ethmoidal sinusitis (12/10/2020), Chronic frontal sinusitis (10/22/2020), Chronic rhinitis (10/23/2020), COVID-19 (10/14/2022), DVT (deep venous thrombosis) (CMS/[facility]) (09/27/2023), Fever, unspecified (11/12/2019), Hypertrophy of nasal turbinates (12/10/2020), Lumbago with sciatica, right side (05/07/2019), Nasal congestion (10/14/2022), Other chest pain (07/28/2021), Other conditions influencing health  status (01/20/2020), Other specified disorders of nose and nasal sinuses (12/08/2017), Personal history of other benign neoplasm (07/21/2017), Personal history of other diseases of the digestive system (02/18/2020), Personal history of other diseases of the musculoskeletal system and connective tissue (09/04/2019), Personal history of other diseases of the respiratory system, Personal history of other diseases of the respiratory system (12/08/2017), Personal history of other diseases of the respiratory system (04/19/2022), Personal history of other diseases of the respiratory system (12/10/2020), Personal history of other diseases of the respiratory system (02/06/2020), Personal history of other specified conditions (01/12/2021), Personal history of other specified conditions (12/08/2017), Personal history of other specified conditions (08/25/2021), Personal history of other specified conditions (08/25/2021), Personal history of urinary (tract) infections (07/28/2021), Pneumonia, unspecified organism (11/22/2019), Shortness of breath (10/14/2022), Status post cervical spinal fusion (09/27/2023), and Tear of lateral meniscus of knee (03/07/2017).    Surgical History:  He has a past surgical history that includes Other surgical history (05/07/2019); Other surgical history (02/11/2020); and Cholecystectomy.     Family History:  Family History   Problem Relation Name Age of Onset    Hypertension Mother PITER     No Known Problems Father      No Known Problems Maternal Grandmother      No Known Problems Maternal Grandfather      No Known Problems Paternal Grandmother      No Known Problems Paternal Grandfather       Social History:  He reports that he has never smoked. He has never used smokeless tobacco. He reports that he does not currently use alcohol. He reports that he does not use drugs.     Allergies:  Patient has no known allergies.    Current Meds:  Current Outpatient Medications:     amLODIPine-benazepriL  (Lotrel) 5-10 mg capsule, TAKE 1 CAPSULE DAILY, Disp: 90 capsule, Rfl: 3    buPROPion SR (Wellbutrin SR) 150 mg 12 hr tablet, TAKE 1 TABLET TWICE A DAY, Disp: 180 tablet, Rfl: 3    clonazePAM (KlonoPIN) 1 mg tablet, Take 1 tablet (1 mg) by mouth once daily as needed for anxiety., Disp: 30 tablet, Rfl: 2    Dexilant 60 mg DR capsule, Take by mouth once daily., Disp: , Rfl:     dicyclomine (Bentyl) 20 mg tablet, Take 1 tablet (20 mg) by mouth every 6 hours., Disp: , Rfl:     dilTIAZem XR (Dilacor XR) 180 mg 24 hr capsule, TAKE 1 CAPSULE DAILY, Disp: 90 capsule, Rfl: 3    dutasteride-tamsulosin 0.5-0.4 mg capsule, ER multiphase 24 hr, TAKE 1 CAPSULE DAILY, Disp: 90 capsule, Rfl: 3    fluticasone (Flonase) 50 mcg/actuation nasal spray, Administer into affected nostril(s) twice a day., Disp: , Rfl:     furosemide (Lasix) 40 mg tablet, TAKE 1 TABLET BY MOUTH DAILY, Disp: 30 tablet, Rfl: 0    meloxicam (Mobic) 15 mg tablet, Take 1 tablet (15 mg) by mouth once daily., Disp: , Rfl:     oxybutynin XL (Ditropan-XL) 10 mg 24 hr tablet, Take 1 tablet (10 mg) by mouth once daily., Disp: , Rfl:     potassium chloride CR (Klor-Con M20) 20 mEq ER tablet, Take 1 tablet (20 mEq) by mouth once daily., Disp: , Rfl:     pyridoxine (Vitamin B-6) 100 mg tablet, TAKE 1 TABLET BY MOUTH ONCE DAILY AS DIRECTED, Disp: 90 tablet, Rfl: 0    semaglutide 0.25 mg or 0.5 mg (2 mg/3 mL) pen injector, Inject 0.5 mg under the skin 1 (one) time per week., Disp: 3 mL, Rfl: 5    venlafaxine XR (Effexor-XR) 75 mg 24 hr capsule, Take 2 capsules (150 mg) by mouth once daily., Disp: , Rfl:     Ventolin HFA 90 mcg/actuation inhaler, INHALE 1 TO 2 PUFFS EVERY 4 TO 6 HOURS AS NEEDED, Disp: , Rfl:     cholecalciferol (Vitamin D-3) 50 mcg (2,000 unit) capsule, TAKE ONE CAPSULE BY MOUTH EVERY DAY, Disp: 90 capsule, Rfl: 0    triamcinolone (Kenalog) 0.1 % cream, APPLY A THIN LAYER TO AFFECTED AREA(S) TWICE DAILY AS NEEDED., Disp: 454 g, Rfl: 11    Vitals:  Visit  "Vitals  Temp 36.6 °C (97.8 °F)   Ht 1.727 m (5' 8\")   Wt 129 kg (284 lb 8 oz)   BMI 43.26 kg/m²   Smoking Status Never   BSA 2.49 m²      Physical Exam:  CONSTITUTIONAL:  Vitals reviewed in nursing chart, well developed, well nourished.    RESPIRATION:  Breathing comfortably, no stridor.  CV:  No clubbing/cyanosis/edema in hands.  EYES:  EOM Intact, sclera normal.  NEURO:  Alert and oriented times 3, Cranial nerves 2-12 intact and symmetric bilaterally.  HEAD AND FACE:  Skin with no masses or lesions, sinuses nontender to palpation.  SALIVARY GLANDS:  Parotid and submandibular glands normal bilaterally.  EARS:  Normal external ears, external auditory canals, and TMs to otoscopy, normal hearing to whispered voice.  NOSE:  External nose midline, anterior rhinoscopy is normal with limited visualization to the anterior aspect of the interior turbinates (see nasal endoscopy).  ORAL CAVITY/OROPHARYNX/LIPS:  Normal mucous membranes, normal floor of mouth/tongue/OP, no masses or lesions are noted.  PHARYNGEAL WALLS AND NASOPHARYNX:  No masses noted.  NECK/LYMPH:  No LAD, no thyroid masses.  LARYNX:  Could not directly visualize transorally.    SINONASAL ENDOSCOPY (CPT 66028): To better evaluate the patient's symptoms, sinonasal endoscopy is indicated.  After discussion of risks and benefits, and topical decongestion and anesthesia,an endoscope was used to perform nasal endoscopy on each side.  A time out identifying the patient, the procedure, the location of the procedure and any concerns was performed prior to beginning the procedure.    Findings:  Examination of the right nasal cavity revealed no evidence of purulence or polyposis at the middle meatus or sphenoethmoid recess.  Examination of the left nasal cavity revealed no evidence of purulence or polyposis at the middle meatus or sphenoethmoid recess.  He has a caudal septal deviation to the left.    Results/Data:  I personally reviewed the CT scan dated 1/26/23 with " the patient today in clinic. It demonstrated diffuse inflammatory changes particularly within each frontal and ethmoid cavity.    I personally reviewed the last note from Dr. Babin outlining referral to me.  I reviewed his last note from his primary care provider from February 6, 2024 outlining management of his baseline medical issues.  Caudal septal deviation to the left    Provider Impressions:  1. Chronic sinusitis   2. Nasal airway obstruction, deviated nasal septum , history of septoplasty  3. Facial pain, headaches  4. Throat clearing, coughing, dysphonia   5. Asthma   6. Heartburn on proton pump inhibitor   7. Taste disturbance     Discussion:  Jarocho Langford and I discussed his symptoms in great detail today.  In regard to next steps, he could consider further intranasal medical therapy we discussed a number of these options today.  He could also consider surgical options but I stressed to him that it is very unlikely that his taste disturbance would improve with endonasal surgery and he understood this.  He wished to move forward with repeat imaging and consideration of surgical options.  I, therefore, we will prescribe him a 10-day course of oral antibiotic therapy and after completion I have asked him to obtain a noncontrast CT sinus.  I would like to see him either face-to-face or virtually following completion of that study to review the results and formulate a plan moving forward.    In regard to his taste disturbance, I recommended consultation with Dr. Montoya with dental medicine and a consult was provided.    In regard to surgical options, if he wanted to consider any type of revision nasal airway surgery I would involve one of my plastic surgery partners given the position of his deviation and previous history of endonasal surgery.  We can discuss this in more detail after we review his CT sinus.    All questions were answered.    Patient Discussion/Summary:  Welcome to Dr. Pete Lovell's  clinic. We are here to assist you with your ENT needs at [facility]. Dr. Lovell is an ENT that specializes in nose, sinus, and skull base disorders.    Dr. Pete Lovell's office number is [phone]. Please call this number to contact his care team regardless of which office you use to access care. This number is the most direct way to communicate with all the members of the care team.    Reyna Bhat CNP is a nurse practitioner who is a part of Dr. Lovell's team. She will work collaboratively with Dr. Lovell to meet your goals. This often may include seeing you for more urgent appointments or follow-up visits under Dr. Lovell's supervision.    Carie Shrestha RN BSN is Dr. Lovell's primary nurse. She can be reached by calling [phone]. Carie is available during business hours Tuesday through Friday. Reyna BESSN is her rhinology nurse partner. Reyna is available during business hours Monday through Thursday. Messages left for them will be returned within one business day. Carie is also Dr. Lovell's surgery scheduler and will assist you with planning and scheduling of your surgery during her office hours.     Kinga Sloan is Dr. Lovell's  and you can reach her at [phone]. She can help you with scheduling of appointments, general questions and information. She is available to receive calls Monday through Friday from 8:00 am until 4:25 pm.     For your convenience, Dr. Lovell sees patients at [facility] [place] and [facility]. While we try to make your appointments as convenient as possible, occasionally a visit to another location may be necessary to provide the best care for you.    Dr. Lovell makes every effort to run on time for your appointments. Therefore, if you are more than 25 minutes late, your appointment will need to be rescheduled to another day. We appreciate your understanding.      We look forward to working with you to meet your healthcare goals.     Signature:  Scribe Attestation  By signing my name below, IGwen Scribe   attest that this documentation has been prepared under the direction and in the presence of Pete Lovell MD.

## 2024-02-22 NOTE — Clinical Note
February 22, 2024     Mahendra Babin MD  [address]   [facility], [address]  [facility] [address]    Patient: Jarocho Langford   YOB: 1955   Date of Visit: 2/22/2024       Dear Dr. Mahendra Babin MD:    Thank you for referring Jarocho Langford to me for evaluation. Below are my notes for this consultation.  If you have questions, please do not hesitate to call me. I look forward to following your patient along with you.       Sincerely,     Pete Lovell MD      CC: No Recipients  ______________________________________________________________________________________    Chief Complaint:  Chronic sinusitis     History Of Present Illness:  Reason For Visit:   Patient presents to Otolaryngology Clinic in consultation at request of Dr. Babin for chronic sinusitis.    The Symptoms Are: persistent and have been present for years.    Main Symptoms:  Patient does not have anterior nasal drainage.     Patient does not have  posterior nasal drainage.    Patient has nasal airway obstruction. right worse than left   Patient has  facial pain.  over forehead, left side of face   Patient does not have  facial pressure.    Patient does not have decreased sense of smell.   Associated Symptoms:   Patient has  headaches.  over forehead, left side of face   Patient has throat clearing.    Patient has coughing.    Patient has dysphonia.   Patient does not have nasal bleeding.     Medications currently on for sinonasal symptoms: Flonase (2 puffs each side 1x daily), Afrin (2 puffs each side 1x daily)  Medications tried in the past for sinonasal symptoms:  Prednisone, oral antibiotic therapy     Other Pertinent Medical Conditions:   Patient has asthma.  does not use medical therapy regularly   Patient does not have aspirin sensitivity.    Patient does not have migraines.    Patient has history of allergy testing. When: 7-8 years ago, negative  Patient does not have history of sinus surgery.     Patient does not have history of nasal fracture.    Patient has heartburn.    The patient is on medical therapy for heartburn.  on dexilant  The patient does have imaging of sinuses. When: 1/26/2023    Jarocho Langford presents to me as a new patient in consultation of Dr. Babin for chronic sinusitis.    He was found on recent CT to have chronic sinusitis. He followed with Dr. Morocho in the past who recommended surgery for this but he never pursued it.     He is having nasal airway obstruction, though he had benefit after his original surgery, it's slowly recurred over time. He breathes mostly through his mouth at this point.     He has a persistent foul taste at the back of his mouth that's gotten progressively worse. He did undergo surgery about 10 years ago for a septoplasty, he also had his uvula shaved down at that time, it's been present since. Things like bread, crackers, and sweets make it obviously worse. He finds that even water tastes sour. He has gargled in the past with baking soda which is only effective in mitigating the taste for about 15-20 minutes.      Active Problems:  Patient Active Problem List   Diagnosis    Anxiety    Benign prostatic hyperplasia (BPH) with urinary urgency    GERD (gastroesophageal reflux disease)    HTN (hypertension)    Vitamin D deficiency    Asthma    Depression, major, in remission (CMS/HCC)    Lumbar radiculopathy    RAISSA positive    RUSH (obstructive sleep apnea)    Venous stasis dermatitis of lower extremity    Venous insufficiency    Vitamin B6 deficiency neuropathy (CMS/HCC)    Cervical stenosis of spine    Balance problem    Body, loose, knee    Dysphagia    Hyperreflexia    Hypertrophy of nasal turbinates    PND (post-nasal drip)    Class 3 severe obesity due to excess calories with serious comorbidity and body mass index (BMI) of 40.0 to 44.9 in adult (CMS/HCC)    Nasal congestion    Chronic pansinusitis    Altered taste      Past Medical History:  He has a past  medical history of Acute gastritis without bleeding (02/18/2020), Arthritis, Chronic ethmoidal sinusitis (12/10/2020), Chronic frontal sinusitis (10/22/2020), Chronic rhinitis (10/23/2020), COVID-19 (10/14/2022), DVT (deep venous thrombosis) (CMS/[facility]) (09/27/2023), Fever, unspecified (11/12/2019), Hypertrophy of nasal turbinates (12/10/2020), Lumbago with sciatica, right side (05/07/2019), Nasal congestion (10/14/2022), Other chest pain (07/28/2021), Other conditions influencing health status (01/20/2020), Other specified disorders of nose and nasal sinuses (12/08/2017), Personal history of other benign neoplasm (07/21/2017), Personal history of other diseases of the digestive system (02/18/2020), Personal history of other diseases of the musculoskeletal system and connective tissue (09/04/2019), Personal history of other diseases of the respiratory system, Personal history of other diseases of the respiratory system (12/08/2017), Personal history of other diseases of the respiratory system (04/19/2022), Personal history of other diseases of the respiratory system (12/10/2020), Personal history of other diseases of the respiratory system (02/06/2020), Personal history of other specified conditions (01/12/2021), Personal history of other specified conditions (12/08/2017), Personal history of other specified conditions (08/25/2021), Personal history of other specified conditions (08/25/2021), Personal history of urinary (tract) infections (07/28/2021), Pneumonia, unspecified organism (11/22/2019), Shortness of breath (10/14/2022), Status post cervical spinal fusion (09/27/2023), and Tear of lateral meniscus of knee (03/07/2017).    Surgical History:  He has a past surgical history that includes Other surgical history (05/07/2019); Other surgical history (02/11/2020); and Cholecystectomy.     Family History:  Family History   Problem Relation Name Age of Onset    Hypertension Mother PITER     No Known Problems Father       No Known Problems Maternal Grandmother      No Known Problems Maternal Grandfather      No Known Problems Paternal Grandmother      No Known Problems Paternal Grandfather       Social History:  He reports that he has never smoked. He has never used smokeless tobacco. He reports that he does not currently use alcohol. He reports that he does not use drugs.     Allergies:  Patient has no known allergies.    Current Meds:  Current Outpatient Medications:     amLODIPine-benazepriL (Lotrel) 5-10 mg capsule, TAKE 1 CAPSULE DAILY, Disp: 90 capsule, Rfl: 3    buPROPion SR (Wellbutrin SR) 150 mg 12 hr tablet, TAKE 1 TABLET TWICE A DAY, Disp: 180 tablet, Rfl: 3    clonazePAM (KlonoPIN) 1 mg tablet, Take 1 tablet (1 mg) by mouth once daily as needed for anxiety., Disp: 30 tablet, Rfl: 2    Dexilant 60 mg DR capsule, Take by mouth once daily., Disp: , Rfl:     dicyclomine (Bentyl) 20 mg tablet, Take 1 tablet (20 mg) by mouth every 6 hours., Disp: , Rfl:     dilTIAZem XR (Dilacor XR) 180 mg 24 hr capsule, TAKE 1 CAPSULE DAILY, Disp: 90 capsule, Rfl: 3    dutasteride-tamsulosin 0.5-0.4 mg capsule, ER multiphase 24 hr, TAKE 1 CAPSULE DAILY, Disp: 90 capsule, Rfl: 3    fluticasone (Flonase) 50 mcg/actuation nasal spray, Administer into affected nostril(s) twice a day., Disp: , Rfl:     furosemide (Lasix) 40 mg tablet, TAKE 1 TABLET BY MOUTH DAILY, Disp: 30 tablet, Rfl: 0    meloxicam (Mobic) 15 mg tablet, Take 1 tablet (15 mg) by mouth once daily., Disp: , Rfl:     oxybutynin XL (Ditropan-XL) 10 mg 24 hr tablet, Take 1 tablet (10 mg) by mouth once daily., Disp: , Rfl:     potassium chloride CR (Klor-Con M20) 20 mEq ER tablet, Take 1 tablet (20 mEq) by mouth once daily., Disp: , Rfl:     pyridoxine (Vitamin B-6) 100 mg tablet, TAKE 1 TABLET BY MOUTH ONCE DAILY AS DIRECTED, Disp: 90 tablet, Rfl: 0    semaglutide 0.25 mg or 0.5 mg (2 mg/3 mL) pen injector, Inject 0.5 mg under the skin 1 (one) time per week., Disp: 3 mL, Rfl: 5     "venlafaxine XR (Effexor-XR) 75 mg 24 hr capsule, Take 2 capsules (150 mg) by mouth once daily., Disp: , Rfl:     Ventolin HFA 90 mcg/actuation inhaler, INHALE 1 TO 2 PUFFS EVERY 4 TO 6 HOURS AS NEEDED, Disp: , Rfl:     cholecalciferol (Vitamin D-3) 50 mcg (2,000 unit) capsule, TAKE ONE CAPSULE BY MOUTH EVERY DAY, Disp: 90 capsule, Rfl: 0    triamcinolone (Kenalog) 0.1 % cream, APPLY A THIN LAYER TO AFFECTED AREA(S) TWICE DAILY AS NEEDED., Disp: 454 g, Rfl: 11    Vitals:  Visit Vitals  Temp 36.6 °C (97.8 °F)   Ht 1.727 m (5' 8\")   Wt 129 kg (284 lb 8 oz)   BMI 43.26 kg/m²   Smoking Status Never   BSA 2.49 m²      Physical Exam:  CONSTITUTIONAL: Vitals reviewed in nursing chart, well developed, well nourished.   RESPIRATION: Breathing comfortably, no stridor.  CV: No clubbing/cyanosis/edema in hands.  EYES: EOM Intact, sclera normal.  NEURO: Alert and oriented times 3, Cranial nerves 2-12 intact and symmetric bilaterally.  HEAD AND FACE: Skin with no masses or lesions, sinuses nontender to palpation.  SALIVARY GLANDS: Parotid and submandibular glands normal bilaterally.  EARS: Normal external ears, external auditory canals, and TMs to otoscopy, normal hearing to whispered voice.  NOSE: External nose midline, anterior rhinoscopy is normal with limited visualization to the anterior aspect of the interior turbinates (see nasal endoscopy).  ORAL CAVITY/OROPHARYNX/LIPS: Normal mucous membranes, normal floor of mouth/tongue/OP, no masses or lesions are noted. Status post uvulectomy. ****  NECK/LYMPH: No LAD, no thyroid masses.    SINONASAL ENDOSCOPY (CPT 34938): To better evaluate the patient's symptoms, sinonasal endoscopy is indicated.  After discussion of risks and benefits, and topical decongestion and anesthesia,an endoscope was used to perform nasal endoscopy on each side.  A time out identifying the patient, the procedure, the location of the procedure and any concerns was performed prior to beginning the " procedure.    Findings:  Right: MM + SER normal   Left: caudal deviation to the left, MM + SER normal     Results/Data:  I personally reviewed the CT scan dated 1/26/23 with the patient today in clinic. It demonstrated diffuse inflammatory changes throughout the paranasal sinuses, and a leftward deviation of the nasal septum.     Provider Impressions:  1. Chronic sinusitis   2. Nasal airway obstruction, deviated nasal septum   3. Facial pain, headaches  4. Throat clearing, coughing, dysphonia   5. Asthma   6. History of negative allergy testing   7. Heartburn on proton pump inhibitor   8. Taste disturbance   9. History of septoplasty, tonsillectomy, and uvulectomy      Discussion: Jarocho Langford and I discussed ***     Discussed surgery; he'd like to pursue surgery; ordered repeat CT Sinus + prescribed amoxicillin x 10 days; will contact to discuss scan (will need to discuss with ambulatory surgery center if he can go there based on BMI)     Discussed taste disturbance, referred to Dr. Montoya for this     He was amenable to this and all questions were answered.     Patient Discussion/Summary:  Welcome to Dr. Pete Lovell's clinic. We are here to assist you with your ENT needs at [facility] [place] [facility] [place]. Dr. Lovell is an ENT that specializes in nose, sinus, and skull base disorders.    Dr. Pete Lovell's office number is [phone]. Please call this number to contact his care team regardless of which office you use to access care. This number is the most direct way to communicate with all the members of the care team.    Reyna Bhat CNP is a nurse practitioner who is a part of Dr. Lovell's team. She will work collaboratively with Dr. Lovell to meet your goals. This often may include seeing you for more urgent appointments or follow-up visits under Dr. Lovell's supervision.    Carie Shrestha RN BSN is Dr. Lovell's primary nurse. She can be reached by calling [phone].  Carie is available during business hours Tuesday through Friday. Reyna Joshi RN BSN is her rhinology nurse partner. Reyna is available during business hours Monday through Thursday. Messages left for them will be returned within one business day. Carie is also Dr. Lovell's surgery scheduler and will assist you with planning and scheduling of your surgery during her office hours.     Kingawill Sloan is Dr. Lovell's  and you can reach her at [phone]. She can help you with scheduling of appointments, general questions and information. She is available to receive calls Monday through Friday from 8:00 am until 4:25 pm.     For your convenience, Dr. Lovell sees patients at [facility] and [facility] at [facility]. While we try to make your appointments as convenient as possible, occasionally a visit to another location may be necessary to provide the best care for you.    Dr. Lovell makes every effort to run on time for your appointments. Therefore, if you are more than 25 minutes late, your appointment will need to be rescheduled to another day. We appreciate your understanding.     We look forward to working with you to meet your healthcare goals.     Signature:  Scribe Attestation  By signing my name below, IGwen Scribe   attest that this documentation has been prepared under the direction and in the presence of Pete Lovell MD.

## 2024-02-22 NOTE — LETTER
February 29, 2024     Mahendra Babin MD  [address]   [facility], [address]  [facility] [id number]    Patient: Jarocho Langford   YOB: 1955   Date of Visit: 2/22/2024       Dear Dr. Mahendra Babin MD:    Thank you for referring Jarocho Langford to me for evaluation. Below are my notes for this consultation.  If you have questions, please do not hesitate to call me. I look forward to following your patient along with you.       Sincerely,     Pete Lovell MD      CC: No Recipients  ______________________________________________________________________________________    Chief Complaint:  Chronic sinus problems    History Of Present Illness:  Reason For Visit:   Patient presents to Otolaryngology Clinic in consultation at request of Dr. Babin for chronic sinus problems.    The Symptoms Are: persistent and have been present for years.    Main Symptoms:  Patient does not have anterior nasal drainage.     Patient does not have  posterior nasal drainage.    Patient has nasal airway obstruction. right worse than left   Patient has  facial pain.  over forehead, left side of face   Patient does not have  facial pressure.    Patient does not have decreased sense of smell.   Associated Symptoms:   Patient has  headaches.  over forehead, left side of face   Patient has throat clearing.    Patient has coughing.    Patient has dysphonia.   Patient does not have nasal bleeding.     Medications currently on for sinonasal symptoms: Flonase (2 puffs each side 1x daily), Afrin (2 puffs each side 1x daily)  Medications tried in the past for sinonasal symptoms:  Prednisone, oral antibiotic therapy     Other Pertinent Medical Conditions:   Patient has asthma.  does not use medical therapy regularly   Patient does not have aspirin sensitivity.    Patient does not have migraines.    Patient has history of allergy testing. When: 7-8 years ago, negative  Patient does not have history of nasal  fracture.    Patient has heartburn.    The patient is on medical therapy for heartburn.  on dexilant  The patient does have imaging of sinuses. When: 1/26/2023    Jarocho Langford presents to me as a new patient in consultation of Dr. Babin for chronic sinus problems.    He has been followed by Dr. Morocho in the past.  He recommended surgical intervention based on his imaging findings.    He has been having some ongoing nasal breathing issues particularly worse on the right-hand side.  He has undergone previous nasal airway surgery and had benefit but his symptoms have slowly recurred over time.  During the same anesthetic he also had his uvula trimmed.  He mentioned that following that procedure he has had a persistent bad taste in his mouth that has gotten progressively worse and it is unclear if there is direct cause-and-effect.  Things like bread, crackers, and sweets can make it worse.  He finds that even water can taste sour.  He has found some benefit gargling baking soda and water but it only works for about 15 to 20 minutes and then his symptoms recur.      The remainder of a full 10 systems review of systems was pan negative outside of that stated in the history of present illness.       Active Problems:  Patient Active Problem List   Diagnosis   • Anxiety   • Benign prostatic hyperplasia (BPH) with urinary urgency   • GERD (gastroesophageal reflux disease)   • HTN (hypertension)   • Vitamin D deficiency   • Asthma   • Depression, major, in remission (CMS/HCC)   • Lumbar radiculopathy   • RAISSA positive   • RUSH (obstructive sleep apnea)   • Venous stasis dermatitis of lower extremity   • Venous insufficiency   • Vitamin B6 deficiency neuropathy (CMS/HCC)   • Cervical stenosis of spine   • Balance problem   • Body, loose, knee   • Dysphagia   • Hyperreflexia   • Hypertrophy of nasal turbinates   • PND (post-nasal drip)   • Class 3 severe obesity due to excess calories with serious comorbidity and body mass  index (BMI) of 40.0 to 44.9 in adult (CMS/[facility])   • Nasal congestion   • Chronic pansinusitis   • Altered taste      Past Medical History:  He has a past medical history of Acute gastritis without bleeding (02/18/2020), Arthritis, Chronic ethmoidal sinusitis (12/10/2020), Chronic frontal sinusitis (10/22/2020), Chronic rhinitis (10/23/2020), COVID-19 (10/14/2022), DVT (deep venous thrombosis) (CMS/[facility]) (09/27/2023), Fever, unspecified (11/12/2019), Hypertrophy of nasal turbinates (12/10/2020), Lumbago with sciatica, right side (05/07/2019), Nasal congestion (10/14/2022), Other chest pain (07/28/2021), Other conditions influencing health status (01/20/2020), Other specified disorders of nose and nasal sinuses (12/08/2017), Personal history of other benign neoplasm (07/21/2017), Personal history of other diseases of the digestive system (02/18/2020), Personal history of other diseases of the musculoskeletal system and connective tissue (09/04/2019), Personal history of other diseases of the respiratory system, Personal history of other diseases of the respiratory system (12/08/2017), Personal history of other diseases of the respiratory system (04/19/2022), Personal history of other diseases of the respiratory system (12/10/2020), Personal history of other diseases of the respiratory system (02/06/2020), Personal history of other specified conditions (01/12/2021), Personal history of other specified conditions (12/08/2017), Personal history of other specified conditions (08/25/2021), Personal history of other specified conditions (08/25/2021), Personal history of urinary (tract) infections (07/28/2021), Pneumonia, unspecified organism (11/22/2019), Shortness of breath (10/14/2022), Status post cervical spinal fusion (09/27/2023), and Tear of lateral meniscus of knee (03/07/2017).    Surgical History:  He has a past surgical history that includes Other surgical history (05/07/2019); Other surgical history (02/11/2020);  and Cholecystectomy.     Family History:  Family History   Problem Relation Name Age of Onset   • Hypertension Mother PITER    • No Known Problems Father     • No Known Problems Maternal Grandmother     • No Known Problems Maternal Grandfather     • No Known Problems Paternal Grandmother     • No Known Problems Paternal Grandfather       Social History:  He reports that he has never smoked. He has never used smokeless tobacco. He reports that he does not currently use alcohol. He reports that he does not use drugs.     Allergies:  Patient has no known allergies.    Current Meds:  Current Outpatient Medications:   •  amLODIPine-benazepriL (Lotrel) 5-10 mg capsule, TAKE 1 CAPSULE DAILY, Disp: 90 capsule, Rfl: 3  •  buPROPion SR (Wellbutrin SR) 150 mg 12 hr tablet, TAKE 1 TABLET TWICE A DAY, Disp: 180 tablet, Rfl: 3  •  clonazePAM (KlonoPIN) 1 mg tablet, Take 1 tablet (1 mg) by mouth once daily as needed for anxiety., Disp: 30 tablet, Rfl: 2  •  Dexilant 60 mg DR capsule, Take by mouth once daily., Disp: , Rfl:   •  dicyclomine (Bentyl) 20 mg tablet, Take 1 tablet (20 mg) by mouth every 6 hours., Disp: , Rfl:   •  dilTIAZem XR (Dilacor XR) 180 mg 24 hr capsule, TAKE 1 CAPSULE DAILY, Disp: 90 capsule, Rfl: 3  •  dutasteride-tamsulosin 0.5-0.4 mg capsule, ER multiphase 24 hr, TAKE 1 CAPSULE DAILY, Disp: 90 capsule, Rfl: 3  •  fluticasone (Flonase) 50 mcg/actuation nasal spray, Administer into affected nostril(s) twice a day., Disp: , Rfl:   •  furosemide (Lasix) 40 mg tablet, TAKE 1 TABLET BY MOUTH DAILY, Disp: 30 tablet, Rfl: 0  •  meloxicam (Mobic) 15 mg tablet, Take 1 tablet (15 mg) by mouth once daily., Disp: , Rfl:   •  oxybutynin XL (Ditropan-XL) 10 mg 24 hr tablet, Take 1 tablet (10 mg) by mouth once daily., Disp: , Rfl:   •  potassium chloride CR (Klor-Con M20) 20 mEq ER tablet, Take 1 tablet (20 mEq) by mouth once daily., Disp: , Rfl:   •  pyridoxine (Vitamin B-6) 100 mg tablet, TAKE 1 TABLET BY MOUTH ONCE DAILY  "AS DIRECTED, Disp: 90 tablet, Rfl: 0  •  semaglutide 0.25 mg or 0.5 mg (2 mg/3 mL) pen injector, Inject 0.5 mg under the skin 1 (one) time per week., Disp: 3 mL, Rfl: 5  •  venlafaxine XR (Effexor-XR) 75 mg 24 hr capsule, Take 2 capsules (150 mg) by mouth once daily., Disp: , Rfl:   •  Ventolin HFA 90 mcg/actuation inhaler, INHALE 1 TO 2 PUFFS EVERY 4 TO 6 HOURS AS NEEDED, Disp: , Rfl:   •  cholecalciferol (Vitamin D-3) 50 mcg (2,000 unit) capsule, TAKE ONE CAPSULE BY MOUTH EVERY DAY, Disp: 90 capsule, Rfl: 0  •  triamcinolone (Kenalog) 0.1 % cream, APPLY A THIN LAYER TO AFFECTED AREA(S) TWICE DAILY AS NEEDED., Disp: 454 g, Rfl: 11    Vitals:  Visit Vitals  Temp 36.6 °C (97.8 °F)   Ht 1.727 m (5' 8\")   Wt 129 kg (284 lb 8 oz)   BMI 43.26 kg/m²   Smoking Status Never   BSA 2.49 m²      Physical Exam:  CONSTITUTIONAL:  Vitals reviewed in nursing chart, well developed, well nourished.    RESPIRATION:  Breathing comfortably, no stridor.  CV:  No clubbing/cyanosis/edema in hands.  EYES:  EOM Intact, sclera normal.  NEURO:  Alert and oriented times 3, Cranial nerves 2-12 intact and symmetric bilaterally.  HEAD AND FACE:  Skin with no masses or lesions, sinuses nontender to palpation.  SALIVARY GLANDS:  Parotid and submandibular glands normal bilaterally.  EARS:  Normal external ears, external auditory canals, and TMs to otoscopy, normal hearing to whispered voice.  NOSE:  External nose midline, anterior rhinoscopy is normal with limited visualization to the anterior aspect of the interior turbinates (see nasal endoscopy).  ORAL CAVITY/OROPHARYNX/LIPS:  Normal mucous membranes, normal floor of mouth/tongue/OP, no masses or lesions are noted.  PHARYNGEAL WALLS AND NASOPHARYNX:  No masses noted.  NECK/LYMPH:  No LAD, no thyroid masses.  LARYNX:  Could not directly visualize transorally.    SINONASAL ENDOSCOPY (CPT 00532): To better evaluate the patient's symptoms, sinonasal endoscopy is indicated.  After discussion of risks " and benefits, and topical decongestion and anesthesia,an endoscope was used to perform nasal endoscopy on each side.  A time out identifying the patient, the procedure, the location of the procedure and any concerns was performed prior to beginning the procedure.    Findings:  Examination of the right nasal cavity revealed no evidence of purulence or polyposis at the middle meatus or sphenoethmoid recess.  Examination of the left nasal cavity revealed no evidence of purulence or polyposis at the middle meatus or sphenoethmoid recess.  He has a caudal septal deviation to the left.    Results/Data:  I personally reviewed the CT scan dated 1/26/23 with the patient today in clinic. It demonstrated diffuse inflammatory changes particularly within each frontal and ethmoid cavity.    I personally reviewed the last note from Dr. Babin outlining referral to me.  I reviewed his last note from his primary care provider from February 6, 2024 outlining management of his baseline medical issues.  Caudal septal deviation to the left    Provider Impressions:  1. Chronic sinusitis   2. Nasal airway obstruction, deviated nasal septum , history of septoplasty  3. Facial pain, headaches  4. Throat clearing, coughing, dysphonia   5. Asthma   6. Heartburn on proton pump inhibitor   7. Taste disturbance     Discussion:  Jarocho Langford and I discussed his symptoms in great detail today.  In regard to next steps, he could consider further intranasal medical therapy we discussed a number of these options today.  He could also consider surgical options but I stressed to him that it is very unlikely that his taste disturbance would improve with endonasal surgery and he understood this.  He wished to move forward with repeat imaging and consideration of surgical options.  I, therefore, we will prescribe him a 10-day course of oral antibiotic therapy and after completion I have asked him to obtain a noncontrast CT sinus.  I would like to see him  either face-to-face or virtually following completion of that study to review the results and formulate a plan moving forward.    In regard to his taste disturbance, I recommended consultation with Dr. Montoya with dental medicine and a consult was provided.    In regard to surgical options, if he wanted to consider any type of revision nasal airway surgery I would involve one of my plastic surgery partners given the position of his deviation and previous history of endonasal surgery.  We can discuss this in more detail after we review his CT sinus.    All questions were answered.    Patient Discussion/Summary:  Welcome to Dr. Pete Lovell's clinic. We are here to assist you with your ENT needs at [facility] [place]. Dr. Lovell is an ENT that specializes in nose, sinus, and skull base disorders.    Dr. Pete Lovell's office number is [phone]. Please call this number to contact his care team regardless of which office you use to access care. This number is the most direct way to communicate with all the members of the care team.    Reyna Bhat CNP is a nurse practitioner who is a part of Dr. Lovell's team. She will work collaboratively with Dr. Lovell to meet your goals. This often may include seeing you for more urgent appointments or follow-up visits under Dr. Lovell's supervision.    Carie Shrestha RN BSN is Dr. Lovell's primary nurse. She can be reached by calling [phone]. Carie is available during business hours Tuesday through Friday. Reyna BESSN is her rhinology nurse partner. Reyna is available during business hours Monday through Thursday. Messages left for them will be returned within one business day. Carie is also Dr. Lovell's surgery scheduler and will assist you with planning and scheduling of your surgery during her office hours.     Kinga Sloan is Dr. Lovell's  and you can reach her at [phone]. She can help you  with scheduling of appointments, general questions and information. She is available to receive calls Monday through Friday from 8:00 am until 4:25 pm.     For your convenience, Dr. Lovell sees patients at [facility] and [facility] at [facility]. While we try to make your appointments as convenient as possible, occasionally a visit to another location may be necessary to provide the best care for you.    Dr. Lovell makes every effort to run on time for your appointments. Therefore, if you are more than 25 minutes late, your appointment will need to be rescheduled to another day. We appreciate your understanding.     We look forward to working with you to meet your healthcare goals.     Signature:  Scribe Attestation  By signing my name below, IGwen Scribe   attest that this documentation has been prepared under the direction and in the presence of Pete Lovell MD.

## 2024-02-26 DIAGNOSIS — F41.9 ANXIETY: ICD-10-CM

## 2024-02-26 DIAGNOSIS — M54.16 LUMBAR RADICULOPATHY: Primary | ICD-10-CM

## 2024-02-26 RX ORDER — MELOXICAM 15 MG/1
15 TABLET ORAL DAILY
Qty: 90 TABLET | Refills: 3 | Status: SHIPPED | OUTPATIENT
Start: 2024-02-26

## 2024-02-26 RX ORDER — VENLAFAXINE HYDROCHLORIDE 75 MG/1
150 CAPSULE, EXTENDED RELEASE ORAL DAILY
Qty: 180 CAPSULE | Refills: 3 | Status: SHIPPED | OUTPATIENT
Start: 2024-02-26

## 2024-03-03 DIAGNOSIS — N40.1 BENIGN PROSTATIC HYPERPLASIA (BPH) WITH URINARY URGENCY: Primary | ICD-10-CM

## 2024-03-03 DIAGNOSIS — R39.15 BENIGN PROSTATIC HYPERPLASIA (BPH) WITH URINARY URGENCY: Primary | ICD-10-CM

## 2024-03-04 RX ORDER — OXYBUTYNIN CHLORIDE 10 MG/1
10 TABLET, EXTENDED RELEASE ORAL DAILY
Qty: 90 TABLET | Refills: 3 | Status: SHIPPED | OUTPATIENT
Start: 2024-03-04

## 2024-03-05 ENCOUNTER — HOSPITAL ENCOUNTER (OUTPATIENT)
Dept: RADIOLOGY | Facility: CLINIC | Age: 69
Discharge: HOME | End: 2024-03-05
Payer: COMMERCIAL

## 2024-03-05 DIAGNOSIS — J32.2 CHRONIC ETHMOIDAL SINUSITIS: ICD-10-CM

## 2024-03-05 DIAGNOSIS — J32.0 CHRONIC MAXILLARY SINUSITIS: ICD-10-CM

## 2024-03-05 PROCEDURE — 70486 CT MAXILLOFACIAL W/O DYE: CPT

## 2024-03-06 ENCOUNTER — TELEMEDICINE (OUTPATIENT)
Dept: OTOLARYNGOLOGY | Facility: CLINIC | Age: 69
End: 2024-03-06
Payer: COMMERCIAL

## 2024-03-06 DIAGNOSIS — J32.2 CHRONIC ETHMOIDAL SINUSITIS: ICD-10-CM

## 2024-03-06 DIAGNOSIS — J32.3 CHRONIC SPHENOIDAL SINUSITIS: ICD-10-CM

## 2024-03-06 DIAGNOSIS — J32.0 CHRONIC MAXILLARY SINUSITIS: Primary | ICD-10-CM

## 2024-03-06 DIAGNOSIS — R09.81 NASAL CONGESTION: ICD-10-CM

## 2024-03-06 PROCEDURE — 99214 OFFICE O/P EST MOD 30 MIN: CPT | Performed by: OTOLARYNGOLOGY

## 2024-03-06 PROCEDURE — 1125F AMNT PAIN NOTED PAIN PRSNT: CPT | Performed by: OTOLARYNGOLOGY

## 2024-03-06 PROCEDURE — 1160F RVW MEDS BY RX/DR IN RCRD: CPT | Performed by: OTOLARYNGOLOGY

## 2024-03-06 PROCEDURE — 1159F MED LIST DOCD IN RCRD: CPT | Performed by: OTOLARYNGOLOGY

## 2024-03-06 PROCEDURE — 3008F BODY MASS INDEX DOCD: CPT | Performed by: OTOLARYNGOLOGY

## 2024-03-06 PROCEDURE — 1036F TOBACCO NON-USER: CPT | Performed by: OTOLARYNGOLOGY

## 2024-03-06 NOTE — PROGRESS NOTES
An interactive audio and video telecommunication system which permits real time communications between the patient (at the originating site) and provider (at the distant site) was utilized to provide this telehealth service.  Verbal consent was requested and obtained for a telehealth visit.     Chief Complaint:  1. Chronic sinusitis   2. Nasal airway obstruction, deviated nasal septum , history of septoplasty  3. Facial pain, headaches  4. Throat clearing, coughing, dysphonia   5. Asthma   6. Heartburn on proton pump inhibitor   7. Taste disturbance     History Of Present Illness:    Jarocho Langford was last seen 2/22/24, a CT Sinus was ordered at that time, he presents virtually to discuss this imaging.     He continues with symptoms as outlined above and also mentioned the sensation of feeling mucus in his nose that he has difficulty evacuating.     Active Problems:  Patient Active Problem List   Diagnosis    Anxiety    Benign prostatic hyperplasia (BPH) with urinary urgency    GERD (gastroesophageal reflux disease)    HTN (hypertension)    Vitamin D deficiency    Asthma    Depression, major, in remission (CMS/[facility])    Lumbar radiculopathy    RAISSA positive    RUSH (obstructive sleep apnea)    Venous stasis dermatitis of lower extremity    Venous insufficiency    Vitamin B6 deficiency neuropathy (CMS/[facility])    Cervical stenosis of spine    Balance problem    Body, loose, knee    Dysphagia    Hyperreflexia    Hypertrophy of nasal turbinates    PND (post-nasal drip)    Class 3 severe obesity due to excess calories with serious comorbidity and body mass index (BMI) of 40.0 to 44.9 in adult (CMS/[facility])    Nasal congestion    Chronic pansinusitis    Altered taste      Past Medical History:  He has a past medical history of Acute gastritis without bleeding (02/18/2020), Arthritis, Chronic ethmoidal sinusitis (12/10/2020), Chronic frontal sinusitis (10/22/2020), Chronic rhinitis (10/23/2020), COVID-19 (10/14/2022), DVT (deep  venous thrombosis) (CMS/[facility]) (09/27/2023), Fever, unspecified (11/12/2019), Hypertrophy of nasal turbinates (12/10/2020), Lumbago with sciatica, right side (05/07/2019), Nasal congestion (10/14/2022), Other chest pain (07/28/2021), Other conditions influencing health status (01/20/2020), Other specified disorders of nose and nasal sinuses (12/08/2017), Personal history of other benign neoplasm (07/21/2017), Personal history of other diseases of the digestive system (02/18/2020), Personal history of other diseases of the musculoskeletal system and connective tissue (09/04/2019), Personal history of other diseases of the respiratory system, Personal history of other diseases of the respiratory system (12/08/2017), Personal history of other diseases of the respiratory system (04/19/2022), Personal history of other diseases of the respiratory system (12/10/2020), Personal history of other diseases of the respiratory system (02/06/2020), Personal history of other specified conditions (01/12/2021), Personal history of other specified conditions (12/08/2017), Personal history of other specified conditions (08/25/2021), Personal history of other specified conditions (08/25/2021), Personal history of urinary (tract) infections (07/28/2021), Pneumonia, unspecified organism (11/22/2019), Shortness of breath (10/14/2022), Status post cervical spinal fusion (09/27/2023), and Tear of lateral meniscus of knee (03/07/2017).    Surgical History:  He has a past surgical history that includes Other surgical history (05/07/2019); Other surgical history (02/11/2020); and Cholecystectomy.     Family History:  Family History   Problem Relation Name Age of Onset    Hypertension Mother PITER     No Known Problems Father      No Known Problems Maternal Grandmother      No Known Problems Maternal Grandfather      No Known Problems Paternal Grandmother      No Known Problems Paternal Grandfather       Social History:  He reports that he has  never smoked. He has never used smokeless tobacco. He reports that he does not currently use alcohol. He reports that he does not use drugs.     Allergies:  Patient has no known allergies.    Current Meds:  Current Outpatient Medications:     amLODIPine-benazepriL (Lotrel) 5-10 mg capsule, TAKE 1 CAPSULE DAILY, Disp: 90 capsule, Rfl: 3    buPROPion SR (Wellbutrin SR) 150 mg 12 hr tablet, TAKE 1 TABLET TWICE A DAY, Disp: 180 tablet, Rfl: 3    cholecalciferol (Vitamin D-3) 50 mcg (2,000 unit) capsule, TAKE ONE CAPSULE BY MOUTH EVERY DAY, Disp: 90 capsule, Rfl: 0    clonazePAM (KlonoPIN) 1 mg tablet, Take 1 tablet (1 mg) by mouth once daily as needed for anxiety., Disp: 30 tablet, Rfl: 2    Dexilant 60 mg DR capsule, Take by mouth once daily., Disp: , Rfl:     dicyclomine (Bentyl) 20 mg tablet, Take 1 tablet (20 mg) by mouth every 6 hours., Disp: , Rfl:     dilTIAZem XR (Dilacor XR) 180 mg 24 hr capsule, TAKE 1 CAPSULE DAILY, Disp: 90 capsule, Rfl: 3    dutasteride-tamsulosin 0.5-0.4 mg capsule, ER multiphase 24 hr, TAKE 1 CAPSULE DAILY, Disp: 90 capsule, Rfl: 3    fluticasone (Flonase) 50 mcg/actuation nasal spray, Administer into affected nostril(s) twice a day., Disp: , Rfl:     furosemide (Lasix) 40 mg tablet, TAKE 1 TABLET BY MOUTH DAILY, Disp: 30 tablet, Rfl: 0    meloxicam (Mobic) 15 mg tablet, TAKE 1 TABLET DAILY, Disp: 90 tablet, Rfl: 3    oxybutynin XL (Ditropan-XL) 10 mg 24 hr tablet, TAKE 1 TABLET DAILY, Disp: 90 tablet, Rfl: 3    potassium chloride CR (Klor-Con M20) 20 mEq ER tablet, Take 1 tablet (20 mEq) by mouth once daily., Disp: , Rfl:     pyridoxine (Vitamin B-6) 100 mg tablet, TAKE 1 TABLET BY MOUTH ONCE DAILY AS DIRECTED, Disp: 90 tablet, Rfl: 0    semaglutide 0.25 mg or 0.5 mg (2 mg/3 mL) pen injector, Inject 0.5 mg under the skin 1 (one) time per week., Disp: 3 mL, Rfl: 5    triamcinolone (Kenalog) 0.1 % cream, APPLY A THIN LAYER TO AFFECTED AREA(S) TWICE DAILY AS NEEDED., Disp: 454 g, Rfl: 11     venlafaxine XR (Effexor-XR) 75 mg 24 hr capsule, TAKE 2 CAPSULES DAILY, Disp: 180 capsule, Rfl: 3    Ventolin HFA 90 mcg/actuation inhaler, INHALE 1 TO 2 PUFFS EVERY 4 TO 6 HOURS AS NEEDED, Disp: , Rfl:     Vitals:  Visit Vitals  Smoking Status Never      Physical Exam:  Visit conducted virtually.     Results/Data:  I personally reviewed the CT scan dated 3/5/24 with the patient virtually today. It demonstrated inflammatory changes throughout his paranasal sinuses.  His right sphenoid was completely opacified and this represented the most significant inflammatory change.    Provider Impressions:  1. Chronic sinusitis   2. Nasal airway obstruction, deviated nasal septum, history of septoplasty, inferior turbinate hypertrophy  3. Facial pain, headaches  4. Throat clearing, coughing, dysphonia   5. Asthma   6. Heartburn on proton pump inhibitor   7. Taste disturbance     Discussion:  Jarocho Langford and I discussed options today both medical and surgical.  He wished to consider surgical options.    The sinus surgery itself was discussed at length.  The risks, benefits, and alternatives were explained in detail which include but is not limited to: persistence of symptoms, pain, bleeding, smell loss or alteration, infection, need for nasal packing, double vision, vision loss, CSF leak requiring other procedures to repair, numbness of teeth/and or face, need for revision surgery, and unexpected risks.      We again discussed his septal deviation and the positioning of this and I would not plan to address this during this anesthetic but he certainly could have this addressed secondarily by one of my plastic surgery partners if his nasal breathing symptoms persist.    I believe he would also benefit from inferior turbinate reductions given his congestion symptoms and I would plan to perform this during the same anesthetic.    We again discussed his taste disturbance and that it would be unlikely that sinus surgery will  correct this.    He will need a thoughtful medical risk assessment and I will plan to perform this procedure at our main campus.  He would like to proceed with the procedure and I will schedule him at his convenience.  All questions were answered.    Patient Discussion/Summary:  Please feel free to contact my office by calling [phone] with any questions.

## 2024-03-07 DIAGNOSIS — J32.3 CHRONIC SPHENOIDAL SINUSITIS: ICD-10-CM

## 2024-03-07 DIAGNOSIS — J32.2 CHRONIC ETHMOIDAL SINUSITIS: ICD-10-CM

## 2024-03-13 ENCOUNTER — PRE-ADMISSION TESTING (OUTPATIENT)
Dept: PREADMISSION TESTING | Facility: HOSPITAL | Age: 69
End: 2024-03-13
Payer: COMMERCIAL

## 2024-03-13 VITALS
HEART RATE: 93 BPM | TEMPERATURE: 97.4 F | BODY MASS INDEX: 43.19 KG/M2 | HEIGHT: 68 IN | RESPIRATION RATE: 18 BRPM | WEIGHT: 285 LBS | DIASTOLIC BLOOD PRESSURE: 73 MMHG | SYSTOLIC BLOOD PRESSURE: 113 MMHG

## 2024-03-13 DIAGNOSIS — J32.2 CHRONIC ETHMOIDAL SINUSITIS: Primary | ICD-10-CM

## 2024-03-13 DIAGNOSIS — J32.3 CHRONIC SPHENOIDAL SINUSITIS: ICD-10-CM

## 2024-03-13 LAB
ABO GROUP (TYPE) IN BLOOD: NORMAL
ANION GAP SERPL CALC-SCNC: 12 MMOL/L (ref 10–20)
ANTIBODY SCREEN: NORMAL
BUN SERPL-MCNC: 18 MG/DL (ref 6–23)
CALCIUM SERPL-MCNC: 8.8 MG/DL (ref 8.6–10.6)
CHLORIDE SERPL-SCNC: 104 MMOL/L (ref 98–107)
CO2 SERPL-SCNC: 28 MMOL/L (ref 21–32)
CREAT SERPL-MCNC: 0.93 MG/DL (ref 0.5–1.3)
EGFRCR SERPLBLD CKD-EPI 2021: 89 ML/MIN/1.73M*2
ERYTHROCYTE [DISTWIDTH] IN BLOOD BY AUTOMATED COUNT: 14.2 % (ref 11.5–14.5)
GLUCOSE SERPL-MCNC: 82 MG/DL (ref 74–99)
HCT VFR BLD AUTO: 43.6 % (ref 41–52)
HGB BLD-MCNC: 14.4 G/DL (ref 13.5–17.5)
MCH RBC QN AUTO: 27.3 PG (ref 26–34)
MCHC RBC AUTO-ENTMCNC: 33 G/DL (ref 32–36)
MCV RBC AUTO: 83 FL (ref 80–100)
NRBC BLD-RTO: 0 /100 WBCS (ref 0–0)
PLATELET # BLD AUTO: 251 X10*3/UL (ref 150–450)
POTASSIUM SERPL-SCNC: 4.2 MMOL/L (ref 3.5–5.3)
RBC # BLD AUTO: 5.27 X10*6/UL (ref 4.5–5.9)
RH FACTOR (ANTIGEN D): NORMAL
SODIUM SERPL-SCNC: 140 MMOL/L (ref 136–145)
WBC # BLD AUTO: 9.5 X10*3/UL (ref 4.4–11.3)

## 2024-03-13 PROCEDURE — 36415 COLL VENOUS BLD VENIPUNCTURE: CPT

## 2024-03-13 PROCEDURE — 86901 BLOOD TYPING SEROLOGIC RH(D): CPT

## 2024-03-13 PROCEDURE — 80048 BASIC METABOLIC PNL TOTAL CA: CPT

## 2024-03-13 PROCEDURE — 99204 OFFICE O/P NEW MOD 45 MIN: CPT | Performed by: NURSE PRACTITIONER

## 2024-03-13 PROCEDURE — 85027 COMPLETE CBC AUTOMATED: CPT

## 2024-03-13 ASSESSMENT — DUKE ACTIVITY SCORE INDEX (DASI)
CAN YOU DO MODERATE WORK AROUND THE HOUSE LIKE VACUUMING, SWEEPING FLOORS OR CARRYING GROCERIES: YES
CAN YOU HAVE SEXUAL RELATIONS: YES
CAN YOU WALK A BLOCK OR TWO ON LEVEL GROUND: YES
CAN YOU PARTICIPATE IN STRENOUS SPORTS LIKE SWIMMING, SINGLES TENNIS, FOOTBALL, BASKETBALL, OR SKIING: NO
DASI METS SCORE: 8
CAN YOU DO LIGHT WORK AROUND THE HOUSE LIKE DUSTING OR WASHING DISHES: YES
CAN YOU TAKE CARE OF YOURSELF (EAT, DRESS, BATHE, OR USE TOILET): YES
CAN YOU CLIMB A FLIGHT OF STAIRS OR WALK UP A HILL: YES
CAN YOU RUN A SHORT DISTANCE: NO
CAN YOU DO HEAVY WORK AROUND THE HOUSE LIKE SCRUBBING FLOORS OR LIFTING AND MOVING HEAVY FURNITURE: YES
CAN YOU PARTICIPATE IN MODERATE RECREATIONAL ACTIVITIES LIKE GOLF, BOWLING, DANCING, DOUBLES TENNIS OR THROWING A BASEBALL OR FOOTBALL: YES
TOTAL_SCORE: 42.7
CAN YOU WALK INDOORS, SUCH AS AROUND YOUR HOUSE: YES
CAN YOU DO YARD WORK LIKE RAKING LEAVES, WEEDING OR PUSHING A MOWER: YES

## 2024-03-13 ASSESSMENT — ENCOUNTER SYMPTOMS
CONSTITUTIONAL NEGATIVE: 1
NECK STIFFNESS: 1
EYES NEGATIVE: 1
RESPIRATORY NEGATIVE: 1
CARDIOVASCULAR NEGATIVE: 1
GASTROINTESTINAL NEGATIVE: 1
ENDOCRINE NEGATIVE: 1
NEUROLOGICAL NEGATIVE: 1
SINUS CONGESTION: 1

## 2024-03-13 ASSESSMENT — CHADS2 SCORE
DIABETES: NO
AGE GREATER THAN OR EQUAL TO 75: NO
PRIOR STROKE OR TIA OR THROMBOEMBOLISM: NO
CHADS2 SCORE: 1
HYPERTENSION: YES
CHF: NO

## 2024-03-13 ASSESSMENT — LIFESTYLE VARIABLES: SMOKING_STATUS: NONSMOKER

## 2024-03-13 NOTE — PREPROCEDURE INSTRUCTIONS
NPO Instructions:    Do not eat any food after midnight the night before your surgery/procedure.  You may have clear liquids until TWO hours before surgery/procedure. This includes water, black tea/coffee, (no milk or cream) apple juice and electrolyte drinks (Gatorade).  You may chew gum up to TWO hours before your surgery/procedure.    Additional Instructions:     Seven/Six Days before Surgery:  Review your medication instructions, stop indicated medications  Five Days before Surgery:  Review your medication instructions, stop indicated medications  Three Days before Surgery:  Review your medication instructions, stop indicated medications  The Day before Surgery:  Review your medication instructions, stop indicated medications  You will be contacted regarding the time of your arrival to facility and surgery time  Do not eat any food after Midnight  Day of Surgery:  Review your medication instructions, take indicated medications  You may have clear liquids until TWO hours before surgery/procedure.  This includes water, black tea/coffee, (no milk or cream) apple juice and electrolyte drinks (Gatorade)  You may chew gum up to TWO hours before your surgery/procedure  Wear  comfortable loose fitting clothing  Do not use moisturizers, creams, lotions or perfume

## 2024-03-13 NOTE — H&P (VIEW-ONLY)
CPM/PAT Evaluation       Name: Jarocho Langford (Jarocho Langford)  /Age: 1955/68 y.o.     Visit Type:   In-Person       Chief Complaint: Chronic ethmoidal sinusitis, Chronic sphenoidal sinusitis     HPI  Patient is a 68-year-old male with a past medical history significant for HTN, RUSH, GERD, BPH, anxiety, osteoarthritis, and chronic sinusitis.  Patient is being evaluated in [facility] in anticipation of bilateral endoscopic sinus surgery with image guidance, bilateral septal mucosal inferior turbinate reductions, nasal endoscopy with excision tissue of maxillary sinus, and excision of nasal turbinates with Dr. Marr on 3-.  Past Medical History:   Diagnosis Date    Acute gastritis without bleeding 2020    Acute gastritis without hemorrhage, unspecified gastritis type    Anxiety     Arthritis     BPH (benign prostatic hyperplasia)     Chronic ethmoidal sinusitis 12/10/2020    Chronic ethmoidal sinusitis    Chronic frontal sinusitis 10/22/2020    Chronic frontal sinusitis    Chronic rhinitis 10/23/2020    Chronic rhinosinusitis    COVID-19 10/14/2022    Positive self-administered antigen test for COVID-19    DVT (deep venous thrombosis) (CMS/[facility]) 2023    Fever, unspecified 2019    Fever and chills    GERD (gastroesophageal reflux disease)     Hypertension     Hypertrophy of nasal turbinates 12/10/2020    Nasal turbinate hypertrophy    Lumbago with sciatica, right side 2019    Chronic right-sided low back pain with right-sided sciatica    Nasal congestion 10/14/2022    Nasal congestion with rhinorrhea    Other chest pain 2021    Chest discomfort    Other conditions influencing health status 2020    History of cough    Other specified disorders of nose and nasal sinuses 2017    Nasal vestibulitis    Personal history of other benign neoplasm 2017    History of other benign neoplasm    Personal history of other diseases of the digestive system 2020     History of acute gastritis    Personal history of other diseases of the musculoskeletal system and connective tissue 09/04/2019    History of muscle weakness    Personal history of other diseases of the respiratory system     History of chronic sinusitis    Personal history of other diseases of the respiratory system 12/08/2017    History of chronic rhinitis    Personal history of other diseases of the respiratory system 04/19/2022    History of sinusitis    Personal history of other diseases of the respiratory system 12/10/2020    History of chronic rhinitis    Personal history of other diseases of the respiratory system 02/06/2020    History of acute sinusitis    Personal history of other specified conditions 01/12/2021    History of diarrhea    Personal history of other specified conditions 12/08/2017    History of epistaxis    Personal history of other specified conditions 08/25/2021    History of chronic fatigue    Personal history of other specified conditions 08/25/2021    History of dysphagia    Personal history of urinary (tract) infections 07/28/2021    History of urinary tract infection    Pneumonia, unspecified organism 11/22/2019    Pneumonia of right lower lobe due to infectious organism    Shortness of breath 10/14/2022    Shortness of breath on exertion    Sleep apnea     does not use CPAP sleeps in recliner chair    Status post cervical spinal fusion 09/27/2023    Tear of lateral meniscus of knee 03/07/2017       Past Surgical History:   Procedure Laterality Date    CHOLECYSTECTOMY      OTHER SURGICAL HISTORY  05/07/2019    Ureteral stent placement    OTHER SURGICAL HISTORY  02/11/2020    Colonoscopy    SPINAL FUSION         Patient  reports being sexually active and has had partner(s) who are female.    Family History   Problem Relation Name Age of Onset    Hypertension Mother PITER     No Known Problems Father      No Known Problems Maternal Grandmother      No Known Problems Maternal Grandfather       No Known Problems Paternal Grandmother      No Known Problems Paternal Grandfather         No Known Allergies    Prior to Admission medications    Medication Sig Start Date End Date Taking? Authorizing Provider   amLODIPine-benazepriL (Lotrel) 5-10 mg capsule TAKE 1 CAPSULE DAILY 4/3/23  Yes Jim Farfan DO   buPROPion SR (Wellbutrin SR) 150 mg 12 hr tablet TAKE 1 TABLET TWICE A DAY 8/7/23  Yes Jim Farfan DO   cholecalciferol (Vitamin D-3) 50 mcg (2,000 unit) capsule TAKE ONE CAPSULE BY MOUTH EVERY DAY 12/12/23  Yes Jim Farfan DO   clonazePAM (KlonoPIN) 1 mg tablet Take 1 tablet (1 mg) by mouth once daily as needed for anxiety. 2/6/24 5/6/24 Yes Jim Farfan, DO   Dexilant 60 mg DR capsule Take by mouth once daily. 4/1/20  Yes Historical Provider, MD   dicyclomine (Bentyl) 20 mg tablet Take 1 tablet (20 mg) by mouth every 6 hours.   Yes Historical Provider, MD   dilTIAZem XR (Dilacor XR) 180 mg 24 hr capsule TAKE 1 CAPSULE DAILY 4/18/23  Yes Jim Farfan DO   dutasteride-tamsulosin 0.5-0.4 mg capsule, ER multiphase 24 hr TAKE 1 CAPSULE DAILY 9/18/23  Yes Jim Farfan DO   fluticasone (Flonase) 50 mcg/actuation nasal spray Administer into affected nostril(s) twice a day. 12/10/20  Yes Historical Provider, MD   oxybutynin XL (Ditropan-XL) 10 mg 24 hr tablet TAKE 1 TABLET DAILY 3/4/24  Yes Jim Farfan DO   pyridoxine (Vitamin B-6) 100 mg tablet TAKE 1 TABLET BY MOUTH ONCE DAILY AS DIRECTED 12/18/23  Yes Jim Farfan, DO   semaglutide 0.25 mg or 0.5 mg (2 mg/3 mL) pen injector Inject 0.5 mg under the skin 1 (one) time per week. 2/6/24  Yes Jim Farfan DO   triamcinolone (Kenalog) 0.1 % cream APPLY A THIN LAYER TO AFFECTED AREA(S) TWICE DAILY AS NEEDED. 5/11/23  Yes Jim Farfan DO   venlafaxine XR (Effexor-XR) 75 mg 24 hr capsule TAKE 2 CAPSULES DAILY 2/26/24  Yes DO Donta El HFA 90 mcg/actuation inhaler INHALE 1 TO 2 PUFFS EVERY 4 TO 6 HOURS  AS NEEDED   Yes Historical Provider, MD   meloxicam (Mobic) 15 mg tablet TAKE 1 TABLET DAILY  Patient not taking: Reported on 3/13/2024 2/26/24   Jim Farfan DO   potassium chloride CR (Klor-Con M20) 20 mEq ER tablet Take 1 tablet (20 mEq) by mouth once daily. 6/28/16   Historical Provider, MD   furosemide (Lasix) 40 mg tablet TAKE 1 TABLET BY MOUTH DAILY 8/28/23 3/13/24  Jim Farfan DO        PAT ROS:   Constitutional:   neg    Neuro/Psych:   neg    Eyes:   neg    Ears:   neg    Nose:    sinus congestion  Mouth:   neg    Throat:   neg    Neck:    neck stiffness  Cardio:   neg    Respiratory:   neg    Endocrine:   neg    GI:   neg    :   neg    Musculoskeletal:   Hematologic:   neg    Skin:  neg        Physical Exam  Vitals reviewed.   Constitutional:       Appearance: He is obese.   HENT:      Head: Normocephalic and atraumatic.      Nose: Congestion present.      Mouth/Throat:      Mouth: Mucous membranes are moist.   Eyes:      Pupils: Pupils are equal, round, and reactive to light.   Cardiovascular:      Rate and Rhythm: Normal rate and regular rhythm.      Pulses: Normal pulses.      Heart sounds: Normal heart sounds.   Pulmonary:      Effort: Pulmonary effort is normal.      Breath sounds: Normal breath sounds.   Abdominal:      Palpations: Abdomen is soft.   Musculoskeletal:         General: Normal range of motion.      Cervical back: Normal range of motion.   Skin:     General: Skin is warm.   Neurological:      General: No focal deficit present.      Mental Status: He is oriented to person, place, and time.   Psychiatric:         Mood and Affect: Mood normal.         Behavior: Behavior normal.          PAT AIRWAY:   Airway:     Mallampati::  II    TM distance::  >3 FB    Neck ROM::  Full  normal        Visit Vitals  /73   Pulse 93   Temp 36.3 °C (97.4 °F)   Resp 18       DASI Risk Score      Flowsheet Row Most Recent Value   DASI SCORE 42.7   METS Score (Will be calculated only when all  the questions are answered) 8          Caprini DVT Assessment      Flowsheet Row Most Recent Value   DVT Score 14   Current Status Major surgery planned, lasting over 3 hours   History Prior major surgery, SVT, DVT/PE   Age 60-75 years   BMI 41-50 (Morbid obesity)          Modified Frailty Index      Flowsheet Row Most Recent Value   Modified Frailty Index Calculator .0909          CHADS2 Stroke Risk  Current as of 14 minutes ago        N/A 3 - 100%: High Risk   2 - 3%: Medium Risk   0 - 2%: Low Risk     Last Change: N/A          This score determines the patient's risk of having a stroke if the patient has atrial fibrillation.        This score is not applicable to this patient. Components are not calculated.          Revised Cardiac Risk Index      Flowsheet Row Most Recent Value   Revised Cardiac Risk Calculator 0          Apfel Simplified Score      Flowsheet Row Most Recent Value   Apfel Simplified Score Calculator 2          Risk Analysis Index Results This Encounter    No data found in the last 1 encounters.       Stop Bang Score      Flowsheet Row Most Recent Value   Do you snore loudly? 1   Do you often feel tired or fatigued after your sleep? 0   Has anyone ever observed you stop breathing in your sleep? 1   Do you have or are you being treated for high blood pressure? 1   Recent BMI (Calculated) 43.3   Is BMI greater than 35 kg/m2? 1=Yes   Age older than 50 years old? 1=Yes   Gender - Male 1=Yes            Assessment and Plan:     Anesthesia:  The patient denies problems with anesthesia in the past such as PONV, prolonged sedation, awareness, dental damage, aspiration, cardiac arrest, difficult intubation, or unexpected hospital admissions.     Neuro:   The patient has no neurological diagnoses or significant findings on chart review, clinical presentation, and evaluation. No grossly apparent perioperative risk. The patient is at increased risk for postoperative delirium secondary to age 65 or  older.    HEENT/Airway  No diagnoses, significant findings on chart review, clinical presentation, or evaluation.    Cardiovascular  The patient is scheduled for non-cardiac surgery associated with elevated risk.  The patient has no major cardiac contraindications to non- cardiac surgery.  RCRI  The patient meets 0-1 RCRI criteria and therefore has a less than 1% risk of major adverse cardiac complications.  METS  The patient's functional capacity capacity is greater than 4 METS.  EKG  The patient has no EKG or echocardiographic changes concerning for myocardial ischemia.   Heart Failure  The patient has no known history of heart failure.  Additionally, the patient reports no symptoms of heart failure and demonstrates no signs of heart failure.  Hypertension Evaluation  The patient has a known history of hypertension that is controlled.  Patient's hypertension is most consistent with stage 1.  Heart Rhythm Evaluation  The patient has no history of arrhythmias.  Heart Valve Evaluation  The patient has no known history of valvular heart disease. The patient has no symptoms or physical exam findings to suggest valvular heart disease.  CARDS EVAL  The patient is not followed by cardiology.    The patient has a 30-day risk for MACE of 0 predictors, 3.9% risk for cardiac death, nonfatal myocardial infarction, and nonfatal cardiac arrest.  JOHNNA score which indicates a 0.2% risk of intraoperative or 30-day postoperative.    Pulmonary   The patient has findings on chart review, clinical presentation and evaluation significant for RUSH. The patient is at increased risk of perioperative pulmonary complications secondary to advanced age greater than 60, morbid obesity.    The patient has a stop bang score of 7, which places patient at high risk for having RUSH.    ARISCAT 26, Intermediate, 13.3% risk of in-hospital postoperative pulmonary complications  PRODIGY 21, high risk of respiratory depression episode. Patient given PI  sheet for preoperative deep breathing exercises.    Hematology  No diagnoses or significant findings on chart review or clinical presentation and evaluation.  Antiplatelet management   The patient is not currently receiving antiplatelet therapy.  Anticoagulation management  The patient is not currently receiving anticoagulation therapy.    Caprini score 14, high risk of perioperative VTE.   Patient instructed to ambulate as soon as possible postoperatively to decrease thromboembolic risk. Initiate mechanical DVT prophylaxis as soon as possible and initiate chemical prophylaxis when deemed safe from a bleeding standpoint post surgery.     Transfusion Evaluation  A type and screen was obtained given the likelihood for perioperative transfusion of blood or blood products.    Gastrointestinal  The patient has diagnoses or significant findings on chart review or clinical presentation and evaluation significant for GERD.  Eat 10- 0,  self-perceived oropharyngeal dysphagia scale (0-40)     Genitourinary  The patient has diagnoses or significant findings on chart review or clinical presentation and evaluation significant for BPH    Renal  The patient has no known history of chronic kidney disease. The patient has specific risk factors associated with increased risk of perioperative renal complications due to age greater than 55, male gender, hypertension..     Musculoskeletal  The patient has diagnoses or significant findings on chart review or clinical presentation and evaluation significant for osteoarthritis    Endocrine  Diabetes Evaluation  The patient has no history of diabetes mellitus.  Thyroid Disease Evaluation  The patient has no history of thyroid disease.    ID  No diagnoses or significant findings on chart review or clinical presentation and evaluation.    -Preoperative medication instructions were provided and reviewed with the patient.  Any additional testing or evaluation was explained to the patient.  NPO  Instructions were discussed, and the patient's questions were answered prior to conclusion of this encounter.     Recent Results (from the past 168 hour(s))   CBC    Collection Time: 03/13/24  1:41 PM   Result Value Ref Range    WBC 9.5 4.4 - 11.3 x10*3/uL    nRBC 0.0 0.0 - 0.0 /100 WBCs    RBC 5.27 4.50 - 5.90 x10*6/uL    Hemoglobin 14.4 13.5 - 17.5 g/dL    Hematocrit 43.6 41.0 - 52.0 %    MCV 83 80 - 100 fL    MCH 27.3 26.0 - 34.0 pg    MCHC 33.0 32.0 - 36.0 g/dL    RDW 14.2 11.5 - 14.5 %    Platelets 251 150 - 450 x10*3/uL   Basic Metabolic Panel    Collection Time: 03/13/24  1:41 PM   Result Value Ref Range    Glucose 82 74 - 99 mg/dL    Sodium 140 136 - 145 mmol/L    Potassium 4.2 3.5 - 5.3 mmol/L    Chloride 104 98 - 107 mmol/L    Bicarbonate 28 21 - 32 mmol/L    Anion Gap 12 10 - 20 mmol/L    Urea Nitrogen 18 6 - 23 mg/dL    Creatinine 0.93 0.50 - 1.30 mg/dL    eGFR 89 >60 mL/min/1.73m*2    Calcium 8.8 8.6 - 10.6 mg/dL   Type And Screen    Collection Time: 03/13/24  1:41 PM   Result Value Ref Range    ABO TYPE O     Rh TYPE POS     ANTIBODY SCREEN NEG

## 2024-03-13 NOTE — CPM/PAT H&P
CPM/PAT Evaluation       Name: Jarocho Langford (Jarocho Langford)  /Age: 1955/68 y.o.     Visit Type:   In-Person       Chief Complaint: Chronic ethmoidal sinusitis, Chronic sphenoidal sinusitis     HPI  Patient is a 68-year-old male with a past medical history significant for HTN, RUSH, GERD, BPH, anxiety, osteoarthritis, and chronic sinusitis.  Patient is being evaluated in [facility] in anticipation of bilateral endoscopic sinus surgery with image guidance, bilateral septal mucosal inferior turbinate reductions, nasal endoscopy with excision tissue of maxillary sinus, and excision of nasal turbinates with Dr. Marr on 3-.  Past Medical History:   Diagnosis Date    Acute gastritis without bleeding 2020    Acute gastritis without hemorrhage, unspecified gastritis type    Anxiety     Arthritis     BPH (benign prostatic hyperplasia)     Chronic ethmoidal sinusitis 12/10/2020    Chronic ethmoidal sinusitis    Chronic frontal sinusitis 10/22/2020    Chronic frontal sinusitis    Chronic rhinitis 10/23/2020    Chronic rhinosinusitis    COVID-19 10/14/2022    Positive self-administered antigen test for COVID-19    DVT (deep venous thrombosis) (CMS/[facility]) 2023    Fever, unspecified 2019    Fever and chills    GERD (gastroesophageal reflux disease)     Hypertension     Hypertrophy of nasal turbinates 12/10/2020    Nasal turbinate hypertrophy    Lumbago with sciatica, right side 2019    Chronic right-sided low back pain with right-sided sciatica    Nasal congestion 10/14/2022    Nasal congestion with rhinorrhea    Other chest pain 2021    Chest discomfort    Other conditions influencing health status 2020    History of cough    Other specified disorders of nose and nasal sinuses 2017    Nasal vestibulitis    Personal history of other benign neoplasm 2017    History of other benign neoplasm    Personal history of other diseases of the digestive system 2020     History of acute gastritis    Personal history of other diseases of the musculoskeletal system and connective tissue 09/04/2019    History of muscle weakness    Personal history of other diseases of the respiratory system     History of chronic sinusitis    Personal history of other diseases of the respiratory system 12/08/2017    History of chronic rhinitis    Personal history of other diseases of the respiratory system 04/19/2022    History of sinusitis    Personal history of other diseases of the respiratory system 12/10/2020    History of chronic rhinitis    Personal history of other diseases of the respiratory system 02/06/2020    History of acute sinusitis    Personal history of other specified conditions 01/12/2021    History of diarrhea    Personal history of other specified conditions 12/08/2017    History of epistaxis    Personal history of other specified conditions 08/25/2021    History of chronic fatigue    Personal history of other specified conditions 08/25/2021    History of dysphagia    Personal history of urinary (tract) infections 07/28/2021    History of urinary tract infection    Pneumonia, unspecified organism 11/22/2019    Pneumonia of right lower lobe due to infectious organism    Shortness of breath 10/14/2022    Shortness of breath on exertion    Sleep apnea     does not use CPAP sleeps in recliner chair    Status post cervical spinal fusion 09/27/2023    Tear of lateral meniscus of knee 03/07/2017       Past Surgical History:   Procedure Laterality Date    CHOLECYSTECTOMY      OTHER SURGICAL HISTORY  05/07/2019    Ureteral stent placement    OTHER SURGICAL HISTORY  02/11/2020    Colonoscopy    SPINAL FUSION         Patient  reports being sexually active and has had partner(s) who are female.    Family History   Problem Relation Name Age of Onset    Hypertension Mother PITER     No Known Problems Father      No Known Problems Maternal Grandmother      No Known Problems Maternal Grandfather       No Known Problems Paternal Grandmother      No Known Problems Paternal Grandfather         No Known Allergies    Prior to Admission medications    Medication Sig Start Date End Date Taking? Authorizing Provider   amLODIPine-benazepriL (Lotrel) 5-10 mg capsule TAKE 1 CAPSULE DAILY 4/3/23  Yes Jim Farfan DO   buPROPion SR (Wellbutrin SR) 150 mg 12 hr tablet TAKE 1 TABLET TWICE A DAY 8/7/23  Yes Jim Farfan DO   cholecalciferol (Vitamin D-3) 50 mcg (2,000 unit) capsule TAKE ONE CAPSULE BY MOUTH EVERY DAY 12/12/23  Yes Jim Farfan DO   clonazePAM (KlonoPIN) 1 mg tablet Take 1 tablet (1 mg) by mouth once daily as needed for anxiety. 2/6/24 5/6/24 Yes Jim Farfan, DO   Dexilant 60 mg DR capsule Take by mouth once daily. 4/1/20  Yes Historical Provider, MD   dicyclomine (Bentyl) 20 mg tablet Take 1 tablet (20 mg) by mouth every 6 hours.   Yes Historical Provider, MD   dilTIAZem XR (Dilacor XR) 180 mg 24 hr capsule TAKE 1 CAPSULE DAILY 4/18/23  Yes Jim Farfan DO   dutasteride-tamsulosin 0.5-0.4 mg capsule, ER multiphase 24 hr TAKE 1 CAPSULE DAILY 9/18/23  Yes Jim Farfan DO   fluticasone (Flonase) 50 mcg/actuation nasal spray Administer into affected nostril(s) twice a day. 12/10/20  Yes Historical Provider, MD   oxybutynin XL (Ditropan-XL) 10 mg 24 hr tablet TAKE 1 TABLET DAILY 3/4/24  Yes Jim Farfan DO   pyridoxine (Vitamin B-6) 100 mg tablet TAKE 1 TABLET BY MOUTH ONCE DAILY AS DIRECTED 12/18/23  Yes Jim Farfan, DO   semaglutide 0.25 mg or 0.5 mg (2 mg/3 mL) pen injector Inject 0.5 mg under the skin 1 (one) time per week. 2/6/24  Yes Jim Farfan DO   triamcinolone (Kenalog) 0.1 % cream APPLY A THIN LAYER TO AFFECTED AREA(S) TWICE DAILY AS NEEDED. 5/11/23  Yes Jim Farfan DO   venlafaxine XR (Effexor-XR) 75 mg 24 hr capsule TAKE 2 CAPSULES DAILY 2/26/24  Yes DO Donta El HFA 90 mcg/actuation inhaler INHALE 1 TO 2 PUFFS EVERY 4 TO 6 HOURS  AS NEEDED   Yes Historical Provider, MD   meloxicam (Mobic) 15 mg tablet TAKE 1 TABLET DAILY  Patient not taking: Reported on 3/13/2024 2/26/24   Jim Farfan DO   potassium chloride CR (Klor-Con M20) 20 mEq ER tablet Take 1 tablet (20 mEq) by mouth once daily. 6/28/16   Historical Provider, MD   furosemide (Lasix) 40 mg tablet TAKE 1 TABLET BY MOUTH DAILY 8/28/23 3/13/24  Jim Farfan DO        PAT ROS:   Constitutional:   neg    Neuro/Psych:   neg    Eyes:   neg    Ears:   neg    Nose:    sinus congestion  Mouth:   neg    Throat:   neg    Neck:    neck stiffness  Cardio:   neg    Respiratory:   neg    Endocrine:   neg    GI:   neg    :   neg    Musculoskeletal:   Hematologic:   neg    Skin:  neg        Physical Exam  Vitals reviewed.   Constitutional:       Appearance: He is obese.   HENT:      Head: Normocephalic and atraumatic.      Nose: Congestion present.      Mouth/Throat:      Mouth: Mucous membranes are moist.   Eyes:      Pupils: Pupils are equal, round, and reactive to light.   Cardiovascular:      Rate and Rhythm: Normal rate and regular rhythm.      Pulses: Normal pulses.      Heart sounds: Normal heart sounds.   Pulmonary:      Effort: Pulmonary effort is normal.      Breath sounds: Normal breath sounds.   Abdominal:      Palpations: Abdomen is soft.   Musculoskeletal:         General: Normal range of motion.      Cervical back: Normal range of motion.   Skin:     General: Skin is warm.   Neurological:      General: No focal deficit present.      Mental Status: He is oriented to person, place, and time.   Psychiatric:         Mood and Affect: Mood normal.         Behavior: Behavior normal.          PAT AIRWAY:   Airway:     Mallampati::  II    TM distance::  >3 FB    Neck ROM::  Full  normal        Visit Vitals  /73   Pulse 93   Temp 36.3 °C (97.4 °F)   Resp 18       DASI Risk Score      Flowsheet Row Most Recent Value   DASI SCORE 42.7   METS Score (Will be calculated only when all  the questions are answered) 8          Caprini DVT Assessment      Flowsheet Row Most Recent Value   DVT Score 14   Current Status Major surgery planned, lasting over 3 hours   History Prior major surgery, SVT, DVT/PE   Age 60-75 years   BMI 41-50 (Morbid obesity)          Modified Frailty Index      Flowsheet Row Most Recent Value   Modified Frailty Index Calculator .0909          CHADS2 Stroke Risk  Current as of 14 minutes ago        N/A 3 - 100%: High Risk   2 - 3%: Medium Risk   0 - 2%: Low Risk     Last Change: N/A          This score determines the patient's risk of having a stroke if the patient has atrial fibrillation.        This score is not applicable to this patient. Components are not calculated.          Revised Cardiac Risk Index      Flowsheet Row Most Recent Value   Revised Cardiac Risk Calculator 0          Apfel Simplified Score      Flowsheet Row Most Recent Value   Apfel Simplified Score Calculator 2          Risk Analysis Index Results This Encounter    No data found in the last 1 encounters.       Stop Bang Score      Flowsheet Row Most Recent Value   Do you snore loudly? 1   Do you often feel tired or fatigued after your sleep? 0   Has anyone ever observed you stop breathing in your sleep? 1   Do you have or are you being treated for high blood pressure? 1   Recent BMI (Calculated) 43.3   Is BMI greater than 35 kg/m2? 1=Yes   Age older than 50 years old? 1=Yes   Gender - Male 1=Yes            Assessment and Plan:     Anesthesia:  The patient denies problems with anesthesia in the past such as PONV, prolonged sedation, awareness, dental damage, aspiration, cardiac arrest, difficult intubation, or unexpected hospital admissions.     Neuro:   The patient has no neurological diagnoses or significant findings on chart review, clinical presentation, and evaluation. No grossly apparent perioperative risk. The patient is at increased risk for postoperative delirium secondary to age 65 or  older.    HEENT/Airway  No diagnoses, significant findings on chart review, clinical presentation, or evaluation.    Cardiovascular  The patient is scheduled for non-cardiac surgery associated with elevated risk.  The patient has no major cardiac contraindications to non- cardiac surgery.  RCRI  The patient meets 0-1 RCRI criteria and therefore has a less than 1% risk of major adverse cardiac complications.  METS  The patient's functional capacity capacity is greater than 4 METS.  EKG  The patient has no EKG or echocardiographic changes concerning for myocardial ischemia.   Heart Failure  The patient has no known history of heart failure.  Additionally, the patient reports no symptoms of heart failure and demonstrates no signs of heart failure.  Hypertension Evaluation  The patient has a known history of hypertension that is controlled.  Patient's hypertension is most consistent with stage 1.  Heart Rhythm Evaluation  The patient has no history of arrhythmias.  Heart Valve Evaluation  The patient has no known history of valvular heart disease. The patient has no symptoms or physical exam findings to suggest valvular heart disease.  CARDS EVAL  The patient is not followed by cardiology.    The patient has a 30-day risk for MACE of 0 predictors, 3.9% risk for cardiac death, nonfatal myocardial infarction, and nonfatal cardiac arrest.  JOHNNA score which indicates a 0.2% risk of intraoperative or 30-day postoperative.    Pulmonary   The patient has findings on chart review, clinical presentation and evaluation significant for RUSH. The patient is at increased risk of perioperative pulmonary complications secondary to advanced age greater than 60, morbid obesity.    The patient has a stop bang score of 7, which places patient at high risk for having RUSH.    ARISCAT 26, Intermediate, 13.3% risk of in-hospital postoperative pulmonary complications  PRODIGY 21, high risk of respiratory depression episode. Patient given PI  sheet for preoperative deep breathing exercises.    Hematology  No diagnoses or significant findings on chart review or clinical presentation and evaluation.  Antiplatelet management   The patient is not currently receiving antiplatelet therapy.  Anticoagulation management  The patient is not currently receiving anticoagulation therapy.    Caprini score 14, high risk of perioperative VTE.   Patient instructed to ambulate as soon as possible postoperatively to decrease thromboembolic risk. Initiate mechanical DVT prophylaxis as soon as possible and initiate chemical prophylaxis when deemed safe from a bleeding standpoint post surgery.     Transfusion Evaluation  A type and screen was obtained given the likelihood for perioperative transfusion of blood or blood products.    Gastrointestinal  The patient has diagnoses or significant findings on chart review or clinical presentation and evaluation significant for GERD.  Eat 10- 0,  self-perceived oropharyngeal dysphagia scale (0-40)     Genitourinary  The patient has diagnoses or significant findings on chart review or clinical presentation and evaluation significant for BPH    Renal  The patient has no known history of chronic kidney disease. The patient has specific risk factors associated with increased risk of perioperative renal complications due to age greater than 55, male gender, hypertension..     Musculoskeletal  The patient has diagnoses or significant findings on chart review or clinical presentation and evaluation significant for osteoarthritis    Endocrine  Diabetes Evaluation  The patient has no history of diabetes mellitus.  Thyroid Disease Evaluation  The patient has no history of thyroid disease.    ID  No diagnoses or significant findings on chart review or clinical presentation and evaluation.    -Preoperative medication instructions were provided and reviewed with the patient.  Any additional testing or evaluation was explained to the patient.  NPO  Instructions were discussed, and the patient's questions were answered prior to conclusion of this encounter.     Recent Results (from the past 168 hour(s))   CBC    Collection Time: 03/13/24  1:41 PM   Result Value Ref Range    WBC 9.5 4.4 - 11.3 x10*3/uL    nRBC 0.0 0.0 - 0.0 /100 WBCs    RBC 5.27 4.50 - 5.90 x10*6/uL    Hemoglobin 14.4 13.5 - 17.5 g/dL    Hematocrit 43.6 41.0 - 52.0 %    MCV 83 80 - 100 fL    MCH 27.3 26.0 - 34.0 pg    MCHC 33.0 32.0 - 36.0 g/dL    RDW 14.2 11.5 - 14.5 %    Platelets 251 150 - 450 x10*3/uL   Basic Metabolic Panel    Collection Time: 03/13/24  1:41 PM   Result Value Ref Range    Glucose 82 74 - 99 mg/dL    Sodium 140 136 - 145 mmol/L    Potassium 4.2 3.5 - 5.3 mmol/L    Chloride 104 98 - 107 mmol/L    Bicarbonate 28 21 - 32 mmol/L    Anion Gap 12 10 - 20 mmol/L    Urea Nitrogen 18 6 - 23 mg/dL    Creatinine 0.93 0.50 - 1.30 mg/dL    eGFR 89 >60 mL/min/1.73m*2    Calcium 8.8 8.6 - 10.6 mg/dL   Type And Screen    Collection Time: 03/13/24  1:41 PM   Result Value Ref Range    ABO TYPE O     Rh TYPE POS     ANTIBODY SCREEN NEG

## 2024-03-18 RX ORDER — CEPHALEXIN 500 MG/1
500 CAPSULE ORAL 2 TIMES DAILY
Qty: 20 CAPSULE | Refills: 0 | Status: CANCELLED | OUTPATIENT
Start: 2024-03-18 | End: 2024-03-28

## 2024-03-18 NOTE — DISCHARGE INSTRUCTIONS
Nasal and Sinus Surgery at Home Instructions  The following instructions will help you know what to expect in the days following your surgery.     Please have the following items available at your home/recovery residence:  · NeilMed Sinus Rinse® bottle or equivalent for post-surgical nasal irrigations  · Oxymetazoline (Afrin®) or Phenylephrine (Remigio-Synephrine®) are topical decongestant sprays   - they should ONLY be used if you have a nosebleed or excessive bleeding  · 4 x 4 gauze and paper tape  · Tylenol® (adjunct to prescription therapy or as sole pain medication)    Activities  · You may shower the same day as your surgery   · Avoid sneezing or blowing your nose for 2 weeks after surgery / if you do sneeze, do so with your mouth open  · Avoid strenuous activity for 2 weeks after surgery / do not lift heavy objects (greater than 10 pounds) for 1 week after surgery  · Walking is strongly encouraged after surgery   · Most patients return to work without about 5 to 7 days after surgery but this is variable    Diet  · You can resume a normal diet within 24 hours of the surgery      Fever  · A low-grade fever, less than 101° F is not uncommon for 2 to 3 days after surgery. If fever continues or is higher than 101° F, call your physician.  You can use Tylenol® to control the fever.      Pain Control  · Pain is variable after nasal surgery but is generally well controlled with pain medication.  Most patients feel pressure and congestion.  Pain should lessen with time. If pain increases, call our office.  · For mild pain, acetaminophen (Tylenol®) is recommended.  We suggest scheduling 500-1000 mg of acetaminophen every 6 hours for the first several days (unless you are allergic to this medicine or have problems with your liver).  Do not take more than 4000mg in a day.  We will also prescribe stronger pain medication for after surgery. Drink plenty of water as these stronger pain medications can be constipating.  We  also recommend that you take stool softeners on a regular basis while taking narcotic pain medication.  · You may also take ibuprofen (Advil®, Motrin®), naproxen (Aleve®, Naprosyn®), and other nonsteroidal anti-inflammatory medications (NSAIDs) following nasal surgery. You may take as directed on the bottle for the first several days. Ibuprofen may be alternated with tylenol to improve your pain control around the clock (ex: tylenol at 8:00 am, ibuprofen at 11:00 am, tylenol at 2:00 pm, ibuprofen at 5:00pm, etc.)    Drainage  · You can expect to have bloody nasal drainage after nasal or sinus surgery. To catch   this drainage and to help avoid continuous nose wiping, we usually put a gauze “mustache” dressing under the nose and tape it to the cheeks for as long as the drainage continues.    · BLEEDING: Some blood in the nasal drainage after surgery is expected. This may be red, dark red or brown. One way to monitor this is to tape a piece of gauze under the nose to collect the bloody drainage. This may saturate with blood every 1 to 2 hours for the first few days after surgery. If it saturates completely with blood (blood dripping off of it and totally red) MORE FREQUENTLY THAN EVERY 30 MINUTES then call our office. Avoid nose blowing and try to sneeze with your mouth open.  Sleeping with your head elevated for at least the first night will also help prevent bleeding and reduce congestion. If you have a nosebleed, try spraying a topical decongestant spray (see page 1) into the side of bleeding 2-3 sprays and hold gentle pressure for 10 minutes.  If the bleeding continues after this is done twice call our office.       Care of the Nose  ·   NASAL SALINE IRRIGATION: THIS IS VERY IMPORTANT - Please START IRRIGATIONS THE MORNING AFTER SURGERY. After sinus surgery, we like to have the nose irrigated with a NeilMed Sinus Rinse® bottle (irrigation instructions and saline solution recipe are listed). This will help rinse  the blood clots and mucous from the nose.   We recommend doing your nasal irrigations AT LEAST 3 TIMES A DAY UNTIL YOUR FOLLOW UP WITH YOUR SURGEON. IF YOU CAN DO IT 5-6 TIMES A DAY THAT WOULD BE IDEAL.  · Try not to manipulate your nose with your fingers     How to Irrigate  · Fill the NeilMed Sinus Rinse® bottle with the room temperature saline solution (see below for recipe). Gently insert the tip into one nostril and squeeze. Keep your head down and blow out through your mouth when you irrigate to prevent water from going back down into your throat. Use about one half of the bottle per nostril. Do this for each nostril at least three times daily until your follow up with your surgeon. The more you can do the irrigation the better. You will likely be irrigating regularly for at least a month or two.    Nasal Irrigation Solution Recipe  · 8 ounces of room temperature distilled water. (If you use tap water, boil it first and let it cool to room temperature.)  · ½ teaspoon of table salt  · ½ teaspoon of baking soda  You can use the NeilMed Sinus Rinse® solution packets if preferred    Follow up  Your appointment for follow up after your surgery should have been scheduled at the time of your surgery. If not, call the office for an appointment scheduled for 1-2 weeks after the date of your surgery.    Call the office or GO TO THE EMERGENCY ROOM if you have:  · Excessive pain, swelling, bleeding or drainage  · Shortness of breath or difficulty breathing  · Persistent nausea and vomiting  · Fever that continues or is higher than 101° F  · Neck stiffness (cannot touch your chin to your chest)  · Confusion  · Worsening headaches that do not respond to pain medication  · Reproducible clear drainage dripping from your nose like a leaky faucet (particularly one sided).  Note:  This may happen and is normal up to 30 minutes following saline irrigations or sprays but if it is reproducible despite stopping saline please contact  our office   · Salty or metallic taste (note that you may experience a salty taste that is normal up to 30 minutes following saline use)    Do not hesitate to call if you have any questions or concerns:  Offices of Otolaryngology - Division of Rhinology  Dr. Borges [phone] - Dr. Lovell [phone]   Normal office hours are:  8am-4pm Monday - Friday   If there is an after-hours EMERGENCY related to your procedure please call [phone] (ask for the “ENT resident on-call”).

## 2024-03-21 ENCOUNTER — ANESTHESIA EVENT (OUTPATIENT)
Dept: OPERATING ROOM | Facility: HOSPITAL | Age: 69
End: 2024-03-21
Payer: COMMERCIAL

## 2024-03-22 ENCOUNTER — HOSPITAL ENCOUNTER (OUTPATIENT)
Facility: HOSPITAL | Age: 69
Setting detail: OUTPATIENT SURGERY
Discharge: HOME | End: 2024-03-22
Attending: OTOLARYNGOLOGY | Admitting: OTOLARYNGOLOGY
Payer: COMMERCIAL

## 2024-03-22 ENCOUNTER — ANESTHESIA (OUTPATIENT)
Dept: OPERATING ROOM | Facility: HOSPITAL | Age: 69
End: 2024-03-22
Payer: COMMERCIAL

## 2024-03-22 VITALS
RESPIRATION RATE: 16 BRPM | OXYGEN SATURATION: 95 % | SYSTOLIC BLOOD PRESSURE: 136 MMHG | HEART RATE: 106 BPM | DIASTOLIC BLOOD PRESSURE: 74 MMHG | HEIGHT: 68 IN | BODY MASS INDEX: 42.27 KG/M2 | WEIGHT: 278.88 LBS | TEMPERATURE: 97.2 F

## 2024-03-22 DIAGNOSIS — J32.2 CHRONIC ETHMOIDAL SINUSITIS: ICD-10-CM

## 2024-03-22 DIAGNOSIS — J32.4 CHRONIC PANSINUSITIS: Primary | ICD-10-CM

## 2024-03-22 DIAGNOSIS — J32.3 CHRONIC SPHENOIDAL SINUSITIS: ICD-10-CM

## 2024-03-22 DIAGNOSIS — G89.18 POST-OP PAIN: ICD-10-CM

## 2024-03-22 LAB
ABO GROUP (TYPE) IN BLOOD: NORMAL
RH FACTOR (ANTIGEN D): NORMAL

## 2024-03-22 PROCEDURE — 3600000003 HC OR TIME - INITIAL BASE CHARGE - PROCEDURE LEVEL THREE: Performed by: OTOLARYNGOLOGY

## 2024-03-22 PROCEDURE — 2500000005 HC RX 250 GENERAL PHARMACY W/O HCPCS: Performed by: OTOLARYNGOLOGY

## 2024-03-22 PROCEDURE — C2625 STENT, NON-COR, TEM W/DEL SY: HCPCS | Performed by: OTOLARYNGOLOGY

## 2024-03-22 PROCEDURE — 2780000003 HC OR 278 NO HCPCS: Performed by: OTOLARYNGOLOGY

## 2024-03-22 PROCEDURE — 7100000001 HC RECOVERY ROOM TIME - INITIAL BASE CHARGE: Performed by: OTOLARYNGOLOGY

## 2024-03-22 PROCEDURE — 31259 NSL/SINS NDSC SPHN TISS RMVL: CPT | Performed by: OTOLARYNGOLOGY

## 2024-03-22 PROCEDURE — 61782 SCAN PROC CRANIAL EXTRA: CPT | Performed by: OTOLARYNGOLOGY

## 2024-03-22 PROCEDURE — 3700000001 HC GENERAL ANESTHESIA TIME - INITIAL BASE CHARGE: Performed by: OTOLARYNGOLOGY

## 2024-03-22 PROCEDURE — A4217 STERILE WATER/SALINE, 500 ML: HCPCS | Performed by: OTOLARYNGOLOGY

## 2024-03-22 PROCEDURE — 88311 DECALCIFY TISSUE: CPT | Mod: TC,SUR | Performed by: OTOLARYNGOLOGY

## 2024-03-22 PROCEDURE — 88305 TISSUE EXAM BY PATHOLOGIST: CPT | Performed by: PATHOLOGY

## 2024-03-22 PROCEDURE — 30140 RESECT INFERIOR TURBINATE: CPT | Performed by: OTOLARYNGOLOGY

## 2024-03-22 PROCEDURE — 2500000005 HC RX 250 GENERAL PHARMACY W/O HCPCS: Performed by: NURSE ANESTHETIST, CERTIFIED REGISTERED

## 2024-03-22 PROCEDURE — 31267 ENDOSCOPY MAXILLARY SINUS: CPT | Performed by: OTOLARYNGOLOGY

## 2024-03-22 PROCEDURE — 88311 DECALCIFY TISSUE: CPT | Performed by: PATHOLOGY

## 2024-03-22 PROCEDURE — 3700000002 HC GENERAL ANESTHESIA TIME - EACH INCREMENTAL 1 MINUTE: Performed by: OTOLARYNGOLOGY

## 2024-03-22 PROCEDURE — 3600000008 HC OR TIME - EACH INCREMENTAL 1 MINUTE - PROCEDURE LEVEL THREE: Performed by: OTOLARYNGOLOGY

## 2024-03-22 PROCEDURE — A31259 PR NASAL/SINUS ENDOSCOPY, SURGICAL WITH ETHMOIDECTOMY; TOTAL (ANTERIOR AND POSTERIOR), I: Performed by: STUDENT IN AN ORGANIZED HEALTH CARE EDUCATION/TRAINING PROGRAM

## 2024-03-22 PROCEDURE — 2500000004 HC RX 250 GENERAL PHARMACY W/ HCPCS (ALT 636 FOR OP/ED): Performed by: STUDENT IN AN ORGANIZED HEALTH CARE EDUCATION/TRAINING PROGRAM

## 2024-03-22 PROCEDURE — 7100000009 HC PHASE TWO TIME - INITIAL BASE CHARGE: Performed by: OTOLARYNGOLOGY

## 2024-03-22 PROCEDURE — 2500000001 HC RX 250 WO HCPCS SELF ADMINISTERED DRUGS (ALT 637 FOR MEDICARE OP): Performed by: OTOLARYNGOLOGY

## 2024-03-22 PROCEDURE — 36415 COLL VENOUS BLD VENIPUNCTURE: CPT | Performed by: STUDENT IN AN ORGANIZED HEALTH CARE EDUCATION/TRAINING PROGRAM

## 2024-03-22 PROCEDURE — 7100000010 HC PHASE TWO TIME - EACH INCREMENTAL 1 MINUTE: Performed by: OTOLARYNGOLOGY

## 2024-03-22 PROCEDURE — 2500000004 HC RX 250 GENERAL PHARMACY W/ HCPCS (ALT 636 FOR OP/ED): Performed by: OTOLARYNGOLOGY

## 2024-03-22 PROCEDURE — 31276 NSL/SINS NDSC FRNT TISS RMVL: CPT | Performed by: OTOLARYNGOLOGY

## 2024-03-22 PROCEDURE — 7100000002 HC RECOVERY ROOM TIME - EACH INCREMENTAL 1 MINUTE: Performed by: OTOLARYNGOLOGY

## 2024-03-22 PROCEDURE — A31259 PR NASAL/SINUS ENDOSCOPY, SURGICAL WITH ETHMOIDECTOMY; TOTAL (ANTERIOR AND POSTERIOR), I: Performed by: NURSE ANESTHETIST, CERTIFIED REGISTERED

## 2024-03-22 PROCEDURE — 2720000007 HC OR 272 NO HCPCS: Performed by: OTOLARYNGOLOGY

## 2024-03-22 PROCEDURE — 2500000004 HC RX 250 GENERAL PHARMACY W/ HCPCS (ALT 636 FOR OP/ED): Performed by: NURSE ANESTHETIST, CERTIFIED REGISTERED

## 2024-03-22 DEVICE — IMPLANT, PROPEL CONTOUR SINUS: Type: IMPLANTABLE DEVICE | Site: NOSE | Status: FUNCTIONAL

## 2024-03-22 RX ORDER — ACETAMINOPHEN 500 MG
1000 TABLET ORAL EVERY 8 HOURS PRN
Qty: 90 TABLET | Refills: 0 | Status: SHIPPED | OUTPATIENT
Start: 2024-03-22 | End: 2024-03-22 | Stop reason: SDUPTHER

## 2024-03-22 RX ORDER — LIDOCAINE HYDROCHLORIDE 10 MG/ML
0.1 INJECTION INFILTRATION; PERINEURAL ONCE
Status: DISCONTINUED | OUTPATIENT
Start: 2024-03-22 | End: 2024-03-22 | Stop reason: HOSPADM

## 2024-03-22 RX ORDER — HYDROMORPHONE HYDROCHLORIDE 1 MG/ML
0.4 INJECTION, SOLUTION INTRAMUSCULAR; INTRAVENOUS; SUBCUTANEOUS EVERY 5 MIN PRN
Status: DISCONTINUED | OUTPATIENT
Start: 2024-03-22 | End: 2024-03-22 | Stop reason: HOSPADM

## 2024-03-22 RX ORDER — AMOXICILLIN 250 MG
1 CAPSULE ORAL DAILY
Qty: 10 TABLET | Refills: 0 | Status: SHIPPED | OUTPATIENT
Start: 2024-03-22 | End: 2024-03-22 | Stop reason: SDUPTHER

## 2024-03-22 RX ORDER — SODIUM CHLORIDE, SODIUM LACTATE, POTASSIUM CHLORIDE, CALCIUM CHLORIDE 600; 310; 30; 20 MG/100ML; MG/100ML; MG/100ML; MG/100ML
100 INJECTION, SOLUTION INTRAVENOUS CONTINUOUS
Status: DISCONTINUED | OUTPATIENT
Start: 2024-03-22 | End: 2024-03-22 | Stop reason: HOSPADM

## 2024-03-22 RX ORDER — PHENYLEPHRINE HYDROCHLORIDE 10 MG/ML
INJECTION INTRAVENOUS AS NEEDED
Status: DISCONTINUED | OUTPATIENT
Start: 2024-03-22 | End: 2024-03-22

## 2024-03-22 RX ORDER — HYDROMORPHONE HYDROCHLORIDE 1 MG/ML
0.2 INJECTION, SOLUTION INTRAMUSCULAR; INTRAVENOUS; SUBCUTANEOUS EVERY 5 MIN PRN
Status: DISCONTINUED | OUTPATIENT
Start: 2024-03-22 | End: 2024-03-22 | Stop reason: HOSPADM

## 2024-03-22 RX ORDER — SODIUM CHLORIDE, SODIUM LACTATE, POTASSIUM CHLORIDE, CALCIUM CHLORIDE 600; 310; 30; 20 MG/100ML; MG/100ML; MG/100ML; MG/100ML
INJECTION, SOLUTION INTRAVENOUS CONTINUOUS PRN
Status: DISCONTINUED | OUTPATIENT
Start: 2024-03-22 | End: 2024-03-22

## 2024-03-22 RX ORDER — OXYCODONE HYDROCHLORIDE 5 MG/1
5 TABLET ORAL EVERY 4 HOURS PRN
Status: DISCONTINUED | OUTPATIENT
Start: 2024-03-22 | End: 2024-03-22 | Stop reason: HOSPADM

## 2024-03-22 RX ORDER — ACETAMINOPHEN 500 MG
1000 TABLET ORAL EVERY 8 HOURS PRN
Qty: 90 TABLET | Refills: 0 | Status: SHIPPED | OUTPATIENT
Start: 2024-03-22

## 2024-03-22 RX ORDER — ACETAMINOPHEN 325 MG/1
650 TABLET ORAL EVERY 4 HOURS PRN
Status: DISCONTINUED | OUTPATIENT
Start: 2024-03-22 | End: 2024-03-22 | Stop reason: HOSPADM

## 2024-03-22 RX ORDER — OXYCODONE HYDROCHLORIDE 5 MG/1
5 TABLET ORAL EVERY 6 HOURS PRN
Qty: 12 TABLET | Refills: 0 | Status: SHIPPED | OUTPATIENT
Start: 2024-03-22 | End: 2024-03-22 | Stop reason: SDUPTHER

## 2024-03-22 RX ORDER — CEFAZOLIN 1 G/1
INJECTION, POWDER, FOR SOLUTION INTRAVENOUS AS NEEDED
Status: DISCONTINUED | OUTPATIENT
Start: 2024-03-22 | End: 2024-03-22

## 2024-03-22 RX ORDER — ONDANSETRON HYDROCHLORIDE 2 MG/ML
INJECTION, SOLUTION INTRAVENOUS AS NEEDED
Status: DISCONTINUED | OUTPATIENT
Start: 2024-03-22 | End: 2024-03-22

## 2024-03-22 RX ORDER — LIDOCAINE HYDROCHLORIDE AND EPINEPHRINE 10; 10 MG/ML; UG/ML
INJECTION, SOLUTION INFILTRATION; PERINEURAL AS NEEDED
Status: DISCONTINUED | OUTPATIENT
Start: 2024-03-22 | End: 2024-03-22 | Stop reason: HOSPADM

## 2024-03-22 RX ORDER — ROCURONIUM BROMIDE 10 MG/ML
INJECTION, SOLUTION INTRAVENOUS AS NEEDED
Status: DISCONTINUED | OUTPATIENT
Start: 2024-03-22 | End: 2024-03-22

## 2024-03-22 RX ORDER — PROPOFOL 10 MG/ML
INJECTION, EMULSION INTRAVENOUS AS NEEDED
Status: DISCONTINUED | OUTPATIENT
Start: 2024-03-22 | End: 2024-03-22

## 2024-03-22 RX ORDER — OXYCODONE HYDROCHLORIDE 5 MG/1
5 TABLET ORAL EVERY 6 HOURS PRN
Qty: 12 TABLET | Refills: 0 | Status: SHIPPED | OUTPATIENT
Start: 2024-03-22 | End: 2024-03-27

## 2024-03-22 RX ORDER — FENTANYL CITRATE 50 UG/ML
INJECTION, SOLUTION INTRAMUSCULAR; INTRAVENOUS AS NEEDED
Status: DISCONTINUED | OUTPATIENT
Start: 2024-03-22 | End: 2024-03-22

## 2024-03-22 RX ORDER — AMOXICILLIN 250 MG
1 CAPSULE ORAL DAILY
Qty: 10 TABLET | Refills: 0 | Status: SHIPPED | OUTPATIENT
Start: 2024-03-22 | End: 2024-04-01

## 2024-03-22 RX ORDER — SODIUM CHLORIDE 0.9 G/100ML
IRRIGANT IRRIGATION AS NEEDED
Status: DISCONTINUED | OUTPATIENT
Start: 2024-03-22 | End: 2024-03-22 | Stop reason: HOSPADM

## 2024-03-22 RX ORDER — REMIFENTANIL HYDROCHLORIDE 1 MG/ML
INJECTION, POWDER, LYOPHILIZED, FOR SOLUTION INTRAVENOUS AS NEEDED
Status: DISCONTINUED | OUTPATIENT
Start: 2024-03-22 | End: 2024-03-22

## 2024-03-22 RX ORDER — OXYMETAZOLINE HCL 0.05 %
2 SPRAY, NON-AEROSOL (ML) NASAL EVERY 12 HOURS PRN
Qty: 15 ML | Refills: 0 | Status: SHIPPED | OUTPATIENT
Start: 2024-03-22 | End: 2024-05-06

## 2024-03-22 RX ORDER — HYDROMORPHONE HYDROCHLORIDE 1 MG/ML
INJECTION, SOLUTION INTRAMUSCULAR; INTRAVENOUS; SUBCUTANEOUS AS NEEDED
Status: DISCONTINUED | OUTPATIENT
Start: 2024-03-22 | End: 2024-03-22

## 2024-03-22 RX ORDER — OXYMETAZOLINE HCL 0.05 %
2 SPRAY, NON-AEROSOL (ML) NASAL EVERY 12 HOURS PRN
Qty: 15 ML | Refills: 0 | Status: SHIPPED | OUTPATIENT
Start: 2024-03-22 | End: 2024-03-22 | Stop reason: SDUPTHER

## 2024-03-22 RX ORDER — LIDOCAINE HCL/PF 100 MG/5ML
SYRINGE (ML) INTRAVENOUS AS NEEDED
Status: DISCONTINUED | OUTPATIENT
Start: 2024-03-22 | End: 2024-03-22

## 2024-03-22 RX ORDER — OXYCODONE HYDROCHLORIDE 5 MG/1
10 TABLET ORAL EVERY 4 HOURS PRN
Status: DISCONTINUED | OUTPATIENT
Start: 2024-03-22 | End: 2024-03-22 | Stop reason: HOSPADM

## 2024-03-22 RX ORDER — OXYMETAZOLINE HCL 0.05 %
SPRAY, NON-AEROSOL (ML) NASAL AS NEEDED
Status: DISCONTINUED | OUTPATIENT
Start: 2024-03-22 | End: 2024-03-22 | Stop reason: HOSPADM

## 2024-03-22 RX ORDER — DEXAMETHASONE SODIUM PHOSPHATE 100 MG/10ML
INJECTION INTRAMUSCULAR; INTRAVENOUS AS NEEDED
Status: DISCONTINUED | OUTPATIENT
Start: 2024-03-22 | End: 2024-03-22

## 2024-03-22 RX ORDER — SODIUM CHLORIDE 0.65 %
2 AEROSOL, SPRAY (ML) NASAL EVERY 4 HOURS
Qty: 44 ML | Refills: 1 | Status: SHIPPED | OUTPATIENT
Start: 2024-03-22 | End: 2024-03-22 | Stop reason: SDUPTHER

## 2024-03-22 RX ORDER — SODIUM CHLORIDE 0.65 %
2 AEROSOL, SPRAY (ML) NASAL EVERY 4 HOURS
Qty: 44 ML | Refills: 1 | Status: SHIPPED | OUTPATIENT
Start: 2024-03-22 | End: 2024-04-06

## 2024-03-22 RX ORDER — MIDAZOLAM HYDROCHLORIDE 1 MG/ML
INJECTION INTRAMUSCULAR; INTRAVENOUS AS NEEDED
Status: DISCONTINUED | OUTPATIENT
Start: 2024-03-22 | End: 2024-03-22

## 2024-03-22 RX ADMIN — ROCURONIUM BROMIDE 60 MG: 10 INJECTION INTRAVENOUS at 07:43

## 2024-03-22 RX ADMIN — SUGAMMADEX 400 MG: 100 INJECTION, SOLUTION INTRAVENOUS at 11:42

## 2024-03-22 RX ADMIN — FENTANYL CITRATE 50 MCG: 50 INJECTION, SOLUTION INTRAMUSCULAR; INTRAVENOUS at 07:42

## 2024-03-22 RX ADMIN — HYDROMORPHONE HYDROCHLORIDE 0.4 MG: 1 INJECTION, SOLUTION INTRAMUSCULAR; INTRAVENOUS; SUBCUTANEOUS at 12:23

## 2024-03-22 RX ADMIN — CEFAZOLIN 3 G: 1 INJECTION, POWDER, FOR SOLUTION INTRAMUSCULAR; INTRAVENOUS at 07:49

## 2024-03-22 RX ADMIN — PROPOFOL 150 MG: 10 INJECTION, EMULSION INTRAVENOUS at 07:42

## 2024-03-22 RX ADMIN — HYDROMORPHONE HYDROCHLORIDE 0.5 MG: 1 INJECTION, SOLUTION INTRAMUSCULAR; INTRAVENOUS; SUBCUTANEOUS at 12:01

## 2024-03-22 RX ADMIN — HYDROMORPHONE HYDROCHLORIDE 0.5 MG: 1 INJECTION, SOLUTION INTRAMUSCULAR; INTRAVENOUS; SUBCUTANEOUS at 11:28

## 2024-03-22 RX ADMIN — REMIFENTANIL HYDROCHLORIDE 10 MCG: 1 INJECTION, POWDER, LYOPHILIZED, FOR SOLUTION INTRAVENOUS at 08:51

## 2024-03-22 RX ADMIN — HYDROMORPHONE HYDROCHLORIDE 0.4 MG: 1 INJECTION, SOLUTION INTRAMUSCULAR; INTRAVENOUS; SUBCUTANEOUS at 12:11

## 2024-03-22 RX ADMIN — REMIFENTANIL HYDROCHLORIDE 10 MCG: 1 INJECTION, POWDER, LYOPHILIZED, FOR SOLUTION INTRAVENOUS at 10:11

## 2024-03-22 RX ADMIN — REMIFENTANIL HYDROCHLORIDE 10 MCG: 1 INJECTION, POWDER, LYOPHILIZED, FOR SOLUTION INTRAVENOUS at 09:25

## 2024-03-22 RX ADMIN — ACETAMINOPHEN 650 MG: 325 TABLET ORAL at 13:00

## 2024-03-22 RX ADMIN — HYDROMORPHONE HYDROCHLORIDE 0.2 MG: 1 INJECTION, SOLUTION INTRAMUSCULAR; INTRAVENOUS; SUBCUTANEOUS at 12:29

## 2024-03-22 RX ADMIN — PHENYLEPHRINE HYDROCHLORIDE 40 MCG: 10 INJECTION INTRAVENOUS at 09:04

## 2024-03-22 RX ADMIN — PHENYLEPHRINE HYDROCHLORIDE 80 MCG: 10 INJECTION INTRAVENOUS at 08:04

## 2024-03-22 RX ADMIN — REMIFENTANIL HYDROCHLORIDE 0.05 MCG/KG/MIN: 1 INJECTION, POWDER, LYOPHILIZED, FOR SOLUTION INTRAVENOUS at 08:41

## 2024-03-22 RX ADMIN — LIDOCAINE HYDROCHLORIDE 100 MG: 20 INJECTION INTRAVENOUS at 07:42

## 2024-03-22 RX ADMIN — SODIUM CHLORIDE, POTASSIUM CHLORIDE, SODIUM LACTATE AND CALCIUM CHLORIDE: 600; 310; 30; 20 INJECTION, SOLUTION INTRAVENOUS at 07:35

## 2024-03-22 RX ADMIN — PHENYLEPHRINE HYDROCHLORIDE 80 MCG: 10 INJECTION INTRAVENOUS at 08:07

## 2024-03-22 RX ADMIN — ROCURONIUM BROMIDE 20 MG: 10 INJECTION INTRAVENOUS at 10:04

## 2024-03-22 RX ADMIN — ONDANSETRON 4 MG: 2 INJECTION INTRAMUSCULAR; INTRAVENOUS at 11:37

## 2024-03-22 RX ADMIN — MIDAZOLAM HYDROCHLORIDE 2 MG: 1 INJECTION, SOLUTION INTRAMUSCULAR; INTRAVENOUS at 07:38

## 2024-03-22 RX ADMIN — ROCURONIUM BROMIDE 20 MG: 10 INJECTION INTRAVENOUS at 09:09

## 2024-03-22 RX ADMIN — ROCURONIUM BROMIDE 20 MG: 10 INJECTION INTRAVENOUS at 08:13

## 2024-03-22 RX ADMIN — DEXAMETHASONE SODIUM PHOSPHATE 10 MG: 10 INJECTION INTRAMUSCULAR; INTRAVENOUS at 07:55

## 2024-03-22 RX ADMIN — REMIFENTANIL HYDROCHLORIDE 10 MCG: 1 INJECTION, POWDER, LYOPHILIZED, FOR SOLUTION INTRAVENOUS at 10:23

## 2024-03-22 SDOH — HEALTH STABILITY: MENTAL HEALTH: CURRENT SMOKER: 0

## 2024-03-22 ASSESSMENT — PAIN - FUNCTIONAL ASSESSMENT
PAIN_FUNCTIONAL_ASSESSMENT: 0-10

## 2024-03-22 ASSESSMENT — COLUMBIA-SUICIDE SEVERITY RATING SCALE - C-SSRS
1. IN THE PAST MONTH, HAVE YOU WISHED YOU WERE DEAD OR WISHED YOU COULD GO TO SLEEP AND NOT WAKE UP?: NO
6. HAVE YOU EVER DONE ANYTHING, STARTED TO DO ANYTHING, OR PREPARED TO DO ANYTHING TO END YOUR LIFE?: NO
2. HAVE YOU ACTUALLY HAD ANY THOUGHTS OF KILLING YOURSELF?: NO

## 2024-03-22 ASSESSMENT — PAIN DESCRIPTION - DESCRIPTORS
DESCRIPTORS: SORE

## 2024-03-22 ASSESSMENT — PAIN SCALES - GENERAL
PAINLEVEL_OUTOF10: 7
PAIN_LEVEL: 2
PAINLEVEL_OUTOF10: 6
PAINLEVEL_OUTOF10: 0 - NO PAIN
PAINLEVEL_OUTOF10: 5 - MODERATE PAIN
PAINLEVEL_OUTOF10: 3
PAINLEVEL_OUTOF10: 8

## 2024-03-22 NOTE — ANESTHESIA PREPROCEDURE EVALUATION
Patient: Jarocho Langford    Procedure Information       Date/Time: 03/22/24 0715    Procedures:       Bilateral endoscopic sinus surgery with image guidance, bilateral submucosal inferior turbinate reductions. (Bilateral)      Nasal Endoscopy with Excision Tissue Maxillary Sinus (Bilateral)      Excision Nasal Turbinate (Bilateral)      Navigation-Assisted Surgery    Location: [facility] OR 03 / [facility] OR    Surgeons: Pete Lovell MD            Relevant Problems   Anesthesia (within normal limits)   (-) PONV (postoperative nausea and vomiting)      Cardiovascular   (+) HTN (hypertension)      Endocrine   (+) Class 3 severe obesity due to excess calories with serious comorbidity and body mass index (BMI) of 40.0 to 44.9 in adult (CMS/[facility])      GI   (+) GERD (gastroesophageal reflux disease)      Neuro/Psych   (+) Anxiety   (+) Depression, major, in remission (CMS/[facility])   (+) Lumbar radiculopathy   (+) Vitamin B6 deficiency neuropathy (CMS/[facility])      Pulmonary   (+) Asthma   (+) RUSH (obstructive sleep apnea)      Musculoskeletal   (+) Cervical stenosis of spine       Clinical information reviewed:   Tobacco  Allergies  Meds   Med Hx  Surg Hx   Fam Hx  Soc Hx        NPO Detail:  NPO/Void Status  Date of Last Liquid: 03/21/24  Time of Last Liquid: 2000  Date of Last Solid: 03/21/24  Time of Last Solid: 2000  Last Intake Type: Clear fluids         Physical Exam    Airway  Mallampati: III  TM distance: >3 FB  Neck ROM: full     Cardiovascular - normal exam     Dental    Pulmonary - normal exam     Abdominal        Anesthesia Plan    History of general anesthesia?: yes  History of complications of general anesthesia?: no    ASA 3     general     The patient is not a current smoker.    intravenous induction   Anesthetic plan and risks discussed with patient.  Use of blood products discussed with patient who.    Plan discussed with CRNA.

## 2024-03-22 NOTE — OP NOTE
Bilateral endoscopic sinus surgery with image guidance, bilateral submucosal inferior turbinate reductions. (B), Nasal Endoscopy with Excision Tissue Maxillary Sinus (B), Excision Nasal Turbinate (B), Navigation-Assisted Surgery Operative Note     Date: 3/22/2024  OR Location: [facility] OR    Name: Jarocho Langford, : 1955, Age: 68 y.o., MRN: [id number], Sex: male    Diagnosis  Pre-op Diagnosis     * Chronic ethmoidal sinusitis [J32.2]     * Chronic sphenoidal sinusitis [J32.3] Post-op Diagnosis     * Chronic ethmoidal sinusitis [J32.2]     * Chronic sphenoidal sinusitis [J32.3]     Procedures  1.  Bilateral nasal endoscopy with total ethmoidectomy including sphenoidotomy with tissue removal  2.  Bilateral nasal endoscopy with frontal sinusotomy  3.  Bilateral maxillary endoscopy with tissue removal  4.  Bilateral submucosal inferior turbinate reductions  5.  Extracranial CT image guidance    Surgeons      * Pete Lovell - Primary    Resident/Fellow/Other Assistant:  Surgeon(s) and Role:     * Urszula Rangel MD - Resident - Assisting    Procedure Summary  Anesthesia: General  ASA: III  Anesthesia Staff: Anesthesiologist: Nile Yu DO; Spike Lima MD  CRNA: ANTWON West-CRNA  C-AA: THIAGO Gonsalves  SRNA: Reed Quijano RN  Estimated Blood Loss: 350mL  Intra-op Medications:   Administrations occurring from 0715 to 1100 on 24:   Medication Name Total Dose   lidocaine-epinephrine (Xylocaine W/EPI) 1 %-1:100,000 injection 9 mL   sodium chloride 0.9 % irrigation solution 1,000 mL   oxymetazoline (Afrin) 0.05 % nasal spray 30 mL              Anesthesia Record               Intraprocedure I/O Totals          Intake    Remifentanil Drip 0.00 mL    The total shown is the total volume documented since Anesthesia Start was filed.    lactated Ringer's 1000.00 mL    Total Intake 1000 mL       Output    Est. Blood Loss 350 mL    Total Output 350 mL       Net    Net Volume 650 mL           Specimen:   ID Type Source Tests Collected by Time   1 : RIGHT SINUS CONTENT Tissue SINUS CONTENTS RIGHT SURGICAL PATHOLOGY EXAM Pete Lovell MD 3/22/2024 0908   2 : LEFT SINUS CONTENT Tissue SINUS CONTENTS LEFT SURGICAL PATHOLOGY EXAM Pete Lovell MD 3/22/2024 0909   3 : MICRODEBRIDER CONTENTS Tissue SINUS CONTENTS BILATERAL SURGICAL PATHOLOGY EXAM Pete Lovell MD 3/22/2024 0947        Staff:   Circulator: Charisse Lou RN; Lori Valdez RN  Relief Scrub: Eboni Allen RN  Scrub Person: Radha Coronado; Trinh Oliva RN    Implants:  Implants       Type Name Action Serial No.      ENT Implant IMPLANT, PROPEL CONTOUR SINUS - [id number] - [id number] Implanted [id number]     ENT Implant IMPLANT, PROPEL CONTOUR SINUS - [id number] - [id number] Implanted [id number]            Findings:   1.  Propel contour stent placed within each frontal drainage pathway  2.  Hemopore placed within each ethmoid cavity  3.  Each middle turbinate was preserved  4.  No nonabsorbable packing was placed  5.  Edematous mucosa throughout dissected paranasal sinuses; no purulence was noted    Indications: Jarocho Langford is an 68 y.o. male who is having surgery for chronic sinusitis.  He has had persistent sinonasal symptoms despite adequate medical therapy.  Risks and benefits were reviewed and we now proceed to the operating room for surgical intervention.    Procedure Details:   After informed consent was obtained and all questions were answered the patient was brought to the operating room and placed supine on the operating room table.  General anesthesia was induced by the anesthesia staff and the patient was orally intubated.  The table was turned 90 degrees.  Afrin-soaked pledgets were placed within both the patient's nasal cavities.     The CT image guidance system was brought to the field.  The CT data was uploaded, reviewed, and the plan was finalized.  The headset was attached with the patient.  The image  guidance was registered accurate in 3 separate orientations and was used throughout the surgical procedure to identify critical landmarks such as the borders of the orbit as well as the ethmoid skull base.  The patient was then prepped and draped in a standard fashion for endonasal surgery.     Bilateral submucosal inferior turbinate reductions were performed.  An incision was made in the anterior aspect of each inferior turbinate.  Submucosal tunnels were dissected throughout the length of each inferior turbinate.  Using a microdebrider blade, submucosal tissue was debulked throughout the length of each inferior turbinate and they were then each outfractured.     Bilateral maxillary endoscopy with tissue removal was performed.  Bilaterally each middle turbinate was medialized.  Each uncinate process was identified and resected and each maxillary sinus was opened from the lacrimal bone anteriorly to the posterior wall of each maxillary sinus posteriorly and the natural drainage pathway was incorporated bilaterally.  Edematous mucosa within each maxillary was debrided but there was no purulence noted.     Bilateral nasal endoscopy with total ethmoidectomy including sphenoidotomy with tissue removal was performed.  Bilaterally anterior and posterior ethmoid air cells adjacent to the lamina and along the ethmoid skull base were widely removed.  The cells either contained edematous mucosa throughout.  The superior turbinate was identified and the inferior one third was resected.  Each sphenoid was cannulated and opened widely in all orientations.  Each sphenoid contained edematous mucosa that was debrided.  I then traveled along the orbit and skull base bilaterally removing remnant air cells adjacent to the skull base and orbit widely delineating the structures.  The anterior nor posterior ethmoid arteries were disturbed bilaterally.       Bilateral nasal endoscopy with frontal sinusotomy was performed.  With  identification of the cranial base posteriorly I proceeded anteriorly.  Remnant anterior ethmoid air cells within each frontal drainage pathway were identified and removed and each frontal sinus was cannulated and opened widely in all orientations.  At the conclusion of the frontal dissection, each frontal sinus had been opened from the orbit laterally to the axilla of the middle turbinate medially and from the anterior to posterior tables.  With the frontal sinus widely patent a propel contour stent was placed bilaterally.    Hemostasis was deemed excellent.  Hemopore was placed within each ethmoid cavity both for hemostasis reasons as well as to medialize each middle turbinate.  The patient was turned over to the anesthesia staff and extubated in the operating without complication.    Complications:  None; patient tolerated the procedure well.    Disposition: PACU - hemodynamically stable.  Condition: stable     Attending Attestation: I was present and scrubbed for the key portions of the procedure.    Pete Lovell  Phone Number: [phone]

## 2024-03-22 NOTE — ANESTHESIA POSTPROCEDURE EVALUATION
Patient: Jarocho Langford    Procedure Summary       Date: 03/22/24 Room / Location: [facility] OR 03 / [facility]    Anesthesia Start: 0726 Anesthesia Stop: 1203    Procedures:       Bilateral endoscopic sinus surgery with image guidance, bilateral submucosal inferior turbinate reductions. (Bilateral)      Nasal Endoscopy with Excision Tissue Maxillary Sinus (Bilateral)      Excision Nasal Turbinate (Bilateral)      Navigation-Assisted Surgery Diagnosis:       Chronic ethmoidal sinusitis      Chronic sphenoidal sinusitis      (Chronic ethmoidal sinusitis [J32.2])      (Chronic sphenoidal sinusitis [J32.3])    Surgeons: Pete Lovell MD Responsible Provider: JUAN West    Anesthesia Type: general ASA Status: 3            Anesthesia Type: general    Vitals Value Taken Time   /79 03/22/24 1200   Temp 37.4 °C (99.3 °F) 03/22/24 1155   Pulse 109 03/22/24 1202   Resp 17 03/22/24 1202   SpO2 97 03/22/24 1202   Vitals shown include unvalidated device data.    Anesthesia Post Evaluation    Patient location during evaluation: PACU  Patient participation: complete - patient participated  Level of consciousness: awake and alert  Pain score: 2  Pain management: adequate  Multimodal analgesia pain management approach  Airway patency: patent  Two or more strategies used to mitigate risk of obstructive sleep apnea  Cardiovascular status: acceptable  Respiratory status: acceptable and face mask  Hydration status: acceptable  Postoperative Nausea and Vomiting: none        No notable events documented.

## 2024-03-22 NOTE — ANESTHESIA PROCEDURE NOTES
Airway  Date/Time: 3/22/2024 7:48 AM  Urgency: elective    Airway not difficult    Staffing  Performed: LENNY   Authorized by: Nile Yu DO    Performed by: JUAN West  Patient location during procedure: OR    Indications and Patient Condition  Indications for airway management: anesthesia and airway protection  Spontaneous ventilation: present  Sedation level: deep  Preoxygenated: yes  Patient position: sniffing  Mask difficulty assessment: 1 - vent by mask  Planned trial extubation    Final Airway Details  Final airway type: endotracheal airway         Successful intubation technique: video laryngoscopy  Endotracheal tube insertion site: oral  Blade: Leonidas  Blade size: #4  Cormack-Lehane Classification: grade I - full view of glottis  Placement verified by: capnometry   Measured from: teeth  Number of attempts at approach: 1  Number of other approaches attempted: 0

## 2024-03-27 ENCOUNTER — OFFICE VISIT (OUTPATIENT)
Dept: OTOLARYNGOLOGY | Facility: CLINIC | Age: 69
End: 2024-03-27
Payer: COMMERCIAL

## 2024-03-27 VITALS — BODY MASS INDEX: 42.39 KG/M2 | WEIGHT: 279.7 LBS | HEIGHT: 68 IN

## 2024-03-27 DIAGNOSIS — J32.2 CHRONIC ETHMOIDAL SINUSITIS: Primary | ICD-10-CM

## 2024-03-27 DIAGNOSIS — J32.0 CHRONIC MAXILLARY SINUSITIS: ICD-10-CM

## 2024-03-27 PROCEDURE — 1036F TOBACCO NON-USER: CPT | Performed by: OTOLARYNGOLOGY

## 2024-03-27 PROCEDURE — 1125F AMNT PAIN NOTED PAIN PRSNT: CPT | Performed by: OTOLARYNGOLOGY

## 2024-03-27 PROCEDURE — 1160F RVW MEDS BY RX/DR IN RCRD: CPT | Performed by: OTOLARYNGOLOGY

## 2024-03-27 PROCEDURE — 1159F MED LIST DOCD IN RCRD: CPT | Performed by: OTOLARYNGOLOGY

## 2024-03-27 PROCEDURE — 31237 NSL/SINS NDSC SURG BX POLYPC: CPT | Performed by: OTOLARYNGOLOGY

## 2024-03-27 PROCEDURE — 3008F BODY MASS INDEX DOCD: CPT | Performed by: OTOLARYNGOLOGY

## 2024-03-27 PROCEDURE — 99024 POSTOP FOLLOW-UP VISIT: CPT | Performed by: OTOLARYNGOLOGY

## 2024-03-27 ASSESSMENT — PAIN SCALES - GENERAL: PAINLEVEL: 5

## 2024-03-27 NOTE — PROGRESS NOTES
Chief Complaint:  1. Chronic sinusitis   2. Nasal airway obstruction, deviated nasal septum, history of septoplasty, inferior turbinate hypertrophy  3. Facial pain, headaches  4. Throat clearing, coughing, dysphonia   5. Asthma   6. Heartburn on proton pump inhibitor   7. Taste disturbance     History Of Present Illness:  Reason for this visit:    Jarocho Langford presents for his first postoperative evaluation since undergoing bilateral endoscopic sinus surgery along with bilateral submucosal inferior turbinate reductions March 22, 2024.  He has had ongoing congestion since the procedure making it difficult for him to sleep.  He also mention facial pain and pressure.  He is rinsing regularly but is having difficulty making the solution go through his sinuses.  He denies significant nasal bleeding.     Active Problems:  Patient Active Problem List   Diagnosis    Anxiety    Benign prostatic hyperplasia (BPH) with urinary urgency    GERD (gastroesophageal reflux disease)    HTN (hypertension)    Vitamin D deficiency    Asthma    Depression, major, in remission (CMS/HCC)    Lumbar radiculopathy    RAISSA positive    RUSH (obstructive sleep apnea)    Venous stasis dermatitis of lower extremity    Venous insufficiency    Vitamin B6 deficiency neuropathy (CMS/[facility])    Cervical stenosis of spine    Balance problem    Body, loose, knee    Dysphagia    Hyperreflexia    Hypertrophy of nasal turbinates    PND (post-nasal drip)    Class 3 severe obesity due to excess calories with serious comorbidity and body mass index (BMI) of 40.0 to 44.9 in adult (CMS/[facility])    Nasal congestion    Chronic pansinusitis    Altered taste    Chronic ethmoidal sinusitis    Chronic sphenoidal sinusitis      Past Medical History:  He has a past medical history of Acute gastritis without bleeding (02/18/2020), Anxiety, Arthritis, BPH (benign prostatic hyperplasia), Chronic ethmoidal sinusitis (12/10/2020), Chronic frontal sinusitis (10/22/2020), Chronic  rhinitis (10/23/2020), COVID-19 (10/14/2022), DVT (deep venous thrombosis) (CMS/[facility]) (09/27/2023), Fever, unspecified (11/12/2019), GERD (gastroesophageal reflux disease), Hypertension, Hypertrophy of nasal turbinates (12/10/2020), Lumbago with sciatica, right side (05/07/2019), Nasal congestion (10/14/2022), Other chest pain (07/28/2021), Other conditions influencing health status (01/20/2020), Other specified disorders of nose and nasal sinuses (12/08/2017), Personal history of other benign neoplasm (07/21/2017), Personal history of other diseases of the digestive system (02/18/2020), Personal history of other diseases of the musculoskeletal system and connective tissue (09/04/2019), Personal history of other diseases of the respiratory system, Personal history of other diseases of the respiratory system (12/08/2017), Personal history of other diseases of the respiratory system (04/19/2022), Personal history of other diseases of the respiratory system (12/10/2020), Personal history of other diseases of the respiratory system (02/06/2020), Personal history of other specified conditions (01/12/2021), Personal history of other specified conditions (12/08/2017), Personal history of other specified conditions (08/25/2021), Personal history of other specified conditions (08/25/2021), Personal history of urinary (tract) infections (07/28/2021), Pneumonia, unspecified organism (11/22/2019), Shortness of breath (10/14/2022), Sleep apnea, Status post cervical spinal fusion (09/27/2023), and Tear of lateral meniscus of knee (03/07/2017).    Surgical History:  He has a past surgical history that includes Other surgical history (05/07/2019); Other surgical history (02/11/2020); Cholecystectomy; and Spinal fusion.     Family History:  Family History   Problem Relation Name Age of Onset    Hypertension Mother PITER     No Known Problems Father      No Known Problems Maternal Grandmother      No Known Problems Maternal Grandfather       No Known Problems Paternal Grandmother      No Known Problems Paternal Grandfather       Social History:  He reports that he has never smoked. He has never used smokeless tobacco. He reports that he does not currently use alcohol. He reports that he does not use drugs.     Allergies:  Patient has no known allergies.    Current Meds:  Current Outpatient Medications:     acetaminophen (Tylenol) 500 mg tablet, Take 2 tablets (1,000 mg) by mouth every 8 hours if needed for mild pain (1 - 3)., Disp: 90 tablet, Rfl: 0    amLODIPine-benazepriL (Lotrel) 5-10 mg capsule, TAKE 1 CAPSULE DAILY, Disp: 90 capsule, Rfl: 3    buPROPion SR (Wellbutrin SR) 150 mg 12 hr tablet, TAKE 1 TABLET TWICE A DAY, Disp: 180 tablet, Rfl: 3    cholecalciferol (Vitamin D-3) 50 mcg (2,000 unit) capsule, TAKE ONE CAPSULE BY MOUTH EVERY DAY, Disp: 90 capsule, Rfl: 0    clonazePAM (KlonoPIN) 1 mg tablet, Take 1 tablet (1 mg) by mouth once daily as needed for anxiety., Disp: 30 tablet, Rfl: 2    Dexilant 60 mg DR capsule, Take by mouth once daily., Disp: , Rfl:     dicyclomine (Bentyl) 20 mg tablet, Take 1 tablet (20 mg) by mouth every 6 hours., Disp: , Rfl:     dilTIAZem XR (Dilacor XR) 180 mg 24 hr capsule, TAKE 1 CAPSULE DAILY, Disp: 90 capsule, Rfl: 3    dutasteride-tamsulosin 0.5-0.4 mg capsule, ER multiphase 24 hr, TAKE 1 CAPSULE DAILY, Disp: 90 capsule, Rfl: 3    fluticasone (Flonase) 50 mcg/actuation nasal spray, Administer into affected nostril(s) twice a day., Disp: , Rfl:     meloxicam (Mobic) 15 mg tablet, TAKE 1 TABLET DAILY, Disp: 90 tablet, Rfl: 3    oxybutynin XL (Ditropan-XL) 10 mg 24 hr tablet, TAKE 1 TABLET DAILY, Disp: 90 tablet, Rfl: 3    oxyCODONE (Roxicodone) 5 mg immediate release tablet, Take 1 tablet (5 mg) by mouth every 6 hours if needed for severe pain (7 - 10) for up to 3 days., Disp: 12 tablet, Rfl: 0    oxymetazoline 0.05 % nasal spray, Administer 2 sprays into each nostril every 12 hours if needed (nose bleeds)  "for up to 3 days. Do not use for more than 3 days., Disp: 15 mL, Rfl: 0    pyridoxine (Vitamin B-6) 100 mg tablet, TAKE 1 TABLET BY MOUTH ONCE DAILY AS DIRECTED, Disp: 90 tablet, Rfl: 0    semaglutide 0.25 mg or 0.5 mg (2 mg/3 mL) pen injector, Inject 0.5 mg under the skin 1 (one) time per week., Disp: 3 mL, Rfl: 5    sodium chloride (Saline Mist) 0.65 % nasal spray, Administer 2 sprays into each nostril every 4 hours for 15 days., Disp: 44 mL, Rfl: 1    triamcinolone (Kenalog) 0.1 % cream, APPLY A THIN LAYER TO AFFECTED AREA(S) TWICE DAILY AS NEEDED., Disp: 454 g, Rfl: 11    venlafaxine XR (Effexor-XR) 75 mg 24 hr capsule, TAKE 2 CAPSULES DAILY, Disp: 180 capsule, Rfl: 3    Ventolin HFA 90 mcg/actuation inhaler, INHALE 1 TO 2 PUFFS EVERY 4 TO 6 HOURS AS NEEDED, Disp: , Rfl:     sennosides-docusate sodium (Senna with Docusate Sodium) 8.6-50 mg tablet, Take 1 tablet by mouth once daily for 10 days., Disp: 10 tablet, Rfl: 0    Vitals:  Visit Vitals  Ht 1.727 m (5' 8\")   Wt 127 kg (279 lb 11.2 oz)   BMI 42.53 kg/m²   Smoking Status Never   BSA 2.47 m²      Physical Exam:  Nose: On external exam there are neither lesions nor asymmetry of the nasal tip/dorsum. On anterior rhinoscopy, visualization posteriorly is limited on anterior examination. For this reason, to adequately evaluate posteriorly for masses, source of epistaxis, polypoid disease, debridement, and/or signs of infections, nasal endoscopy is indicated.  (Please see procedure below.)    SINONASAL ENDOSCOPY WITH DEBRIDEMENT (CPT 43630-47):  Due to the patient's chronic sinusitis/chronic rhinitis, sinonasal endoscopy with debridement is indicated. After discussion of risks and benefits, and topical decongestion and anesthesia, an endoscope was used to perform nasal endoscopy with debridement.  A timeout identifying the patient, the procedure, and any concerns was performed prior to beginning the procedure.    Findings:  Examination of each nasal cavity revealed " crusting and debris throughout the nasal cavity anteriorly leading into the middle meatus.  This was removed with a combination of alligator and suction.  I used an Afrin and lidocaine soaked pledget to anesthetize the anterior aspect of the middle turbinate and then I was able to separate the middle turbinate bilaterally from the lateral wall.  Following this, extensive crust, clot, and debris was evacuated from each maxillary extending into each ethmoid cavity.  I left some debris posteriorly at the sphenoid face bilaterally secondary to patient discomfort.  I was able to see the base of each propel contour stent within each frontal drainage pathway following debridement.  He tolerated the procedure well.    Provider Impressions:  1. Chronic sinusitis s/p bilateral sinus surgery 3/22/24  2. Nasal airway obstruction with history of septoplasty s/p inferior turbinate reductions 3/22/24   3. Facial pain, headaches  4. Throat clearing, coughing, dysphonia   5. Asthma   6. Heartburn on proton pump inhibitor   7. Taste disturbance     Discussion:  Jarocho Langford appeared very well on examination today.  I asked him to continue saline rinses and I am hopeful that he will now be easier to move the fluid through his nasal cavities and sinuses.  I would expect that his pain and pressure would improve now that his sinuses have been decompressed.  I would also expect that his nasal breathing will improve given the extensive debris that was removed today.  I asked him to continue to avoid strenuous activity and follow-up with me in 1 week.  He was amenable to this and all questions were answered.    Signature:  Scribe Attestation  By signing my name below, IGwen Scribe   attest that this documentation has been prepared under the direction and in the presence of Pete Lovell MD.

## 2024-03-31 DIAGNOSIS — E55.9 VITAMIN D DEFICIENCY: ICD-10-CM

## 2024-04-01 ENCOUNTER — DOCUMENTATION (OUTPATIENT)
Dept: OTOLARYNGOLOGY | Facility: CLINIC | Age: 69
End: 2024-04-01
Payer: COMMERCIAL

## 2024-04-01 RX ORDER — NICOTINE 11MG/24HR
PATCH, TRANSDERMAL 24 HOURS TRANSDERMAL DAILY
Qty: 90 CAPSULE | Refills: 0 | Status: SHIPPED | OUTPATIENT
Start: 2024-04-01

## 2024-04-01 NOTE — PROGRESS NOTES
Patient called in with concerns of dark, thick blood clots coming from his left nasal passage over the weekend. He denies any bright red blood, continuous or uncontrolled nasal bleeding, or feelings of blood running down the back of his throat. He started using Afrin two days ago and has decreased his nasal irrigations to once per day at this time. Per Dr. Lovell continue to use Afrin nasal spray 3 or 4 sprays each nostril twice per day, and okay to hold on rinsing in the short-term as this could be releasing clot. He was reassured his symptoms are not concerning at this time but was offered an appointment tomorrow for further evaluation. Patient declined and is comfortable keeping his scheduled follow up appointment at this time. He will call sooner for any further concerns or worsening symptoms. All questions were answered and he was agreeable to proceed with this plan of care.

## 2024-04-07 DIAGNOSIS — I10 PRIMARY HYPERTENSION: ICD-10-CM

## 2024-04-09 RX ORDER — AMLODIPINE AND BENAZEPRIL HYDROCHLORIDE 5; 10 MG/1; MG/1
CAPSULE ORAL
Qty: 90 CAPSULE | Refills: 3 | Status: SHIPPED | OUTPATIENT
Start: 2024-04-09

## 2024-04-10 ENCOUNTER — OFFICE VISIT (OUTPATIENT)
Dept: OTOLARYNGOLOGY | Facility: CLINIC | Age: 69
End: 2024-04-10
Payer: COMMERCIAL

## 2024-04-10 DIAGNOSIS — I10 PRIMARY HYPERTENSION: ICD-10-CM

## 2024-04-10 DIAGNOSIS — J32.0 CHRONIC MAXILLARY SINUSITIS: ICD-10-CM

## 2024-04-10 DIAGNOSIS — J32.2 CHRONIC ETHMOIDAL SINUSITIS: Primary | ICD-10-CM

## 2024-04-10 PROCEDURE — 1160F RVW MEDS BY RX/DR IN RCRD: CPT | Performed by: OTOLARYNGOLOGY

## 2024-04-10 PROCEDURE — 99213 OFFICE O/P EST LOW 20 MIN: CPT | Performed by: OTOLARYNGOLOGY

## 2024-04-10 PROCEDURE — 1159F MED LIST DOCD IN RCRD: CPT | Performed by: OTOLARYNGOLOGY

## 2024-04-10 PROCEDURE — 3008F BODY MASS INDEX DOCD: CPT | Performed by: OTOLARYNGOLOGY

## 2024-04-10 PROCEDURE — 31237 NSL/SINS NDSC SURG BX POLYPC: CPT | Performed by: OTOLARYNGOLOGY

## 2024-04-10 PROCEDURE — 1036F TOBACCO NON-USER: CPT | Performed by: OTOLARYNGOLOGY

## 2024-04-10 RX ORDER — AMOXICILLIN AND CLAVULANATE POTASSIUM 875; 125 MG/1; MG/1
875 TABLET, FILM COATED ORAL 2 TIMES DAILY
Qty: 20 TABLET | Refills: 0 | Status: SHIPPED | OUTPATIENT
Start: 2024-04-10 | End: 2024-04-20

## 2024-04-10 RX ORDER — DILTIAZEM HYDROCHLORIDE 180 MG/1
CAPSULE, EXTENDED RELEASE ORAL
Qty: 90 CAPSULE | Refills: 3 | Status: SHIPPED | OUTPATIENT
Start: 2024-04-10

## 2024-04-10 NOTE — PROGRESS NOTES
Chief Complaint:  1. Chronic sinusitis s/p bilateral sinus surgery 3/22/24  2. Nasal airway obstruction with history of septoplasty s/p inferior turbinate reductions 3/22/24   3. Facial pain, headaches  4. Throat clearing, coughing, dysphonia   5. Asthma   6. Heartburn on proton pump inhibitor   7. Taste disturbance     History Of Present Illness:  Reason for this visit:  Jarocho Langford presents for his second postoperative evaluation.     He has had some ongoing facial pressure most notably on the right side of his forehead.  He has also had continued congestion and has been using Afrin as needed for this.  He is rinsing about 3 to 4 days but stopped for a few days between his last assessment in today secondary to bleeding with the rinses.  He denies significant bleeding related to this and since resuming he has had no further issues.  He was having a foul taste in his mouth leading up to the procedure that briefly improved following but is since returned.  He does have an appointment with Dr. Montoya to discuss taste disturbance.     Active Problems:  Patient Active Problem List   Diagnosis    Anxiety    Benign prostatic hyperplasia (BPH) with urinary urgency    GERD (gastroesophageal reflux disease)    HTN (hypertension)    Vitamin D deficiency    Asthma    Depression, major, in remission (CMS/HCC)    Lumbar radiculopathy    RAISSA positive    RUSH (obstructive sleep apnea)    Venous stasis dermatitis of lower extremity    Venous insufficiency    Vitamin B6 deficiency neuropathy (CMS/HCC)    Cervical stenosis of spine    Balance problem    Body, loose, knee    Dysphagia    Hyperreflexia    Hypertrophy of nasal turbinates    PND (post-nasal drip)    Class 3 severe obesity due to excess calories with serious comorbidity and body mass index (BMI) of 40.0 to 44.9 in adult (CMS/HCC)    Nasal congestion    Chronic pansinusitis    Altered taste    Chronic ethmoidal sinusitis    Chronic sphenoidal sinusitis      Past Medical  History:  He has a past medical history of Acute gastritis without bleeding (02/18/2020), Anxiety, Arthritis, BPH (benign prostatic hyperplasia), Chronic ethmoidal sinusitis (12/10/2020), Chronic frontal sinusitis (10/22/2020), Chronic rhinitis (10/23/2020), COVID-19 (10/14/2022), DVT (deep venous thrombosis) (CMS/[facility], located within [facility]) (09/27/2023), Fever, unspecified (11/12/2019), GERD (gastroesophageal reflux disease), Hypertension, Hypertrophy of nasal turbinates (12/10/2020), Lumbago with sciatica, right side (05/07/2019), Nasal congestion (10/14/2022), Other chest pain (07/28/2021), Other conditions influencing health status (01/20/2020), Other specified disorders of nose and nasal sinuses (12/08/2017), Personal history of other benign neoplasm (07/21/2017), Personal history of other diseases of the digestive system (02/18/2020), Personal history of other diseases of the musculoskeletal system and connective tissue (09/04/2019), Personal history of other diseases of the respiratory system, Personal history of other diseases of the respiratory system (12/08/2017), Personal history of other diseases of the respiratory system (04/19/2022), Personal history of other diseases of the respiratory system (12/10/2020), Personal history of other diseases of the respiratory system (02/06/2020), Personal history of other specified conditions (01/12/2021), Personal history of other specified conditions (12/08/2017), Personal history of other specified conditions (08/25/2021), Personal history of other specified conditions (08/25/2021), Personal history of urinary (tract) infections (07/28/2021), Pneumonia, unspecified organism (11/22/2019), Shortness of breath (10/14/2022), Sleep apnea, Status post cervical spinal fusion (09/27/2023), and Tear of lateral meniscus of knee (03/07/2017).    Surgical History:  He has a past surgical history that includes Other surgical history (05/07/2019); Other surgical history (02/11/2020); Cholecystectomy;  and Spinal fusion.     Family History:  Family History   Problem Relation Name Age of Onset    Hypertension Mother PITER     No Known Problems Father      No Known Problems Maternal Grandmother      No Known Problems Maternal Grandfather      No Known Problems Paternal Grandmother      No Known Problems Paternal Grandfather       Social History:  He reports that he has never smoked. He has never used smokeless tobacco. He reports that he does not currently use alcohol. He reports that he does not use drugs.     Allergies:  Patient has no known allergies.    Current Meds:  Current Outpatient Medications:     acetaminophen (Tylenol) 500 mg tablet, Take 2 tablets (1,000 mg) by mouth every 8 hours if needed for mild pain (1 - 3)., Disp: 90 tablet, Rfl: 0    amLODIPine-benazepriL (Lotrel) 5-10 mg capsule, TAKE 1 CAPSULE DAILY, Disp: 90 capsule, Rfl: 3    buPROPion SR (Wellbutrin SR) 150 mg 12 hr tablet, TAKE 1 TABLET TWICE A DAY, Disp: 180 tablet, Rfl: 3    clonazePAM (KlonoPIN) 1 mg tablet, Take 1 tablet (1 mg) by mouth once daily as needed for anxiety., Disp: 30 tablet, Rfl: 2    Dexilant 60 mg DR capsule, Take by mouth once daily., Disp: , Rfl:     dicyclomine (Bentyl) 20 mg tablet, Take 1 tablet (20 mg) by mouth every 6 hours., Disp: , Rfl:     dilTIAZem XR (Dilacor XR) 180 mg 24 hr capsule, TAKE 1 CAPSULE DAILY, Disp: 90 capsule, Rfl: 3    dutasteride-tamsulosin 0.5-0.4 mg capsule, ER multiphase 24 hr, TAKE 1 CAPSULE DAILY, Disp: 90 capsule, Rfl: 3    fluticasone (Flonase) 50 mcg/actuation nasal spray, Administer into affected nostril(s) twice a day., Disp: , Rfl:     meloxicam (Mobic) 15 mg tablet, TAKE 1 TABLET DAILY, Disp: 90 tablet, Rfl: 3    oxybutynin XL (Ditropan-XL) 10 mg 24 hr tablet, TAKE 1 TABLET DAILY, Disp: 90 tablet, Rfl: 3    oxymetazoline 0.05 % nasal spray, Administer 2 sprays into each nostril every 12 hours if needed (nose bleeds) for up to 3 days. Do not use for more than 3 days., Disp: 15 mL,  Rfl: 0    pyridoxine (Vitamin B-6) 100 mg tablet, TAKE 1 TABLET BY MOUTH ONCE DAILY AS DIRECTED, Disp: 90 tablet, Rfl: 0    semaglutide 0.25 mg or 0.5 mg (2 mg/3 mL) pen injector, Inject 0.5 mg under the skin 1 (one) time per week., Disp: 3 mL, Rfl: 5    triamcinolone (Kenalog) 0.1 % cream, APPLY A THIN LAYER TO AFFECTED AREA(S) TWICE DAILY AS NEEDED., Disp: 454 g, Rfl: 11    venlafaxine XR (Effexor-XR) 75 mg 24 hr capsule, TAKE 2 CAPSULES DAILY, Disp: 180 capsule, Rfl: 3    Ventolin HFA 90 mcg/actuation inhaler, INHALE 1 TO 2 PUFFS EVERY 4 TO 6 HOURS AS NEEDED, Disp: , Rfl:     Vitamin D3 50 mcg (2,000 unit) capsule, TAKE ONE CAPSULE BY MOUTH EVERY DAY, Disp: 90 capsule, Rfl: 0    Vitals:  Visit Vitals  Smoking Status Never      Physical Exam:  Nose: On external exam there are neither lesions nor asymmetry of the nasal tip/dorsum. On anterior rhinoscopy, visualization posteriorly is limited on anterior examination. For this reason, to adequately evaluate posteriorly for masses, source of epistaxis, polypoid disease, debridement, and/or signs of infections, nasal endoscopy is indicated.  (Please see procedure below.)    SINONASAL ENDOSCOPY WITH DEBRIDEMENT (CPT 72240-18):  Due to the patient's chronic sinusitis/chronic rhinitis, sinonasal endoscopy with debridement is indicated. After discussion of risks and benefits, and topical decongestion and anesthesia, an endoscope was used to perform nasal endoscopy with debridement.  A timeout identifying the patient, the procedure, and any concerns was performed prior to beginning the procedure.    Findings:  Examination of the right nasal cavity revealed rather significant debris between the middle turbinate and lateral wall extending throughout the ethmoid cavity that was completely removed with an alligator and suction.  The maxillary sinus, ethmoid, and sphenoid were all patent.  There was granulation tissue and debris within the propel stent itself and I could not  visualize into the frontal.  Using angled instruments I was able to remove the debris and cannulate the frontal sinus.  Examination of the left nasal cavity revealed crusting and debris throughout the ethmoid cavity extending into the sphenoid that was removed with combination of alligator and suction.  On this side the maxillary and frontal were widely patent without significant debris.  He did have mucopurulence within the left maxillary sinus that was suctioned free on the floor.    Provider Impressions:  1. Chronic sinusitis s/p bilateral sinus surgery 3/22/24  2. Nasal airway obstruction with history of septoplasty s/p inferior turbinate reductions 3/22/24   3. Facial pain, headaches  4. Throat clearing, coughing, dysphonia   5. Asthma   6. Heartburn on proton pump inhibitor   7. Taste disturbance     Discussion:  Jarocho Langford appeared well on examination today.  I was able to remove significant debris from each side and cannulate all of his dissected sinuses.  Given the findings within the left maxillary sinus I recommended a course of oral antibiotic therapy and this was prescribed.  I recommended discontinuation for any side effects.  Given the amount of debris that was noted on exam today, I suggested continued saline rinses and follow-up with me in about 6 weeks.  At that time, we can globally reassess his symptoms and make a medical plan moving forward.  He was amenable to this and all questions were answered.    Signature:  Scribe Attestation  By signing my name below, IGwen Scribe   attest that this documentation has been prepared under the direction and in the presence of Pete Lovell MD.

## 2024-04-11 LAB
LABORATORY COMMENT REPORT: NORMAL
PATH REPORT.FINAL DX SPEC: NORMAL
PATH REPORT.GROSS SPEC: NORMAL
PATH REPORT.RELEVANT HX SPEC: NORMAL
PATH REPORT.TOTAL CANCER: NORMAL

## 2024-04-11 PROCEDURE — 88312 SPECIAL STAINS GROUP 1: CPT | Mod: TC,SUR | Performed by: OTOLARYNGOLOGY

## 2024-04-11 PROCEDURE — 88312 SPECIAL STAINS GROUP 1: CPT | Performed by: PATHOLOGY

## 2024-04-24 ENCOUNTER — TELEPHONE (OUTPATIENT)
Dept: OTOLARYNGOLOGY | Facility: CLINIC | Age: 69
End: 2024-04-24
Payer: COMMERCIAL

## 2024-04-24 NOTE — TELEPHONE ENCOUNTER
Patient contacted the office stating he is still having a large amount of thick, yellow mucus coming from his nasal irrigations. He has finishes his antibiotic therapy 3 days ago. RN discussed with Dr. Maciel and patient offered appointment for a nasal culture. Patient states he is unable to come in at this time or this week. Patient will call back in a few days, to schedule an appointment that his job will be able to accommodate.  
Normal

## 2024-05-03 ENCOUNTER — LAB (OUTPATIENT)
Dept: LAB | Facility: LAB | Age: 69
End: 2024-05-03
Payer: COMMERCIAL

## 2024-05-03 DIAGNOSIS — E66.01 CLASS 3 SEVERE OBESITY DUE TO EXCESS CALORIES WITH SERIOUS COMORBIDITY AND BODY MASS INDEX (BMI) OF 45.0 TO 49.9 IN ADULT (MULTI): ICD-10-CM

## 2024-05-03 DIAGNOSIS — E53.1 VITAMIN B6 DEFICIENCY NEUROPATHY (MULTI): ICD-10-CM

## 2024-05-03 DIAGNOSIS — G63 VITAMIN B6 DEFICIENCY NEUROPATHY (MULTI): ICD-10-CM

## 2024-05-03 DIAGNOSIS — Z11.59 ENCOUNTER FOR HEPATITIS C SCREENING TEST FOR LOW RISK PATIENT: ICD-10-CM

## 2024-05-03 DIAGNOSIS — I10 PRIMARY HYPERTENSION: ICD-10-CM

## 2024-05-03 LAB
ALBUMIN SERPL BCP-MCNC: 4.2 G/DL (ref 3.4–5)
ALP SERPL-CCNC: 88 U/L (ref 33–136)
ALT SERPL W P-5'-P-CCNC: 17 U/L (ref 10–52)
ANION GAP SERPL CALC-SCNC: 10 MMOL/L (ref 10–20)
AST SERPL W P-5'-P-CCNC: 16 U/L (ref 9–39)
BASOPHILS # BLD AUTO: 0.05 X10*3/UL (ref 0–0.1)
BASOPHILS NFR BLD AUTO: 0.5 %
BILIRUB SERPL-MCNC: 0.3 MG/DL (ref 0–1.2)
BUN SERPL-MCNC: 20 MG/DL (ref 6–23)
CALCIUM SERPL-MCNC: 8.6 MG/DL (ref 8.6–10.3)
CHLORIDE SERPL-SCNC: 105 MMOL/L (ref 98–107)
CHOLEST SERPL-MCNC: 151 MG/DL (ref 0–199)
CHOLESTEROL/HDL RATIO: 3.4
CO2 SERPL-SCNC: 28 MMOL/L (ref 21–32)
CREAT SERPL-MCNC: 0.96 MG/DL (ref 0.5–1.3)
EGFRCR SERPLBLD CKD-EPI 2021: 86 ML/MIN/1.73M*2
EOSINOPHIL # BLD AUTO: 0.22 X10*3/UL (ref 0–0.7)
EOSINOPHIL NFR BLD AUTO: 2.2 %
ERYTHROCYTE [DISTWIDTH] IN BLOOD BY AUTOMATED COUNT: 14.1 % (ref 11.5–14.5)
GLUCOSE SERPL-MCNC: 81 MG/DL (ref 74–99)
HCT VFR BLD AUTO: 41.8 % (ref 41–52)
HDLC SERPL-MCNC: 44.4 MG/DL
HGB BLD-MCNC: 13.8 G/DL (ref 13.5–17.5)
IMM GRANULOCYTES # BLD AUTO: 0.01 X10*3/UL (ref 0–0.7)
IMM GRANULOCYTES NFR BLD AUTO: 0.1 % (ref 0–0.9)
LDLC SERPL CALC-MCNC: 81 MG/DL
LYMPHOCYTES # BLD AUTO: 2.46 X10*3/UL (ref 1.2–4.8)
LYMPHOCYTES NFR BLD AUTO: 24.7 %
MAGNESIUM SERPL-MCNC: 2.02 MG/DL (ref 1.6–2.4)
MCH RBC QN AUTO: 27.5 PG (ref 26–34)
MCHC RBC AUTO-ENTMCNC: 33 G/DL (ref 32–36)
MCV RBC AUTO: 83 FL (ref 80–100)
MONOCYTES # BLD AUTO: 0.84 X10*3/UL (ref 0.1–1)
MONOCYTES NFR BLD AUTO: 8.4 %
NEUTROPHILS # BLD AUTO: 6.39 X10*3/UL (ref 1.2–7.7)
NEUTROPHILS NFR BLD AUTO: 64.1 %
NON HDL CHOLESTEROL: 107 MG/DL (ref 0–149)
NRBC BLD-RTO: 0 /100 WBCS (ref 0–0)
PLATELET # BLD AUTO: 247 X10*3/UL (ref 150–450)
POTASSIUM SERPL-SCNC: 4.1 MMOL/L (ref 3.5–5.3)
PROT SERPL-MCNC: 6.5 G/DL (ref 6.4–8.2)
RBC # BLD AUTO: 5.01 X10*6/UL (ref 4.5–5.9)
SODIUM SERPL-SCNC: 139 MMOL/L (ref 136–145)
TRIGL SERPL-MCNC: 127 MG/DL (ref 0–149)
TSH SERPL-ACNC: 2.27 MIU/L (ref 0.44–3.98)
VIT B12 SERPL-MCNC: 317 PG/ML (ref 211–911)
VLDL: 25 MG/DL (ref 0–40)
WBC # BLD AUTO: 10 X10*3/UL (ref 4.4–11.3)

## 2024-05-03 PROCEDURE — 36415 COLL VENOUS BLD VENIPUNCTURE: CPT

## 2024-05-03 PROCEDURE — 83735 ASSAY OF MAGNESIUM: CPT

## 2024-05-03 PROCEDURE — 80053 COMPREHEN METABOLIC PANEL: CPT

## 2024-05-03 PROCEDURE — 86803 HEPATITIS C AB TEST: CPT

## 2024-05-03 PROCEDURE — 84443 ASSAY THYROID STIM HORMONE: CPT

## 2024-05-03 PROCEDURE — 85025 COMPLETE CBC W/AUTO DIFF WBC: CPT

## 2024-05-03 PROCEDURE — 80061 LIPID PANEL: CPT

## 2024-05-03 PROCEDURE — 82607 VITAMIN B-12: CPT

## 2024-05-03 PROCEDURE — 84207 ASSAY OF VITAMIN B-6: CPT

## 2024-05-03 ASSESSMENT — ENCOUNTER SYMPTOMS
RHINORRHEA: 0
DIARRHEA: 0
FATIGUE: 1
FACIAL ASYMMETRY: 0
WHEEZING: 0
HEADACHES: 0
TROUBLE SWALLOWING: 0
PALPITATIONS: 0
PHOTOPHOBIA: 0
CHEST TIGHTNESS: 0
ADENOPATHY: 0
HEMATURIA: 0
NAUSEA: 0
ABDOMINAL PAIN: 0
DYSURIA: 0
HALLUCINATIONS: 0
CONSTIPATION: 0
NECK PAIN: 1
CHILLS: 0
FREQUENCY: 0
BACK PAIN: 0
TREMORS: 0
CHOKING: 0
NERVOUS/ANXIOUS: 0
NECK STIFFNESS: 1
FLANK PAIN: 0
DIZZINESS: 0
DIAPHORESIS: 0
SINUS PRESSURE: 0
FEVER: 0
SHORTNESS OF BREATH: 0
EYE ITCHING: 0
JOINT SWELLING: 0
APPETITE CHANGE: 0
DYSPHORIC MOOD: 0
MYALGIAS: 0
LIGHT-HEADEDNESS: 0
WOUND: 0
VOMITING: 0
ARTHRALGIAS: 0
EYE PAIN: 0
UNEXPECTED WEIGHT CHANGE: 0
BRUISES/BLEEDS EASILY: 0
EYE DISCHARGE: 0
POLYDIPSIA: 0
SEIZURES: 0
VOICE CHANGE: 0
NUMBNESS: 0
WEAKNESS: 0
ACTIVITY CHANGE: 0
SORE THROAT: 0
ABDOMINAL DISTENTION: 0
CONFUSION: 0
EYE REDNESS: 0
BLOOD IN STOOL: 0
HYPERTENSION: 1
SLEEP DISTURBANCE: 0
COUGH: 0
AGITATION: 0
SPEECH DIFFICULTY: 0

## 2024-05-03 NOTE — PROGRESS NOTES
Subjective   Patient ID: Jarocho Langford is a 69 y.o. male who presents for 6 month check up (And review labs ) and Med Management (CSM for Klonopin).    OARRS:  No data recorded  I have personally reviewed the OARRS report for Jarocho Langford. I have considered the risks of abuse, dependence, addiction and diversion and I believe that it is clinically appropriate for Jarocho Langford to be prescribed this medication    Is the patient prescribed a combination of a benzodiazepine and opioid?  No    Last Urine Drug Screen / ordered today: Yes  Recent Results (from the past 8760 hour(s))  -Confirmation Opiate/Opioid/Benzo Prescription Compliance:   Collection Time: 11/04/23 11:05 AM       Result                      Value             Ref Range           Clonazepam                  <25               <25 ng/mL           7-Aminoclonazepam           141 (H)           <25 ng/mL           Alprazolam                  <25               <25 ng/mL           Alpha-Hydroxyalprazolam     <25               <25 ng/mL           Midazolam                   <25               <25 ng/mL           Alpha-Hydroxymidazolam      <25               <25 ng/mL           Chlordiazepoxide            <25               <25 ng/mL           Diazepam                    <25               <25 ng/mL           Nordiazepam                 <25               <25 ng/mL           Temazepam                   <25               <25 ng/mL           Oxazepam                    <25               <25 ng/mL           Lorazepam                   <25               <25 ng/mL           Methadone                   <25               <25 ng/mL           EDDP                        <25               <25 ng/mL           6-Acetylmorphine            <25               <25 ng/mL           Codeine                     <50               <50 ng/mL           Hydrocodone                 <25               <25 ng/mL           Hydromorphone               <25               <25 ng/mL            Morphine                    <50               <50 ng/mL           Norhydrocodone              <25               <25 ng/mL           Noroxycodone                <25               <25 ng/mL           Oxycodone                   <25               <25 ng/mL           Oxymorphone                 <25               <25 ng/mL           Fentanyl                    <2.5              <2.5 ng/mL          Norfentanyl                 <2.5              <2.5 ng/mL          Tramadol                    <50               <50 ng/mL           O-Desmethyltramadol         <50               <50 ng/mL           Zolpidem                    <25               <25 ng/mL           Zolpidem Metabolite (Z*     <25               <25 ng/mL      -Screen Opiate/Opioid/Benzo Prescription Compliance:   Collection Time: 11/04/23 11:05 AM       Result                      Value             Ref Range           Creatinine, Urine Rand*     79.0              20.0 - 370.0*       Amphetamine Screen, Ur*                       Presumptive *   Presumptive Negative       Barbiturate Screen, Ur*                       Presumptive *   Presumptive Negative       Cannabinoid Screen, Ur*                       Presumptive *   Presumptive Negative       Cocaine Metabolite Scr*                       Presumptive *   Presumptive Negative       PCP Screen, Urine                             Presumptive *   Presumptive Negative  Results are as expected.         Controlled Substance Agreement:  Date of the Last Agreement: 11/7/2023  Reviewed Controlled Substance Agreement including but not limited to the benefits, risks, and alternatives to treatment with a Controlled Substance medication(s).    Benzodiazepines:  What is the patient's goal of therapy? reduction of anxiety  Is this being achieved with current treatment?  Yes    YENNY-7:  No data recorded    Activities of Daily Living:   Is your overall impression that this patient is benefiting (symptom reduction outweighs side  effects) from benzodiazepine therapy? Yes     1. Physical Functioning: Same  2. Family Relationship: Same  3. Social Relationship: Same  4. Mood: Same  5. Sleep Patterns: Same  6. Overall Function: SameSubjective  Jarocho Langford is a 69 y.o. male and is here for a comprehensive physical exam. The patient reports here for follow-up on hypertension, anxiety and..    Do you take any herbs or supplements that were not prescribed by a doctor? no  Are you taking calcium supplements? no  Are you taking aspirin daily? no      History:  Date last PSA: 9/2023           Anxiety  Presents for follow-up visit. Patient reports no chest pain, confusion, dizziness, nausea, nervous/anxious behavior, palpitations, shortness of breath or suicidal ideas. The quality of sleep is good.       Hypertension  This is a chronic problem. The current episode started more than 1 year ago. The problem is unchanged. The problem is controlled. Associated symptoms include anxiety, malaise/fatigue, neck pain and peripheral edema. Pertinent negatives include no chest pain, headaches, palpitations or shortness of breath. Agents associated with hypertension include NSAIDs. Risk factors for coronary artery disease include dyslipidemia, male gender and obesity. Past treatments include calcium channel blockers, ACE inhibitors and diuretics. The current treatment provides significant improvement. There are no compliance problems.         Review of Systems   Constitutional:  Positive for fatigue and malaise/fatigue. Negative for activity change, appetite change, chills, diaphoresis, fever and unexpected weight change.   HENT:  Negative for congestion, ear pain, hearing loss, nosebleeds, postnasal drip, rhinorrhea, sinus pressure, sneezing, sore throat, tinnitus, trouble swallowing and voice change.    Eyes:  Negative for photophobia, pain, discharge, redness, itching and visual disturbance.   Respiratory:  Negative for cough, choking, chest tightness,  "shortness of breath and wheezing.    Cardiovascular:  Positive for leg swelling. Negative for chest pain and palpitations.   Gastrointestinal:  Negative for abdominal distention, abdominal pain, blood in stool, constipation, diarrhea, nausea and vomiting.   Endocrine: Negative for cold intolerance, heat intolerance, polydipsia and polyuria.   Genitourinary:  Negative for dysuria, flank pain, frequency, hematuria and urgency.   Musculoskeletal:  Positive for neck pain and neck stiffness. Negative for arthralgias, back pain, joint swelling and myalgias.   Skin:  Negative for rash and wound.   Allergic/Immunologic: Negative for immunocompromised state.   Neurological:  Negative for dizziness, tremors, seizures, syncope, facial asymmetry, speech difficulty, weakness, light-headedness, numbness and headaches.   Hematological:  Negative for adenopathy. Does not bruise/bleed easily.   Psychiatric/Behavioral:  Negative for agitation, behavioral problems, confusion, dysphoric mood, hallucinations, self-injury, sleep disturbance and suicidal ideas. The patient is not nervous/anxious.        Objective   /66 (BP Location: Left arm, Patient Position: Sitting, BP Cuff Size: Large adult)   Pulse 95   Temp 35.9 °C (96.6 °F) (Temporal)   Resp 18   Ht 1.727 m (5' 8\")   Wt 129 kg (285 lb)   SpO2 95%   BMI 43.33 kg/m²     Physical Exam  Constitutional:       General: He is not in acute distress.     Appearance: He is not ill-appearing or diaphoretic.   HENT:      Head: Normocephalic and atraumatic.      Right Ear: External ear normal.      Left Ear: External ear normal.      Nose: Nose normal. No rhinorrhea.   Eyes:      General: Lids are normal. No scleral icterus.        Right eye: No discharge.         Left eye: No discharge.      Conjunctiva/sclera: Conjunctivae normal.   Cardiovascular:      Rate and Rhythm: Normal rate and regular rhythm.      Pulses: Normal pulses.      Heart sounds: No murmur heard.  Pulmonary:     "  Effort: Pulmonary effort is normal. No respiratory distress.      Breath sounds: No decreased breath sounds, wheezing, rhonchi or rales.   Abdominal:      General: Bowel sounds are normal. There is no distension.      Palpations: Abdomen is soft. There is no mass.      Tenderness: There is no abdominal tenderness. There is no guarding or rebound.   Musculoskeletal:         General: No swelling, tenderness or deformity.      Cervical back: No rigidity or tenderness.      Right lower leg: No edema.      Left lower leg: No edema.   Lymphadenopathy:      Cervical: No cervical adenopathy.      Upper Body:      Right upper body: No supraclavicular adenopathy.      Left upper body: No supraclavicular adenopathy.   Skin:     General: Skin is warm and dry.      Coloration: Skin is not jaundiced or pale.      Findings: No erythema, lesion or rash.   Neurological:      General: No focal deficit present.      Mental Status: He is alert and oriented to person, place, and time.      Sensory: No sensory deficit.      Motor: No weakness or tremor.      Coordination: Coordination normal.      Gait: Gait normal.   Psychiatric:         Mood and Affect: Mood normal. Affect is not inappropriate.         Behavior: Behavior normal.         Assessment/Plan   Diagnoses and all orders for this visit:  Routine general medical examination at a health care facility  Anxiety  -     clonazePAM (KlonoPIN) 1 mg tablet; Take 1 tablet (1 mg) by mouth once daily as needed for anxiety.  -     Drug Screen, Urine With Reflex to Confirmation; Future  Medication management  -     clonazePAM (KlonoPIN) 1 mg tablet; Take 1 tablet (1 mg) by mouth once daily as needed for anxiety.  -     Drug Screen, Urine With Reflex to Confirmation; Future  Class 3 severe obesity due to excess calories with serious comorbidity and body mass index (BMI) of 40.0 to 44.9 in adult (Multi)  -     CBC and Auto Differential; Future  -     Comprehensive Metabolic Panel; Future  -      Lipid Panel; Future  -     Magnesium; Future  -     TSH with reflex to Free T4 if abnormal; Future  Primary hypertension  -     clonazePAM (KlonoPIN) 1 mg tablet; Take 1 tablet (1 mg) by mouth once daily as needed for anxiety.  -     CBC and Auto Differential; Future  -     Comprehensive Metabolic Panel; Future  -     Lipid Panel; Future  -     Magnesium; Future  -     TSH with reflex to Free T4 if abnormal; Future  -     Drug Screen, Urine With Reflex to Confirmation; Future  Vitamin B6 deficiency neuropathy (Multi)  -     CBC and Auto Differential; Future  -     Vitamin B6; Future    2. Patient Counseling:  --Nutrition: Stressed importance of moderation in sodium/caffeine intake, saturated fat and cholesterol, caloric balance, sufficient intake of fresh fruits, vegetables, fiber, calcium, iron  --Exercise: Stressed the importance of regular exercise.   --Substance Abuse: Discussed cessation/primary prevention of tobacco, alcohol, or other drug use; driving or other dangerous activities under the influence; availability of treatment for abuse.   --Injury prevention: Discussed safety belts, safety helmets, smoke detector, smoking near bedding or upholstery.   --Dental health: Discussed importance of regular tooth brushing, flossing, and dental visits.  --Immunizations reviewed.  --Discussed benefits of screening colonoscopy.  3. Discussed the patient's BMI with him.  The BMI is above average. The patient received Current weight: 129 kg (285 lb)  Weight change since last visit (-) denotes wt loss 5.3 lbs   Weight loss needed to achieve BMI 25: 120.9 Lbs  Weight loss needed to achieve BMI 30: 88.1 Lbs    Provided instructions on dietary changes  Provided instructions on exercise  Advised to Increase physical activity because they have an above normal BMI.  4. Follow up 6 months with labs.  Will also need 3 months CSM.

## 2024-05-04 LAB — HCV AB SER QL: NONREACTIVE

## 2024-05-06 ENCOUNTER — OFFICE VISIT (OUTPATIENT)
Dept: PRIMARY CARE | Facility: CLINIC | Age: 69
End: 2024-05-06
Payer: COMMERCIAL

## 2024-05-06 VITALS
OXYGEN SATURATION: 95 % | HEART RATE: 95 BPM | DIASTOLIC BLOOD PRESSURE: 66 MMHG | TEMPERATURE: 96.6 F | RESPIRATION RATE: 18 BRPM | BODY MASS INDEX: 43.19 KG/M2 | SYSTOLIC BLOOD PRESSURE: 103 MMHG | HEIGHT: 68 IN | WEIGHT: 285 LBS

## 2024-05-06 DIAGNOSIS — G63 VITAMIN B6 DEFICIENCY NEUROPATHY (MULTI): ICD-10-CM

## 2024-05-06 DIAGNOSIS — Z79.899 MEDICATION MANAGEMENT: ICD-10-CM

## 2024-05-06 DIAGNOSIS — E66.01 CLASS 3 SEVERE OBESITY DUE TO EXCESS CALORIES WITH SERIOUS COMORBIDITY AND BODY MASS INDEX (BMI) OF 40.0 TO 44.9 IN ADULT (MULTI): ICD-10-CM

## 2024-05-06 DIAGNOSIS — I10 PRIMARY HYPERTENSION: ICD-10-CM

## 2024-05-06 DIAGNOSIS — Z00.00 ROUTINE GENERAL MEDICAL EXAMINATION AT A HEALTH CARE FACILITY: Primary | ICD-10-CM

## 2024-05-06 DIAGNOSIS — F41.9 ANXIETY: ICD-10-CM

## 2024-05-06 DIAGNOSIS — E53.1 VITAMIN B6 DEFICIENCY NEUROPATHY (MULTI): ICD-10-CM

## 2024-05-06 PROCEDURE — 3008F BODY MASS INDEX DOCD: CPT | Performed by: FAMILY MEDICINE

## 2024-05-06 PROCEDURE — 1159F MED LIST DOCD IN RCRD: CPT | Performed by: FAMILY MEDICINE

## 2024-05-06 PROCEDURE — 1036F TOBACCO NON-USER: CPT | Performed by: FAMILY MEDICINE

## 2024-05-06 PROCEDURE — 99397 PER PM REEVAL EST PAT 65+ YR: CPT | Performed by: FAMILY MEDICINE

## 2024-05-06 PROCEDURE — 1160F RVW MEDS BY RX/DR IN RCRD: CPT | Performed by: FAMILY MEDICINE

## 2024-05-06 PROCEDURE — 3074F SYST BP LT 130 MM HG: CPT | Performed by: FAMILY MEDICINE

## 2024-05-06 PROCEDURE — 3078F DIAST BP <80 MM HG: CPT | Performed by: FAMILY MEDICINE

## 2024-05-06 RX ORDER — CLONAZEPAM 1 MG/1
1 TABLET ORAL DAILY PRN
Qty: 30 TABLET | Refills: 2 | Status: SHIPPED | OUTPATIENT
Start: 2024-05-06 | End: 2024-08-04

## 2024-05-07 LAB — PYRIDOXAL PHOS SERPL-SCNC: 183.1 NMOL/L (ref 20–125)

## 2024-05-22 ENCOUNTER — OFFICE VISIT (OUTPATIENT)
Dept: OTOLARYNGOLOGY | Facility: CLINIC | Age: 69
End: 2024-05-22
Payer: COMMERCIAL

## 2024-05-22 DIAGNOSIS — R44.8 FACIAL PRESSURE: ICD-10-CM

## 2024-05-22 DIAGNOSIS — R09.81 NASAL CONGESTION: ICD-10-CM

## 2024-05-22 DIAGNOSIS — J32.2 CHRONIC ETHMOIDAL SINUSITIS: Primary | ICD-10-CM

## 2024-05-22 DIAGNOSIS — J32.0 CHRONIC MAXILLARY SINUSITIS: ICD-10-CM

## 2024-05-22 PROCEDURE — 99213 OFFICE O/P EST LOW 20 MIN: CPT | Performed by: OTOLARYNGOLOGY

## 2024-05-22 PROCEDURE — 3008F BODY MASS INDEX DOCD: CPT | Performed by: OTOLARYNGOLOGY

## 2024-05-22 PROCEDURE — 1159F MED LIST DOCD IN RCRD: CPT | Performed by: OTOLARYNGOLOGY

## 2024-05-22 PROCEDURE — 31231 NASAL ENDOSCOPY DX: CPT | Performed by: OTOLARYNGOLOGY

## 2024-05-22 PROCEDURE — 1160F RVW MEDS BY RX/DR IN RCRD: CPT | Performed by: OTOLARYNGOLOGY

## 2024-05-22 NOTE — PROGRESS NOTES
Chief Complaint:  1. Chronic sinusitis s/p bilateral sinus surgery 3/22/24  2. Nasal airway obstruction with history of septoplasty s/p inferior turbinate reductions 3/22/24   3. Facial pain, headaches  4. Throat clearing, coughing, dysphonia   5. Asthma   6. Heartburn on proton pump inhibitor   7. Taste disturbance     History Of Present Illness:    Jarocho Langford presents since last being seen 4/10/24.      After that evaluation, he completed a 10-day course of Augmentin.  He does not feel that he got significant benefit with this.  He continues to produce discolored drainage from both sides of his nose and have nasal congestion.  He has been using Afrin nearly on a daily basis for the last 3 weeks.  He will get temporary relief with this.  He is using saline rinses at least once per day as well as saline spray.  He endorses intermittent facial pressure since the procedure.  He denies any issues related to nasal bleeding.    Main Symptoms:  Patient has anterior nasal drainage.     Patient does not have  posterior nasal drainage.    Patient has nasal airway obstruction.   Patient does not have  facial pain.    Patient has  facial pressure.    Patient does not have decreased sense of smell.   Associated Symptoms:   Patient does not have  headaches.    Patient does not have throat clearing.    Patient does not have coughing.    Patient does not have dysphonia.   Patient does not have sneezing.   Patient does not have itchy eyes.   Patient does not have nasal bleeding.     Medications currently on for sinonasal symptoms:  Afrin several times daily, Saline irrigations 1-2x daily, Saline spray several times daily     Active Problems:  Patient Active Problem List   Diagnosis    Anxiety    Benign prostatic hyperplasia (BPH) with urinary urgency    GERD (gastroesophageal reflux disease)    HTN (hypertension)    Vitamin D deficiency    Asthma ([facility])    Depression, major, in remission (CMS-HCC)    Lumbar radiculopathy     RAISSA positive    RUSH (obstructive sleep apnea)    Venous stasis dermatitis of lower extremity    Venous insufficiency    Vitamin B6 deficiency neuropathy (Multi)    Cervical stenosis of spine    Balance problem    Body, loose, knee    Dysphagia    Hyperreflexia    Hypertrophy of nasal turbinates    PND (post-nasal drip)    Class 3 severe obesity due to excess calories with serious comorbidity and body mass index (BMI) of 40.0 to 44.9 in adult (Multi)    Nasal congestion    Chronic pansinusitis    Altered taste    Chronic ethmoidal sinusitis    Chronic sphenoidal sinusitis      Past Medical History:  He has a past medical history of Acute gastritis without bleeding (02/18/2020), Anxiety, Arthritis, BPH (benign prostatic hyperplasia), Chronic ethmoidal sinusitis (12/10/2020), Chronic frontal sinusitis (10/22/2020), Chronic rhinitis (10/23/2020), COVID-19 (10/14/2022), DVT (deep venous thrombosis) (Multi) (09/27/2023), Fever, unspecified (11/12/2019), GERD (gastroesophageal reflux disease), Hypertension, Hypertrophy of nasal turbinates (12/10/2020), Lumbago with sciatica, right side (05/07/2019), Nasal congestion (10/14/2022), Other chest pain (07/28/2021), Other conditions influencing health status (01/20/2020), Other specified disorders of nose and nasal sinuses (12/08/2017), Personal history of other benign neoplasm (07/21/2017), Personal history of other diseases of the digestive system (02/18/2020), Personal history of other diseases of the musculoskeletal system and connective tissue (09/04/2019), Personal history of other diseases of the respiratory system, Personal history of other diseases of the respiratory system (12/08/2017), Personal history of other diseases of the respiratory system (04/19/2022), Personal history of other diseases of the respiratory system (12/10/2020), Personal history of other diseases of the respiratory system (02/06/2020), Personal history of other specified conditions (01/12/2021),  Personal history of other specified conditions (12/08/2017), Personal history of other specified conditions (08/25/2021), Personal history of other specified conditions (08/25/2021), Personal history of urinary (tract) infections (07/28/2021), Pneumonia, unspecified organism (11/22/2019), Shortness of breath (10/14/2022), Sleep apnea, Status post cervical spinal fusion (09/27/2023), and Tear of lateral meniscus of knee (03/07/2017).    Surgical History:  He has a past surgical history that includes Other surgical history (05/07/2019); Other surgical history (02/11/2020); Cholecystectomy; and Spinal fusion.     Family History:  Family History   Problem Relation Name Age of Onset    Hypertension Mother PITER     No Known Problems Father      No Known Problems Maternal Grandmother      No Known Problems Maternal Grandfather      No Known Problems Paternal Grandmother      No Known Problems Paternal Grandfather       Social History:  He reports that he has never smoked. He has never used smokeless tobacco. He reports that he does not currently use alcohol. He reports that he does not use drugs.     Allergies:  Patient has no known allergies.    Current Meds:  Current Outpatient Medications:     acetaminophen (Tylenol) 500 mg tablet, Take 2 tablets (1,000 mg) by mouth every 8 hours if needed for mild pain (1 - 3)., Disp: 90 tablet, Rfl: 0    amLODIPine-benazepriL (Lotrel) 5-10 mg capsule, TAKE 1 CAPSULE DAILY, Disp: 90 capsule, Rfl: 3    buPROPion SR (Wellbutrin SR) 150 mg 12 hr tablet, TAKE 1 TABLET TWICE A DAY, Disp: 180 tablet, Rfl: 3    clonazePAM (KlonoPIN) 1 mg tablet, Take 1 tablet (1 mg) by mouth once daily as needed for anxiety., Disp: 30 tablet, Rfl: 2    Dexilant 60 mg DR capsule, Take by mouth once daily., Disp: , Rfl:     dicyclomine (Bentyl) 20 mg tablet, Take 1 tablet (20 mg) by mouth every 6 hours., Disp: , Rfl:     dilTIAZem XR (Dilacor XR) 180 mg 24 hr capsule, TAKE 1 CAPSULE DAILY, Disp: 90 capsule,  Rfl: 3    dutasteride-tamsulosin 0.5-0.4 mg capsule, ER multiphase 24 hr, TAKE 1 CAPSULE DAILY, Disp: 90 capsule, Rfl: 3    fluticasone (Flonase) 50 mcg/actuation nasal spray, Administer into affected nostril(s) twice a day., Disp: , Rfl:     meloxicam (Mobic) 15 mg tablet, TAKE 1 TABLET DAILY, Disp: 90 tablet, Rfl: 3    oxybutynin XL (Ditropan-XL) 10 mg 24 hr tablet, TAKE 1 TABLET DAILY, Disp: 90 tablet, Rfl: 3    oxymetazoline 0.05 % nasal spray, Administer 2 sprays into each nostril every 12 hours if needed (nose bleeds) for up to 3 days. Do not use for more than 3 days., Disp: 15 mL, Rfl: 0    pyridoxine (Vitamin B-6) 100 mg tablet, TAKE 1 TABLET BY MOUTH ONCE DAILY AS DIRECTED, Disp: 90 tablet, Rfl: 0    semaglutide 0.25 mg or 0.5 mg (2 mg/3 mL) pen injector, Inject 0.5 mg under the skin 1 (one) time per week., Disp: 3 mL, Rfl: 5    triamcinolone (Kenalog) 0.1 % cream, APPLY A THIN LAYER TO AFFECTED AREA(S) TWICE DAILY AS NEEDED., Disp: 454 g, Rfl: 11    venlafaxine XR (Effexor-XR) 75 mg 24 hr capsule, TAKE 2 CAPSULES DAILY, Disp: 180 capsule, Rfl: 3    Ventolin HFA 90 mcg/actuation inhaler, INHALE 1 TO 2 PUFFS EVERY 4 TO 6 HOURS AS NEEDED, Disp: , Rfl:     Vitamin D3 50 mcg (2,000 unit) capsule, TAKE ONE CAPSULE BY MOUTH EVERY DAY, Disp: 90 capsule, Rfl: 0    Vitals:  Visit Vitals  Smoking Status Never      Physical Exam:  Nose: On external exam there are neither lesions nor asymmetry of the nasal tip/dorsum. On anterior rhinoscopy, visualization posteriorly is limited on anterior examination. For this reason, to adequately evaluate posteriorly for masses, source of epistaxis, polypoid disease, debridement, and/or signs of infections, nasal endoscopy is indicated.  (Please see procedure below.)    SINONASAL ENDOSCOPY (CPT 79724): To better evaluate the patient's symptoms, sinonasal endoscopy is indicated.  After discussion of risks and benefits, and topical decongestion and anesthesia,an endoscope was used to  perform nasal endoscopy on each side.  A time out identifying the patient, the procedure, the location of the procedure and any concerns was performed prior to beginning the procedure.    Findings:  Examination of the right nasal cavity revealed a small crust within the ethmoid cavity that was removed.  There was some discolored mucus in the floor of the right maxillary.  The sphenoid appeared patent but there had been some contracture.  The frontal also appeared patent but there had been some contracture.  Examination of the left nasal cavity revealed a widely patent maxillary, ethmoid, and sphenoid.  The frontal had some contracture but appeared patent.    Provider Impressions:  1. Chronic sinusitis s/p bilateral sinus surgery 3/22/24  2. Nasal airway obstruction with history of septoplasty s/p inferior turbinate reductions 3/22/24   3. Facial pressure  4. Asthma   5. Heartburn on proton pump inhibitor   6. Taste disturbance     Discussion: Jarocho Langford and I discussed his ongoing symptoms.  I did not see significant crust on exam today and we discussed some of the discolored drainage that he has been producing.  Given his lack of significant benefit with previous oral antibiotic therapy I suggested initiation of topical steroid therapy and he was comfortable.  A prescription for mometasone 2 mg was prescribed and I asked him to use this on a twice daily basis within rinses moving forward.  If he continues with symptoms despite this we can certainly discuss additional options.  I asked him to follow-up with me in 2 months.  All questions were answered.    Signature:  Scribe Attestation  By signing my name below, IGwen Scribe   attest that this documentation has been prepared under the direction and in the presence of Pete Lovell MD.

## 2024-05-24 DIAGNOSIS — J32.4 CHRONIC PANSINUSITIS: ICD-10-CM

## 2024-05-24 DIAGNOSIS — J32.8 OTHER CHRONIC SINUSITIS: ICD-10-CM

## 2024-05-24 RX ORDER — MOMETASONE FUROATE 100 %
POWDER (GRAM) MISCELLANEOUS
Qty: 180 G | Refills: 3 | Status: SHIPPED | OUTPATIENT
Start: 2024-05-24

## 2024-06-26 ENCOUNTER — OFFICE VISIT (OUTPATIENT)
Dept: OTOLARYNGOLOGY | Facility: CLINIC | Age: 69
End: 2024-06-26
Payer: COMMERCIAL

## 2024-06-26 VITALS — WEIGHT: 286.9 LBS | HEIGHT: 68 IN | BODY MASS INDEX: 43.48 KG/M2

## 2024-06-26 DIAGNOSIS — J32.0 CHRONIC MAXILLARY SINUSITIS: ICD-10-CM

## 2024-06-26 DIAGNOSIS — J34.89 RHINORRHEA: ICD-10-CM

## 2024-06-26 DIAGNOSIS — J34.89 NASAL CRUSTING: Primary | ICD-10-CM

## 2024-06-26 RX ORDER — AMOXICILLIN AND CLAVULANATE POTASSIUM 875; 125 MG/1; MG/1
1 TABLET, FILM COATED ORAL 2 TIMES DAILY
Qty: 14 TABLET | Refills: 0 | Status: SHIPPED | OUTPATIENT
Start: 2024-06-26 | End: 2024-07-03

## 2024-06-26 ASSESSMENT — PATIENT HEALTH QUESTIONNAIRE - PHQ9
SUM OF ALL RESPONSES TO PHQ9 QUESTIONS 1 AND 2: 0
2. FEELING DOWN, DEPRESSED OR HOPELESS: NOT AT ALL
1. LITTLE INTEREST OR PLEASURE IN DOING THINGS: NOT AT ALL

## 2024-06-26 ASSESSMENT — PAIN SCALES - GENERAL: PAINLEVEL: 0-NO PAIN

## 2024-06-26 NOTE — PROGRESS NOTES
Chief Complaint:  1. Chronic sinusitis s/p bilateral sinus surgery 3/22/24  2. Nasal airway obstruction with history of septoplasty s/p inferior turbinate reductions 3/22/24   3. Facial pressure  4. Asthma   5. Heartburn on proton pump inhibitor   6. Taste disturbance     History Of Present Illness:    Jarocho Langford presents since last being seen 5/22/2024.    He has had a bad odor in his nose over the last 7 to 10 days associated with nasal drainage.  Up to that point, he has been doing well utilizing mometasone rinses twice per day.    Main Symptoms:  Patient has anterior nasal drainage.   acute  Patient has  posterior nasal drainage.  acute  Patient does not have nasal airway obstruction.   Patient does not have  facial pain.    Patient does not have  facial pressure.    Patient does not have decreased sense of smell.    Medications currently on for sinonasal symptoms: Mometasone rinses 2x/day    Active Problems:  Patient Active Problem List   Diagnosis    Anxiety    Benign prostatic hyperplasia (BPH) with urinary urgency    GERD (gastroesophageal reflux disease)    HTN (hypertension)    Vitamin D deficiency    Asthma ([facility])    Depression, major, in remission (CMS-HCC)    Lumbar radiculopathy    RAISSA positive    RUSH (obstructive sleep apnea)    Venous stasis dermatitis of lower extremity    Venous insufficiency    Vitamin B6 deficiency neuropathy (Multi)    Cervical stenosis of spine    Balance problem    Body, loose, knee    Dysphagia    Hyperreflexia    Hypertrophy of nasal turbinates    PND (post-nasal drip)    Class 3 severe obesity due to excess calories with serious comorbidity and body mass index (BMI) of 40.0 to 44.9 in adult (Multi)    Nasal congestion    Chronic pansinusitis    Altered taste    Chronic ethmoidal sinusitis    Chronic sphenoidal sinusitis        Past Medical History:  He has a past medical history of Acute gastritis without bleeding (02/18/2020), Anxiety, Arthritis, BPH (benign  prostatic hyperplasia), Chronic ethmoidal sinusitis (12/10/2020), Chronic frontal sinusitis (10/22/2020), Chronic rhinitis (10/23/2020), COVID-19 (10/14/2022), DVT (deep venous thrombosis) (Multi) (09/27/2023), Fever, unspecified (11/12/2019), GERD (gastroesophageal reflux disease), Hypertension, Hypertrophy of nasal turbinates (12/10/2020), Lumbago with sciatica, right side (05/07/2019), Nasal congestion (10/14/2022), Other chest pain (07/28/2021), Other conditions influencing health status (01/20/2020), Other specified disorders of nose and nasal sinuses (12/08/2017), Personal history of other benign neoplasm (07/21/2017), Personal history of other diseases of the digestive system (02/18/2020), Personal history of other diseases of the musculoskeletal system and connective tissue (09/04/2019), Personal history of other diseases of the respiratory system, Personal history of other diseases of the respiratory system (12/08/2017), Personal history of other diseases of the respiratory system (04/19/2022), Personal history of other diseases of the respiratory system (12/10/2020), Personal history of other diseases of the respiratory system (02/06/2020), Personal history of other specified conditions (01/12/2021), Personal history of other specified conditions (12/08/2017), Personal history of other specified conditions (08/25/2021), Personal history of other specified conditions (08/25/2021), Personal history of urinary (tract) infections (07/28/2021), Pneumonia, unspecified organism (11/22/2019), Shortness of breath (10/14/2022), Sleep apnea, Status post cervical spinal fusion (09/27/2023), and Tear of lateral meniscus of knee (03/07/2017).    Surgical History:  He has a past surgical history that includes Other surgical history (05/07/2019); Other surgical history (02/11/2020); Cholecystectomy; and Spinal fusion.     Family History:  Family History   Problem Relation Name Age of Onset    Hypertension Mother PITER      No Known Problems Father      No Known Problems Maternal Grandmother      No Known Problems Maternal Grandfather      No Known Problems Paternal Grandmother      No Known Problems Paternal Grandfather         Social History:  He reports that he has never smoked. He has never used smokeless tobacco. He reports that he does not currently use alcohol. He reports that he does not use drugs.     Allergies:  Patient has no known allergies.    Current Meds:    Current Outpatient Medications:     acetaminophen (Tylenol) 500 mg tablet, Take 2 tablets (1,000 mg) by mouth every 8 hours if needed for mild pain (1 - 3)., Disp: 90 tablet, Rfl: 0    amLODIPine-benazepriL (Lotrel) 5-10 mg capsule, TAKE 1 CAPSULE DAILY, Disp: 90 capsule, Rfl: 3    buPROPion SR (Wellbutrin SR) 150 mg 12 hr tablet, TAKE 1 TABLET TWICE A DAY, Disp: 180 tablet, Rfl: 3    clonazePAM (KlonoPIN) 1 mg tablet, Take 1 tablet (1 mg) by mouth once daily as needed for anxiety., Disp: 30 tablet, Rfl: 2    Dexilant 60 mg DR capsule, Take by mouth once daily., Disp: , Rfl:     dicyclomine (Bentyl) 20 mg tablet, Take 1 tablet (20 mg) by mouth every 6 hours., Disp: , Rfl:     dilTIAZem XR (Dilacor XR) 180 mg 24 hr capsule, TAKE 1 CAPSULE DAILY, Disp: 90 capsule, Rfl: 3    dutasteride-tamsulosin 0.5-0.4 mg capsule, ER multiphase 24 hr, TAKE 1 CAPSULE DAILY, Disp: 90 capsule, Rfl: 3    fluticasone (Flonase) 50 mcg/actuation nasal spray, Administer into affected nostril(s) twice a day., Disp: , Rfl:     meloxicam (Mobic) 15 mg tablet, TAKE 1 TABLET DAILY, Disp: 90 tablet, Rfl: 3    mometasone furoate, bulk, 100 % powder, Compound 2 mg capsule to be added to sinus rinse twice daily., Disp: 180 g, Rfl: 3    oxybutynin XL (Ditropan-XL) 10 mg 24 hr tablet, TAKE 1 TABLET DAILY, Disp: 90 tablet, Rfl: 3    oxymetazoline 0.05 % nasal spray, Administer 2 sprays into each nostril every 12 hours if needed (nose bleeds) for up to 3 days. Do not use for more than 3 days., Disp: 15  "mL, Rfl: 0    pyridoxine (Vitamin B-6) 100 mg tablet, TAKE 1 TABLET BY MOUTH ONCE DAILY AS DIRECTED, Disp: 90 tablet, Rfl: 0    semaglutide 0.25 mg or 0.5 mg (2 mg/3 mL) pen injector, Inject 0.5 mg under the skin 1 (one) time per week., Disp: 3 mL, Rfl: 5    triamcinolone (Kenalog) 0.1 % cream, APPLY A THIN LAYER TO AFFECTED AREA(S) TWICE DAILY AS NEEDED., Disp: 454 g, Rfl: 11    venlafaxine XR (Effexor-XR) 75 mg 24 hr capsule, TAKE 2 CAPSULES DAILY, Disp: 180 capsule, Rfl: 3    Ventolin HFA 90 mcg/actuation inhaler, INHALE 1 TO 2 PUFFS EVERY 4 TO 6 HOURS AS NEEDED, Disp: , Rfl:     Vitamin D3 50 mcg (2,000 unit) capsule, TAKE ONE CAPSULE BY MOUTH EVERY DAY, Disp: 90 capsule, Rfl: 0    Vitals:  Visit Vitals  Ht 1.727 m (5' 8\")   Wt 130 kg (286 lb 14.4 oz)   BMI 43.62 kg/m²   Smoking Status Never   BSA 2.5 m²      Physical Exam:  Nose: On external exam there are neither lesions nor asymmetry of the nasal tip/dorsum.  On anterior rhinoscopy, visualization posteriorly is limited on anterior examination.  For this reason, to adequately evaluate posteriorly for masses, source of epistaxis, polypoid disease, debridement, and/or signs of infections, nasal endoscopy is indicated.  (Please see procedure below.)    SINONASAL ENDOSCOPY (CPT 18969):  To better evaluate the patient's symptoms, sinonasal endoscopy is indicated.  After discussion of risks and benefits, and topical decongestion and anesthesia, an endoscope was used to perform nasal endoscopy on each side.  A time out identifying the patient, the procedure, the location of the procedure and any concerns was performed prior to beginning the procedure.    Findings:  Examination of the right nasal cavity revealed debris within the right maxillary sinus that was completely evacuated with a combination of alligator and suction.  Following this, the maxillary sinus and ethmoid cavity were widely patent and all debris was removed.  His right sphenoid and frontal appeared " patent but there was some contracture of the drainage pathways.  Examination of the left nasal cavity revealed no debris.  The maxillary, ethmoid, sphenoid, and frontal appeared patent without significant mucosal edema.    Provider Impressions:  1. Chronic sinusitis s/p bilateral sinus surgery 3/22/24  2. Nasal airway obstruction with history of septoplasty s/p inferior turbinate reductions 3/22/24   3. Rhinorrhea  4. Asthma   5. Heartburn on proton pump inhibitor   6. Taste disturbance     Discussion:  Jarocho Langford had debris on the right that I believe was causing the odor and this was removed today.  I asked him to continue mometasone rinses.  I also prescribed a 7-day course of oral antibiotic therapy (Augmentin).  I recommended discontinuation for any side effects.  I asked him to follow-up with me as scheduled in late July.  All questions were answered.    Signature:  Scribe Attestation  By signing my name below, IDelmis, Scribe   attest that this documentation has been prepared under the direction and in the presence of Pete Lovell MD.

## 2024-07-05 DIAGNOSIS — E55.9 VITAMIN D DEFICIENCY: ICD-10-CM

## 2024-07-05 DIAGNOSIS — E53.1 VITAMIN B6 DEFICIENCY NEUROPATHY (MULTI): ICD-10-CM

## 2024-07-05 DIAGNOSIS — G63 VITAMIN B6 DEFICIENCY NEUROPATHY (MULTI): ICD-10-CM

## 2024-07-08 RX ORDER — NICOTINE 11MG/24HR
PATCH, TRANSDERMAL 24 HOURS TRANSDERMAL DAILY
Qty: 90 CAPSULE | Refills: 3 | Status: SHIPPED | OUTPATIENT
Start: 2024-07-08

## 2024-07-08 RX ORDER — PYRIDOXINE HCL (VITAMIN B6) 100 MG
100 TABLET ORAL DAILY
Qty: 90 TABLET | Refills: 3 | Status: SHIPPED | OUTPATIENT
Start: 2024-07-08

## 2024-07-24 ENCOUNTER — APPOINTMENT (OUTPATIENT)
Dept: OTOLARYNGOLOGY | Facility: CLINIC | Age: 69
End: 2024-07-24
Payer: COMMERCIAL

## 2024-07-24 VITALS — HEIGHT: 68 IN | BODY MASS INDEX: 43.53 KG/M2 | WEIGHT: 287.2 LBS

## 2024-07-24 DIAGNOSIS — J32.3 CHRONIC SPHENOIDAL SINUSITIS: ICD-10-CM

## 2024-07-24 DIAGNOSIS — J34.89 NASAL CRUSTING: ICD-10-CM

## 2024-07-24 DIAGNOSIS — J34.89 RHINORRHEA: ICD-10-CM

## 2024-07-24 DIAGNOSIS — J32.0 CHRONIC MAXILLARY SINUSITIS: Primary | ICD-10-CM

## 2024-07-24 PROCEDURE — 1160F RVW MEDS BY RX/DR IN RCRD: CPT | Performed by: OTOLARYNGOLOGY

## 2024-07-24 PROCEDURE — 87075 CULTR BACTERIA EXCEPT BLOOD: CPT

## 2024-07-24 PROCEDURE — 99213 OFFICE O/P EST LOW 20 MIN: CPT | Performed by: OTOLARYNGOLOGY

## 2024-07-24 PROCEDURE — 31231 NASAL ENDOSCOPY DX: CPT | Performed by: OTOLARYNGOLOGY

## 2024-07-24 PROCEDURE — 1159F MED LIST DOCD IN RCRD: CPT | Performed by: OTOLARYNGOLOGY

## 2024-07-24 PROCEDURE — 87205 SMEAR GRAM STAIN: CPT

## 2024-07-24 PROCEDURE — 87070 CULTURE OTHR SPECIMN AEROBIC: CPT

## 2024-07-24 PROCEDURE — 3008F BODY MASS INDEX DOCD: CPT | Performed by: OTOLARYNGOLOGY

## 2024-07-24 PROCEDURE — 1126F AMNT PAIN NOTED NONE PRSNT: CPT | Performed by: OTOLARYNGOLOGY

## 2024-07-24 PROCEDURE — 87186 SC STD MICRODIL/AGAR DIL: CPT

## 2024-07-24 ASSESSMENT — PAIN SCALES - GENERAL: PAINLEVEL: 0-NO PAIN

## 2024-07-24 NOTE — PROGRESS NOTES
Chest Chief Complaint:  1. Chronic sinusitis s/p bilateral sinus surgery 3/22/24  2. Nasal airway obstruction with history of septoplasty s/p inferior turbinate reductions 3/22/24   3. Facial pressure  4. Asthma   5. Heartburn on proton pump inhibitor   6. Taste disturbance     History Of Present Illness:    Jarocho Langford presents since last being seen 6/26/2024.     The foul odor from inside of his nose improved after antibiotic therapy but is now recurred to a lesser degree along with some thick postnasal drainage.  About 2 weeks ago he started having some burning with the mometasone during rinses.  He is still trying to do this consistently but there are days that he cannot perform both rinses.    The amount of postnasal drainage, in particular, is slowly improving.    Main Symptoms:  Patient has anterior nasal drainage.   Three times a day he will produce debris; bilaterally.   Patient has  posterior nasal drainage.    Patient does not have nasal airway obstruction.   Patient does not have  facial pain.    Patient does not have  facial pressure.    He does not have decreased sense of smell.    Medications currently on for sinonasal symptoms:   Mometasone rinses BID    Active Problems:  Patient Active Problem List   Diagnosis    Anxiety    Benign prostatic hyperplasia (BPH) with urinary urgency    GERD (gastroesophageal reflux disease)    HTN (hypertension)    Vitamin D deficiency    Asthma ([facility])    Depression, major, in remission ([facility])    Lumbar radiculopathy    RAISSA positive    RUSH (obstructive sleep apnea)    Venous stasis dermatitis of lower extremity    Venous insufficiency    Vitamin B6 deficiency neuropathy (Multi)    Cervical stenosis of spine    Balance problem    Body, loose, knee    Dysphagia    Hyperreflexia    Hypertrophy of nasal turbinates    PND (post-nasal drip)    Class 3 severe obesity due to excess calories with serious comorbidity and body mass index (BMI) of 40.0 to 44.9 in adult  (Multi)    Nasal congestion    Chronic pansinusitis    Altered taste    Chronic ethmoidal sinusitis    Chronic sphenoidal sinusitis        Past Medical History:  He has a past medical history of Acute gastritis without bleeding (02/18/2020), Anxiety, Arthritis, BPH (benign prostatic hyperplasia), Chronic ethmoidal sinusitis (12/10/2020), Chronic frontal sinusitis (10/22/2020), Chronic rhinitis (10/23/2020), COVID-19 (10/14/2022), DVT (deep venous thrombosis) (Multi) (09/27/2023), Fever, unspecified (11/12/2019), GERD (gastroesophageal reflux disease), Hypertension, Hypertrophy of nasal turbinates (12/10/2020), Lumbago with sciatica, right side (05/07/2019), Nasal congestion (10/14/2022), Other chest pain (07/28/2021), Other conditions influencing health status (01/20/2020), Other specified disorders of nose and nasal sinuses (12/08/2017), Personal history of other benign neoplasm (07/21/2017), Personal history of other diseases of the digestive system (02/18/2020), Personal history of other diseases of the musculoskeletal system and connective tissue (09/04/2019), Personal history of other diseases of the respiratory system, Personal history of other diseases of the respiratory system (12/08/2017), Personal history of other diseases of the respiratory system (04/19/2022), Personal history of other diseases of the respiratory system (12/10/2020), Personal history of other diseases of the respiratory system (02/06/2020), Personal history of other specified conditions (01/12/2021), Personal history of other specified conditions (12/08/2017), Personal history of other specified conditions (08/25/2021), Personal history of other specified conditions (08/25/2021), Personal history of urinary (tract) infections (07/28/2021), Pneumonia, unspecified organism (11/22/2019), Shortness of breath (10/14/2022), Sleep apnea, Status post cervical spinal fusion (09/27/2023), and Tear of lateral meniscus of knee  (03/07/2017).    Surgical History:  He has a past surgical history that includes Other surgical history (05/07/2019); Other surgical history (02/11/2020); Cholecystectomy; and Spinal fusion.     Family History:  Family History   Problem Relation Name Age of Onset    Hypertension Mother PITER     No Known Problems Father      No Known Problems Maternal Grandmother      No Known Problems Maternal Grandfather      No Known Problems Paternal Grandmother      No Known Problems Paternal Grandfather         Social History:  He reports that he has never smoked. He has never used smokeless tobacco. He reports that he does not currently use alcohol. He reports that he does not use drugs.     Allergies:  Patient has no known allergies.    Current Meds:    Current Outpatient Medications:     acetaminophen (Tylenol) 500 mg tablet, Take 2 tablets (1,000 mg) by mouth every 8 hours if needed for mild pain (1 - 3)., Disp: 90 tablet, Rfl: 0    amLODIPine-benazepriL (Lotrel) 5-10 mg capsule, TAKE 1 CAPSULE DAILY, Disp: 90 capsule, Rfl: 3    buPROPion SR (Wellbutrin SR) 150 mg 12 hr tablet, TAKE 1 TABLET TWICE A DAY, Disp: 180 tablet, Rfl: 3    clonazePAM (KlonoPIN) 1 mg tablet, Take 1 tablet (1 mg) by mouth once daily as needed for anxiety., Disp: 30 tablet, Rfl: 2    Dexilant 60 mg DR capsule, Take by mouth once daily., Disp: , Rfl:     dicyclomine (Bentyl) 20 mg tablet, Take 1 tablet (20 mg) by mouth every 6 hours., Disp: , Rfl:     dilTIAZem XR (Dilacor XR) 180 mg 24 hr capsule, TAKE 1 CAPSULE DAILY, Disp: 90 capsule, Rfl: 3    dutasteride-tamsulosin 0.5-0.4 mg capsule, ER multiphase 24 hr, TAKE 1 CAPSULE DAILY, Disp: 90 capsule, Rfl: 3    fluticasone (Flonase) 50 mcg/actuation nasal spray, Administer into affected nostril(s) twice a day., Disp: , Rfl:     meloxicam (Mobic) 15 mg tablet, TAKE 1 TABLET DAILY, Disp: 90 tablet, Rfl: 3    mometasone furoate, bulk, 100 % powder, Compound 2 mg capsule to be added to sinus rinse twice  "daily., Disp: 180 g, Rfl: 3    oxybutynin XL (Ditropan-XL) 10 mg 24 hr tablet, TAKE 1 TABLET DAILY, Disp: 90 tablet, Rfl: 3    oxymetazoline 0.05 % nasal spray, Administer 2 sprays into each nostril every 12 hours if needed (nose bleeds) for up to 3 days. Do not use for more than 3 days., Disp: 15 mL, Rfl: 0    pyridoxine (Vitamin B-6) 100 mg tablet, TAKE ONE TABLET BY MOUTH ONCE A DAY AS DIRECTED, Disp: 90 tablet, Rfl: 3    semaglutide 0.25 mg or 0.5 mg (2 mg/3 mL) pen injector, Inject 0.5 mg under the skin 1 (one) time per week., Disp: 3 mL, Rfl: 5    triamcinolone (Kenalog) 0.1 % cream, APPLY A THIN LAYER TO AFFECTED AREA(S) TWICE DAILY AS NEEDED., Disp: 454 g, Rfl: 11    venlafaxine XR (Effexor-XR) 75 mg 24 hr capsule, TAKE 2 CAPSULES DAILY, Disp: 180 capsule, Rfl: 3    Ventolin HFA 90 mcg/actuation inhaler, INHALE 1 TO 2 PUFFS EVERY 4 TO 6 HOURS AS NEEDED, Disp: , Rfl:     Vitamin D3 50 mcg (2,000 unit) capsule, TAKE ONE CAPSULE BY MOUTH EVERY DAY, Disp: 90 capsule, Rfl: 3    Vitals:  Visit Vitals  Ht 1.727 m (5' 8\")   Wt 130 kg (287 lb 3.2 oz)   BMI 43.67 kg/m²   Smoking Status Never   BSA 2.5 m²        Physical Exam:  Nose: On external exam there are neither lesions nor asymmetry of the nasal tip/dorsum. On anterior rhinoscopy, visualization posteriorly is limited on anterior examination. For this reason, to adequately evaluate posteriorly for masses, source of epistaxis, polypoid disease, debridement, and/or signs of infections, nasal endoscopy is indicated.  (Please see procedure below.)    SINONASAL ENDOSCOPY (CPT 83338): To better evaluate the patient's symptoms, sinonasal endoscopy is indicated.  After discussion of risks and benefits, and topical decongestion and anesthesia,an endoscope was used to perform nasal endoscopy on each side.  A time out identifying the patient, the procedure, the location of the procedure and any concerns was performed prior to beginning the procedure.    Findings:  " Examination of the right nasal cavity revealed a patent maxillary sinus.  There was crusting just medial to the maxillary antrostomy within the nasal cavity.  This was removed and some discolored mucus in this region was cultured.  The ethmoid cavity was patent.  There was stable scarring at the sphenoid face and within the frontal drainage pathway.  It is likely there are drainage pathways for these sinuses, however.  Examination of the left nasal cavity revealed a patent maxillary, ethmoid, sphenoid, and frontal.  There was some contracture within the frontal drainage pathway but this was patent.  There was contact of the middle turbinate to the lateral wall and inferiorly there was a small band between the middle turbinate and the inferior turbinate that was lysed.    Provider Impressions:  1. Chronic sinusitis s/p bilateral sinus surgery 3/22/24  2. Nasal airway obstruction with history of septoplasty s/p inferior turbinate reductions 3/22/24   3. Rhinorrhea  4. Asthma   5. Heartburn on proton pump inhibitor   6. Taste disturbance     Discussion:  Jarocho Langford and I discussed his exam and symptoms.  Overall, he is noticing slow but consistent improvement in his sinonasal symptoms.  I think that mucus is getting caught up on either exposed bone or some granulation tissue along the medial aspect of his right maxillary sinus causing the odor and I believe that as he continues to heal this will eventually resolve.  Although I obtained a culture, if his symptoms continue to slowly improve he was comfortable deferring short-term medical options in favor of continuing the mometasone but if his symptoms worsen we can use the culture to guide future treatments.    In regard to the mometasone rinses burning on administration, if this continues, he can certainly hold the rinses for a few days and then resume.  The mucosa of his sinuses appeared quite normal so I do not think you will see any significant change in  symptoms holding medical therapy in the short-term.  He could also consider discontinuing the mometasone and rinsing with plain saline to see if this does not burn.    I recommended follow-up in 3 months.  He was amenable to this and all questions were answered.    Signature:  Pete Lovell MD

## 2024-07-27 LAB
BACTERIA SPEC CULT: ABNORMAL
GRAM STN SPEC: ABNORMAL
GRAM STN SPEC: ABNORMAL

## 2024-08-06 ENCOUNTER — APPOINTMENT (OUTPATIENT)
Dept: PRIMARY CARE | Facility: CLINIC | Age: 69
End: 2024-08-06
Payer: COMMERCIAL

## 2024-08-10 ENCOUNTER — LAB (OUTPATIENT)
Dept: LAB | Facility: LAB | Age: 69
End: 2024-08-10
Payer: COMMERCIAL

## 2024-08-10 DIAGNOSIS — F41.9 ANXIETY: ICD-10-CM

## 2024-08-10 DIAGNOSIS — Z79.899 MEDICATION MANAGEMENT: ICD-10-CM

## 2024-08-10 DIAGNOSIS — E66.01 CLASS 3 SEVERE OBESITY DUE TO EXCESS CALORIES WITH SERIOUS COMORBIDITY AND BODY MASS INDEX (BMI) OF 40.0 TO 44.9 IN ADULT (MULTI): ICD-10-CM

## 2024-08-10 DIAGNOSIS — E53.1 VITAMIN B6 DEFICIENCY NEUROPATHY (MULTI): ICD-10-CM

## 2024-08-10 DIAGNOSIS — I10 PRIMARY HYPERTENSION: ICD-10-CM

## 2024-08-10 DIAGNOSIS — G63 VITAMIN B6 DEFICIENCY NEUROPATHY (MULTI): ICD-10-CM

## 2024-08-10 LAB
ALBUMIN SERPL BCP-MCNC: 4.2 G/DL (ref 3.4–5)
ALP SERPL-CCNC: 90 U/L (ref 33–136)
ALT SERPL W P-5'-P-CCNC: 17 U/L (ref 10–52)
AMPHETAMINES UR QL SCN: NORMAL
ANION GAP SERPL CALC-SCNC: 11 MMOL/L (ref 10–20)
AST SERPL W P-5'-P-CCNC: 14 U/L (ref 9–39)
BARBITURATES UR QL SCN: NORMAL
BASOPHILS # BLD AUTO: 0.04 X10*3/UL (ref 0–0.1)
BASOPHILS NFR BLD AUTO: 0.5 %
BENZODIAZ UR QL SCN: NORMAL
BILIRUB SERPL-MCNC: 0.4 MG/DL (ref 0–1.2)
BUN SERPL-MCNC: 19 MG/DL (ref 6–23)
BZE UR QL SCN: NORMAL
CALCIUM SERPL-MCNC: 8.7 MG/DL (ref 8.6–10.3)
CANNABINOIDS UR QL SCN: NORMAL
CHLORIDE SERPL-SCNC: 107 MMOL/L (ref 98–107)
CHOLEST SERPL-MCNC: 149 MG/DL (ref 0–199)
CHOLESTEROL/HDL RATIO: 2.9
CO2 SERPL-SCNC: 25 MMOL/L (ref 21–32)
CREAT SERPL-MCNC: 0.92 MG/DL (ref 0.5–1.3)
EGFRCR SERPLBLD CKD-EPI 2021: 90 ML/MIN/1.73M*2
EOSINOPHIL # BLD AUTO: 0.18 X10*3/UL (ref 0–0.7)
EOSINOPHIL NFR BLD AUTO: 2.1 %
ERYTHROCYTE [DISTWIDTH] IN BLOOD BY AUTOMATED COUNT: 14.7 % (ref 11.5–14.5)
FENTANYL+NORFENTANYL UR QL SCN: NORMAL
GLUCOSE SERPL-MCNC: 90 MG/DL (ref 74–99)
HCT VFR BLD AUTO: 44.5 % (ref 41–52)
HDLC SERPL-MCNC: 51 MG/DL
HGB BLD-MCNC: 14.6 G/DL (ref 13.5–17.5)
IMM GRANULOCYTES # BLD AUTO: 0.02 X10*3/UL (ref 0–0.7)
IMM GRANULOCYTES NFR BLD AUTO: 0.2 % (ref 0–0.9)
LDLC SERPL CALC-MCNC: 89 MG/DL
LYMPHOCYTES # BLD AUTO: 1.69 X10*3/UL (ref 1.2–4.8)
LYMPHOCYTES NFR BLD AUTO: 20 %
MAGNESIUM SERPL-MCNC: 2.12 MG/DL (ref 1.6–2.4)
MCH RBC QN AUTO: 27 PG (ref 26–34)
MCHC RBC AUTO-ENTMCNC: 32.8 G/DL (ref 32–36)
MCV RBC AUTO: 82 FL (ref 80–100)
METHADONE UR QL SCN: NORMAL
MONOCYTES # BLD AUTO: 0.78 X10*3/UL (ref 0.1–1)
MONOCYTES NFR BLD AUTO: 9.2 %
NEUTROPHILS # BLD AUTO: 5.75 X10*3/UL (ref 1.2–7.7)
NEUTROPHILS NFR BLD AUTO: 68 %
NON HDL CHOLESTEROL: 98 MG/DL (ref 0–149)
NRBC BLD-RTO: 0 /100 WBCS (ref 0–0)
OPIATES UR QL SCN: NORMAL
OXYCODONE+OXYMORPHONE UR QL SCN: NORMAL
PCP UR QL SCN: NORMAL
PLATELET # BLD AUTO: 230 X10*3/UL (ref 150–450)
POTASSIUM SERPL-SCNC: 4.1 MMOL/L (ref 3.5–5.3)
PROT SERPL-MCNC: 6.5 G/DL (ref 6.4–8.2)
RBC # BLD AUTO: 5.41 X10*6/UL (ref 4.5–5.9)
SODIUM SERPL-SCNC: 139 MMOL/L (ref 136–145)
TRIGL SERPL-MCNC: 44 MG/DL (ref 0–149)
TSH SERPL-ACNC: 2.52 MIU/L (ref 0.44–3.98)
VLDL: 9 MG/DL (ref 0–40)
WBC # BLD AUTO: 8.5 X10*3/UL (ref 4.4–11.3)

## 2024-08-10 PROCEDURE — 84207 ASSAY OF VITAMIN B-6: CPT

## 2024-08-10 PROCEDURE — 85025 COMPLETE CBC W/AUTO DIFF WBC: CPT

## 2024-08-10 PROCEDURE — 80053 COMPREHEN METABOLIC PANEL: CPT

## 2024-08-10 PROCEDURE — 36415 COLL VENOUS BLD VENIPUNCTURE: CPT

## 2024-08-10 PROCEDURE — 84443 ASSAY THYROID STIM HORMONE: CPT

## 2024-08-10 PROCEDURE — 80307 DRUG TEST PRSMV CHEM ANLYZR: CPT

## 2024-08-10 PROCEDURE — 83735 ASSAY OF MAGNESIUM: CPT

## 2024-08-10 PROCEDURE — 80061 LIPID PANEL: CPT

## 2024-08-14 ENCOUNTER — APPOINTMENT (OUTPATIENT)
Dept: PRIMARY CARE | Facility: CLINIC | Age: 69
End: 2024-08-14
Payer: COMMERCIAL

## 2024-08-14 VITALS
WEIGHT: 287.6 LBS | HEIGHT: 68 IN | RESPIRATION RATE: 16 BRPM | BODY MASS INDEX: 43.59 KG/M2 | OXYGEN SATURATION: 96 % | DIASTOLIC BLOOD PRESSURE: 63 MMHG | SYSTOLIC BLOOD PRESSURE: 118 MMHG | HEART RATE: 85 BPM | TEMPERATURE: 97.8 F

## 2024-08-14 DIAGNOSIS — I10 PRIMARY HYPERTENSION: Primary | ICD-10-CM

## 2024-08-14 DIAGNOSIS — E66.01 CLASS 3 SEVERE OBESITY DUE TO EXCESS CALORIES WITH SERIOUS COMORBIDITY AND BODY MASS INDEX (BMI) OF 40.0 TO 44.9 IN ADULT (MULTI): ICD-10-CM

## 2024-08-14 DIAGNOSIS — E55.9 VITAMIN D DEFICIENCY: ICD-10-CM

## 2024-08-14 DIAGNOSIS — E53.1 VITAMIN B6 DEFICIENCY NEUROPATHY (MULTI): ICD-10-CM

## 2024-08-14 DIAGNOSIS — F41.9 ANXIETY: ICD-10-CM

## 2024-08-14 DIAGNOSIS — G63 VITAMIN B6 DEFICIENCY NEUROPATHY (MULTI): ICD-10-CM

## 2024-08-14 DIAGNOSIS — Z79.899 MEDICATION MANAGEMENT: ICD-10-CM

## 2024-08-14 LAB — PYRIDOXAL PHOS SERPL-SCNC: 116.8 NMOL/L (ref 20–125)

## 2024-08-14 PROCEDURE — 99214 OFFICE O/P EST MOD 30 MIN: CPT | Performed by: FAMILY MEDICINE

## 2024-08-14 PROCEDURE — 3078F DIAST BP <80 MM HG: CPT | Performed by: FAMILY MEDICINE

## 2024-08-14 PROCEDURE — 1159F MED LIST DOCD IN RCRD: CPT | Performed by: FAMILY MEDICINE

## 2024-08-14 PROCEDURE — 3008F BODY MASS INDEX DOCD: CPT | Performed by: FAMILY MEDICINE

## 2024-08-14 PROCEDURE — 3074F SYST BP LT 130 MM HG: CPT | Performed by: FAMILY MEDICINE

## 2024-08-14 PROCEDURE — 1036F TOBACCO NON-USER: CPT | Performed by: FAMILY MEDICINE

## 2024-08-14 RX ORDER — CLONAZEPAM 1 MG/1
1 TABLET ORAL DAILY PRN
Qty: 30 TABLET | Refills: 2 | Status: SHIPPED | OUTPATIENT
Start: 2024-08-14 | End: 2024-11-12

## 2024-08-14 RX ORDER — RABEPRAZOLE SODIUM 20 MG/1
TABLET, DELAYED RELEASE ORAL
COMMUNITY
Start: 2024-08-12

## 2024-08-14 ASSESSMENT — ENCOUNTER SYMPTOMS
HYPERTENSION: 1
FLANK PAIN: 0
NECK PAIN: 1
ABDOMINAL DISTENTION: 0
ADENOPATHY: 0
DIARRHEA: 0
CHOKING: 0
SEIZURES: 0
PHOTOPHOBIA: 0
JOINT SWELLING: 0
BACK PAIN: 0
ABDOMINAL PAIN: 0
VOMITING: 0
ACTIVITY CHANGE: 0
CHEST TIGHTNESS: 0
COUGH: 0
CHILLS: 0
UNEXPECTED WEIGHT CHANGE: 0
POLYDIPSIA: 0
SPEECH DIFFICULTY: 0
NECK STIFFNESS: 1
FACIAL ASYMMETRY: 0
FEVER: 0
TROUBLE SWALLOWING: 0
LIGHT-HEADEDNESS: 0
DIAPHORESIS: 0
NAUSEA: 0
EYE ITCHING: 0
RHINORRHEA: 0
CONSTIPATION: 0
HEADACHES: 0
PALPITATIONS: 0
NERVOUS/ANXIOUS: 0
CONFUSION: 0
DIZZINESS: 0
VOICE CHANGE: 0
TREMORS: 0
EYE DISCHARGE: 0
SLEEP DISTURBANCE: 0
DYSPHORIC MOOD: 0
DYSURIA: 0
SINUS PRESSURE: 0
SHORTNESS OF BREATH: 0
BRUISES/BLEEDS EASILY: 0
ARTHRALGIAS: 0
MYALGIAS: 0
EYE PAIN: 0
EYE REDNESS: 0
BLOOD IN STOOL: 0
AGITATION: 0
FREQUENCY: 0
APPETITE CHANGE: 0
NUMBNESS: 0
HEMATURIA: 0
FATIGUE: 1
HALLUCINATIONS: 0
WHEEZING: 0
WOUND: 0
WEAKNESS: 0
SORE THROAT: 0

## 2024-08-14 NOTE — PROGRESS NOTES
Subjective   Patient ID: Jarocho Langford is a 69 y.o. male who presents for Med Management (CSM: Klonopin).    OARRS:  No data recorded  I have personally reviewed the OARRS report for Jarocho Langford. I have considered the risks of abuse, dependence, addiction and diversion and I believe that it is clinically appropriate for Jarocho Langford to be prescribed this medication    Is the patient prescribed a combination of a benzodiazepine and opioid?  No    Last Urine Drug Screen / ordered today: No  Recent Results (from the past 8760 hour(s))  -Drug Screen, Urine With Reflex to Confirmation:   Collection Time: 08/10/24 10:24 AM       Result                      Value             Ref Range           Amphetamine Screen, Ur*                       Presumptive *   Presumptive Negative       Barbiturate Screen, Ur*                       Presumptive *   Presumptive Negative       Benzodiazepines Screen*                       Presumptive *   Presumptive Negative       Cannabinoid Screen, Ur*                       Presumptive *   Presumptive Negative       Cocaine Metabolite Scr*                       Presumptive *   Presumptive Negative       Fentanyl Screen, Urine                        Presumptive *   Presumptive Negative       Opiate Screen, Urine                          Presumptive *   Presumptive Negative       Oxycodone Screen, Urine                       Presumptive *   Presumptive Negative       PCP Screen, Urine                             Presumptive *   Presumptive Negative       Methadone Screen, Urine                       Presumptive *   Presumptive Negative  -Confirmation Opiate/Opioid/Benzo Prescription Compliance:   Collection Time: 11/04/23 11:05 AM       Result                      Value             Ref Range           Clonazepam                  <25               <25 ng/mL           7-Aminoclonazepam           141 (H)           <25 ng/mL           Alprazolam                  <25               <25 ng/mL            Alpha-Hydroxyalprazolam     <25               <25 ng/mL           Midazolam                   <25               <25 ng/mL           Alpha-Hydroxymidazolam      <25               <25 ng/mL           Chlordiazepoxide            <25               <25 ng/mL           Diazepam                    <25               <25 ng/mL           Nordiazepam                 <25               <25 ng/mL           Temazepam                   <25               <25 ng/mL           Oxazepam                    <25               <25 ng/mL           Lorazepam                   <25               <25 ng/mL           Methadone                   <25               <25 ng/mL           EDDP                        <25               <25 ng/mL           6-Acetylmorphine            <25               <25 ng/mL           Codeine                     <50               <50 ng/mL           Hydrocodone                 <25               <25 ng/mL           Hydromorphone               <25               <25 ng/mL           Morphine                    <50               <50 ng/mL           Norhydrocodone              <25               <25 ng/mL           Noroxycodone                <25               <25 ng/mL           Oxycodone                   <25               <25 ng/mL           Oxymorphone                 <25               <25 ng/mL           Fentanyl                    <2.5              <2.5 ng/mL          Norfentanyl                 <2.5              <2.5 ng/mL          Tramadol                    <50               <50 ng/mL           O-Desmethyltramadol         <50               <50 ng/mL           Zolpidem                    <25               <25 ng/mL           Zolpidem Metabolite (Z*     <25               <25 ng/mL      -Screen Opiate/Opioid/Benzo Prescription Compliance:   Collection Time: 11/04/23 11:05 AM       Result                      Value             Ref Range           Creatinine, Urine Rand*     79.0              20.0 - 370.0*        Amphetamine Screen, Ur*                       Presumptive *   Presumptive Negative       Barbiturate Screen, Ur*                       Presumptive *   Presumptive Negative       Cannabinoid Screen, Ur*                       Presumptive *   Presumptive Negative       Cocaine Metabolite Scr*                       Presumptive *   Presumptive Negative       PCP Screen, Urine                             Presumptive *   Presumptive Negative  Results are as expected.         Controlled Substance Agreement:  Date of the Last Agreement: Today  Reviewed Controlled Substance Agreement including but not limited to the benefits, risks, and alternatives to treatment with a Controlled Substance medication(s).    Benzodiazepines:  What is the patient's goal of therapy?  Reduction of anxiety  Is this being achieved with current treatment?  Yes    YENNY-7:  No data recorded    Activities of Daily Living:   Is your overall impression that this patient is benefiting (symptom reduction outweighs side effects) from benzodiazepine therapy? Yes     1. Physical Functioning: Same  2. Family Relationship: Same  3. Social Relationship: Same  4. Mood: Same  5. Sleep Patterns: Same  6. Overall Function: Same      Anxiety  Presents for follow-up visit. Patient reports no chest pain, confusion, dizziness, nausea, nervous/anxious behavior, palpitations, shortness of breath or suicidal ideas. The quality of sleep is good.       Hypertension  This is a chronic problem. The current episode started more than 1 year ago. The problem is unchanged. The problem is controlled. Associated symptoms include anxiety, malaise/fatigue, neck pain and peripheral edema. Pertinent negatives include no chest pain, headaches, palpitations or shortness of breath. Agents associated with hypertension include NSAIDs. Risk factors for coronary artery disease include dyslipidemia, male gender and obesity. Past treatments include calcium channel blockers, ACE inhibitors and  "diuretics. The current treatment provides significant improvement. There are no compliance problems.         Review of Systems   Constitutional:  Positive for fatigue and malaise/fatigue. Negative for activity change, appetite change, chills, diaphoresis, fever and unexpected weight change.   HENT:  Negative for congestion, ear pain, hearing loss, nosebleeds, postnasal drip, rhinorrhea, sinus pressure, sneezing, sore throat, tinnitus, trouble swallowing and voice change.    Eyes:  Negative for photophobia, pain, discharge, redness, itching and visual disturbance.   Respiratory:  Negative for cough, choking, chest tightness, shortness of breath and wheezing.    Cardiovascular:  Positive for leg swelling. Negative for chest pain and palpitations.   Gastrointestinal:  Negative for abdominal distention, abdominal pain, blood in stool, constipation, diarrhea, nausea and vomiting.   Endocrine: Negative for cold intolerance, heat intolerance, polydipsia and polyuria.   Genitourinary:  Negative for dysuria, flank pain, frequency, hematuria and urgency.   Musculoskeletal:  Positive for neck pain and neck stiffness. Negative for arthralgias, back pain, joint swelling and myalgias.   Skin:  Negative for rash and wound.   Allergic/Immunologic: Negative for immunocompromised state.   Neurological:  Negative for dizziness, tremors, seizures, syncope, facial asymmetry, speech difficulty, weakness, light-headedness, numbness and headaches.   Hematological:  Negative for adenopathy. Does not bruise/bleed easily.   Psychiatric/Behavioral:  Negative for agitation, behavioral problems, confusion, dysphoric mood, hallucinations, self-injury, sleep disturbance and suicidal ideas. The patient is not nervous/anxious.        Objective   /63 (BP Location: Right arm, Patient Position: Sitting, BP Cuff Size: Large adult)   Pulse 85   Temp 36.6 °C (97.8 °F) (Temporal)   Resp 16   Ht 1.727 m (5' 8\")   Wt 130 kg (287 lb 9.6 oz)   SpO2 " 96%   BMI 43.73 kg/m²     Physical Exam  Constitutional:       General: He is not in acute distress.     Appearance: He is not ill-appearing or diaphoretic.   HENT:      Head: Normocephalic and atraumatic.      Right Ear: External ear normal.      Left Ear: External ear normal.      Nose: Nose normal. No rhinorrhea.   Eyes:      General: Lids are normal. No scleral icterus.        Right eye: No discharge.         Left eye: No discharge.      Conjunctiva/sclera: Conjunctivae normal.   Cardiovascular:      Rate and Rhythm: Normal rate and regular rhythm.      Pulses: Normal pulses.      Heart sounds: No murmur heard.  Pulmonary:      Effort: Pulmonary effort is normal. No respiratory distress.      Breath sounds: No decreased breath sounds, wheezing, rhonchi or rales.   Abdominal:      General: Bowel sounds are normal. There is no distension.      Palpations: Abdomen is soft. There is no mass.      Tenderness: There is no abdominal tenderness. There is no guarding or rebound.   Musculoskeletal:         General: No swelling, tenderness or deformity.      Cervical back: No rigidity or tenderness.      Right lower leg: No edema.      Left lower leg: No edema.   Lymphadenopathy:      Cervical: No cervical adenopathy.      Upper Body:      Right upper body: No supraclavicular adenopathy.      Left upper body: No supraclavicular adenopathy.   Skin:     General: Skin is warm and dry.      Coloration: Skin is not jaundiced or pale.      Findings: No erythema, lesion or rash.   Neurological:      General: No focal deficit present.      Mental Status: He is alert and oriented to person, place, and time.      Sensory: No sensory deficit.      Motor: No weakness or tremor.      Coordination: Coordination normal.      Gait: Gait normal.   Psychiatric:         Mood and Affect: Mood normal. Affect is not inappropriate.         Behavior: Behavior normal.         Assessment/Plan   Diagnoses and all orders for this visit:  Primary  hypertension  -     clonazePAM (KlonoPIN) 1 mg tablet; Take 1 tablet (1 mg) by mouth once daily as needed for anxiety.  -     Lipid Panel; Future  -     Comprehensive Metabolic Panel; Future  -     CBC and Auto Differential; Future  -     Magnesium; Future  -     TSH with reflex to Free T4 if abnormal; Future  -     Vitamin B12; Future  -     Vitamin D 25-Hydroxy,Total (for eval of Vitamin D levels); Future  -     Vitamin B6; Future  -     Follow Up In [facility]; Future  Anxiety  -     clonazePAM (KlonoPIN) 1 mg tablet; Take 1 tablet (1 mg) by mouth once daily as needed for anxiety.  -     Drug Screen, Urine With Reflex to Confirmation; Future  -     Follow Up In [facility]; Future  -     Follow Up In [facility]; Future  Medication management  -     clonazePAM (KlonoPIN) 1 mg tablet; Take 1 tablet (1 mg) by mouth once daily as needed for anxiety.  -     Drug Screen, Urine With Reflex to Confirmation; Future  -     Follow Up In [facility]; Future  -     Follow Up In [facility]; Future  Class 3 severe obesity due to excess calories with serious comorbidity and body mass index (BMI) of 40.0 to 44.9 in adult (Multi)  -     semaglutide 0.25 mg or 0.5 mg (2 mg/3 mL) pen injector; Inject 0.5 mg under the skin 1 (one) time per week.  -     Lipid Panel; Future  -     Comprehensive Metabolic Panel; Future  -     CBC and Auto Differential; Future  -     Magnesium; Future  -     TSH with reflex to Free T4 if abnormal; Future  -     Vitamin B12; Future  -     Vitamin D 25-Hydroxy,Total (for eval of Vitamin D levels); Future  -     Vitamin B6; Future  -     Follow Up In [facility]; Future  Vitamin D deficiency  -     Vitamin D 25-Hydroxy,Total (for eval of Vitamin D levels); Future  -     Follow [facility]; Future  Vitamin B6 deficiency neuropathy (Multi)  -     Vitamin B12; Future  -     Vitamin B6; Future  -     Follow Up In [facility] - [facility]; Future

## 2024-08-28 ENCOUNTER — TELEPHONE (OUTPATIENT)
Dept: OTOLARYNGOLOGY | Facility: CLINIC | Age: 69
End: 2024-08-28
Payer: COMMERCIAL

## 2024-08-28 DIAGNOSIS — J32.8 OTHER CHRONIC SINUSITIS: ICD-10-CM

## 2024-08-28 NOTE — TELEPHONE ENCOUNTER
Patient contacted the office with symptoms of increased nasal congestion with thick yellow discharge for 1 week. He states that twice a day when blowing his nose, he gets discharge that looks like skin, but is a thick piece of mucous. He denies any pain, pressure, fever, or cough. He is currently using Mometasone nasal irrigations BID. Discussed with Dr. Lovell who placed a verbal order for Augmentin 875 mg - 125 mg PO BID x 10 days. Medication education provided to patient, advised patient to take medication after meals, encouraged a daily probiotic and advised to discontinue medications if adverse reactions.  Patient agrees to plan of care and prescription sent to patient pharmacy.

## 2024-08-29 RX ORDER — AMOXICILLIN AND CLAVULANATE POTASSIUM 875; 125 MG/1; MG/1
1 TABLET, FILM COATED ORAL 2 TIMES DAILY
Qty: 20 TABLET | Refills: 0 | Status: SHIPPED | OUTPATIENT
Start: 2024-08-29 | End: 2024-09-08

## 2024-09-09 DIAGNOSIS — F32.5 DEPRESSION, MAJOR, IN REMISSION (CMS-HCC): ICD-10-CM

## 2024-09-09 RX ORDER — BUPROPION HYDROCHLORIDE 150 MG/1
150 TABLET, EXTENDED RELEASE ORAL 2 TIMES DAILY
Qty: 180 TABLET | Refills: 3 | Status: SHIPPED | OUTPATIENT
Start: 2024-09-09

## 2024-09-12 ENCOUNTER — OFFICE VISIT (OUTPATIENT)
Facility: CLINIC | Age: 69
End: 2024-09-12
Payer: COMMERCIAL

## 2024-09-12 VITALS
HEART RATE: 86 BPM | HEIGHT: 68 IN | TEMPERATURE: 98.6 F | DIASTOLIC BLOOD PRESSURE: 78 MMHG | SYSTOLIC BLOOD PRESSURE: 130 MMHG | BODY MASS INDEX: 43.5 KG/M2 | WEIGHT: 287 LBS

## 2024-09-12 DIAGNOSIS — G89.29 CHRONIC BILATERAL LOW BACK PAIN WITHOUT SCIATICA: ICD-10-CM

## 2024-09-12 DIAGNOSIS — M54.50 CHRONIC BILATERAL LOW BACK PAIN WITHOUT SCIATICA: ICD-10-CM

## 2024-09-12 DIAGNOSIS — M54.16 LUMBAR RADICULOPATHY: Primary | ICD-10-CM

## 2024-09-12 ASSESSMENT — PATIENT HEALTH QUESTIONNAIRE - PHQ9
SUM OF ALL RESPONSES TO PHQ9 QUESTIONS 1 & 2: 0
1. LITTLE INTEREST OR PLEASURE IN DOING THINGS: NOT AT ALL
2. FEELING DOWN, DEPRESSED OR HOPELESS: NOT AT ALL

## 2024-09-12 ASSESSMENT — PAIN SCALES - GENERAL: PAINLEVEL: 7

## 2024-09-12 NOTE — PROGRESS NOTES
[facility] [place]  Department of Neurological Surgery  Established Patient Visit    History of Present Illness  Jarocho Langford is a 69 y.o. year old male who presents to the spine clinic in follow up with lower back and thigh pain. Patient has a history of three-level anterior cervical discectomy and fusion with plating he presents with L4 radiculopathy and worsening back pain. He was last seen in clinic 08/2023 where we discussed the patients need for weight loss for continuing surgical options. Previously we discussed a minimally invasive transforaminal lumbar interbody fusion as last resort if his symptoms do not improve after weight loss. He is here today after losing weight and wishes to discuss surgical options. He states the pain has gotten worse in his anterior thighs. 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 He states he is struggling to exercise to lose weight due to the pain. He has tried aerobic exercise which he states has been helpful and he enjoys.       Patient's BMI is Body mass index is 43.64 kg/m².    14/14 systems reviewed and negative other than what is listed in the history of present illness    Patient Active Problem List   Diagnosis    Anxiety    Benign prostatic hyperplasia (BPH) with urinary urgency    GERD (gastroesophageal reflux disease)    HTN (hypertension)    Vitamin D deficiency    Asthma ([facility])    Depression, major, in remission ([facility])    Lumbar radiculopathy    RAISSA positive    RUSH (obstructive sleep apnea)    Venous stasis dermatitis of lower extremity    Venous insufficiency    Vitamin B6 deficiency neuropathy (Multi)    Cervical stenosis of spine    Balance problem    Body, loose, knee    Dysphagia    Hyperreflexia    Hypertrophy of nasal turbinates    PND (post-nasal drip)    Class 3 severe obesity due to excess calories  with serious comorbidity and body mass index (BMI) of 40.0 to 44.9 in adult (Multi)    Nasal congestion    Chronic pansinusitis    Altered taste    Chronic ethmoidal sinusitis    Chronic sphenoidal sinusitis     Past Medical History:   Diagnosis Date    Acute gastritis without bleeding 02/18/2020    Acute gastritis without hemorrhage, unspecified gastritis type    Anxiety     Arthritis     BPH (benign prostatic hyperplasia)     Chronic ethmoidal sinusitis 12/10/2020    Chronic ethmoidal sinusitis    Chronic frontal sinusitis 10/22/2020    Chronic frontal sinusitis    Chronic rhinitis 10/23/2020    Chronic rhinosinusitis    COVID-19 10/14/2022    Positive self-administered antigen test for COVID-19    DVT (deep venous thrombosis) (Multi) 09/27/2023    Fever, unspecified 11/12/2019    Fever and chills    GERD (gastroesophageal reflux disease)     Hypertension     Hypertrophy of nasal turbinates 12/10/2020    Nasal turbinate hypertrophy    Lumbago with sciatica, right side 05/07/2019    Chronic right-sided low back pain with right-sided sciatica    Nasal congestion 10/14/2022    Nasal congestion with rhinorrhea    Other chest pain 07/28/2021    Chest discomfort    Other conditions influencing health status 01/20/2020    History of cough    Other specified disorders of nose and nasal sinuses 12/08/2017    Nasal vestibulitis    Personal history of other benign neoplasm 07/21/2017    History of other benign neoplasm    Personal history of other diseases of the digestive system 02/18/2020    History of acute gastritis    Personal history of other diseases of the musculoskeletal system and connective tissue 09/04/2019    History of muscle weakness    Personal history of other diseases of the respiratory system     History of chronic sinusitis    Personal history of other diseases of the respiratory system 12/08/2017    History of chronic rhinitis    Personal history of other diseases of the respiratory system 04/19/2022     History of sinusitis    Personal history of other diseases of the respiratory system 12/10/2020    History of chronic rhinitis    Personal history of other diseases of the respiratory system 02/06/2020    History of acute sinusitis    Personal history of other specified conditions 01/12/2021    History of diarrhea    Personal history of other specified conditions 12/08/2017    History of epistaxis    Personal history of other specified conditions 08/25/2021    History of chronic fatigue    Personal history of other specified conditions 08/25/2021    History of dysphagia    Personal history of urinary (tract) infections 07/28/2021    History of urinary tract infection    Pneumonia, unspecified organism 11/22/2019    Pneumonia of right lower lobe due to infectious organism    Shortness of breath 10/14/2022    Shortness of breath on exertion    Sleep apnea     does not use CPAP sleeps in recliner chair    Status post cervical spinal fusion 09/27/2023    Tear of lateral meniscus of knee 03/07/2017     Past Surgical History:   Procedure Laterality Date    CHOLECYSTECTOMY      OTHER SURGICAL HISTORY  05/07/2019    Ureteral stent placement    OTHER SURGICAL HISTORY  02/11/2020    Colonoscopy    SPINAL FUSION       Social History     Tobacco Use    Smoking status: Never    Smokeless tobacco: Never   Substance Use Topics    Alcohol use: Not Currently     Comment: SOCIAL DRINKER     family history includes Hypertension in his mother; No Known Problems in his father, maternal grandfather, maternal grandmother, paternal grandfather, and paternal grandmother.    Current Outpatient Medications:     acetaminophen (Tylenol) 500 mg tablet, Take 2 tablets (1,000 mg) by mouth every 8 hours if needed for mild pain (1 - 3)., Disp: 90 tablet, Rfl: 0    amLODIPine-benazepriL (Lotrel) 5-10 mg capsule, TAKE 1 CAPSULE DAILY, Disp: 90 capsule, Rfl: 3    buPROPion SR (Wellbutrin SR) 150 mg 12 hr tablet, TAKE 1 TABLET TWICE A DAY, Disp: 180  tablet, Rfl: 3    clonazePAM (KlonoPIN) 1 mg tablet, Take 1 tablet (1 mg) by mouth once daily as needed for anxiety., Disp: 30 tablet, Rfl: 2    Dexilant 60 mg DR capsule, Take by mouth once daily., Disp: , Rfl:     dicyclomine (Bentyl) 20 mg tablet, Take 1 tablet (20 mg) by mouth every 6 hours., Disp: , Rfl:     dilTIAZem XR (Dilacor XR) 180 mg 24 hr capsule, TAKE 1 CAPSULE DAILY, Disp: 90 capsule, Rfl: 3    dutasteride-tamsulosin 0.5-0.4 mg capsule, ER multiphase 24 hr, TAKE 1 CAPSULE DAILY, Disp: 90 capsule, Rfl: 3    fluticasone (Flonase) 50 mcg/actuation nasal spray, Administer into affected nostril(s) twice a day., Disp: , Rfl:     meloxicam (Mobic) 15 mg tablet, TAKE 1 TABLET DAILY, Disp: 90 tablet, Rfl: 3    oxybutynin XL (Ditropan-XL) 10 mg 24 hr tablet, TAKE 1 TABLET DAILY, Disp: 90 tablet, Rfl: 3    pyridoxine (Vitamin B-6) 100 mg tablet, TAKE ONE TABLET BY MOUTH ONCE A DAY AS DIRECTED, Disp: 90 tablet, Rfl: 3    RABEprazole (Aciphex) EC tablet, , Disp: , Rfl:     semaglutide 0.25 mg or 0.5 mg (2 mg/3 mL) pen injector, Inject 0.5 mg under the skin 1 (one) time per week., Disp: 3 mL, Rfl: 5    venlafaxine XR (Effexor-XR) 75 mg 24 hr capsule, TAKE 2 CAPSULES DAILY, Disp: 180 capsule, Rfl: 3    Ventolin HFA 90 mcg/actuation inhaler, INHALE 1 TO 2 PUFFS EVERY 4 TO 6 HOURS AS NEEDED, Disp: , Rfl:     Vitamin D3 50 mcg (2,000 unit) capsule, TAKE ONE CAPSULE BY MOUTH EVERY DAY, Disp: 90 capsule, Rfl: 3    mometasone furoate, bulk, 100 % powder, Compound 2 mg capsule to be added to sinus rinse twice daily. (Patient not taking: Reported on 9/12/2024), Disp: 180 g, Rfl: 3    oxymetazoline 0.05 % nasal spray, Administer 2 sprays into each nostril every 12 hours if needed (nose bleeds) for up to 3 days. Do not use for more than 3 days., Disp: 15 mL, Rfl: 0    triamcinolone (Kenalog) 0.1 % cream, APPLY A THIN LAYER TO AFFECTED AREA(S) TWICE DAILY AS NEEDED. (Patient not taking: Reported on 9/12/2024), Disp: 454 g,  Rfl: 11  No Known Allergies    Physical Examination:    General: Well developed, awake/alert/oriented x3, no distress, alert and cooperative  Skin: Warm and dry, no lesions, no rashes  ENMT: Mucous membranes moist, no apparent injury, no lesions seen  Head/Neck: Neck Supple, no apparent injury  Respiratory/Thorax: Normal breath sounds with good chest expansion, thorax symmetric  Cardiovascular: No pitting edema, no JVD  Pain is present in anterior thighs.     Motor Strength: 5/5 Throughout all extremities    Muscle Bulk: Normal and symmetric in all extremities    Posture:   -- Cervical: Normal  -- Thoracic: Normal  -- Lumbar : Normal  Paraspinal muscle spasm/tenderness absent.     Sensation: intact to light touch      Results:  I personally reviewed and interpreted the imaging results.  MR Lumbar Spine 07/2023:  Mild multilevel degenerative changes of the lumbar spine most   prominent at L2-3 and L3-4 with mild spinal canal narrowing. No   high-grade canal or foraminal stenosis.     Assessment and Plan:      Jarocho Langford is a 69 y.o. year old male who presents to the spine clinic in follow up with lower back and thigh pain. Patient has a history of three-level anterior cervical discectomy and fusion with plating he presents with L4 radiculopathy and worsening back pain. He was last seen in clinic 08/2023 where we discussed the patients need for weight loss for continuing surgical options. Previously we discussed a minimally invasive transforaminal lumbar interbody fusion as last resort if his symptoms do not improve after weight loss. He is here today after losing weight and wishes to discuss surgical options. Order for an XR lumbar put in today. Order for an EMG.  Discussed continued weight loss of 30-40 pounds for future surgery options. Will reach out to patient with results of his orders. Follow up with patient in 3-4 months for further evaluation.       I have reviewed all prior documentation and reviewed  the electronic medical record since admission. I have personally have reviewed all advanced imaging not just the reports and used my interpretation as documented as the relevant findings. I have reviewed the risks and benefits of all treatment recommendations listed in this note with the patient and family. I spent a total of 30 minutes in service to this patient's care during this date of service.      The above clinical summary has been dictated with voice recognition software. It has not been proofread for grammatical errors, typographical mistakes, or other semantic inconsistencies.    Thank you for visiting our office today. It was our pleasure to take part in your healthcare.     Do not hesitate to call with any questions regarding your plan of care after leaving at [phone] M-F 8am-4pm.     To clinicians, thank you very much for this kind referral. It is a privilege to partner with you in the care of your patients. My office would be delighted to assist you with any further consultations or with questions regarding the plan of care outlined. Do not hesitate to call the office or contact me directly.       Sincerely,      Scotty Moya MD, [facility]  Spine , [facility]  Dustin Patle Chair in Spinal Neurosurgery  Complex Spine Surgery Fellowship Director   of Neurological Surgery  [facility] [employer]  Phone: [phone]  Fax: [phone]         Scribe Attestation  By signing my name below, I, Tomasz James   attest that this documentation has been prepared under the direction and in the presence of Scotty Moya MD.

## 2024-09-14 ENCOUNTER — HOSPITAL ENCOUNTER (OUTPATIENT)
Dept: RADIOLOGY | Facility: HOSPITAL | Age: 69
Discharge: HOME | End: 2024-09-14
Payer: COMMERCIAL

## 2024-09-14 DIAGNOSIS — M54.16 LUMBAR RADICULOPATHY: ICD-10-CM

## 2024-09-14 PROCEDURE — 72110 X-RAY EXAM L-2 SPINE 4/>VWS: CPT | Performed by: RADIOLOGY

## 2024-09-14 PROCEDURE — 72120 X-RAY BEND ONLY L-S SPINE: CPT

## 2024-09-23 DIAGNOSIS — R39.15 BENIGN PROSTATIC HYPERPLASIA (BPH) WITH URINARY URGENCY: ICD-10-CM

## 2024-09-23 DIAGNOSIS — N40.1 BENIGN PROSTATIC HYPERPLASIA (BPH) WITH URINARY URGENCY: ICD-10-CM

## 2024-09-23 RX ORDER — DUTASTERIDE AND TAMSULOSIN HYDROCHLORIDE CAPSULES .5; .4 MG/1; MG/1
1 CAPSULE ORAL DAILY
Qty: 90 CAPSULE | Refills: 3 | Status: SHIPPED | OUTPATIENT
Start: 2024-09-23

## 2024-09-27 ENCOUNTER — HOSPITAL ENCOUNTER (OUTPATIENT)
Dept: RADIOLOGY | Facility: HOSPITAL | Age: 69
Discharge: HOME | End: 2024-09-27
Payer: COMMERCIAL

## 2024-09-27 DIAGNOSIS — M54.50 CHRONIC BILATERAL LOW BACK PAIN WITHOUT SCIATICA: ICD-10-CM

## 2024-09-27 DIAGNOSIS — G89.29 CHRONIC BILATERAL LOW BACK PAIN WITHOUT SCIATICA: ICD-10-CM

## 2024-09-27 PROCEDURE — 72148 MRI LUMBAR SPINE W/O DYE: CPT

## 2024-10-02 ENCOUNTER — TELEPHONE (OUTPATIENT)
Dept: NEUROSURGERY | Facility: CLINIC | Age: 69
End: 2024-10-02
Payer: COMMERCIAL

## 2024-10-02 NOTE — TELEPHONE ENCOUNTER
----- Message from Scotty Moya sent at 10/2/2024  9:22 AM EDT -----  Regarding: Results Review  MRI as expected, if continues with weight loss we can discuss moving forward with surgery  ----- Message -----  From: Interface, Radiology Results In  Sent: 9/30/2024   9:26 AM EDT  To: Scotty Moya MD

## 2024-10-19 NOTE — PROGRESS NOTES
Sinus & Skull Base Surgery    Chief Complaint:  1. Chronic sinusitis s/p bilateral sinus surgery 3/22/24  2. Nasal airway obstruction with history of septoplasty s/p inferior turbinate reductions 3/22/24   3. Rhinorrhea  4. Asthma   5. Heartburn on proton pump inhibitor   6. Taste disturbance    History Of Present Illness:    Jarocho Langford presents since last being seen July 24, 2024.     He continues to get intermittent crusting from his nose but this is improved from when we last saw one another.  He can get congested bilaterally.  Over the last 5 days he has had some right sided nasal bleeding.    In late August 2024 he contacted my office with an exacerbation of symptoms and was prescribed 10 days of Augmentin.    Main Symptoms:  Patient does not have anterior nasal drainage.  Patient has posterior nasal drainage.  Very thick.  Patient has nasal airway obstruction.  Bilateral  Patient does not have facial pain.  Patient does not have facial pressure.  Patient does not have decreased sense of smell.  Associated Symptoms:  Patient has headaches.  Intermittent but improved since surgery.  Patient does not have throat clearing.  Patient does not have coughing.  Patient does not have dysphonia.  Patient has nasal bleeding.  Right-sided epistaxis over the last 5 days.    Medications currently on for sinonasal symptoms:  Mometasone rinses BID    Active Problems:  Patient Active Problem List   Diagnosis    Anxiety    Benign prostatic hyperplasia (BPH) with urinary urgency    GERD (gastroesophageal reflux disease)    HTN (hypertension)    Vitamin D deficiency    Asthma    Depression, major, in remission ([facility])    Lumbar radiculopathy    RAISSA positive    RUSH (obstructive sleep apnea)    Venous stasis dermatitis of lower extremity    Venous insufficiency    Vitamin B6 deficiency neuropathy (Multi)    Cervical stenosis of spine    Balance problem    Body, loose, knee    Dysphagia    Hyperreflexia     Hypertrophy of nasal turbinates    PND (post-nasal drip)    Class 3 severe obesity due to excess calories with serious comorbidity and body mass index (BMI) of 40.0 to 44.9 in adult    Nasal congestion    Chronic pansinusitis    Altered taste    Chronic ethmoidal sinusitis    Chronic sphenoidal sinusitis     Past Medical History:  He has a past medical history of Acute gastritis without bleeding (02/18/2020), Anxiety, Arthritis, BPH (benign prostatic hyperplasia), Chronic ethmoidal sinusitis (12/10/2020), Chronic frontal sinusitis (10/22/2020), Chronic rhinitis (10/23/2020), COVID-19 (10/14/2022), DVT (deep venous thrombosis) (Multi) (09/27/2023), Fever, unspecified (11/12/2019), GERD (gastroesophageal reflux disease), Hypertension, Hypertrophy of nasal turbinates (12/10/2020), Lumbago with sciatica, right side (05/07/2019), Nasal congestion (10/14/2022), Other chest pain (07/28/2021), Other conditions influencing health status (01/20/2020), Other specified disorders of nose and nasal sinuses (12/08/2017), Personal history of other benign neoplasm (07/21/2017), Personal history of other diseases of the digestive system (02/18/2020), Personal history of other diseases of the musculoskeletal system and connective tissue (09/04/2019), Personal history of other diseases of the respiratory system, Personal history of other diseases of the respiratory system (12/08/2017), Personal history of other diseases of the respiratory system (04/19/2022), Personal history of other diseases of the respiratory system (12/10/2020), Personal history of other diseases of the respiratory system (02/06/2020), Personal history of other specified conditions (01/12/2021), Personal history of other specified conditions (12/08/2017), Personal history of other specified conditions (08/25/2021), Personal history of other specified conditions (08/25/2021), Personal history of urinary (tract) infections (07/28/2021), Pneumonia, unspecified  organism (11/22/2019), Shortness of breath (10/14/2022), Sleep apnea, Status post cervical spinal fusion (09/27/2023), and Tear of lateral meniscus of knee (03/07/2017).    Surgical History:  He has a past surgical history that includes Other surgical history (05/07/2019); Other surgical history (02/11/2020); Cholecystectomy; and Spinal fusion.    Family History:  Family History   Problem Relation Name Age of Onset    Hypertension Mother PITER     No Known Problems Father      No Known Problems Maternal Grandmother      No Known Problems Maternal Grandfather      No Known Problems Paternal Grandmother      No Known Problems Paternal Grandfather       Social History:  He reports that he has never smoked. He has never been exposed to tobacco smoke. He has never used smokeless tobacco. He reports that he does not currently use alcohol. He reports that he does not use drugs.    Allergies:  Patient has no known allergies.    Current Meds:  Current Outpatient Medications:     acetaminophen (Tylenol) 500 mg tablet, Take 2 tablets (1,000 mg) by mouth every 8 hours if needed for mild pain (1 - 3)., Disp: 90 tablet, Rfl: 0    amLODIPine-benazepriL (Lotrel) 5-10 mg capsule, TAKE 1 CAPSULE DAILY, Disp: 90 capsule, Rfl: 3    buPROPion SR (Wellbutrin SR) 150 mg 12 hr tablet, TAKE 1 TABLET TWICE A DAY, Disp: 180 tablet, Rfl: 3    clonazePAM (KlonoPIN) 1 mg tablet, Take 1 tablet (1 mg) by mouth once daily as needed for anxiety., Disp: 30 tablet, Rfl: 2    Dexilant 60 mg DR capsule, Take by mouth once daily., Disp: , Rfl:     dicyclomine (Bentyl) 20 mg tablet, Take 1 tablet (20 mg) by mouth every 6 hours., Disp: , Rfl:     dilTIAZem XR (Dilacor XR) 180 mg 24 hr capsule, TAKE 1 CAPSULE DAILY, Disp: 90 capsule, Rfl: 3    meloxicam (Mobic) 15 mg tablet, TAKE 1 TABLET DAILY, Disp: 90 tablet, Rfl: 3    oxybutynin XL (Ditropan-XL) 10 mg 24 hr tablet, TAKE 1 TABLET DAILY, Disp: 90 tablet, Rfl: 3    pyridoxine (Vitamin B-6) 100 mg tablet,  "TAKE ONE TABLET BY MOUTH ONCE A DAY AS DIRECTED, Disp: 90 tablet, Rfl: 3    RABEprazole (Aciphex) EC tablet, , Disp: , Rfl:     semaglutide 0.25 mg or 0.5 mg (2 mg/3 mL) pen injector, Inject 0.5 mg under the skin 1 (one) time per week., Disp: 3 mL, Rfl: 5    venlafaxine XR (Effexor-XR) 75 mg 24 hr capsule, TAKE 2 CAPSULES DAILY, Disp: 180 capsule, Rfl: 3    Ventolin HFA 90 mcg/actuation inhaler, INHALE 1 TO 2 PUFFS EVERY 4 TO 6 HOURS AS NEEDED, Disp: , Rfl:     Vitamin D3 50 mcg (2,000 unit) capsule, TAKE ONE CAPSULE BY MOUTH EVERY DAY, Disp: 90 capsule, Rfl: 3    dutasteride-tamsulosin 0.5-0.4 mg capsule, ER multiphase 24 hr, TAKE 1 CAPSULE DAILY, Disp: 90 capsule, Rfl: 3    fluticasone (Flonase) 50 mcg/actuation nasal spray, Administer into affected nostril(s) twice a day. (Patient not taking: Reported on 10/23/2024), Disp: , Rfl:     mometasone furoate, bulk, 100 % powder, Compound 2 mg capsule to be added to sinus rinse twice daily. (Patient not taking: Reported on 9/12/2024), Disp: 180 g, Rfl: 3    oxymetazoline 0.05 % nasal spray, Administer 2 sprays into each nostril every 12 hours if needed (nose bleeds) for up to 3 days. Do not use for more than 3 days., Disp: 15 mL, Rfl: 0    triamcinolone (Kenalog) 0.1 % cream, APPLY A THIN LAYER TO AFFECTED AREA(S) TWICE DAILY AS NEEDED. (Patient not taking: Reported on 9/12/2024), Disp: 454 g, Rfl: 11    Vitals:  Visit Vitals  Ht 1.727 m (5' 8\")   Wt 132 kg (291 lb 8 oz)   BMI 44.32 kg/m²   Smoking Status Never   BSA 2.52 m²     Physical Exam:  Nose: On external exam there are neither lesions nor asymmetry of the nasal tip/dorsum. On anterior rhinoscopy, visualization posteriorly is limited on anterior examination. For this reason, to adequately evaluate posteriorly for masses, source of epistaxis, polypoid disease, debridement, and/or signs of infections, nasal endoscopy is indicated.  (Please see procedure below.)    SINONASAL ENDOSCOPY (CPT 40134): To better evaluate " the patient's symptoms, sinonasal endoscopy is indicated.  After discussion of risks and benefits, and topical decongestion and anesthesia, an endoscope was used to perform nasal endoscopy on each side.  A time out identifying the patient, the procedure, the location of the procedure and any concerns was performed prior to beginning the procedure.    Findings:  Examination of the right nasal cavity revealed some irritation over the anterior aspect of the middle turbinate and I am suspicious this is the source of bleeding.  There was mucopurulence on the floor of the right maxillary that was cultured.  The right sphenoid appeared patent but the drainage pathway is contracted.  Examination of the left nasal cavity revealed a patent maxillary, ethmoid, and sphenoid.  No significant mucosal inflammatory change in the left.  The frontal appeared patent but the drainage pathway was narrow.    Provider Impressions:  1.  Chronic sinusitis s/p bilateral sinus surgery 3/22/24  2.  Nasal airway obstruction with history of septoplasty s/p inferior turbinate reductions 3/22/24   3.  Postnasal drainage  4.  Headaches  5.  Epistaxis  6.  Asthma  7.  Taste disturbance    Discussion:  Jarocho Langford had evidence of infection within the right maxillary sinus today.  The remainder of his sinuses was normalizing.  I obtained a culture from the right maxillary sinus and I would like to prescribe a 1 month course of topical antibiotic to be added to his rinses along with the mometasone.  I asked him to follow-up with me in 3 months.  All questions were answered.    In regard to the epistaxis, it appeared to be originating from the right middle turbinate and him hopeful that it will resolve without specific intervention.    Scribe Attestation  By signing my name below, I, Tomasz Lane, attest that this documentation has been prepared under the direction and in the presence of Pete Lovell MD.    Signature:  Pete DUONG  MD Nas

## 2024-10-23 ENCOUNTER — APPOINTMENT (OUTPATIENT)
Dept: OTOLARYNGOLOGY | Facility: CLINIC | Age: 69
End: 2024-10-23
Payer: COMMERCIAL

## 2024-10-23 VITALS — WEIGHT: 291.5 LBS | HEIGHT: 68 IN | BODY MASS INDEX: 44.18 KG/M2

## 2024-10-23 DIAGNOSIS — J32.0 CHRONIC MAXILLARY SINUSITIS: Primary | ICD-10-CM

## 2024-10-23 DIAGNOSIS — R09.82 POST-NASAL DRAINAGE: ICD-10-CM

## 2024-10-23 DIAGNOSIS — R04.0 EPISTAXIS: ICD-10-CM

## 2024-10-23 PROCEDURE — 87075 CULTR BACTERIA EXCEPT BLOOD: CPT

## 2024-10-23 PROCEDURE — 99213 OFFICE O/P EST LOW 20 MIN: CPT | Performed by: OTOLARYNGOLOGY

## 2024-10-23 PROCEDURE — 31231 NASAL ENDOSCOPY DX: CPT | Performed by: OTOLARYNGOLOGY

## 2024-10-23 PROCEDURE — 87205 SMEAR GRAM STAIN: CPT

## 2024-10-23 PROCEDURE — 87186 SC STD MICRODIL/AGAR DIL: CPT

## 2024-10-23 PROCEDURE — 87070 CULTURE OTHR SPECIMN AEROBIC: CPT

## 2024-10-23 PROCEDURE — 87077 CULTURE AEROBIC IDENTIFY: CPT

## 2024-10-23 PROCEDURE — 1159F MED LIST DOCD IN RCRD: CPT | Performed by: OTOLARYNGOLOGY

## 2024-10-23 PROCEDURE — 3008F BODY MASS INDEX DOCD: CPT | Performed by: OTOLARYNGOLOGY

## 2024-10-23 PROCEDURE — 1160F RVW MEDS BY RX/DR IN RCRD: CPT | Performed by: OTOLARYNGOLOGY

## 2024-10-23 ASSESSMENT — PATIENT HEALTH QUESTIONNAIRE - PHQ9
2. FEELING DOWN, DEPRESSED OR HOPELESS: NOT AT ALL
SUM OF ALL RESPONSES TO PHQ9 QUESTIONS 1 AND 2: 0
1. LITTLE INTEREST OR PLEASURE IN DOING THINGS: NOT AT ALL

## 2024-10-26 LAB
BACTERIA SPEC CULT: ABNORMAL
GRAM STN SPEC: ABNORMAL
GRAM STN SPEC: ABNORMAL

## 2024-10-28 ENCOUNTER — HOSPITAL ENCOUNTER (OUTPATIENT)
Dept: NEUROLOGY | Facility: HOSPITAL | Age: 69
Discharge: HOME | End: 2024-10-28
Payer: COMMERCIAL

## 2024-10-28 DIAGNOSIS — G89.29 CHRONIC BILATERAL LOW BACK PAIN WITHOUT SCIATICA: ICD-10-CM

## 2024-10-28 DIAGNOSIS — M54.50 CHRONIC BILATERAL LOW BACK PAIN WITHOUT SCIATICA: ICD-10-CM

## 2024-10-28 PROCEDURE — 95886 MUSC TEST DONE W/N TEST COMP: CPT | Performed by: PSYCHIATRY & NEUROLOGY

## 2024-10-28 PROCEDURE — 95910 NRV CNDJ TEST 7-8 STUDIES: CPT | Performed by: PSYCHIATRY & NEUROLOGY

## 2024-11-01 ENCOUNTER — HOSPITAL ENCOUNTER (OUTPATIENT)
Dept: RADIOLOGY | Facility: HOSPITAL | Age: 69
Discharge: HOME | End: 2024-11-01
Payer: COMMERCIAL

## 2024-11-01 ENCOUNTER — APPOINTMENT (OUTPATIENT)
Dept: ORTHOPEDIC SURGERY | Facility: CLINIC | Age: 69
End: 2024-11-01
Payer: COMMERCIAL

## 2024-11-01 VITALS — WEIGHT: 285 LBS | HEIGHT: 68 IN | BODY MASS INDEX: 43.19 KG/M2

## 2024-11-01 DIAGNOSIS — M25.562 CHRONIC PAIN OF BOTH KNEES: ICD-10-CM

## 2024-11-01 DIAGNOSIS — M17.0 PRIMARY OSTEOARTHRITIS OF BOTH KNEES: Primary | ICD-10-CM

## 2024-11-01 DIAGNOSIS — M25.561 CHRONIC PAIN OF BOTH KNEES: ICD-10-CM

## 2024-11-01 DIAGNOSIS — G89.29 CHRONIC PAIN OF BOTH KNEES: ICD-10-CM

## 2024-11-01 PROBLEM — M17.11 UNILATERAL PRIMARY OSTEOARTHRITIS, RIGHT KNEE: Status: ACTIVE | Noted: 2024-12-05

## 2024-11-01 PROCEDURE — 73564 X-RAY EXAM KNEE 4 OR MORE: CPT | Mod: 50

## 2024-11-01 RX ORDER — TRIAMCINOLONE ACETONIDE 40 MG/ML
40 INJECTION, SUSPENSION INTRA-ARTICULAR; INTRAMUSCULAR
Status: COMPLETED | OUTPATIENT
Start: 2024-11-01 | End: 2024-11-01

## 2024-11-01 RX ORDER — LIDOCAINE HYDROCHLORIDE 10 MG/ML
4 INJECTION, SOLUTION INFILTRATION; PERINEURAL
Status: COMPLETED | OUTPATIENT
Start: 2024-11-01 | End: 2024-11-01

## 2024-11-04 ENCOUNTER — TELEPHONE (OUTPATIENT)
Dept: ORTHOPEDIC SURGERY | Facility: CLINIC | Age: 69
End: 2024-11-04
Payer: COMMERCIAL

## 2024-11-04 ENCOUNTER — TELEPHONE (OUTPATIENT)
Dept: OTOLARYNGOLOGY | Facility: CLINIC | Age: 69
End: 2024-11-04
Payer: COMMERCIAL

## 2024-11-04 ENCOUNTER — APPOINTMENT (OUTPATIENT)
Dept: NEUROLOGY | Facility: HOSPITAL | Age: 69
End: 2024-11-04
Payer: COMMERCIAL

## 2024-11-04 NOTE — TELEPHONE ENCOUNTER
Patient made aware of below nasal culture results. Dr. Lovell placed a verbal order for a 1 month course of Bactrim topically twice daily.  He is rinsing with mometasone already so he will simply add the Bactrim to the mometasone and rinse with this twice per day for the next 1 month. Nasal irrigations ordered. Order placed to [facility] for Bactrim 160-32 mg twice a day per nasal irrigation for 30 days with 0 refills. Patient notified compounding pharmacy will call patient for further instruction and to obtain additional information. Patient instructed to call with any further questions.     Contains abnormal data Tissue/Wound Culture/Smear  Order: [id number]   Collected 10/23/2024 16:18       Status: Final result       Visible to patient: Yes (seen)       Dx: Chronic maxillary sinusitis    Specimen Information: Sinus; Tissue/Biopsy   0 Result Notes  Tissue/Wound Culture/Smear (4+) Abundant Methicillin Susceptible Staphylococcus aureus (MSSA) Abnormal             Gram Stain  Abnormal   (1+) Rare Polymorphonuclear leukocytes   (2+) Few Gram positive cocci           Resulting Agency: [facility]     Susceptibility     Methicillin Susceptible Staphylococcus aureus (MSSA)     MICROSCAN    Clindamycin Resistant    Erythromycin Resistant    Oxacillin Susceptible    Tetracycline Susceptible    Trimethoprim/Sulfamethoxazole Susceptible    Vancomycin Susceptible              Linear View        Specimen Collected: 10/23/24 16:18 Last Resulted: 10/26/24 11:17

## 2024-11-04 NOTE — TELEPHONE ENCOUNTER
----- Message from Gema RAGSDALE sent at 11/4/2024 12:57 PM EST -----  Prior Authorization: APPROVED     Insurance Provider: MMO     Test-Procedure-Medication-Equipment: CT MARY RT KNEE WO CONTRAST                                                                              Name of Insurance Personnel conferred with: ONLINE        Prior Authorization Number: [id number]    Expiration Date: 11/19/24    Comments: SCHEDULE PT AT [facility]  ----- Message -----  From: Francie Hoffman  Sent: 11/1/2024  10:25 AM EST  To: Gema MCLEAN PATIENT  SX 12/05/2024 AT [facility] RIGHT TOTAL KNEE   CT SCAN RIGHT KNEE MARY

## 2024-11-04 NOTE — TELEPHONE ENCOUNTER
Called and made appt for CT scan on 11/12/2024 @7:45 to go along with his PAT. Called and spoke with Jarocho and gave him the appt details

## 2024-11-11 ENCOUNTER — APPOINTMENT (OUTPATIENT)
Dept: PREADMISSION TESTING | Facility: HOSPITAL | Age: 69
End: 2024-11-11
Payer: COMMERCIAL

## 2024-11-12 ENCOUNTER — HOSPITAL ENCOUNTER (OUTPATIENT)
Dept: CARDIOLOGY | Facility: HOSPITAL | Age: 69
Discharge: HOME | End: 2024-11-12
Payer: COMMERCIAL

## 2024-11-12 ENCOUNTER — APPOINTMENT (OUTPATIENT)
Dept: PRIMARY CARE | Facility: CLINIC | Age: 69
End: 2024-11-12
Payer: COMMERCIAL

## 2024-11-12 ENCOUNTER — PRE-ADMISSION TESTING (OUTPATIENT)
Dept: PREADMISSION TESTING | Facility: HOSPITAL | Age: 69
End: 2024-11-12
Payer: COMMERCIAL

## 2024-11-12 ENCOUNTER — HOSPITAL ENCOUNTER (OUTPATIENT)
Dept: RADIOLOGY | Facility: HOSPITAL | Age: 69
Discharge: HOME | End: 2024-11-12
Payer: COMMERCIAL

## 2024-11-12 VITALS
RESPIRATION RATE: 20 BRPM | SYSTOLIC BLOOD PRESSURE: 152 MMHG | BODY MASS INDEX: 43.67 KG/M2 | TEMPERATURE: 96.8 F | HEART RATE: 78 BPM | DIASTOLIC BLOOD PRESSURE: 79 MMHG | OXYGEN SATURATION: 96 % | HEIGHT: 68 IN | WEIGHT: 288.14 LBS

## 2024-11-12 VITALS
OXYGEN SATURATION: 96 % | HEART RATE: 94 BPM | WEIGHT: 286.3 LBS | DIASTOLIC BLOOD PRESSURE: 70 MMHG | RESPIRATION RATE: 16 BRPM | TEMPERATURE: 97.7 F | SYSTOLIC BLOOD PRESSURE: 118 MMHG | HEIGHT: 68 IN | BODY MASS INDEX: 43.39 KG/M2

## 2024-11-12 DIAGNOSIS — Z01.818 PRE-OP EXAMINATION: ICD-10-CM

## 2024-11-12 DIAGNOSIS — G89.29 CHRONIC PAIN OF BOTH KNEES: ICD-10-CM

## 2024-11-12 DIAGNOSIS — I10 PRIMARY HYPERTENSION: ICD-10-CM

## 2024-11-12 DIAGNOSIS — M25.562 CHRONIC PAIN OF BOTH KNEES: ICD-10-CM

## 2024-11-12 DIAGNOSIS — Z01.818 PREOP EXAMINATION: Primary | ICD-10-CM

## 2024-11-12 DIAGNOSIS — M17.11 UNILATERAL PRIMARY OSTEOARTHRITIS, RIGHT KNEE: ICD-10-CM

## 2024-11-12 DIAGNOSIS — M25.561 CHRONIC PAIN OF BOTH KNEES: ICD-10-CM

## 2024-11-12 DIAGNOSIS — M17.0 PRIMARY OSTEOARTHRITIS OF BOTH KNEES: ICD-10-CM

## 2024-11-12 DIAGNOSIS — F41.9 ANXIETY: ICD-10-CM

## 2024-11-12 DIAGNOSIS — Z79.899 MEDICATION MANAGEMENT: ICD-10-CM

## 2024-11-12 DIAGNOSIS — Z23 NEED FOR VACCINATION: ICD-10-CM

## 2024-11-12 LAB
ALBUMIN SERPL BCP-MCNC: 4.3 G/DL (ref 3.4–5)
ALP SERPL-CCNC: 93 U/L (ref 33–136)
ALT SERPL W P-5'-P-CCNC: 21 U/L (ref 10–52)
ANION GAP SERPL CALC-SCNC: 12 MMOL/L (ref 10–20)
APTT PPP: 33 SECONDS (ref 27–38)
AST SERPL W P-5'-P-CCNC: 19 U/L (ref 9–39)
ATRIAL RATE: 79 BPM
BASOPHILS # BLD AUTO: 0.03 X10*3/UL (ref 0–0.1)
BASOPHILS NFR BLD AUTO: 0.3 %
BILIRUB SERPL-MCNC: 0.4 MG/DL (ref 0–1.2)
BUN SERPL-MCNC: 18 MG/DL (ref 6–23)
CALCIUM SERPL-MCNC: 8.5 MG/DL (ref 8.6–10.3)
CHLORIDE SERPL-SCNC: 103 MMOL/L (ref 98–107)
CO2 SERPL-SCNC: 27 MMOL/L (ref 21–32)
CREAT SERPL-MCNC: 0.98 MG/DL (ref 0.5–1.3)
EGFRCR SERPLBLD CKD-EPI 2021: 83 ML/MIN/1.73M*2
EOSINOPHIL # BLD AUTO: 0.15 X10*3/UL (ref 0–0.7)
EOSINOPHIL NFR BLD AUTO: 1.6 %
ERYTHROCYTE [DISTWIDTH] IN BLOOD BY AUTOMATED COUNT: 14.6 % (ref 11.5–14.5)
EST. AVERAGE GLUCOSE BLD GHB EST-MCNC: 100 MG/DL
GLUCOSE SERPL-MCNC: 75 MG/DL (ref 74–99)
HBA1C MFR BLD: 5.1 %
HCT VFR BLD AUTO: 46.2 % (ref 41–52)
HGB BLD-MCNC: 15.3 G/DL (ref 13.5–17.5)
IMM GRANULOCYTES # BLD AUTO: 0.03 X10*3/UL (ref 0–0.7)
IMM GRANULOCYTES NFR BLD AUTO: 0.3 % (ref 0–0.9)
INR PPP: 1 (ref 0.9–1.1)
LYMPHOCYTES # BLD AUTO: 2.04 X10*3/UL (ref 1.2–4.8)
LYMPHOCYTES NFR BLD AUTO: 22.2 %
MCH RBC QN AUTO: 27.4 PG (ref 26–34)
MCHC RBC AUTO-ENTMCNC: 33.1 G/DL (ref 32–36)
MCV RBC AUTO: 83 FL (ref 80–100)
MONOCYTES # BLD AUTO: 0.72 X10*3/UL (ref 0.1–1)
MONOCYTES NFR BLD AUTO: 7.9 %
NEUTROPHILS # BLD AUTO: 6.2 X10*3/UL (ref 1.2–7.7)
NEUTROPHILS NFR BLD AUTO: 67.7 %
NRBC BLD-RTO: 0 /100 WBCS (ref 0–0)
P AXIS: -5 DEGREES
P OFFSET: 177 MS
P ONSET: 141 MS
PLATELET # BLD AUTO: 241 X10*3/UL (ref 150–450)
POTASSIUM SERPL-SCNC: 4.4 MMOL/L (ref 3.5–5.3)
PR INTERVAL: 156 MS
PROT SERPL-MCNC: 6.7 G/DL (ref 6.4–8.2)
PROTHROMBIN TIME: 10.9 SECONDS (ref 9.8–12.8)
Q ONSET: 219 MS
QRS COUNT: 13 BEATS
QRS DURATION: 90 MS
QT INTERVAL: 382 MS
QTC CALCULATION(BAZETT): 438 MS
QTC FREDERICIA: 418 MS
R AXIS: 52 DEGREES
RBC # BLD AUTO: 5.59 X10*6/UL (ref 4.5–5.9)
SODIUM SERPL-SCNC: 138 MMOL/L (ref 136–145)
T AXIS: 57 DEGREES
T OFFSET: 410 MS
VENTRICULAR RATE: 79 BPM
WBC # BLD AUTO: 9.2 X10*3/UL (ref 4.4–11.3)

## 2024-11-12 PROCEDURE — 84075 ASSAY ALKALINE PHOSPHATASE: CPT

## 2024-11-12 PROCEDURE — 3008F BODY MASS INDEX DOCD: CPT | Performed by: FAMILY MEDICINE

## 2024-11-12 PROCEDURE — 1036F TOBACCO NON-USER: CPT | Performed by: FAMILY MEDICINE

## 2024-11-12 PROCEDURE — 90480 ADMN SARSCOV2 VAC 1/ONLY CMP: CPT | Performed by: FAMILY MEDICINE

## 2024-11-12 PROCEDURE — 73700 CT LOWER EXTREMITY W/O DYE: CPT | Mod: RT

## 2024-11-12 PROCEDURE — 99214 OFFICE O/P EST MOD 30 MIN: CPT | Performed by: FAMILY MEDICINE

## 2024-11-12 PROCEDURE — 85025 COMPLETE CBC W/AUTO DIFF WBC: CPT

## 2024-11-12 PROCEDURE — 36415 COLL VENOUS BLD VENIPUNCTURE: CPT

## 2024-11-12 PROCEDURE — 1159F MED LIST DOCD IN RCRD: CPT | Performed by: FAMILY MEDICINE

## 2024-11-12 PROCEDURE — 91322 SARSCOV2 VAC 50 MCG/0.5ML IM: CPT | Performed by: FAMILY MEDICINE

## 2024-11-12 PROCEDURE — 87081 CULTURE SCREEN ONLY: CPT | Mod: ELYLAB

## 2024-11-12 PROCEDURE — 83036 HEMOGLOBIN GLYCOSYLATED A1C: CPT | Mod: ELYLAB

## 2024-11-12 PROCEDURE — 3078F DIAST BP <80 MM HG: CPT | Performed by: FAMILY MEDICINE

## 2024-11-12 PROCEDURE — 85730 THROMBOPLASTIN TIME PARTIAL: CPT

## 2024-11-12 PROCEDURE — 3074F SYST BP LT 130 MM HG: CPT | Performed by: FAMILY MEDICINE

## 2024-11-12 PROCEDURE — 93005 ELECTROCARDIOGRAM TRACING: CPT

## 2024-11-12 PROCEDURE — 85610 PROTHROMBIN TIME: CPT

## 2024-11-12 RX ORDER — CLONAZEPAM 1 MG/1
1 TABLET ORAL DAILY PRN
Qty: 30 TABLET | Refills: 2 | Status: SHIPPED | OUTPATIENT
Start: 2024-11-12 | End: 2025-02-10

## 2024-11-12 ASSESSMENT — ENCOUNTER SYMPTOMS
WOUND: 0
NERVOUS/ANXIOUS: 0
NECK PAIN: 1
NAUSEA: 0
APPETITE CHANGE: 0
VOMITING: 0
SEIZURES: 0
ARTHRALGIAS: 0
EYE PAIN: 0
ADENOPATHY: 0
FLANK PAIN: 0
SHORTNESS OF BREATH: 0
AGITATION: 0
SLEEP DISTURBANCE: 0
EYE ITCHING: 0
NECK STIFFNESS: 1
HEMATURIA: 0
JOINT SWELLING: 0
POLYDIPSIA: 0
HYPERTENSION: 1
FEVER: 0
HEADACHES: 0
BLOOD IN STOOL: 0
CHILLS: 0
COUGH: 0
DYSURIA: 0
LIGHT-HEADEDNESS: 0
SORE THROAT: 0
NUMBNESS: 0
FATIGUE: 1
FACIAL ASYMMETRY: 0
PHOTOPHOBIA: 0
CHEST TIGHTNESS: 0
HALLUCINATIONS: 0
UNEXPECTED WEIGHT CHANGE: 0
DYSPHORIC MOOD: 0
CONFUSION: 0
SINUS PRESSURE: 0
SPEECH DIFFICULTY: 0
PALPITATIONS: 0
MYALGIAS: 0
FREQUENCY: 0
DIARRHEA: 0
ACTIVITY CHANGE: 0
BRUISES/BLEEDS EASILY: 0
EYE DISCHARGE: 0
VOICE CHANGE: 0
WEAKNESS: 0
ABDOMINAL DISTENTION: 0
ABDOMINAL PAIN: 0
DIAPHORESIS: 0
EYE REDNESS: 0
RHINORRHEA: 0
BACK PAIN: 0
TROUBLE SWALLOWING: 0
WHEEZING: 0
DIZZINESS: 0
CHOKING: 0
TREMORS: 0
CONSTIPATION: 0

## 2024-11-12 NOTE — PREPROCEDURE INSTRUCTIONS
KNEE JOINT REPLACEMENT PRE-OPERATIVE INSTRUCTIONS     You will receive notification one business day prior to your surgery to confirm your arrival time and any additional information between 2 P.M. - 5 P.M. It is important that you answer your phone and/or check your messages during this time.     You may see in [facility] your surgery start time change several times even up to the day before your procedure. Please disregard those times and only follow the time given by the  who will be notifying you via phone and not a text.     Please arrive at your scheduled time to avoid delay or cancelled surgery.     Please enter the building through either the Main Entrance in front of the hospital or from the Parking Garage Walk [address] Bridge. From the parking garage, which is free, take the 2nd Floor Walkway Bridge into the hospital and check in at the [facility] Outpatient Desk as you enter the hospital directly in front of you. If you enter through the Main Entrance take the elevator off the lobby on the right labeled “A” to the 2nd floor and check in at the [facility] Outpatient Surgery desk as you exit the elevator. Wheelchairs are available for use if using the Main Entrance.     Handicap parking in the land lot, in front of hospital by main entrance, wheelchairs are available at this entrance     ?   INSTRUCTIONS:   Please contact your doctor’s office, who is doing procedure, about any changes in your health, bad cold, fever, sore throat, or COVID within last 4 weeks     Talk to your surgeon for instructions if you should stop your aspirin, blood thinner, diabetes medicines, weight loss medications, multivitamins or over the counter supplements since many surgeons have you adjust or stop these medications prior to procedure. The doctor’s office may have you contact the prescribing doctor for medication adjustments for your surgery.   Surgeon or physician has instructed patient to hold medication for 7 days prior to procedure  (Meloxicam, Semaglutide.  Stop Date:     If you take certain medications like Beta Blocker or Anti-Seizure medication, you may have to take them the morning of procedure with a sip of water. If this is the case your surgeons office should let you know, and the PAT nurses will follow up when they speak to you to verify you are aware.     If not staying overnight after your surgery, and you are receiving any type of anesthesia with your surgery, you must have an adult (age 18 or older) immediately available to drive you home after surgery or your procedure will be cancelled. You may be discharged home after surgery per an Uber, Lyft, Taxi or any other transportation service as long as the responsible adult (18 or older) is in the vehicle with you at time of discharge. The  of these transportation services is not responsible for your care and cannot be consider a responsible adult. We also highly recommend you have someone stay with you for the entire day and night of your surgery.     All jewelry and piercings must be removed. If you are unable to remove an item or have a dermal piercing, please be sure to tell the nurse when you arrive for surgery.     Nail polish must be removed off one finger of each hand     Make-up or other beauty products (lotion, deodorant, hairspray, perfume, etc.) must be removed or not used for day of surgery.     Avoid smoking, consuming alcohol, or any medical or recreational drug use for 24 hours before surgery.     Do not wear contacts to hospital, bring your glasses and a case     Leave valuables at home except photo ID, insurance card and any co-payment that has been requested by hospital.     For adults who are unable to consent or make medical decisions for themselves, a legal guardian or Power of  must accompany them to the surgery center. If this is not possible, please call [phone] to make additional arrangements.  If there is any guardianship or legal Power of   paperwork, please bring them the day of surgery.     Wear comfortable, loose fitting clothing into the procedure.  While your admitted you are asked to bring short sleeve shirts, shorts (loose like pajamas, sweat shorts), and tennis shoes/sneakers.  No slip on shoes.     Please bring your 2-Wheeled Walker with you the day of surgery and the [facility] Booklet that was given to you during your PAT appointment.     The nurse practitioners, , , physical therapist, and occupational therapist will all discuss and work with you during your stay to help with discharge, physical therapy and any other needs. They also may discuss a program called Meds to Beds, where postoperative medications prescribed to you after surgery will be filled and delivered to your beside before you leave, so that you do not need to stop at the pharmacy on the way home    If you use a CPAP or BIPAP, please make sure the PAT nurse was able to get the settings from you, if not please inform the nurses the day of surgery of your settings you use at home.     Additional instructions about eating and drinking before surgery:     Do not eat any solid foods?or drink anything after midnight the night prior to your procedure. Milk, nutritional drinks/supplements, and infant formula are considered solid foods.  This also includes no gum, candy, lozenges, ice, or any other oral consumption.     CHG SOAP & ORAL RINSE   In regards to bathing, please follow the instructions explained to you at the PAT appointment about taking a showering with the CHG soap the 5 nights prior to your surgery.       During your PAT Appointment you were given [employer] CHG Wash Soap (bottles of bubble gum pink soap) to use before procedure.  Begin using the CHG soap 5 days before your scheduled surgery.  Be sure to sleep on clean sheets - change your sheets the first night you do this cleansing process (you don't not need to change  them every night).     CHG SOAP INSTRUCTIONS:  Begin using the CHG soap 5 days prior to your scheduled surgery.  You will wash and rinse your face and genitals with normal soap.  The night before surgery is the ONLY TIME you use the CHG SOAP for your hair, and do NOT use conditioner after washing with the CHG Soap.  For the rest of the showers the 5 days prior to surgery you will use normal shampoo.  Hair extensions should be removed.  Then apply CHG soap solution to a clean wet washcloth.     Turn the water off or move away from the water spray to avoid premature rinsing of the CHG soap as you apply it.   Avoid getting the CHG soap in your ears, eyes, and mouth.  Apply the CHG soap to entire body from the neck down EXCEPT your face and genitals.  Allow the CHG soap to sit for 3 minutes on your skin.  Then turn on water and rinse the CHG solution off your body completely.    DO NOT wash with regular soap after you have used the CHG soap   Pat yourself dry with a clean, fresh-laundered towel when you get out of the shower and clean Pj's  DO NOT apply any powders, deodorants, or lotions after shower   Be aware the CHG soap will stain fabrics if you wash them with bleach after use.   Make sure to pay special attention to cleaning area(s) where your incision(s) will be located. Avoid scrubbing your skin to hard.  You will repeat this same process steps 1-12 for each shower you take prior to surgery.  The morning of  the surgery is the fifth day.      ORAL RINSE   You will receive a call or notification from your pharmacy to  a prescription prior to procedure.  Be sure to  the prescription oral rinse from your local pharmacy.   You will be using the oral rinse the night before and the morning of surgery.    Take a cap full of the solution, 15mL   Swish (gargle if you can) the mouthwash in your mouth for at least 30 seconds, (DO NOT SWALLOW), and spit out   After the oral CHG rinse, do not rinse your mouth  with water, drink, or eat.      If you have to take a medication the morning of surgery as instructed by your surgeon- please take that medication with a sip of water prior to doing the oral rinse the day of surgery.       ?If you have any questions or concerns, please call Pre-Admission Testing at [phone] or your Physician’s office Dr. Joao Lares  [phone]  [facility] General Line: [phone]

## 2024-11-12 NOTE — PROGRESS NOTES
Subjective   Patient ID: Jarocho Langford is a 69 y.o. male who presents for Med Management (CSM: Klonopin) and Pre-op Exam (Right knee replacement/Surgery on 12/5/24/PAT: 11/12/24).    Subjective  Jarocho Langford is a 69 y.o. male who presents to the office today for a preoperative consultation at the request of surgeon Joao briggs jr who plans on performing right total knee on 12/5/2024.  This consultation is requested for the specific conditions prompting preoperative evaluation (i.e. because of potential effect on operative risk): Hypertension. Planned anesthesia: spinal. The patient has the following known anesthesia issues: None. Patients bleeding risk: no recent abnormal bleeding, no remote history of abnormal bleeding, and use of Ca-channel blockers (see med list). Patient does not have objections to receiving blood products if needed.         OARRS:  No data recorded  I have personally reviewed the OARRS report for Jarocho Langford. I have considered the risks of abuse, dependence, addiction and diversion and I believe that it is clinically appropriate for Jarocho Langford to be prescribed this medication    Is the patient prescribed a combination of a benzodiazepine and opioid?  No    Last Urine Drug Screen / ordered today: No  Recent Results (from the past 8760 hour(s))  -Drug Screen, Urine With Reflex to Confirmation:   Collection Time: 08/10/24 10:24 AM       Result                      Value             Ref Range           Amphetamine Screen, Ur*                       Presumptive *   Presumptive Negative       Barbiturate Screen, Ur*                       Presumptive *   Presumptive Negative       Benzodiazepines Screen*                       Presumptive *   Presumptive Negative       Cannabinoid Screen, Ur*                       Presumptive *   Presumptive Negative       Cocaine Metabolite Scr*                       Presumptive *   Presumptive Negative       Fentanyl Screen, Urine                         Presumptive *   Presumptive Negative       Opiate Screen, Urine                          Presumptive *   Presumptive Negative       Oxycodone Screen, Urine                       Presumptive *   Presumptive Negative       PCP Screen, Urine                             Presumptive *   Presumptive Negative       Methadone Screen, Urine                       Presumptive *   Presumptive Negative  -Confirmation Opiate/Opioid/Benzo Prescription Compliance:   Collection Time: 11/04/23 11:05 AM       Result                      Value             Ref Range           Clonazepam                  <25               <25 ng/mL           7-Aminoclonazepam           141 (H)           <25 ng/mL           Alprazolam                  <25               <25 ng/mL           Alpha-Hydroxyalprazolam     <25               <25 ng/mL           Midazolam                   <25               <25 ng/mL           Alpha-Hydroxymidazolam      <25               <25 ng/mL           Chlordiazepoxide            <25               <25 ng/mL           Diazepam                    <25               <25 ng/mL           Nordiazepam                 <25               <25 ng/mL           Temazepam                   <25               <25 ng/mL           Oxazepam                    <25               <25 ng/mL           Lorazepam                   <25               <25 ng/mL           Methadone                   <25               <25 ng/mL           EDDP                        <25               <25 ng/mL           6-Acetylmorphine            <25               <25 ng/mL           Codeine                     <50               <50 ng/mL           Hydrocodone                 <25               <25 ng/mL           Hydromorphone               <25               <25 ng/mL           Morphine                    <50               <50 ng/mL           Norhydrocodone              <25               <25 ng/mL           Noroxycodone                <25               <25 ng/mL            Oxycodone                   <25               <25 ng/mL           Oxymorphone                 <25               <25 ng/mL           Fentanyl                    <2.5              <2.5 ng/mL          Norfentanyl                 <2.5              <2.5 ng/mL          Tramadol                    <50               <50 ng/mL           O-Desmethyltramadol         <50               <50 ng/mL           Zolpidem                    <25               <25 ng/mL           Zolpidem Metabolite (Z*     <25               <25 ng/mL      -Screen Opiate/Opioid/Benzo Prescription Compliance:   Collection Time: 11/04/23 11:05 AM       Result                      Value             Ref Range           Creatinine, Urine Rand*     79.0              20.0 - 370.0*       Amphetamine Screen, Ur*                       Presumptive *   Presumptive Negative       Barbiturate Screen, Ur*                       Presumptive *   Presumptive Negative       Cannabinoid Screen, Ur*                       Presumptive *   Presumptive Negative       Cocaine Metabolite Scr*                       Presumptive *   Presumptive Negative       PCP Screen, Urine                             Presumptive *   Presumptive Negative  Results are as expected.         Controlled Substance Agreement:  Date of the Last Agreement: Today  Reviewed Controlled Substance Agreement including but not limited to the benefits, risks, and alternatives to treatment with a Controlled Substance medication(s).    Benzodiazepines:  What is the patient's goal of therapy?  Reduction of anxiety  Is this being achieved with current treatment?  Yes    YENNY-7:  No data recorded    Activities of Daily Living:   Is your overall impression that this patient is benefiting (symptom reduction outweighs side effects) from benzodiazepine therapy? Yes     1. Physical Functioning: Same  2. Family Relationship: Same  3. Social Relationship: Same  4. Mood: Same  5. Sleep Patterns: Same  6. Overall Function:  Same      Anxiety  Presents for follow-up visit. Patient reports no chest pain, confusion, dizziness, nausea, nervous/anxious behavior, palpitations, shortness of breath or suicidal ideas. The quality of sleep is good.       Hypertension  This is a chronic problem. The current episode started more than 1 year ago. The problem is unchanged. The problem is controlled. Associated symptoms include anxiety, malaise/fatigue, neck pain and peripheral edema. Pertinent negatives include no chest pain, headaches, palpitations or shortness of breath. Agents associated with hypertension include NSAIDs. Risk factors for coronary artery disease include dyslipidemia, male gender and obesity. Past treatments include calcium channel blockers, ACE inhibitors and diuretics. The current treatment provides significant improvement. There are no compliance problems.         Review of Systems   Constitutional:  Positive for fatigue and malaise/fatigue. Negative for activity change, appetite change, chills, diaphoresis, fever and unexpected weight change.   HENT:  Negative for congestion, ear pain, hearing loss, nosebleeds, postnasal drip, rhinorrhea, sinus pressure, sneezing, sore throat, tinnitus, trouble swallowing and voice change.    Eyes:  Negative for photophobia, pain, discharge, redness, itching and visual disturbance.   Respiratory:  Negative for cough, choking, chest tightness, shortness of breath and wheezing.    Cardiovascular:  Positive for leg swelling. Negative for chest pain and palpitations.   Gastrointestinal:  Negative for abdominal distention, abdominal pain, blood in stool, constipation, diarrhea, nausea and vomiting.   Endocrine: Negative for cold intolerance, heat intolerance, polydipsia and polyuria.   Genitourinary:  Negative for dysuria, flank pain, frequency, hematuria and urgency.   Musculoskeletal:  Positive for neck pain and neck stiffness. Negative for arthralgias, back pain, joint swelling and myalgias.  "  Skin:  Negative for rash and wound.   Allergic/Immunologic: Negative for immunocompromised state.   Neurological:  Negative for dizziness, tremors, seizures, syncope, facial asymmetry, speech difficulty, weakness, light-headedness, numbness and headaches.   Hematological:  Negative for adenopathy. Does not bruise/bleed easily.   Psychiatric/Behavioral:  Negative for agitation, behavioral problems, confusion, dysphoric mood, hallucinations, self-injury, sleep disturbance and suicidal ideas. The patient is not nervous/anxious.        Objective   /70 (BP Location: Left arm, Patient Position: Sitting, BP Cuff Size: Large adult)   Pulse 94   Temp 36.5 °C (97.7 °F) (Temporal)   Resp 16   Ht 1.727 m (5' 8\")   Wt 130 kg (286 lb 4.8 oz)   SpO2 96%   BMI 43.53 kg/m²   Predictors of intubation difficulty:   Morbid obesity? yes -BMI 43   Anatomically abnormal facies? no   Prominent incisors? no   Receding mandible? no   Short, thick neck? yes   Dentition: No chipped, loose, or missing teeth.    Cardiographics  ECG: normal sinus rhythm, no blocks or conduction defects, no ischemic changes no change from 2023    Lab Review   Hospital Outpatient Visit on 11/12/2024  Ventricular Rate          Value: 79(BPM)            Dt: 11/12/2024  Atrial Rate               Value: 79(BPM)            Dt: 11/12/2024  MN Interval               Value: 156(ms)            Dt: 11/12/2024  QRS Duration              Value: 90(ms)             Dt: 11/12/2024  QT Interval               Value: 382(ms)            Dt: 11/12/2024  QTC Calculation(Bazett)   Value: 438(ms)            Dt: 11/12/2024  P Axis                    Value: -5(degrees)        Dt: 11/12/2024  R Axis                    Value: 52(degrees)        Dt: 11/12/2024  T Axis                    Value: 57(degrees)        Dt: 11/12/2024  QRS Count                 Value: 13(beats)          Dt: 11/12/2024  Q Onset                   Value: 219(ms)            Dt: 11/12/2024  P Onset          "          Value: 141(ms)            Dt: 11/12/2024  P Offset                  Value: 177(ms)            Dt: 11/12/2024  T Offset                  Value: 410(ms)            Dt: 11/12/2024  QTC Fredericia            Value: 418(ms)            Dt: 11/12/2024  ------------  Pre-Admission Testing on 11/12/2024  aPTT                      Value: 33(seconds)        Dt: 11/12/2024  WBC                       Value: 9.2(x10*3/uL)      Dt: 11/12/2024  nRBC                      Value: 0.0(/100 WBCs)     Dt: 11/12/2024  RBC                       Value: 5.59(x10*6/uL)     Dt: 11/12/2024  Hemoglobin                Value: 15.3(g/dL)         Dt: 11/12/2024  Hematocrit                Value: 46.2(%)            Dt: 11/12/2024  MCV                       Value: 83(fL)             Dt: 11/12/2024  MCH                       Value: 27.4(pg)           Dt: 11/12/2024  MCHC                      Value: 33.1(g/dL)         Dt: 11/12/2024  RDW                       Value: 14.6(%) (H)        Dt: 11/12/2024  Platelets                 Value: 241(x10*3/uL)      Dt: 11/12/2024  Neutrophils %             Value: 67.7(%)            Dt: 11/12/2024  Immature Granulocytes %,* Value: 0.3(%)             Dt: 11/12/2024  Lymphocytes %             Value: 22.2(%)            Dt: 11/12/2024  Monocytes %               Value: 7.9(%)             Dt: 11/12/2024  Eosinophils %             Value: 1.6(%)             Dt: 11/12/2024  Basophils %               Value: 0.3(%)             Dt: 11/12/2024  Neutrophils Absolute      Value: 6.20(x10*3/uL)     Dt: 11/12/2024  Immature Granulocytes Ab* Value: 0.03(x10*3/uL)     Dt: 11/12/2024  Lymphocytes Absolute      Value: 2.04(x10*3/uL)     Dt: 11/12/2024  Monocytes Absolute        Value: 0.72(x10*3/uL)     Dt: 11/12/2024  Eosinophils Absolute      Value: 0.15(x10*3/uL)     Dt: 11/12/2024  Basophils Absolute        Value: 0.03(x10*3/uL)     Dt: 11/12/2024  Glucose                   Value: 75(mg/dL)          Dt: 11/12/2024  Sodium                     Value: 138(mmol/L)        Dt: 11/12/2024  Potassium                 Value: 4.4(mmol/L)        Dt: 11/12/2024  Chloride                  Value: 103(mmol/L)        Dt: 11/12/2024  Bicarbonate               Value: 27(mmol/L)         Dt: 11/12/2024  Anion Gap                 Value: 12(mmol/L)         Dt: 11/12/2024  Urea Nitrogen             Value: 18(mg/dL)          Dt: 11/12/2024  Creatinine                Value: 0.98(mg/dL)        Dt: 11/12/2024  eGFR                      Value: 83(mL/min/1.73m*2) Dt: 11/12/2024  Calcium                   Value: 8.5(mg/dL) (L)     Dt: 11/12/2024  Albumin                   Value: 4.3(g/dL)          Dt: 11/12/2024  Alkaline Phosphatase      Value: 93(U/L)            Dt: 11/12/2024  Total Protein             Value: 6.7(g/dL)          Dt: 11/12/2024  AST                       Value: 19(U/L)            Dt: 11/12/2024  Bilirubin, Total          Value: 0.4(mg/dL)         Dt: 11/12/2024  ALT                       Value: 21(U/L)            Dt: 11/12/2024  Protime                   Value: 10.9(seconds)      Dt: 11/12/2024  INR                       Value: 1.0                Dt: 11/12/2024  Hemoglobin A1C            Value: 5.1(%)             Dt: 11/12/2024  Estimated Average Glucose Value: 100(mg/dL)         Dt: 11/12/2024  ------------  Office Visit on 10/23/2024  Tissue/Wound Culture/Sme*                           Dt: 10/23/2024                  Value: (4+) Abundant Methicillin Susceptible Staphylococ*   Gram Stain                                          Dt: 10/23/2024                  Value: (1+) Rare Polymorphonuclear leukocytes   Gram Stain                                          Dt: 10/23/2024                  Value: (2+) Few Gram positive cocci   ------------.  Physical Exam  Constitutional:       General: He is not in acute distress.     Appearance: He is not ill-appearing or diaphoretic.   HENT:      Head: Normocephalic and atraumatic.      Right Ear: External ear  normal.      Left Ear: External ear normal.      Nose: Nose normal. No rhinorrhea.   Eyes:      General: Lids are normal. No scleral icterus.        Right eye: No discharge.         Left eye: No discharge.      Conjunctiva/sclera: Conjunctivae normal.   Cardiovascular:      Rate and Rhythm: Normal rate and regular rhythm.      Pulses: Normal pulses.      Heart sounds: No murmur heard.  Pulmonary:      Effort: Pulmonary effort is normal. No respiratory distress.      Breath sounds: No decreased breath sounds, wheezing, rhonchi or rales.   Abdominal:      General: Bowel sounds are normal. There is no distension.      Palpations: Abdomen is soft. There is no mass.      Tenderness: There is no abdominal tenderness. There is no guarding or rebound.   Musculoskeletal:         General: No swelling, tenderness or deformity.      Cervical back: No rigidity or tenderness.      Right lower leg: No edema.      Left lower leg: No edema.   Lymphadenopathy:      Cervical: No cervical adenopathy.      Upper Body:      Right upper body: No supraclavicular adenopathy.      Left upper body: No supraclavicular adenopathy.   Skin:     General: Skin is warm and dry.      Coloration: Skin is not jaundiced or pale.      Findings: No erythema, lesion or rash.   Neurological:      General: No focal deficit present.      Mental Status: He is alert and oriented to person, place, and time.      Sensory: No sensory deficit.      Motor: No weakness or tremor.      Coordination: Coordination normal.      Gait: Gait normal.   Psychiatric:         Mood and Affect: Mood normal. Affect is not inappropriate.         Behavior: Behavior normal.         Assessment/Plan   Diagnoses and all orders for this visit:  Preop examination  Unilateral primary osteoarthritis, right knee  -     Disability Placard  Anxiety  -     Follow Up In [facility] - PCP - Established  -     clonazePAM (KlonoPIN) 1 mg tablet; Take 1 tablet (1 mg) by mouth once daily as  needed for anxiety.  Medication management  -     Follow Up In [facility] - PCP - Established  -     clonazePAM (KlonoPIN) 1 mg tablet; Take 1 tablet (1 mg) by mouth once daily as needed for anxiety.  Need for vaccination  -     Moderna COVID-19 vaccine, monovalent, age 12 years and older, (50mcg/0.5mL)(Spikevax)  Primary hypertension  Keep next regular scheduled follow-up appointment in 3 months with labs prior.  Assessment/Plan  69 y.o. male with planned surgery as above.    Known risk factors for perioperative complications: None    Difficulty with intubation is not anticipated.    Patient is cleared for surgery from a primary care point of view    Current medications which may produce withdrawal symptoms if withheld perioperatively: Clonazepam    1. Preoperative workup as follows ECG, hemoglobin, hematocrit, electrolytes, creatinine, glucose, liver function studies, coagulation studies.  2. Change in medication regimen before surgery: Patient is to hold meloxicam last dose 11/20 patient is to hold semaglutide last dose 11/25.  Patient may hold all other medications on morning of surgery and resume all medications postsurgically when okay with surgeon  3. Prophylaxis for cardiac events with perioperative beta-blockers: should be considered, specific regimen per anesthesia.  4. Invasive hemodynamic monitoring perioperatively: at the discretion of anesthesiologist.  5. Deep vein thrombosis prophylaxis postoperatively:regimen to be chosen by surgical team.

## 2024-11-12 NOTE — H&P (VIEW-ONLY)
Subjective   Patient ID: Jarocho Langford is a 69 y.o. male who presents for Med Management (CSM: Klonopin) and Pre-op Exam (Right knee replacement/Surgery on 12/5/24/PAT: 11/12/24).    Subjective  Jarocho Langford is a 69 y.o. male who presents to the office today for a preoperative consultation at the request of surgeon Joao briggs jr who plans on performing right total knee on 12/5/2024.  This consultation is requested for the specific conditions prompting preoperative evaluation (i.e. because of potential effect on operative risk): Hypertension. Planned anesthesia: spinal. The patient has the following known anesthesia issues: None. Patients bleeding risk: no recent abnormal bleeding, no remote history of abnormal bleeding, and use of Ca-channel blockers (see med list). Patient does not have objections to receiving blood products if needed.         OARRS:  No data recorded  I have personally reviewed the OARRS report for Jarocho Langford. I have considered the risks of abuse, dependence, addiction and diversion and I believe that it is clinically appropriate for Jarocho Langford to be prescribed this medication    Is the patient prescribed a combination of a benzodiazepine and opioid?  No    Last Urine Drug Screen / ordered today: No  Recent Results (from the past 8760 hour(s))  -Drug Screen, Urine With Reflex to Confirmation:   Collection Time: 08/10/24 10:24 AM       Result                      Value             Ref Range           Amphetamine Screen, Ur*                       Presumptive *   Presumptive Negative       Barbiturate Screen, Ur*                       Presumptive *   Presumptive Negative       Benzodiazepines Screen*                       Presumptive *   Presumptive Negative       Cannabinoid Screen, Ur*                       Presumptive *   Presumptive Negative       Cocaine Metabolite Scr*                       Presumptive *   Presumptive Negative       Fentanyl Screen, Urine                         Presumptive *   Presumptive Negative       Opiate Screen, Urine                          Presumptive *   Presumptive Negative       Oxycodone Screen, Urine                       Presumptive *   Presumptive Negative       PCP Screen, Urine                             Presumptive *   Presumptive Negative       Methadone Screen, Urine                       Presumptive *   Presumptive Negative  -Confirmation Opiate/Opioid/Benzo Prescription Compliance:   Collection Time: 11/04/23 11:05 AM       Result                      Value             Ref Range           Clonazepam                  <25               <25 ng/mL           7-Aminoclonazepam           141 (H)           <25 ng/mL           Alprazolam                  <25               <25 ng/mL           Alpha-Hydroxyalprazolam     <25               <25 ng/mL           Midazolam                   <25               <25 ng/mL           Alpha-Hydroxymidazolam      <25               <25 ng/mL           Chlordiazepoxide            <25               <25 ng/mL           Diazepam                    <25               <25 ng/mL           Nordiazepam                 <25               <25 ng/mL           Temazepam                   <25               <25 ng/mL           Oxazepam                    <25               <25 ng/mL           Lorazepam                   <25               <25 ng/mL           Methadone                   <25               <25 ng/mL           EDDP                        <25               <25 ng/mL           6-Acetylmorphine            <25               <25 ng/mL           Codeine                     <50               <50 ng/mL           Hydrocodone                 <25               <25 ng/mL           Hydromorphone               <25               <25 ng/mL           Morphine                    <50               <50 ng/mL           Norhydrocodone              <25               <25 ng/mL           Noroxycodone                <25               <25 ng/mL            Oxycodone                   <25               <25 ng/mL           Oxymorphone                 <25               <25 ng/mL           Fentanyl                    <2.5              <2.5 ng/mL          Norfentanyl                 <2.5              <2.5 ng/mL          Tramadol                    <50               <50 ng/mL           O-Desmethyltramadol         <50               <50 ng/mL           Zolpidem                    <25               <25 ng/mL           Zolpidem Metabolite (Z*     <25               <25 ng/mL      -Screen Opiate/Opioid/Benzo Prescription Compliance:   Collection Time: 11/04/23 11:05 AM       Result                      Value             Ref Range           Creatinine, Urine Rand*     79.0              20.0 - 370.0*       Amphetamine Screen, Ur*                       Presumptive *   Presumptive Negative       Barbiturate Screen, Ur*                       Presumptive *   Presumptive Negative       Cannabinoid Screen, Ur*                       Presumptive *   Presumptive Negative       Cocaine Metabolite Scr*                       Presumptive *   Presumptive Negative       PCP Screen, Urine                             Presumptive *   Presumptive Negative  Results are as expected.         Controlled Substance Agreement:  Date of the Last Agreement: Today  Reviewed Controlled Substance Agreement including but not limited to the benefits, risks, and alternatives to treatment with a Controlled Substance medication(s).    Benzodiazepines:  What is the patient's goal of therapy?  Reduction of anxiety  Is this being achieved with current treatment?  Yes    YENNY-7:  No data recorded    Activities of Daily Living:   Is your overall impression that this patient is benefiting (symptom reduction outweighs side effects) from benzodiazepine therapy? Yes     1. Physical Functioning: Same  2. Family Relationship: Same  3. Social Relationship: Same  4. Mood: Same  5. Sleep Patterns: Same  6. Overall Function:  Same      Anxiety  Presents for follow-up visit. Patient reports no chest pain, confusion, dizziness, nausea, nervous/anxious behavior, palpitations, shortness of breath or suicidal ideas. The quality of sleep is good.       Hypertension  This is a chronic problem. The current episode started more than 1 year ago. The problem is unchanged. The problem is controlled. Associated symptoms include anxiety, malaise/fatigue, neck pain and peripheral edema. Pertinent negatives include no chest pain, headaches, palpitations or shortness of breath. Agents associated with hypertension include NSAIDs. Risk factors for coronary artery disease include dyslipidemia, male gender and obesity. Past treatments include calcium channel blockers, ACE inhibitors and diuretics. The current treatment provides significant improvement. There are no compliance problems.         Review of Systems   Constitutional:  Positive for fatigue and malaise/fatigue. Negative for activity change, appetite change, chills, diaphoresis, fever and unexpected weight change.   HENT:  Negative for congestion, ear pain, hearing loss, nosebleeds, postnasal drip, rhinorrhea, sinus pressure, sneezing, sore throat, tinnitus, trouble swallowing and voice change.    Eyes:  Negative for photophobia, pain, discharge, redness, itching and visual disturbance.   Respiratory:  Negative for cough, choking, chest tightness, shortness of breath and wheezing.    Cardiovascular:  Positive for leg swelling. Negative for chest pain and palpitations.   Gastrointestinal:  Negative for abdominal distention, abdominal pain, blood in stool, constipation, diarrhea, nausea and vomiting.   Endocrine: Negative for cold intolerance, heat intolerance, polydipsia and polyuria.   Genitourinary:  Negative for dysuria, flank pain, frequency, hematuria and urgency.   Musculoskeletal:  Positive for neck pain and neck stiffness. Negative for arthralgias, back pain, joint swelling and myalgias.  "  Skin:  Negative for rash and wound.   Allergic/Immunologic: Negative for immunocompromised state.   Neurological:  Negative for dizziness, tremors, seizures, syncope, facial asymmetry, speech difficulty, weakness, light-headedness, numbness and headaches.   Hematological:  Negative for adenopathy. Does not bruise/bleed easily.   Psychiatric/Behavioral:  Negative for agitation, behavioral problems, confusion, dysphoric mood, hallucinations, self-injury, sleep disturbance and suicidal ideas. The patient is not nervous/anxious.        Objective   /70 (BP Location: Left arm, Patient Position: Sitting, BP Cuff Size: Large adult)   Pulse 94   Temp 36.5 °C (97.7 °F) (Temporal)   Resp 16   Ht 1.727 m (5' 8\")   Wt 130 kg (286 lb 4.8 oz)   SpO2 96%   BMI 43.53 kg/m²   Predictors of intubation difficulty:   Morbid obesity? yes -BMI 43   Anatomically abnormal facies? no   Prominent incisors? no   Receding mandible? no   Short, thick neck? yes   Dentition: No chipped, loose, or missing teeth.    Cardiographics  ECG: normal sinus rhythm, no blocks or conduction defects, no ischemic changes no change from 2023    Lab Review   Hospital Outpatient Visit on 11/12/2024  Ventricular Rate          Value: 79(BPM)            Dt: 11/12/2024  Atrial Rate               Value: 79(BPM)            Dt: 11/12/2024  AK Interval               Value: 156(ms)            Dt: 11/12/2024  QRS Duration              Value: 90(ms)             Dt: 11/12/2024  QT Interval               Value: 382(ms)            Dt: 11/12/2024  QTC Calculation(Bazett)   Value: 438(ms)            Dt: 11/12/2024  P Axis                    Value: -5(degrees)        Dt: 11/12/2024  R Axis                    Value: 52(degrees)        Dt: 11/12/2024  T Axis                    Value: 57(degrees)        Dt: 11/12/2024  QRS Count                 Value: 13(beats)          Dt: 11/12/2024  Q Onset                   Value: 219(ms)            Dt: 11/12/2024  P Onset          "          Value: 141(ms)            Dt: 11/12/2024  P Offset                  Value: 177(ms)            Dt: 11/12/2024  T Offset                  Value: 410(ms)            Dt: 11/12/2024  QTC Fredericia            Value: 418(ms)            Dt: 11/12/2024  ------------  Pre-Admission Testing on 11/12/2024  aPTT                      Value: 33(seconds)        Dt: 11/12/2024  WBC                       Value: 9.2(x10*3/uL)      Dt: 11/12/2024  nRBC                      Value: 0.0(/100 WBCs)     Dt: 11/12/2024  RBC                       Value: 5.59(x10*6/uL)     Dt: 11/12/2024  Hemoglobin                Value: 15.3(g/dL)         Dt: 11/12/2024  Hematocrit                Value: 46.2(%)            Dt: 11/12/2024  MCV                       Value: 83(fL)             Dt: 11/12/2024  MCH                       Value: 27.4(pg)           Dt: 11/12/2024  MCHC                      Value: 33.1(g/dL)         Dt: 11/12/2024  RDW                       Value: 14.6(%) (H)        Dt: 11/12/2024  Platelets                 Value: 241(x10*3/uL)      Dt: 11/12/2024  Neutrophils %             Value: 67.7(%)            Dt: 11/12/2024  Immature Granulocytes %,* Value: 0.3(%)             Dt: 11/12/2024  Lymphocytes %             Value: 22.2(%)            Dt: 11/12/2024  Monocytes %               Value: 7.9(%)             Dt: 11/12/2024  Eosinophils %             Value: 1.6(%)             Dt: 11/12/2024  Basophils %               Value: 0.3(%)             Dt: 11/12/2024  Neutrophils Absolute      Value: 6.20(x10*3/uL)     Dt: 11/12/2024  Immature Granulocytes Ab* Value: 0.03(x10*3/uL)     Dt: 11/12/2024  Lymphocytes Absolute      Value: 2.04(x10*3/uL)     Dt: 11/12/2024  Monocytes Absolute        Value: 0.72(x10*3/uL)     Dt: 11/12/2024  Eosinophils Absolute      Value: 0.15(x10*3/uL)     Dt: 11/12/2024  Basophils Absolute        Value: 0.03(x10*3/uL)     Dt: 11/12/2024  Glucose                   Value: 75(mg/dL)          Dt: 11/12/2024  Sodium                     Value: 138(mmol/L)        Dt: 11/12/2024  Potassium                 Value: 4.4(mmol/L)        Dt: 11/12/2024  Chloride                  Value: 103(mmol/L)        Dt: 11/12/2024  Bicarbonate               Value: 27(mmol/L)         Dt: 11/12/2024  Anion Gap                 Value: 12(mmol/L)         Dt: 11/12/2024  Urea Nitrogen             Value: 18(mg/dL)          Dt: 11/12/2024  Creatinine                Value: 0.98(mg/dL)        Dt: 11/12/2024  eGFR                      Value: 83(mL/min/1.73m*2) Dt: 11/12/2024  Calcium                   Value: 8.5(mg/dL) (L)     Dt: 11/12/2024  Albumin                   Value: 4.3(g/dL)          Dt: 11/12/2024  Alkaline Phosphatase      Value: 93(U/L)            Dt: 11/12/2024  Total Protein             Value: 6.7(g/dL)          Dt: 11/12/2024  AST                       Value: 19(U/L)            Dt: 11/12/2024  Bilirubin, Total          Value: 0.4(mg/dL)         Dt: 11/12/2024  ALT                       Value: 21(U/L)            Dt: 11/12/2024  Protime                   Value: 10.9(seconds)      Dt: 11/12/2024  INR                       Value: 1.0                Dt: 11/12/2024  Hemoglobin A1C            Value: 5.1(%)             Dt: 11/12/2024  Estimated Average Glucose Value: 100(mg/dL)         Dt: 11/12/2024  ------------  Office Visit on 10/23/2024  Tissue/Wound Culture/Sme*                           Dt: 10/23/2024                  Value: (4+) Abundant Methicillin Susceptible Staphylococ*   Gram Stain                                          Dt: 10/23/2024                  Value: (1+) Rare Polymorphonuclear leukocytes   Gram Stain                                          Dt: 10/23/2024                  Value: (2+) Few Gram positive cocci   ------------.  Physical Exam  Constitutional:       General: He is not in acute distress.     Appearance: He is not ill-appearing or diaphoretic.   HENT:      Head: Normocephalic and atraumatic.      Right Ear: External ear  normal.      Left Ear: External ear normal.      Nose: Nose normal. No rhinorrhea.   Eyes:      General: Lids are normal. No scleral icterus.        Right eye: No discharge.         Left eye: No discharge.      Conjunctiva/sclera: Conjunctivae normal.   Cardiovascular:      Rate and Rhythm: Normal rate and regular rhythm.      Pulses: Normal pulses.      Heart sounds: No murmur heard.  Pulmonary:      Effort: Pulmonary effort is normal. No respiratory distress.      Breath sounds: No decreased breath sounds, wheezing, rhonchi or rales.   Abdominal:      General: Bowel sounds are normal. There is no distension.      Palpations: Abdomen is soft. There is no mass.      Tenderness: There is no abdominal tenderness. There is no guarding or rebound.   Musculoskeletal:         General: No swelling, tenderness or deformity.      Cervical back: No rigidity or tenderness.      Right lower leg: No edema.      Left lower leg: No edema.   Lymphadenopathy:      Cervical: No cervical adenopathy.      Upper Body:      Right upper body: No supraclavicular adenopathy.      Left upper body: No supraclavicular adenopathy.   Skin:     General: Skin is warm and dry.      Coloration: Skin is not jaundiced or pale.      Findings: No erythema, lesion or rash.   Neurological:      General: No focal deficit present.      Mental Status: He is alert and oriented to person, place, and time.      Sensory: No sensory deficit.      Motor: No weakness or tremor.      Coordination: Coordination normal.      Gait: Gait normal.   Psychiatric:         Mood and Affect: Mood normal. Affect is not inappropriate.         Behavior: Behavior normal.         Assessment/Plan   Diagnoses and all orders for this visit:  Preop examination  Unilateral primary osteoarthritis, right knee  -     Disability Placard  Anxiety  -     Follow Up In [facility] - PCP - Established  -     clonazePAM (KlonoPIN) 1 mg tablet; Take 1 tablet (1 mg) by mouth once daily as  needed for anxiety.  Medication management  -     Follow Up In [facility] - PCP - Established  -     clonazePAM (KlonoPIN) 1 mg tablet; Take 1 tablet (1 mg) by mouth once daily as needed for anxiety.  Need for vaccination  -     Moderna COVID-19 vaccine, monovalent, age 12 years and older, (50mcg/0.5mL)(Spikevax)  Primary hypertension  Keep next regular scheduled follow-up appointment in 3 months with labs prior.  Assessment/Plan  69 y.o. male with planned surgery as above.    Known risk factors for perioperative complications: None    Difficulty with intubation is not anticipated.    Patient is cleared for surgery from a primary care point of view    Current medications which may produce withdrawal symptoms if withheld perioperatively: Clonazepam    1. Preoperative workup as follows ECG, hemoglobin, hematocrit, electrolytes, creatinine, glucose, liver function studies, coagulation studies.  2. Change in medication regimen before surgery: Patient is to hold meloxicam last dose 11/20 patient is to hold semaglutide last dose 11/25.  Patient may hold all other medications on morning of surgery and resume all medications postsurgically when okay with surgeon  3. Prophylaxis for cardiac events with perioperative beta-blockers: should be considered, specific regimen per anesthesia.  4. Invasive hemodynamic monitoring perioperatively: at the discretion of anesthesiologist.  5. Deep vein thrombosis prophylaxis postoperatively:regimen to be chosen by surgical team.

## 2024-11-14 LAB — STAPHYLOCOCCUS SPEC CULT: NORMAL

## 2024-11-18 DIAGNOSIS — Z01.818 PRE-OPERATIVE CLEARANCE: Primary | ICD-10-CM

## 2024-11-18 RX ORDER — CHLORHEXIDINE GLUCONATE ORAL RINSE 1.2 MG/ML
15 SOLUTION DENTAL DAILY
Qty: 30 ML | Refills: 0 | Status: SHIPPED | OUTPATIENT
Start: 2024-11-18 | End: 2024-11-20

## 2024-11-22 ENCOUNTER — APPOINTMENT (OUTPATIENT)
Dept: ORTHOPEDIC SURGERY | Facility: CLINIC | Age: 69
End: 2024-11-22
Payer: COMMERCIAL

## 2024-11-22 DIAGNOSIS — M17.0 ARTHRITIS OF BOTH KNEES: Primary | ICD-10-CM

## 2024-11-22 ASSESSMENT — PAIN SCALES - GENERAL: PAINLEVEL_OUTOF10: 4

## 2024-11-22 ASSESSMENT — PAIN - FUNCTIONAL ASSESSMENT: PAIN_FUNCTIONAL_ASSESSMENT: 0-10

## 2024-11-22 NOTE — PROGRESS NOTES
This patient has pain in the knee that is worsening.  The pain increases with activity and with weightbearing, and interferes with daily activities.  There is pain with range of motion, limited range of motion, crepitus and swelling.  The x-ray demonstrates joint space narrowing, osteophyte formation, and subchondral sclerosis.  The symptoms have worsened in spite of extensive medical treatment, therapy, and external support over at least the past 3 months.     This is the preop visit to discuss the risks and benefits of the total knee replacement surgery.  These risks were fully explained to the patient.  With this, and any surgery, infection is a risk, this is usually 1 to 2%, even higher in diabetics, persons with rheumatoid arthritis, previous surgeries, on oral steroid medications, and obesity.  All of these issues were properly addressed.  We always assure all sterile techniques will be followed.  Patient will also need to be on antibiotics for the next 2 years for any minor surgery, even dental work.  For high risk patients, this will be for lifetime.  Severe infections may require removal of the prosthesis.     It was also explained to the patient that there will be some minor blood loss during the procedure and a blood transfusion may be recommended if medically necessary.  It is also noted that with knee surgery a tourniquet is applied to the lower extremity to limit blood loss during the procedure.     Pulmonary embolism and blood clots are also discussed with the patient.  We discussed that these can be fatal complications to the surgery.  It is explained to the patient that the use of thromboembolic stockings, foot pumps, incentive spirometer's, early mobility, and the use of blood thinners for period of time is the standard of care for prevention of blood clots.     Patient's preoperative range of motion is 0-125 degrees.  It is explained to the patient that this type of surgery is to decrease  arthritis pain and sometimes stiffness may occur.  It is important that the patient go to physical therapy postoperatively.  It is also stated that occasionally we will have to manipulate the knee if pain and stiffness persists.     Loosening and wear of the prosthesis are also discussed.  The prosthesis normally last between 12 and 15 years.  Revisions may be more complicated and with higher risk.     Fractures, though rare, may also occur intraoperatively.  These fractures may be to the tibia or to the femur.  There may be nerve or arterial injuries as well and these are discussed in detail.     Lastly, the benefits of spinal anesthesia were explained to the patient.     All of the patient's questions and concerns were answered.     This note was prepared using voice recognition software.  The details of this note are correct and have been reviewed, and corrected to the best of my ability.  Some grammatical areas may persist related to the Dragon software     DVT ppx: Plan for Eliquis PO 2.5 mg twice daily postoperatively    Last images obtained: CT 11/12/2024, x-ray 11/1/2024    Yang Desai PA-C

## 2024-11-26 ENCOUNTER — OFFICE VISIT (OUTPATIENT)
Dept: PODIATRY | Age: 69
End: 2024-11-26
Payer: COMMERCIAL

## 2024-11-26 VITALS — WEIGHT: 280 LBS | TEMPERATURE: 97.4 F | BODY MASS INDEX: 42.44 KG/M2 | HEIGHT: 68 IN

## 2024-11-26 DIAGNOSIS — S90.222A SUBUNGUAL HEMATOMA OF SECOND TOE OF LEFT FOOT, INITIAL ENCOUNTER: ICD-10-CM

## 2024-11-26 DIAGNOSIS — L03.032 CELLULITIS OF SECOND TOE OF LEFT FOOT: ICD-10-CM

## 2024-11-26 DIAGNOSIS — M79.675 PAIN IN TOE OF LEFT FOOT: Primary | ICD-10-CM

## 2024-11-26 PROCEDURE — 1123F ACP DISCUSS/DSCN MKR DOCD: CPT | Performed by: PODIATRIST

## 2024-11-26 PROCEDURE — 11730 AVULSION NAIL PLATE SIMPLE 1: CPT | Performed by: PODIATRIST

## 2024-11-26 PROCEDURE — G8427 DOCREV CUR MEDS BY ELIG CLIN: HCPCS | Performed by: PODIATRIST

## 2024-11-26 PROCEDURE — G8417 CALC BMI ABV UP PARAM F/U: HCPCS | Performed by: PODIATRIST

## 2024-11-26 PROCEDURE — 3017F COLORECTAL CA SCREEN DOC REV: CPT | Performed by: PODIATRIST

## 2024-11-26 PROCEDURE — G8484 FLU IMMUNIZE NO ADMIN: HCPCS | Performed by: PODIATRIST

## 2024-11-26 PROCEDURE — 99213 OFFICE O/P EST LOW 20 MIN: CPT | Performed by: PODIATRIST

## 2024-11-26 PROCEDURE — 1036F TOBACCO NON-USER: CPT | Performed by: PODIATRIST

## 2024-11-26 RX ORDER — CEPHALEXIN 500 MG/1
500 CAPSULE ORAL 3 TIMES DAILY
Qty: 15 CAPSULE | Refills: 0 | Status: SHIPPED | OUTPATIENT
Start: 2024-11-26 | End: 2024-12-01

## 2024-11-26 RX ORDER — GENTAMICIN SULFATE 1 MG/G
CREAM TOPICAL
Qty: 15 G | Refills: 1 | Status: SHIPPED | OUTPATIENT
Start: 2024-11-26

## 2024-11-26 ASSESSMENT — ENCOUNTER SYMPTOMS
NAUSEA: 0
BACK PAIN: 0
VOMITING: 0
SHORTNESS OF BREATH: 0

## 2024-11-26 NOTE — PROGRESS NOTES
[facility] [place] [facility], [facility]  [address]  [address]  [place] [address]  Dept: [phone]  Loc: [phone]       Mo Nobles  (1955)    11/26/24    Subjective     Mo Nobles is 69 y.o. male who complains today of:    Chief Complaint   Patient presents with    Nail Problem     Left 2nd toe       HPI: Patient presents with a complaint of an injury to the left second toe.  Patient states he stepped the toe while barefoot against the bed frame approximately 10 days ago.  Patient states there was copious bleeding at the time.  Patient has been applying topical Neosporin to the area daily.  Patient reports redness to the toe.  Patient denies signs or symptoms of systemic infection.  Patient reports that he is scheduled to undergo right lower extremity total knee arthroplasty on Thursday, December 5.    Review of Systems   Constitutional:  Negative for chills and fever.   HENT:  Negative for hearing loss.    Respiratory:  Negative for shortness of breath.    Cardiovascular:  Negative for chest pain.   Gastrointestinal:  Negative for nausea and vomiting.   Genitourinary:  Negative for difficulty urinating.   Musculoskeletal:  Negative for back pain and gait problem.   Skin:  Negative for wound.   Neurological:  Negative for numbness.   Hematological:  Bruises/bleeds easily.   Psychiatric/Behavioral:  Negative for sleep disturbance.        Allergies:  Metoprolol and Belviq [lorcaserin hcl]    Current Outpatient Medications on File Prior to Visit   Medication Sig Dispense Refill    clonazePAM (KLONOPIN) 1 MG tablet TAKE 1 TABLET BY MOUTH ONCE DAILY AS NEEDED FOR ANXIETY      solifenacin (VESICARE) 10 MG tablet Take 1 tablet by mouth daily      Semaglutide,0.25 or 0.5MG/DOS, 2 MG/3ML SOPN Inject 0.5 mg into the skin      rifAXIMin (XIFAXAN) 550 MG tablet Take by mouth      vitamin B-6 (PYRIDOXINE) 100 MG tablet TAKE ONE TABLET

## 2024-12-02 DIAGNOSIS — J45.909 ASTHMA, UNSPECIFIED ASTHMA SEVERITY, UNSPECIFIED WHETHER COMPLICATED, UNSPECIFIED WHETHER PERSISTENT (HHS-HCC): Primary | ICD-10-CM

## 2024-12-02 DIAGNOSIS — N40.1 BENIGN PROSTATIC HYPERPLASIA (BPH) WITH URINARY URGENCY: ICD-10-CM

## 2024-12-02 DIAGNOSIS — R39.15 BENIGN PROSTATIC HYPERPLASIA (BPH) WITH URINARY URGENCY: ICD-10-CM

## 2024-12-02 PROCEDURE — RXMED WILLOW AMBULATORY MEDICATION CHARGE

## 2024-12-02 RX ORDER — OXYBUTYNIN CHLORIDE 10 MG/1
10 TABLET, EXTENDED RELEASE ORAL DAILY
Qty: 90 TABLET | Refills: 1 | Status: SHIPPED | OUTPATIENT
Start: 2024-12-02

## 2024-12-02 RX ORDER — ALBUTEROL SULFATE 90 UG/1
2 INHALANT RESPIRATORY (INHALATION) EVERY 4 HOURS PRN
Qty: 18 G | Refills: 3 | Status: SHIPPED | OUTPATIENT
Start: 2024-12-02

## 2024-12-02 NOTE — TELEPHONE ENCOUNTER
Per [employer] message form pt:   Dr. Farfan took me off of Ditropan and put me back on it. [facility] needs another prescription in order to refill it. It should be for 90 days.

## 2024-12-04 ENCOUNTER — ANESTHESIA EVENT (OUTPATIENT)
Dept: OPERATING ROOM | Facility: HOSPITAL | Age: 69
End: 2024-12-04
Payer: COMMERCIAL

## 2024-12-04 ENCOUNTER — OFFICE VISIT (OUTPATIENT)
Dept: ORTHOPEDIC SURGERY | Facility: CLINIC | Age: 69
End: 2024-12-04
Payer: COMMERCIAL

## 2024-12-04 VITALS — WEIGHT: 286 LBS | HEIGHT: 68 IN | BODY MASS INDEX: 43.35 KG/M2

## 2024-12-04 DIAGNOSIS — M17.0 ARTHRITIS OF BOTH KNEES: Primary | ICD-10-CM

## 2024-12-04 PROCEDURE — 99211 OFF/OP EST MAY X REQ PHY/QHP: CPT | Performed by: STUDENT IN AN ORGANIZED HEALTH CARE EDUCATION/TRAINING PROGRAM

## 2024-12-04 NOTE — PROGRESS NOTES
Patient presents today for early follow-up regarding discussion for total knee arthroplasty for tomorrow.  Earlier this week I did get a phone call discussing that he did have a cough and cold therefore did recommend early follow-up prior to surgery to discuss this.  Patient today states that he is feeling much improved still does have residual slight cough however denies any fevers chills.  Overall feeling well.    I did discuss current plans for surgical treatment tomorrow.  Did discuss proceeding with treatment regarding surgery versus waiting.  Constellation of findings discussed with Jarocho in detail using shared informed tissue making he does wish to proceed with total knee arthroplasty tomorrow will review current regimen with anesthesia prior to proceeding.  Given symptoms of malaise fevers are not present and patient overall feels well I am okay to proceed with surgical treatment.

## 2024-12-05 ENCOUNTER — DOCUMENTATION (OUTPATIENT)
Dept: HOME HEALTH SERVICES | Facility: HOME HEALTH | Age: 69
End: 2024-12-05

## 2024-12-05 ENCOUNTER — PHARMACY VISIT (OUTPATIENT)
Dept: PHARMACY | Facility: CLINIC | Age: 69
End: 2024-12-05
Payer: COMMERCIAL

## 2024-12-05 ENCOUNTER — HOSPITAL ENCOUNTER (OUTPATIENT)
Facility: HOSPITAL | Age: 69
Discharge: HOME | End: 2024-12-06
Attending: STUDENT IN AN ORGANIZED HEALTH CARE EDUCATION/TRAINING PROGRAM | Admitting: STUDENT IN AN ORGANIZED HEALTH CARE EDUCATION/TRAINING PROGRAM
Payer: COMMERCIAL

## 2024-12-05 ENCOUNTER — ANESTHESIA (OUTPATIENT)
Dept: OPERATING ROOM | Facility: HOSPITAL | Age: 69
End: 2024-12-05
Payer: COMMERCIAL

## 2024-12-05 ENCOUNTER — HOME HEALTH ADMISSION (OUTPATIENT)
Dept: HOME HEALTH SERVICES | Facility: HOME HEALTH | Age: 69
End: 2024-12-05
Payer: COMMERCIAL

## 2024-12-05 ENCOUNTER — APPOINTMENT (OUTPATIENT)
Dept: RADIOLOGY | Facility: HOSPITAL | Age: 69
End: 2024-12-05
Payer: COMMERCIAL

## 2024-12-05 DIAGNOSIS — M17.11 UNILATERAL PRIMARY OSTEOARTHRITIS, RIGHT KNEE: ICD-10-CM

## 2024-12-05 DIAGNOSIS — Z96.651 S/P TOTAL KNEE ARTHROPLASTY, RIGHT: Primary | ICD-10-CM

## 2024-12-05 PROCEDURE — 2500000004 HC RX 250 GENERAL PHARMACY W/ HCPCS (ALT 636 FOR OP/ED): Performed by: STUDENT IN AN ORGANIZED HEALTH CARE EDUCATION/TRAINING PROGRAM

## 2024-12-05 PROCEDURE — 99231 SBSQ HOSP IP/OBS SF/LOW 25: CPT

## 2024-12-05 PROCEDURE — 3700000001 HC GENERAL ANESTHESIA TIME - INITIAL BASE CHARGE: Performed by: STUDENT IN AN ORGANIZED HEALTH CARE EDUCATION/TRAINING PROGRAM

## 2024-12-05 PROCEDURE — 2500000002 HC RX 250 W HCPCS SELF ADMINISTERED DRUGS (ALT 637 FOR MEDICARE OP, ALT 636 FOR OP/ED): Performed by: STUDENT IN AN ORGANIZED HEALTH CARE EDUCATION/TRAINING PROGRAM

## 2024-12-05 PROCEDURE — RXMED WILLOW AMBULATORY MEDICATION CHARGE

## 2024-12-05 PROCEDURE — 7100000011 HC EXTENDED STAY RECOVERY HOURLY - NURSING UNIT

## 2024-12-05 PROCEDURE — 73560 X-RAY EXAM OF KNEE 1 OR 2: CPT | Mod: RT

## 2024-12-05 PROCEDURE — 97530 THERAPEUTIC ACTIVITIES: CPT | Mod: GP

## 2024-12-05 PROCEDURE — 2500000004 HC RX 250 GENERAL PHARMACY W/ HCPCS (ALT 636 FOR OP/ED): Performed by: NURSE ANESTHETIST, CERTIFIED REGISTERED

## 2024-12-05 PROCEDURE — 2500000001 HC RX 250 WO HCPCS SELF ADMINISTERED DRUGS (ALT 637 FOR MEDICARE OP): Performed by: STUDENT IN AN ORGANIZED HEALTH CARE EDUCATION/TRAINING PROGRAM

## 2024-12-05 PROCEDURE — 2780000003 HC OR 278 NO HCPCS: Performed by: STUDENT IN AN ORGANIZED HEALTH CARE EDUCATION/TRAINING PROGRAM

## 2024-12-05 PROCEDURE — 2500000005 HC RX 250 GENERAL PHARMACY W/O HCPCS: Performed by: STUDENT IN AN ORGANIZED HEALTH CARE EDUCATION/TRAINING PROGRAM

## 2024-12-05 PROCEDURE — C1713 ANCHOR/SCREW BN/BN,TIS/BN: HCPCS | Performed by: STUDENT IN AN ORGANIZED HEALTH CARE EDUCATION/TRAINING PROGRAM

## 2024-12-05 PROCEDURE — C1776 JOINT DEVICE (IMPLANTABLE): HCPCS | Performed by: STUDENT IN AN ORGANIZED HEALTH CARE EDUCATION/TRAINING PROGRAM

## 2024-12-05 PROCEDURE — 7100000002 HC RECOVERY ROOM TIME - EACH INCREMENTAL 1 MINUTE: Performed by: STUDENT IN AN ORGANIZED HEALTH CARE EDUCATION/TRAINING PROGRAM

## 2024-12-05 PROCEDURE — 2500000002 HC RX 250 W HCPCS SELF ADMINISTERED DRUGS (ALT 637 FOR MEDICARE OP, ALT 636 FOR OP/ED)

## 2024-12-05 PROCEDURE — 2500000004 HC RX 250 GENERAL PHARMACY W/ HCPCS (ALT 636 FOR OP/ED): Mod: JZ | Performed by: STUDENT IN AN ORGANIZED HEALTH CARE EDUCATION/TRAINING PROGRAM

## 2024-12-05 PROCEDURE — 2500000001 HC RX 250 WO HCPCS SELF ADMINISTERED DRUGS (ALT 637 FOR MEDICARE OP)

## 2024-12-05 PROCEDURE — 2720000007 HC OR 272 NO HCPCS: Performed by: STUDENT IN AN ORGANIZED HEALTH CARE EDUCATION/TRAINING PROGRAM

## 2024-12-05 PROCEDURE — 2500000004 HC RX 250 GENERAL PHARMACY W/ HCPCS (ALT 636 FOR OP/ED)

## 2024-12-05 PROCEDURE — 97161 PT EVAL LOW COMPLEX 20 MIN: CPT | Mod: GP

## 2024-12-05 PROCEDURE — 7100000001 HC RECOVERY ROOM TIME - INITIAL BASE CHARGE: Performed by: STUDENT IN AN ORGANIZED HEALTH CARE EDUCATION/TRAINING PROGRAM

## 2024-12-05 PROCEDURE — 2500000005 HC RX 250 GENERAL PHARMACY W/O HCPCS

## 2024-12-05 PROCEDURE — 97116 GAIT TRAINING THERAPY: CPT | Mod: GP

## 2024-12-05 PROCEDURE — 73560 X-RAY EXAM OF KNEE 1 OR 2: CPT | Mod: RIGHT SIDE | Performed by: RADIOLOGY

## 2024-12-05 PROCEDURE — 3700000002 HC GENERAL ANESTHESIA TIME - EACH INCREMENTAL 1 MINUTE: Performed by: STUDENT IN AN ORGANIZED HEALTH CARE EDUCATION/TRAINING PROGRAM

## 2024-12-05 PROCEDURE — 27447 TOTAL KNEE ARTHROPLASTY: CPT

## 2024-12-05 PROCEDURE — 3600000018 HC OR TIME - INITIAL BASE CHARGE - PROCEDURE LEVEL SIX: Performed by: STUDENT IN AN ORGANIZED HEALTH CARE EDUCATION/TRAINING PROGRAM

## 2024-12-05 PROCEDURE — 27447 TOTAL KNEE ARTHROPLASTY: CPT | Performed by: STUDENT IN AN ORGANIZED HEALTH CARE EDUCATION/TRAINING PROGRAM

## 2024-12-05 PROCEDURE — 3600000017 HC OR TIME - EACH INCREMENTAL 1 MINUTE - PROCEDURE LEVEL SIX: Performed by: STUDENT IN AN ORGANIZED HEALTH CARE EDUCATION/TRAINING PROGRAM

## 2024-12-05 DEVICE — CRUCIATE RETAINING FEMORAL
Type: IMPLANTABLE DEVICE | Site: KNEE | Status: FUNCTIONAL
Brand: TRIATHLON

## 2024-12-05 DEVICE — TIBIAL COMPONENT
Type: IMPLANTABLE DEVICE | Site: KNEE | Status: FUNCTIONAL
Brand: TRIATHLON

## 2024-12-05 DEVICE — TIBIAL BEARING INSERT - CS
Type: IMPLANTABLE DEVICE | Site: KNEE | Status: FUNCTIONAL
Brand: TRIATHLON

## 2024-12-05 DEVICE — PATELLA
Type: IMPLANTABLE DEVICE | Site: KNEE | Status: FUNCTIONAL
Brand: TRIATHLON

## 2024-12-05 RX ORDER — ALBUTEROL SULFATE 90 UG/1
2 INHALANT RESPIRATORY (INHALATION) EVERY 4 HOURS PRN
Status: DISCONTINUED | OUTPATIENT
Start: 2024-12-05 | End: 2024-12-06 | Stop reason: HOSPADM

## 2024-12-05 RX ORDER — GABAPENTIN 300 MG/1
600 CAPSULE ORAL ONCE
Status: COMPLETED | OUTPATIENT
Start: 2024-12-05 | End: 2024-12-05

## 2024-12-05 RX ORDER — VANCOMYCIN HYDROCHLORIDE 1 G/20ML
INJECTION, POWDER, LYOPHILIZED, FOR SOLUTION INTRAVENOUS AS NEEDED
Status: DISCONTINUED | OUTPATIENT
Start: 2024-12-05 | End: 2024-12-05 | Stop reason: HOSPADM

## 2024-12-05 RX ORDER — SODIUM CHLORIDE 0.9 G/100ML
IRRIGANT IRRIGATION AS NEEDED
Status: DISCONTINUED | OUTPATIENT
Start: 2024-12-05 | End: 2024-12-05 | Stop reason: HOSPADM

## 2024-12-05 RX ORDER — MIDAZOLAM HYDROCHLORIDE 1 MG/ML
INJECTION, SOLUTION INTRAMUSCULAR; INTRAVENOUS AS NEEDED
Status: DISCONTINUED | OUTPATIENT
Start: 2024-12-05 | End: 2024-12-05

## 2024-12-05 RX ORDER — CELECOXIB 200 MG/1
400 CAPSULE ORAL ONCE
Status: COMPLETED | OUTPATIENT
Start: 2024-12-05 | End: 2024-12-05

## 2024-12-05 RX ORDER — AMLODIPINE AND BENAZEPRIL HYDROCHLORIDE 5; 10 MG/1; MG/1
1 CAPSULE ORAL DAILY
Status: DISCONTINUED | OUTPATIENT
Start: 2024-12-05 | End: 2024-12-06 | Stop reason: HOSPADM

## 2024-12-05 RX ORDER — TRANEXAMIC ACID 650 MG/1
1950 TABLET ORAL ONCE
Status: COMPLETED | OUTPATIENT
Start: 2024-12-06 | End: 2024-12-06

## 2024-12-05 RX ORDER — MORPHINE SULFATE 2 MG/ML
2 INJECTION, SOLUTION INTRAMUSCULAR; INTRAVENOUS EVERY 2 HOUR PRN
Status: DISCONTINUED | OUTPATIENT
Start: 2024-12-05 | End: 2024-12-06 | Stop reason: HOSPADM

## 2024-12-05 RX ORDER — CLONAZEPAM 1 MG/1
1 TABLET ORAL DAILY PRN
Status: DISCONTINUED | OUTPATIENT
Start: 2024-12-05 | End: 2024-12-06 | Stop reason: HOSPADM

## 2024-12-05 RX ORDER — SODIUM CHLORIDE, SODIUM LACTATE, POTASSIUM CHLORIDE, CALCIUM CHLORIDE 600; 310; 30; 20 MG/100ML; MG/100ML; MG/100ML; MG/100ML
50 INJECTION, SOLUTION INTRAVENOUS CONTINUOUS
Status: ACTIVE | OUTPATIENT
Start: 2024-12-05 | End: 2024-12-06

## 2024-12-05 RX ORDER — ACETAMINOPHEN 325 MG/1
975 TABLET ORAL ONCE
Status: COMPLETED | OUTPATIENT
Start: 2024-12-05 | End: 2024-12-05

## 2024-12-05 RX ORDER — PROCHLORPERAZINE 25 MG/1
25 SUPPOSITORY RECTAL EVERY 12 HOURS PRN
Status: DISCONTINUED | OUTPATIENT
Start: 2024-12-05 | End: 2024-12-06 | Stop reason: HOSPADM

## 2024-12-05 RX ORDER — ONDANSETRON HYDROCHLORIDE 2 MG/ML
INJECTION, SOLUTION INTRAVENOUS AS NEEDED
Status: DISCONTINUED | OUTPATIENT
Start: 2024-12-05 | End: 2024-12-05

## 2024-12-05 RX ORDER — ROPIVACAINE HYDROCHLORIDE 2 MG/ML
20 INJECTION, SOLUTION EPIDURAL; INFILTRATION; PERINEURAL ONCE
Status: COMPLETED | OUTPATIENT
Start: 2024-12-05 | End: 2024-12-05

## 2024-12-05 RX ORDER — CYCLOBENZAPRINE HCL 10 MG
10 TABLET ORAL 3 TIMES DAILY PRN
Status: DISCONTINUED | OUTPATIENT
Start: 2024-12-05 | End: 2024-12-06 | Stop reason: HOSPADM

## 2024-12-05 RX ORDER — KETOROLAC TROMETHAMINE 30 MG/ML
15 INJECTION, SOLUTION INTRAMUSCULAR; INTRAVENOUS EVERY 6 HOURS
Status: COMPLETED | OUTPATIENT
Start: 2024-12-05 | End: 2024-12-06

## 2024-12-05 RX ORDER — BUPROPION HYDROCHLORIDE 150 MG/1
150 TABLET, EXTENDED RELEASE ORAL 2 TIMES DAILY
Status: DISCONTINUED | OUTPATIENT
Start: 2024-12-05 | End: 2024-12-06 | Stop reason: HOSPADM

## 2024-12-05 RX ORDER — PROPOFOL 10 MG/ML
INJECTION, EMULSION INTRAVENOUS AS NEEDED
Status: DISCONTINUED | OUTPATIENT
Start: 2024-12-05 | End: 2024-12-05

## 2024-12-05 RX ORDER — PANTOPRAZOLE SODIUM 40 MG/1
40 TABLET, DELAYED RELEASE ORAL
Status: DISCONTINUED | OUTPATIENT
Start: 2024-12-06 | End: 2024-12-06 | Stop reason: HOSPADM

## 2024-12-05 RX ORDER — NALOXONE HYDROCHLORIDE 1 MG/ML
0.2 INJECTION INTRAMUSCULAR; INTRAVENOUS; SUBCUTANEOUS EVERY 5 MIN PRN
Status: DISCONTINUED | OUTPATIENT
Start: 2024-12-05 | End: 2024-12-06 | Stop reason: HOSPADM

## 2024-12-05 RX ORDER — FENTANYL CITRATE 50 UG/ML
25 INJECTION, SOLUTION INTRAMUSCULAR; INTRAVENOUS EVERY 5 MIN PRN
Status: DISCONTINUED | OUTPATIENT
Start: 2024-12-05 | End: 2024-12-05 | Stop reason: HOSPADM

## 2024-12-05 RX ORDER — DOCUSATE SODIUM 100 MG/1
100 CAPSULE, LIQUID FILLED ORAL 2 TIMES DAILY
Qty: 20 CAPSULE | Refills: 0 | Status: SHIPPED | OUTPATIENT
Start: 2024-12-05 | End: 2024-12-15

## 2024-12-05 RX ORDER — ALBUTEROL SULFATE 0.83 MG/ML
2.5 SOLUTION RESPIRATORY (INHALATION) ONCE AS NEEDED
Status: DISCONTINUED | OUTPATIENT
Start: 2024-12-05 | End: 2024-12-05 | Stop reason: HOSPADM

## 2024-12-05 RX ORDER — OXYCODONE HYDROCHLORIDE 5 MG/1
5 TABLET ORAL EVERY 4 HOURS PRN
Status: DISCONTINUED | OUTPATIENT
Start: 2024-12-05 | End: 2024-12-06 | Stop reason: HOSPADM

## 2024-12-05 RX ORDER — OXYBUTYNIN CHLORIDE 5 MG/1
5 TABLET ORAL 2 TIMES DAILY
Status: DISCONTINUED | OUTPATIENT
Start: 2024-12-05 | End: 2024-12-06 | Stop reason: HOSPADM

## 2024-12-05 RX ORDER — MEPERIDINE HYDROCHLORIDE 25 MG/ML
12.5 INJECTION INTRAMUSCULAR; INTRAVENOUS; SUBCUTANEOUS EVERY 10 MIN PRN
Status: DISCONTINUED | OUTPATIENT
Start: 2024-12-05 | End: 2024-12-05 | Stop reason: HOSPADM

## 2024-12-05 RX ORDER — DIPHENHYDRAMINE HYDROCHLORIDE 50 MG/ML
12.5 INJECTION INTRAMUSCULAR; INTRAVENOUS ONCE AS NEEDED
Status: DISCONTINUED | OUTPATIENT
Start: 2024-12-05 | End: 2024-12-05 | Stop reason: HOSPADM

## 2024-12-05 RX ORDER — SODIUM CHLORIDE, SODIUM LACTATE, POTASSIUM CHLORIDE, CALCIUM CHLORIDE 600; 310; 30; 20 MG/100ML; MG/100ML; MG/100ML; MG/100ML
100 INJECTION, SOLUTION INTRAVENOUS CONTINUOUS
Status: DISCONTINUED | OUTPATIENT
Start: 2024-12-05 | End: 2024-12-05 | Stop reason: HOSPADM

## 2024-12-05 RX ORDER — PROCHLORPERAZINE MALEATE 5 MG
10 TABLET ORAL EVERY 6 HOURS PRN
Status: DISCONTINUED | OUTPATIENT
Start: 2024-12-05 | End: 2024-12-06 | Stop reason: HOSPADM

## 2024-12-05 RX ORDER — CEFAZOLIN SODIUM 2 G/100ML
2 INJECTION, SOLUTION INTRAVENOUS EVERY 8 HOURS
Status: COMPLETED | OUTPATIENT
Start: 2024-12-05 | End: 2024-12-06

## 2024-12-05 RX ORDER — TRANEXAMIC ACID 650 MG/1
1950 TABLET ORAL ONCE
Status: COMPLETED | OUTPATIENT
Start: 2024-12-05 | End: 2024-12-05

## 2024-12-05 RX ORDER — OXYCODONE HYDROCHLORIDE 5 MG/1
10 TABLET ORAL EVERY 4 HOURS PRN
Status: DISCONTINUED | OUTPATIENT
Start: 2024-12-05 | End: 2024-12-06 | Stop reason: HOSPADM

## 2024-12-05 RX ORDER — GLYCOPYRROLATE 0.2 MG/ML
INJECTION INTRAMUSCULAR; INTRAVENOUS AS NEEDED
Status: DISCONTINUED | OUTPATIENT
Start: 2024-12-05 | End: 2024-12-05

## 2024-12-05 RX ORDER — ONDANSETRON HYDROCHLORIDE 2 MG/ML
4 INJECTION, SOLUTION INTRAVENOUS ONCE AS NEEDED
Status: DISCONTINUED | OUTPATIENT
Start: 2024-12-05 | End: 2024-12-05 | Stop reason: HOSPADM

## 2024-12-05 RX ORDER — ROPIVACAINE/EPI/CLONIDINE/KET 2.46-0.005
SYRINGE (ML) INJECTION AS NEEDED
Status: DISCONTINUED | OUTPATIENT
Start: 2024-12-05 | End: 2024-12-05 | Stop reason: HOSPADM

## 2024-12-05 RX ORDER — BISACODYL 5 MG
10 TABLET, DELAYED RELEASE (ENTERIC COATED) ORAL DAILY PRN
Status: DISCONTINUED | OUTPATIENT
Start: 2024-12-05 | End: 2024-12-06 | Stop reason: HOSPADM

## 2024-12-05 RX ORDER — BUPIVACAINE HYDROCHLORIDE 7.5 MG/ML
INJECTION, SOLUTION INTRASPINAL AS NEEDED
Status: DISCONTINUED | OUTPATIENT
Start: 2024-12-05 | End: 2024-12-05

## 2024-12-05 RX ORDER — CEFAZOLIN SODIUM IN 0.9 % NACL 3 G/100 ML
3 INTRAVENOUS SOLUTION, PIGGYBACK (ML) INTRAVENOUS ONCE
Status: COMPLETED | OUTPATIENT
Start: 2024-12-05 | End: 2024-12-05

## 2024-12-05 RX ORDER — CYCLOBENZAPRINE HCL 10 MG
10 TABLET ORAL 3 TIMES DAILY PRN
Qty: 21 TABLET | Refills: 0 | Status: SHIPPED | OUTPATIENT
Start: 2024-12-05 | End: 2024-12-12

## 2024-12-05 RX ORDER — DILTIAZEM HYDROCHLORIDE 180 MG/1
180 CAPSULE, COATED, EXTENDED RELEASE ORAL DAILY
Status: DISCONTINUED | OUTPATIENT
Start: 2024-12-05 | End: 2024-12-06 | Stop reason: HOSPADM

## 2024-12-05 RX ORDER — DIPHENHYDRAMINE HCL 12.5MG/5ML
12.5 LIQUID (ML) ORAL EVERY 6 HOURS PRN
Status: DISCONTINUED | OUTPATIENT
Start: 2024-12-05 | End: 2024-12-06 | Stop reason: HOSPADM

## 2024-12-05 RX ORDER — VENLAFAXINE HYDROCHLORIDE 150 MG/1
150 CAPSULE, EXTENDED RELEASE ORAL DAILY
Status: DISCONTINUED | OUTPATIENT
Start: 2024-12-05 | End: 2024-12-06 | Stop reason: HOSPADM

## 2024-12-05 RX ORDER — HYDRALAZINE HYDROCHLORIDE 20 MG/ML
5 INJECTION INTRAMUSCULAR; INTRAVENOUS EVERY 30 MIN PRN
Status: DISCONTINUED | OUTPATIENT
Start: 2024-12-05 | End: 2024-12-05 | Stop reason: HOSPADM

## 2024-12-05 RX ORDER — ONDANSETRON HYDROCHLORIDE 2 MG/ML
4 INJECTION, SOLUTION INTRAVENOUS EVERY 8 HOURS PRN
Status: DISCONTINUED | OUTPATIENT
Start: 2024-12-05 | End: 2024-12-06 | Stop reason: HOSPADM

## 2024-12-05 RX ORDER — OXYCODONE HYDROCHLORIDE 5 MG/1
10 TABLET ORAL EVERY 6 HOURS PRN
Status: DISCONTINUED | OUTPATIENT
Start: 2024-12-05 | End: 2024-12-06 | Stop reason: HOSPADM

## 2024-12-05 RX ORDER — PROCHLORPERAZINE EDISYLATE 5 MG/ML
10 INJECTION INTRAMUSCULAR; INTRAVENOUS EVERY 6 HOURS PRN
Status: DISCONTINUED | OUTPATIENT
Start: 2024-12-05 | End: 2024-12-06 | Stop reason: HOSPADM

## 2024-12-05 RX ORDER — OXYCODONE HYDROCHLORIDE 5 MG/1
5 TABLET ORAL EVERY 6 HOURS PRN
Qty: 28 TABLET | Refills: 0 | Status: SHIPPED | OUTPATIENT
Start: 2024-12-05 | End: 2024-12-12

## 2024-12-05 RX ORDER — LABETALOL HYDROCHLORIDE 5 MG/ML
5 INJECTION, SOLUTION INTRAVENOUS ONCE AS NEEDED
Status: DISCONTINUED | OUTPATIENT
Start: 2024-12-05 | End: 2024-12-05 | Stop reason: HOSPADM

## 2024-12-05 RX ORDER — ONDANSETRON 4 MG/1
4 TABLET, ORALLY DISINTEGRATING ORAL EVERY 8 HOURS PRN
Status: DISCONTINUED | OUTPATIENT
Start: 2024-12-05 | End: 2024-12-06 | Stop reason: HOSPADM

## 2024-12-05 RX ORDER — ACETAMINOPHEN 325 MG/1
650 TABLET ORAL EVERY 6 HOURS SCHEDULED
Status: DISCONTINUED | OUTPATIENT
Start: 2024-12-05 | End: 2024-12-06 | Stop reason: HOSPADM

## 2024-12-05 RX ORDER — DOCUSATE SODIUM 100 MG/1
100 CAPSULE, LIQUID FILLED ORAL 2 TIMES DAILY
Status: DISCONTINUED | OUTPATIENT
Start: 2024-12-05 | End: 2024-12-06 | Stop reason: HOSPADM

## 2024-12-05 RX ADMIN — TRANEXAMIC ACID 1950 MG: 650 TABLET ORAL at 18:07

## 2024-12-05 RX ADMIN — OXYCODONE 10 MG: 5 TABLET ORAL at 18:12

## 2024-12-05 RX ADMIN — DILTIAZEM HYDROCHLORIDE 180 MG: 180 CAPSULE, COATED, EXTENDED RELEASE ORAL at 13:11

## 2024-12-05 RX ADMIN — GABAPENTIN 600 MG: 300 CAPSULE ORAL at 06:49

## 2024-12-05 RX ADMIN — ACETAMINOPHEN 650 MG: 325 TABLET ORAL at 18:07

## 2024-12-05 RX ADMIN — KETOROLAC TROMETHAMINE 15 MG: 30 INJECTION, SOLUTION INTRAMUSCULAR at 12:28

## 2024-12-05 RX ADMIN — BUPROPION HYDROCHLORIDE 150 MG: 150 TABLET, EXTENDED RELEASE ORAL at 20:35

## 2024-12-05 RX ADMIN — POVIDONE-IODINE 1 APPLICATION: 5 SOLUTION TOPICAL at 06:48

## 2024-12-05 RX ADMIN — OXYBUTYNIN CHLORIDE 5 MG: 5 TABLET ORAL at 13:11

## 2024-12-05 RX ADMIN — CYCLOBENZAPRINE HYDROCHLORIDE 10 MG: 10 TABLET, FILM COATED ORAL at 14:59

## 2024-12-05 RX ADMIN — CEFAZOLIN SODIUM 2 G: 2 INJECTION, SOLUTION INTRAVENOUS at 18:07

## 2024-12-05 RX ADMIN — ACETAMINOPHEN 650 MG: 325 TABLET ORAL at 12:28

## 2024-12-05 RX ADMIN — CELECOXIB 400 MG: 200 CAPSULE ORAL at 06:49

## 2024-12-05 RX ADMIN — DOCUSATE SODIUM 100 MG: 100 CAPSULE, LIQUID FILLED ORAL at 12:28

## 2024-12-05 RX ADMIN — OXYCODONE 10 MG: 5 TABLET ORAL at 12:28

## 2024-12-05 RX ADMIN — OXYBUTYNIN CHLORIDE 5 MG: 5 TABLET ORAL at 20:35

## 2024-12-05 RX ADMIN — CYCLOBENZAPRINE HYDROCHLORIDE 10 MG: 10 TABLET, FILM COATED ORAL at 22:00

## 2024-12-05 RX ADMIN — SODIUM CHLORIDE, POTASSIUM CHLORIDE, SODIUM LACTATE AND CALCIUM CHLORIDE 50 ML/HR: 600; 310; 30; 20 INJECTION, SOLUTION INTRAVENOUS at 12:28

## 2024-12-05 RX ADMIN — VENLAFAXINE HYDROCHLORIDE 150 MG: 150 CAPSULE, EXTENDED RELEASE ORAL at 13:11

## 2024-12-05 RX ADMIN — KETOROLAC TROMETHAMINE 15 MG: 30 INJECTION, SOLUTION INTRAMUSCULAR at 18:07

## 2024-12-05 RX ADMIN — ROPIVACAINE HYDROCHLORIDE 40 MG: 2 INJECTION, SOLUTION EPIDURAL; INFILTRATION at 09:01

## 2024-12-05 RX ADMIN — ACETAMINOPHEN 975 MG: 325 TABLET ORAL at 06:49

## 2024-12-05 RX ADMIN — SODIUM CHLORIDE, POTASSIUM CHLORIDE, SODIUM LACTATE AND CALCIUM CHLORIDE 50 ML/HR: 600; 310; 30; 20 INJECTION, SOLUTION INTRAVENOUS at 06:48

## 2024-12-05 RX ADMIN — OXYCODONE 10 MG: 5 TABLET ORAL at 21:59

## 2024-12-05 RX ADMIN — ROPIVACAINE HYDROCHLORIDE: 5 INJECTION EPIDURAL; INFILTRATION; PERINEURAL at 08:15

## 2024-12-05 RX ADMIN — TAMSULOSIN HYDROCHLORIDE: 0.4 CAPSULE ORAL at 13:11

## 2024-12-05 RX ADMIN — DOCUSATE SODIUM 100 MG: 100 CAPSULE, LIQUID FILLED ORAL at 20:35

## 2024-12-05 RX ADMIN — TRANEXAMIC ACID 1950 MG: 650 TABLET ORAL at 06:48

## 2024-12-05 SDOH — SOCIAL STABILITY: SOCIAL INSECURITY: WITHIN THE LAST YEAR, HAVE YOU BEEN AFRAID OF YOUR PARTNER OR EX-PARTNER?: NO

## 2024-12-05 SDOH — SOCIAL STABILITY: SOCIAL INSECURITY: HAVE YOU HAD ANY THOUGHTS OF HARMING ANYONE ELSE?: NO

## 2024-12-05 SDOH — SOCIAL STABILITY: SOCIAL INSECURITY: ABUSE: ADULT

## 2024-12-05 SDOH — ECONOMIC STABILITY: FOOD INSECURITY: WITHIN THE PAST 12 MONTHS, YOU WORRIED THAT YOUR FOOD WOULD RUN OUT BEFORE YOU GOT THE MONEY TO BUY MORE.: NEVER TRUE

## 2024-12-05 SDOH — ECONOMIC STABILITY: FOOD INSECURITY: WITHIN THE PAST 12 MONTHS, THE FOOD YOU BOUGHT JUST DIDN'T LAST AND YOU DIDN'T HAVE MONEY TO GET MORE.: NEVER TRUE

## 2024-12-05 SDOH — ECONOMIC STABILITY: HOUSING INSECURITY: IN THE LAST 12 MONTHS, WAS THERE A TIME WHEN YOU WERE NOT ABLE TO PAY THE MORTGAGE OR RENT ON TIME?: NO

## 2024-12-05 SDOH — ECONOMIC STABILITY: INCOME INSECURITY: IN THE PAST 12 MONTHS HAS THE ELECTRIC, GAS, OIL, OR WATER COMPANY THREATENED TO SHUT OFF SERVICES IN YOUR HOME?: NO

## 2024-12-05 SDOH — SOCIAL STABILITY: SOCIAL INSECURITY
WITHIN THE LAST YEAR, HAVE YOU BEEN KICKED, HIT, SLAPPED, OR OTHERWISE PHYSICALLY HURT BY YOUR PARTNER OR EX-PARTNER?: NO

## 2024-12-05 SDOH — SOCIAL STABILITY: SOCIAL INSECURITY: DO YOU FEEL UNSAFE GOING BACK TO THE PLACE WHERE YOU ARE LIVING?: NO

## 2024-12-05 SDOH — SOCIAL STABILITY: SOCIAL INSECURITY: WITHIN THE LAST YEAR, HAVE YOU BEEN HUMILIATED OR EMOTIONALLY ABUSED IN OTHER WAYS BY YOUR PARTNER OR EX-PARTNER?: NO

## 2024-12-05 SDOH — SOCIAL STABILITY: SOCIAL INSECURITY
WITHIN THE LAST YEAR, HAVE YOU BEEN RAPED OR FORCED TO HAVE ANY KIND OF SEXUAL ACTIVITY BY YOUR PARTNER OR EX-PARTNER?: NO

## 2024-12-05 SDOH — SOCIAL STABILITY: SOCIAL INSECURITY: HAS ANYONE EVER THREATENED TO HURT YOUR FAMILY OR YOUR PETS?: NO

## 2024-12-05 SDOH — SOCIAL STABILITY: SOCIAL INSECURITY: DO YOU FEEL ANYONE HAS EXPLOITED OR TAKEN ADVANTAGE OF YOU FINANCIALLY OR OF YOUR PERSONAL PROPERTY?: NO

## 2024-12-05 SDOH — ECONOMIC STABILITY: HOUSING INSECURITY: AT ANY TIME IN THE PAST 12 MONTHS, WERE YOU HOMELESS OR LIVING IN A SHELTER (INCLUDING NOW)?: NO

## 2024-12-05 SDOH — ECONOMIC STABILITY: FOOD INSECURITY: HOW HARD IS IT FOR YOU TO PAY FOR THE VERY BASICS LIKE FOOD, HOUSING, MEDICAL CARE, AND HEATING?: NOT HARD AT ALL

## 2024-12-05 SDOH — SOCIAL STABILITY: SOCIAL INSECURITY: DOES ANYONE TRY TO KEEP YOU FROM HAVING/CONTACTING OTHER FRIENDS OR DOING THINGS OUTSIDE YOUR HOME?: NO

## 2024-12-05 SDOH — SOCIAL STABILITY: SOCIAL INSECURITY: WERE YOU ABLE TO COMPLETE ALL THE BEHAVIORAL HEALTH SCREENINGS?: YES

## 2024-12-05 SDOH — SOCIAL STABILITY: SOCIAL INSECURITY: ARE YOU OR HAVE YOU BEEN THREATENED OR ABUSED PHYSICALLY, EMOTIONALLY, OR SEXUALLY BY ANYONE?: NO

## 2024-12-05 SDOH — HEALTH STABILITY: MENTAL HEALTH: CURRENT SMOKER: 0

## 2024-12-05 SDOH — SOCIAL STABILITY: SOCIAL INSECURITY: ARE THERE ANY APPARENT SIGNS OF INJURIES/BEHAVIORS THAT COULD BE RELATED TO ABUSE/NEGLECT?: NO

## 2024-12-05 SDOH — ECONOMIC STABILITY: TRANSPORTATION INSECURITY: IN THE PAST 12 MONTHS, HAS LACK OF TRANSPORTATION KEPT YOU FROM MEDICAL APPOINTMENTS OR FROM GETTING MEDICATIONS?: NO

## 2024-12-05 SDOH — SOCIAL STABILITY: SOCIAL INSECURITY: HAVE YOU HAD THOUGHTS OF HARMING ANYONE ELSE?: NO

## 2024-12-05 SDOH — ECONOMIC STABILITY: HOUSING INSECURITY: IN THE PAST 12 MONTHS, HOW MANY TIMES HAVE YOU MOVED WHERE YOU WERE LIVING?: 0

## 2024-12-05 ASSESSMENT — COGNITIVE AND FUNCTIONAL STATUS - GENERAL
HELP NEEDED FOR BATHING: A LITTLE
DRESSING REGULAR UPPER BODY CLOTHING: A LITTLE
TOILETING: A LITTLE
CLIMB 3 TO 5 STEPS WITH RAILING: A LITTLE
DRESSING REGULAR LOWER BODY CLOTHING: A LOT
MOVING TO AND FROM BED TO CHAIR: A LOT
WALKING IN HOSPITAL ROOM: A LOT
DRESSING REGULAR LOWER BODY CLOTHING: A LOT
STANDING UP FROM CHAIR USING ARMS: A LOT
PERSONAL GROOMING: A LITTLE
MOBILITY SCORE: 12
EATING MEALS: A LITTLE
TURNING FROM BACK TO SIDE WHILE IN FLAT BAD: A LOT
MOVING FROM LYING ON BACK TO SITTING ON SIDE OF FLAT BED WITH BEDRAILS: A LOT
WALKING IN HOSPITAL ROOM: TOTAL
STANDING UP FROM CHAIR USING ARMS: A LITTLE
TURNING FROM BACK TO SIDE WHILE IN FLAT BAD: A LOT
MOBILITY SCORE: 20
MOVING TO AND FROM BED TO CHAIR: A LITTLE
HELP NEEDED FOR BATHING: A LITTLE
CLIMB 3 TO 5 STEPS WITH RAILING: A LOT
MOBILITY SCORE: 10
PATIENT BASELINE BEDBOUND: NO
TOILETING: A LITTLE
STANDING UP FROM CHAIR USING ARMS: A LOT
DAILY ACTIVITIY SCORE: 19
WALKING IN HOSPITAL ROOM: A LITTLE
DRESSING REGULAR UPPER BODY CLOTHING: A LITTLE
DAILY ACTIVITIY SCORE: 17
MOVING FROM LYING ON BACK TO SITTING ON SIDE OF FLAT BED WITH BEDRAILS: A LOT
MOVING TO AND FROM BED TO CHAIR: A LOT
CLIMB 3 TO 5 STEPS WITH RAILING: TOTAL

## 2024-12-05 ASSESSMENT — ACTIVITIES OF DAILY LIVING (ADL)
ADEQUATE_TO_COMPLETE_ADL: YES
ADL_ASSISTANCE: INDEPENDENT
JUDGMENT_ADEQUATE_SAFELY_COMPLETE_DAILY_ACTIVITIES: YES
TOILETING: INDEPENDENT
BATHING: INDEPENDENT
HEARING - LEFT EAR: FUNCTIONAL
LACK_OF_TRANSPORTATION: NO
HEARING - RIGHT EAR: FUNCTIONAL
PATIENT'S MEMORY ADEQUATE TO SAFELY COMPLETE DAILY ACTIVITIES?: YES
LACK_OF_TRANSPORTATION: NO
LACK_OF_TRANSPORTATION: NO
GROOMING: INDEPENDENT
WALKS IN HOME: INDEPENDENT
LACK_OF_TRANSPORTATION: NO
DRESSING YOURSELF: INDEPENDENT
FEEDING YOURSELF: INDEPENDENT

## 2024-12-05 ASSESSMENT — PAIN - FUNCTIONAL ASSESSMENT
PAIN_FUNCTIONAL_ASSESSMENT: 0-10

## 2024-12-05 ASSESSMENT — PAIN SCALES - GENERAL
PAINLEVEL_OUTOF10: 8
PAIN_LEVEL: 0
PAINLEVEL_OUTOF10: 0 - NO PAIN
PAINLEVEL_OUTOF10: 0 - NO PAIN
PAINLEVEL_OUTOF10: 6
PAINLEVEL_OUTOF10: 0 - NO PAIN
PAINLEVEL_OUTOF10: 0 - NO PAIN
PAINLEVEL_OUTOF10: 7

## 2024-12-05 ASSESSMENT — PAIN DESCRIPTION - ORIENTATION: ORIENTATION: RIGHT

## 2024-12-05 ASSESSMENT — COLUMBIA-SUICIDE SEVERITY RATING SCALE - C-SSRS
2. HAVE YOU ACTUALLY HAD ANY THOUGHTS OF KILLING YOURSELF?: NO
6. HAVE YOU EVER DONE ANYTHING, STARTED TO DO ANYTHING, OR PREPARED TO DO ANYTHING TO END YOUR LIFE?: NO
1. IN THE PAST MONTH, HAVE YOU WISHED YOU WERE DEAD OR WISHED YOU COULD GO TO SLEEP AND NOT WAKE UP?: NO

## 2024-12-05 ASSESSMENT — LIFESTYLE VARIABLES
AUDIT-C TOTAL SCORE: 0
HOW OFTEN DO YOU HAVE A DRINK CONTAINING ALCOHOL: NEVER
SKIP TO QUESTIONS 9-10: 1
HOW MANY STANDARD DRINKS CONTAINING ALCOHOL DO YOU HAVE ON A TYPICAL DAY: PATIENT DOES NOT DRINK
HOW OFTEN DO YOU HAVE 6 OR MORE DRINKS ON ONE OCCASION: NEVER
AUDIT-C TOTAL SCORE: 0

## 2024-12-05 ASSESSMENT — PATIENT HEALTH QUESTIONNAIRE - PHQ9
2. FEELING DOWN, DEPRESSED OR HOPELESS: NOT AT ALL
SUM OF ALL RESPONSES TO PHQ9 QUESTIONS 1 & 2: 0
1. LITTLE INTEREST OR PLEASURE IN DOING THINGS: NOT AT ALL

## 2024-12-05 ASSESSMENT — PAIN DESCRIPTION - LOCATION: LOCATION: KNEE

## 2024-12-05 NOTE — ANESTHESIA PROCEDURE NOTES
Peripheral Block    Patient location during procedure: pre-op  Start time: 12/5/2024 8:00 AM  End time: 12/5/2024 8:15 AM  Reason for block: at surgeon's request and post-op pain management  Staffing  Performed: attending   Authorized by: Vargas Raman DO    Performed by: Vargas Raman DO  Preanesthetic Checklist  Completed: patient identified, IV checked, site marked, risks and benefits discussed, surgical consent, monitors and equipment checked, pre-op evaluation and timeout performed   Timeout performed at: 12/5/2024 8:00 AM  Peripheral Block  Patient position: laying flat  Prep: ChloraPrep  Patient monitoring: heart rate, cardiac monitor and continuous pulse ox  Block type: adductor canal (+IPACK)  Laterality: right  Injection technique: single-shot  Guidance: ultrasound guided  Local infiltration: lidocaine  Infiltration strength: 1 %  Dose: 5 mL  Needle  Needle gauge: 21 G  Needle length: 10 cm  Needle localization: anatomical landmarks and ultrasound guidance  Assessment  Injection assessment: negative aspiration for heme, no paresthesia on injection, incremental injection and local visualized surrounding nerve on ultrasound  Paresthesia pain: none  Heart rate change: no  Slow fractionated injection: yes  Additional Notes  Adductor canal and IPACK nerve blocks performed for post-operative analgesia. 2 mg IV versed given for procedural sedation. Sterile prep. 20 mL of 0.5% ropivacaine with decadron 5 mg given in divided doses for the adductor canal. 20 mL of 0.2% ropivacaine given for the IPACK block. Negative aspiration for heme every 5 mL injected. No pain, paresthesias. Patient tolerated the procedure well without complications.

## 2024-12-05 NOTE — DISCHARGE INSTRUCTIONS
Total Knee Replacement  Discharge Instructions    To prevent Clot formation, you have been placed on the following medication:  Eliquis 2.5 mg twice daily for 28 days. Started on 12/6/24.     Surgical Site Care:  Change dressing once a day and PRN (as needed). Apply 4 x 4 sponge and light tape. If glue present, leave open to air.  You may leave wound open to air after initial dressing removal, if wound is clean, dry and intact  If Aquacel Ag dressing is present, do not remove dressing for 7 days, unless heavily saturated. If heavily saturated, remove dressing and start using instructions above  Staples will be removed on post-operative day 14 and steri-strips applied  Showering is permitted starting POD1 if waterproof Aquacel dressing is present or when the incision is covered with 4 x 4 and Tegaderm waterproof dressing  Until all areas of incision are healed.    Physical Therapy:  Weight Bearing Status:  Weight-bearing as tolerated  Precautions, Per Physical Therapy Handout    Pain Medications  You were given  Oxycodone  Wean off pain medications as you deem appropriate as long as pain is under control  Do not exceed 4,000 mg of acetaminophen from all sources in a 24 hour period    Cold packs/Ice packs/Machine  May be used 5 times daily for 15-30 minutes as necessary  Be sure to have a barrier (cloth, clothing, towel) between the site and the ice pack to prevent frostbite    Contact [facility] office if  Increased redness, swelling, drainage of any kind, and/or pain to surgery site.  As well as new onset fevers and or chills.  These could signify an infection.  Calf or thigh tenderness to touch as well as increased swelling or redness.  This could signify a clot formation.  Numbness or tingling to an area around the incision site or below the incision site (toes).  Any rash appears, increased  or new onset nausea/vomiting occur.  This may indicate a reaction to a medication.  Phone #  [phone].  Follow up with Surgeon in 2 weeks  I acknowledge that I have received leyla hose and understand the instructions on how and when to wear them (on during the day, off at night)   Discharging RN who has gone over instructions and acknowledges leyla hose have been received     Ice 5 times a day for 20 minutes each session to operative extremity for two weeks.   LEYLA hose to be worn for three weeks. Can be removed for skin care and hygiene.   Incentive spirometer 10 times every hour while awake for two weeks.

## 2024-12-05 NOTE — CARE PLAN
Problem: Pain - Adult  Goal: Verbalizes/displays adequate comfort level or baseline comfort level  Outcome: Progressing     Problem: Safety - Adult  Goal: Free from fall injury  Outcome: Progressing     Problem: Discharge Planning  Goal: Discharge to home or other facility with appropriate resources  Outcome: Progressing     Problem: Chronic Conditions and Co-morbidities  Goal: Patient's chronic conditions and co-morbidity symptoms are monitored and maintained or improved  Outcome: Progressing     Problem: Fall/Injury  Goal: Not fall by end of shift  Outcome: Progressing  Goal: Be free from injury by end of the shift  Outcome: Progressing  Goal: Verbalize understanding of personal risk factors for fall in the hospital  Outcome: Progressing  Goal: Verbalize understanding of risk factor reduction measures to prevent injury from fall in the home  Outcome: Progressing  Goal: Use assistive devices by end of the shift  Outcome: Progressing  Goal: Pace activities to prevent fatigue by end of the shift  Outcome: Progressing   The patient's goals for the shift include      The clinical goals for the shift include Pain management

## 2024-12-05 NOTE — ANESTHESIA PROCEDURE NOTES
Spinal Block    Patient location during procedure: OR  Start time: 12/5/2024 8:40 AM  End time: 12/5/2024 8:50 AM  Reason for block: primary anesthetic and at surgeon's request  Staffing  Performed: CRNA   Authorized by: Vargas Raman DO    Performed by: JUAN Fontaine    Preanesthetic Checklist  Completed: patient identified, IV checked, site marked, risks and benefits discussed, surgical consent, monitors and equipment checked, pre-op evaluation, timeout performed and sterile techniques followed  Block Timeout  RN/Licensed healthcare professional reads aloud to the Anesthesia provider and entire team: Patient identity, procedure with side and site, patient position, and as applicable the availability of implants/special equipment/special requirements.  Patient on coagulant treatment: no  Timeout performed at: 12/5/2024 8:40 AM  Spinal Block  Patient position: sitting  Prep: Betadine  Sterility prep: cap, drape, gloves, hand hygiene and mask  Sedation level: light sedation  Patient monitoring: blood pressure, continuous pulse oximetry, EKG, ETCO2 and heart rate  Approach: midline  Vertebral space: L4-5  Injection technique: single-shot  Needle  Needle type: Quincke   Needle gauge: 22 G  Free flowing CSF: yes    Assessment  Sensory level: T6 bilateral  Block outcome: patient comfortable  Procedure assessment: patient tolerated procedure well with no immediate complications  Additional Notes  1% lidocaine skin wheal to level,

## 2024-12-05 NOTE — PROGRESS NOTES
12/05/24 1520   Discharge Planning   Living Arrangements Spouse/significant other   Support Systems Spouse/significant other;Children;Family members   Assistance Needed none, PTA ind ADLS and IALDS no AD, drives, works FT, denies falls. Pt owns FWW, walk-in shower with small corner seats, toilet riser   Type of Residence Private residence  (1 level home)   Number of Stairs to Enter Residence 2   Number of Stairs Within Residence 0   Do you have animals or pets at home? Yes   Type of Animals or Pets 2 Dogs (1 small and 1 Lg)   Home or Post Acute Services In home services   Type of Home Care Services Home PT;Home OT   Expected Discharge Disposition Home H  ([facility])   Does the patient need discharge transport arranged? No   Financial Resource Strain   How hard is it for you to pay for the very basics like food, housing, medical care, and heating? Not hard   Housing Stability   In the last 12 months, was there a time when you were not able to pay the mortgage or rent on time? N   In the past 12 months, how many times have you moved where you were living? 0   At any time in the past 12 months, were you homeless or living in a shelter (including now)? N   Transportation Needs   In the past 12 months, has lack of transportation kept you from medical appointments or from getting medications? no   In the past 12 months, has lack of transportation kept you from meetings, work, or from getting things needed for daily living? No   Patient Choice   Provider Choice list and CMS website (https://medicare.gov/care-compare#search) for post-acute Quality and Resource Measure Data were provided and reviewed with: Patient;Family   Patient / Family choosing to utilize agency / facility established prior to hospitalization No   Stroke Family Assessment   Stroke Family Assessment Needed No   Intensity of Service   Intensity of Service 0-30 min     Pt is s/p Elective Right total knee arthroplasty 12/5/24 with Dr. Joao Lares. Pt is  Weight-bearing as tolerated to operative extremity with use of walker for assistance with ambulating. Pt will DC on PO Eliquis 2.5 mg BID for DVT prophylaxis for 28 days -To start POD #1. PT/OT eval AMPA scores currently pending. Pt prefers home with [facility] and agency of choice is [facility], pt requesting same day DC. CNP and therapy aware. CNP ordered HCO in Epic. Demographics verified, pt states best phone is wife Ade [phone]. [facility]  notified of [facility] referral/HCO in [facility], ADOD same day DC, confirms received and processed for next day SOC.    Update: Notified by PT [facility] score is 20, pt did well and worked on stairs and is clear for DC from PT standpoint. [facility]  notified.

## 2024-12-05 NOTE — ANESTHESIA POSTPROCEDURE EVALUATION
Patient: Jarocho Langford    Procedure Summary       Date: 12/05/24 Room / Location: [facility] OR 12 / Virtual [facility] OR    Anesthesia Start: 0840 Anesthesia Stop: 1058    Procedure: Arthroplasty Total Knee Robot-Assisted (Right: Knee) Diagnosis:       Unilateral primary osteoarthritis, right knee      (Unilateral primary osteoarthritis, right knee [M17.11])    Surgeons: Joao Lares MD Responsible Provider: Vargas Raman DO    Anesthesia Type: MAC, regional, spinal ASA Status: 3            Anesthesia Type: MAC, regional, spinal    Vitals Value Taken Time   /62 12/05/24 1054   Temp 36.7 12/05/24 1058   Pulse 92 12/05/24 1057   Resp 13 12/05/24 1057   SpO2 94 % 12/05/24 1057   Vitals shown include unfiled device data.    Anesthesia Post Evaluation    Patient location during evaluation: bedside  Patient participation: complete - patient participated  Level of consciousness: awake and alert  Pain score: 0  Pain management: adequate  Airway patency: patent  Cardiovascular status: acceptable and stable  Respiratory status: acceptable and face mask  Hydration status: acceptable  Postoperative Nausea and Vomiting: none        No notable events documented.

## 2024-12-05 NOTE — OP NOTE
Arthroplasty Total Knee Robot-Assisted (R) Operative Note    Date: 2024  OR Location: [facility]    Name: Jarocho Langford, : 1955, Age: 69 y.o., MRN: [id number], Sex: male    Diagnosis  Pre-op Diagnosis      * Unilateral primary osteoarthritis, right knee [M17.11] Post-op Diagnosis     * Unilateral primary osteoarthritis, right knee [M17.11]     Procedures  Arthroplasty Total Knee Robot-Assisted  [id number] - RI ARTHRP KNE CONDYLE&PLATU MEDIAL&LAT COMPARTMENTS      Surgeons      * Joao Lares - Primary    Resident/Fellow/Other Assistant:  Surgeons and Role:     * Yang Desai PA-C - Assisting    Staff:   Circulator: Jeffery Knight Person: Andie    Anesthesia Staff: Anesthesiologist: Vargas Raman DO  CRNA: ANTWON Fontaine-CRNA    Procedure Summary  Anesthesia: Regional, Monitor Anesthesia Care, Spinal  ASA: III  Estimated Blood Loss: 50mL  Intra-op Medications:   Administrations occurring from 0845 to 1030 on 24:   Medication Name Total Dose   ropivacaine-epinephrine-clonidine-ketorolac 2.46-0.005- 0.0008-0.3mg/mL periarticular syringe 100 mL   sodium chloride 0.9 % irrigation solution 1,000 mL   vancomycin (Vancocin) vial for injection 1 g   ceFAZolin (Ancef) 3 g in sodium chloride 0.9%  mL 3 g   ropivacaine (Naropin) 0.2 % injection solution 40 mg 40 mg   bupivacaine PF 0.75 %-dextrose 8.25 % (Sensorcaine) intrathecal 1.8 mL   glycopyrrolate 0.2 mg/mL 0.2 mg   ondansetron 2 mg/mL 4 mg   propofol (Diprivan) infusion 10 mg/mL 1,551.59 mg              Anesthesia Record               Intraprocedure I/O Totals          Intake    Propofol Drip 0.00 mL    The total shown is the total volume documented since Anesthesia Start was filed.    Total Intake 0 mL          Specimen: No specimens collected                Drains and/or Catheters: * None in log *    Tourniquet Times:   * Missing tourniquet times found for documented tourniquets in lo *     Implants:  Implants       Type Name  Action Serial No.      Joint INSERT, TIBIAL, X3 TRIATHLON CS, SZ-5 9MM - [id number] Implanted      Joint COMPONENT, CR FEMORAL, P5, BEADED W/PA, RIGHT - [id number] Implanted      Joint BASEPLATE, TRIATHLON TRITANIUM, SIZE 5, 49MM - [id number] Implanted      Joint PATELLA, TRIATHLON, ASYMMETRIC,  SIZE A35 - [id number] Implanted               Findings: see procedure details            Components: Triathlon TriTanium asymmetric patella 35 mm, triathlon susan retaining femoral component size 5, triathlon tibial bearing insert 9 mm, triathlon titanium tibial component size 5    Indiciations: The patient was seen in the preoperative area. The risks, benefits, complications, treatment options, non-operative alternatives, expected recovery and outcomes were discussed with the patient. The possibilities of reaction to medication, pulmonary aspiration, injury to surrounding structures, bleeding, recurrent infection, the need for additional procedures, failure to diagnose a condition, and creating a complication requiring transfusion or operation were discussed with the patient. The patient concurred with the proposed plan, giving informed consent.  The site of surgery was properly noted/marked if necessary per policy. The patient has been actively warmed in preoperative area. Preoperative antibiotics have been ordered and given within 1 hours of incision. Venous thrombosis prophylaxis have been ordered.    The patient failed multiple attempts at non-surgical management.  Specific to TKA we discussed the risks of infection / revision surgery, DVT/PE, medical complications, stiffness / loss of motion, neurologic (foot drop) or vascular injury.    Procedure Details:   Patient was met prior to surgery in pre-operative holding.  The appropriate extremity was marked and recent health status was reviewed and we found no contraindication to proceeding with the scheduled procedure.  We again reviewed the risks of infection, wound  issues, deep venous thrombosis, pulmonary embolism, medical complications including cardiac and pulmonary, neurologic and vascular injury and incomplete relief of pre-operative pain.    The patient discussed regional anesthesia in pre-op holding.  The patient was transported to the operating room.  A thigh tourniquet was placed and a small bump on the buttock.  The patient was resting comfortably in a supine position with all marcio prominences well padded.  Sequential compression device was placed on the contralateral lower extremity.  An exam under anesthesia was performed to evaluate the patient´s range of motion and ligamentous integrity.    The patient was prepped and draped in the usual sterile fashion.  The leg was placed in a well padded leg hines.  After prep, drape, antibiotic and time out, the leg was exsanguinated and the tourniquet inflated.  A longitudinal incision was made over the anterior knee.  The dissection was carried out through the skin and subcutaneous tissue.  A medial parapatellar arthrotomy was performed.  An effusion and synovitis was noted compatible with the grade IV changes of the articular cartilage.  The fat pad was slightly de-bulked, Lisa´s line was marked and some of the deep medial collateral ligament was released from the tibia.  The ACL was resected. The knee was flexed up and medial and lateral retractors were placed to protect the collateral ligaments and popliteus tendon.      Two pins were placed in the distal femur and proximal tibia (intra-incisional) directed laterally.  Two arrays were placed and the registration and check points were completed.     The checkpoints were then registered on the femur and the tibia.  Once this was complete we used the spacers, newberry elevator and manual stress to confirm ideal balancing and resection.  Once this was completed we adjusted the resection and rotation to match our pre-operative plan with our intraoperative data.     The  robot was draped in sterile fashion and brought into the field.  The saw was registered and check points confirmed.  The saw was used to complete the cuts.  Retractors were used to protect the MCL and patellar tendon.  The haptic feedback removed any aberrant cuts and all cuts were consistent with the preoperative plan.    The gaps appeared symmetric with a spacer and we proceeded to placing trial implants.  With symmetric gaps we progressed to placing trial implants.  The final poly was a 9 mm.  The motion and alignment was confirmed with the trials in place.    A trial tibial component was placed with care to avoid overhang.  The rotation was set in line with the medial third of the tibial tubercle.  A trial femoral component was then placed, followed by a trial articular surface.  The knee was taken through range of motion with the provisional components.  The flexion and extension gaps were evaluated, as well as the patellar tracking and mid-flexion stability.  The following soft tissue balancing, recuts, and/or modifications were required: Removal posterior based osteophytes, removal of medial based osteophytes partial MCL release.      The patella was everted and ~9 mm of bone were removed from the thickest portion using a clamp/cutting guide.  The lug holes were drilled in the patella and femur.  The tibia was prepared with the drill and broach after confirmation of alignment using a drop negro.   The sclerotic areas of the bone were drilled using a small drill bit.  The capsule around the knee was injected using a long acting local anesthetic / multimodal pain mixture.    Overall bone quality was excellent and the computer assisted cuts were precise and we felt that cementless femoral and tibal and patellar components were appropriate.      The knee was then copiously lavaged with sterile saline and Irrisept (0.05% chlorhexidine gluconate).  The tourniquet was taken down and hemostasis was obtained using Bovie  electrocautery and tranexamic acid (per protocol).  No significant bleeders were encountered and the usage of a drain was not felt to be necessary.    A layered closure was performed using 1 Vicryl and 1 Stratafix in the retinacular layer and quadriceps tendon.  2-0 vicryl in the deep dermal layer and monofilament in the skin.  An adherent, water proof dressing was placed followed by Webril and an ace bandage. All counts were reported as correct.  The patient was stable to the post anesthesia care unit.    I was present and participated in the entire procedure. The Physician Assistant participated in all critical elements of the procedure under my direct supervision. The surgical incision was closed by the PA under my indirect supervision. There were no qualified residents available to assist.    The physician assistant was present for the entire case.  Given the nature of the procedure and disease process, a skilled surgical assistant was necessary for the case.  The assistant was necessary for retraction and helped directly facilitate completion of the surgery.  A certified scrub tech was at the back table managing instruments and supplies for the surgical procedure.     Post op plan  WBAT  PT/OT  XR at follow up 3 views operative knee  Eliquis 2.5 mg p.o. twice daily starting postop day 1 for 1 month    Complications:  None; patient tolerated the procedure well.    Disposition: PACU - hemodynamically stable.  Condition: stable       Joao Lares  Phone Number: [phone]

## 2024-12-05 NOTE — PROGRESS NOTES
Patient seen and evaluated upon arrival to floor s/p right total knee arthroplasty. Doing well post operatively. Pain is well controlled. Vitals stable. He is resting comfortably in bed at this time with family present at the bedside.     Weak dorsi/plantar flexion bilaterally. Numbness remains to operative extremity. Dressing remains clean, dry, and intact.    PT/OT: Weight-bearing as tolerated to operative extremity with use of walker for assistance with ambulating.    Eliquis 2.5 mg BID for DVT prophylaxis for 28 days. To start POD1    Patient is requesting same day discharge. Discussed that he will need to meet the criteria to go home to include PT/OT, voiding, vitals remain stable, and pain controlled. He is agreeable to these plans.     Patient plans to return home with  home care upon discharge, orders placed. TCC and SW following for discharge planning.

## 2024-12-05 NOTE — HH CARE COORDINATION
Home Care received a Referral for Physical Therapy and Occupational Therapy. We have processed the referral for a Start of Care on 12/6/24.     If you have any questions or concerns, please feel free to contact us at [phone]. Follow the prompts, enter your five digit zip code, and you will be directed to your care team on [facility] 1.

## 2024-12-05 NOTE — ANESTHESIA PREPROCEDURE EVALUATION
Jarocho Langford is a 69 y.o. male here for:    Arthroplasty Total Knee Robot-Assisted  With Joao Lares MD  Pre-Op Diagnosis Codes:      * Unilateral primary osteoarthritis, right knee [M17.11]    Relevant Problems   Cardiac   (+) HTN (hypertension)      Pulmonary   (+) Asthma   (+) RUSH (obstructive sleep apnea)      Neuro   (+) Anxiety   (+) Depression, major, in remission (CMS-HCC)   (+) Lumbar radiculopathy   (+) Vitamin B6 deficiency neuropathy (Multi)      GI   (+) Dysphagia   (+) GERD (gastroesophageal reflux disease)      Endocrine   (+) Class 3 severe obesity due to excess calories with serious comorbidity and body mass index (BMI) of 40.0 to 44.9 in adult      Musculoskeletal   (+) Cervical stenosis of spine   (+) Unilateral primary osteoarthritis, right knee      HEENT   (+) Chronic ethmoidal sinusitis   (+) Chronic pansinusitis   (+) Chronic sphenoidal sinusitis       Lab Results   Component Value Date    HGB 15.3 11/12/2024    HCT 46.2 11/12/2024    WBC 9.2 11/12/2024     11/12/2024     11/12/2024    K 4.4 11/12/2024     11/12/2024    CREATININE 0.98 11/12/2024    BUN 18 11/12/2024       Social History     Tobacco Use   Smoking Status Never   • Passive exposure: Never   Smokeless Tobacco Never       No Known Allergies    Current Outpatient Medications   Medication Instructions   • acetaminophen (TYLENOL) 1,000 mg, oral, Every 8 hours PRN   • albuterol (Ventolin HFA) 90 mcg/actuation inhaler 2 puffs, inhalation, Every 4 hours PRN   • amLODIPine-benazepriL (Lotrel) 5-10 mg capsule TAKE 1 CAPSULE DAILY   • buPROPion SR (WELLBUTRIN SR) 150 mg, oral, 2 times daily   • clonazePAM (KLONOPIN) 1 mg, oral, Daily PRN   • dicyclomine (BENTYL) 20 mg, Every 6 hours   • dilTIAZem XR (Dilacor XR) 180 mg 24 hr capsule TAKE 1 CAPSULE DAILY   • dutasteride-tamsulosin 0.5-0.4 mg capsule, ER multiphase 24 hr 1 capsule, oral, Daily   • meloxicam (MOBIC) 15 mg, oral, Daily   • mometasone furoate, bulk,  100 % powder Compound 2 mg capsule to be added to sinus rinse twice daily.   • naloxone (Narcan) 4 mg/0.1 mL nasal spray Instill 1 spray intranasally for opioid overdose; repeat in 5 minutes if no response.   • oxybutynin XL (DITROPAN XL) 10 mg, oral, Daily, Do not crush, chew, or split.   • oxymetazoline 0.05 % nasal spray 2 sprays, Each Nostril, Every 12 hours PRN, Do not use for more than 3 days.   • pyridoxine (VITAMIN B-6) 100 mg, oral, Daily   • RABEprazole (Aciphex) EC tablet Take 1 tablet (20 mg) by mouth once daily.   • semaglutide 0.5 mg, subcutaneous, Once Weekly   • triamcinolone (Kenalog) 0.1 % cream APPLY A THIN LAYER TO AFFECTED AREA(S) TWICE DAILY AS NEEDED.   • venlafaxine XR (EFFEXOR-XR) 150 mg, oral, Daily   • Vitamin D3 50 mcg (2,000 unit) capsule oral, Daily       Past Surgical History:   Procedure Laterality Date   • ANTERIOR CERVICAL DISCECTOMY W/ FUSION     • CERVICAL FUSION     • CHOLECYSTECTOMY     • COLONOSCOPY     • CYSTOSCOPY      with stent placement then removal   • SINUS SURGERY      had surgery 3-22-24   • TONSILLECTOMY     • VASECTOMY     • WISDOM TOOTH EXTRACTION Bilateral        Family History   Problem Relation Name Age of Onset   • Hypertension Mother PITER    • No Known Problems Father     • No Known Problems Maternal Grandmother     • No Known Problems Maternal Grandfather     • No Known Problems Paternal Grandmother     • No Known Problems Paternal Grandfather         NPO Details:  NPO/Void Status  Carbohydrate Drink Given Prior to Surgery? : N  Date of Last Liquid: 12/04/24  Time of Last Liquid: 2200  Date of Last Solid: 12/04/24  Time of Last Solid: 2100  Last Intake Type: Clear fluids  Time of Last Void: 0630        Physical Exam    Airway  Mallampati: III  TM distance: >3 FB  Neck ROM: full     Cardiovascular - normal exam     Dental - normal exam     Pulmonary - normal exam     Abdominal   (+) obese           Anesthesia Plan    History of general anesthesia?: yes  History  of complications of general anesthesia?: no    ASA 3     MAC, regional and spinal     The patient is not a current smoker.    intravenous induction   Postoperative administration of opioids is intended.  Anesthetic plan and risks discussed with patient.    Plan discussed with CRNA.

## 2024-12-05 NOTE — PROGRESS NOTES
Physical Therapy    Physical Therapy Evaluation & Treatment    Patient Name: Jarocho Langford  MRN: [id number]  Today's Date: 12/5/2024   Time Calculation  Start Time: 1402  Stop Time: 1454  Time Calculation (min): 52 min  602/602-A    Assessment/Plan   PT Assessment  PT Assessment Results: Decreased range of motion, Decreased mobility, Orthopedic restrictions, Pain  Rehab Prognosis: Good  Evaluation/Treatment Tolerance: Patient tolerated treatment well  Strengths: Housing layout, Support and attitude of living partners, Capable of completing ADLs semi/independent  End of Session Communication: Bedside nurse, Care Coordinator  Assessment Comment: Pt presents with decreased functional mobility following R TKA as displayed by decreased R knee ROM, pain, and decreased balance. Pt will benefit from continued PT to address deficits at the acute care level.  End of Session Patient Position: Up in chair, Alarm on  IP OR SWING BED PT PLAN  Inpatient or Swing Bed: Inpatient  PT Plan  Treatment/Interventions: Bed mobility, Transfer training, Gait training, Balance training, Neuromuscular re-education, Stair training, Strengthening, Endurance training, Range of motion, Therapeutic exercise, Therapeutic activity, Home exercise program  PT Plan: Ongoing PT  PT Frequency: BID  PT Discharge Recommendations: Low intensity level of continued care  PT Recommended Transfer Status: Stand by assist, Assistive device  PT - OK to Discharge: Yes (PT eval complete, ok to d/c once deemed medically appropriate.)    Current Problem:  Patient Active Problem List   Diagnosis    Anxiety    Benign prostatic hyperplasia (BPH) with urinary urgency    GERD (gastroesophageal reflux disease)    HTN (hypertension)    Vitamin D deficiency    Asthma    Depression, major, in remission (CMS-HCC)    Lumbar radiculopathy    RAISSA positive    RUSH (obstructive sleep apnea)    Venous stasis dermatitis of lower extremity    Venous insufficiency    Vitamin B6  deficiency neuropathy (Multi)    Cervical stenosis of spine    Balance problem    Body, loose, knee    Dysphagia    Hyperreflexia    Hypertrophy of nasal turbinates    PND (post-nasal drip)    Class 3 severe obesity due to excess calories with serious comorbidity and body mass index (BMI) of 40.0 to 44.9 in adult    Nasal congestion    Chronic pansinusitis    Altered taste    Chronic ethmoidal sinusitis    Chronic sphenoidal sinusitis    S/P total knee arthroplasty, right       Subjective     General Visit Information:  General  Reason for Referral: s/p R TKA  Referred By: Arnaud PT eval and treat  Past Medical History Relevant to Rehab: anxiety, BPH, GERD, HTN, asthma, depression, RUSH, lumbar radiculopathy  Family/Caregiver Present: Yes  Caregiver Feedback: Wife and daughter present supportive  Prior to Session Communication: Bedside nurse  Patient Position Received: Bed, 3 rail up, Alarm on  General Comment: Pt s/p elective R TKA 12/5/24 by Dr. Lares. Requesting same day discharge.    Home Living:  Home Living  Type of Home: House  Lives With: Spouse  Home Layout: One level  Home Access: Stairs to enter without rails  Entrance Stairs-Rails: None  Entrance Stairs-Number of Steps: 2  Bathroom Shower/Tub: Walk-in shower  Bathroom Toilet: Standard  Bathroom Equipment: None  Home Living Comments: Son will be staying to assist following surgery.    Prior Level of Function:  Prior Function Per Pt/Caregiver Report  Level of [place]: Independent with ADLs and functional transfers, Independent with homemaking with ambulation  ADL Assistance: Independent  Homemaking Assistance: Independent  Ambulatory Assistance: Independent (no device)  Prior Function Comments: Drives. No falls.    Precautions:  Precautions  LE Weight Bearing Status: Weight Bearing as Tolerated (RLE)  Medical Precautions: Fall precautions  Post-Surgical Precautions: Right total knee precautions  Braces Applied: KI donned when OOB    Vital Signs:      Objective     Pain:  Pain Assessment  Pain Assessment: 0-10  0-10 (Numeric) Pain Score: 8  Pain Type: Surgical pain  Pain Location: Knee  Pain Orientation: Right    Cognition:  Cognition  Overall Cognitive Status: Within Functional Limits  Orientation Level: Oriented X4    General Assessments:      Activity Tolerance  Endurance: Endurance does not limit participation in activity     Strength  Strength Comments: RLE MMT hip flex 4-/5, knee ext NT, recent surgery, ankle DF 4/5. LLE MMT 4/5 overall. .     Coordination  Movements are Fluid and Coordinated: Yes  Postural Control  Postural Control: Within Functional Limits  Static Sitting Balance  Static Sitting-Comment/Number of Minutes: Good  Dynamic Sitting Balance  Dynamic Sitting-Comments: Good  Static Standing Balance  Static Standing-Comment/Number of Minutes: Fair +  Dynamic Standing Balance  Dynamic Standing-Comments: Fair +    Functional Assessments:     Bed Mobility  Bed Mobility: Yes  Bed Mobility 1  Bed Mobility 1: Supine to sitting  Level of Assistance 1: Close supervision  Bed Mobility Comments 1: HOB ~45 degrees. Supervision for safety  Transfers  Transfer: Yes  Transfer 1  Technique 1: Sit to stand, Stand to sit  Transfer Device 1:  (FWW)  Transfer Level of Assistance 1: Close supervision, Modified independent  Trials/Comments 1: x2 reps; cues for hand placement initially however demos carryover.  Ambulation/Gait Training  Ambulation/Gait Training Performed: Yes  Ambulation/Gait Training 1  Surface 1: Level tile  Device 1: Rolling walker  Assistance 1:  (SBA)  Comments/Distance (ft) 1: Initially demos step to gait pattern however progresses to step through pattern. Ambulates 200' with seated rest break alf. No LOB, denies lightheadedness.  Stairs  Stairs: Yes  Stairs  Rails 1: None (Comment)  Device 1: Single point cane  Support Devices 1:  (KI)  Assistance 1:  (SBA)  Comment/Number of Steps 1: up/down 2 steps with SPC and no rail. SBA for  safety/balance. Cues for appropriate sequencing of RLE.       Extremity/Trunk Assessments:        RLE   RLE :  (ROM hip/ankle WFL. ROM R knee 4-64 degrees AAROM.)  LLE   LLE : Within Functional Limits    Treatments:  Therapeutic Exercise  Therapeutic Exercise Performed: Yes  Therapeutic Exercise Activity 1: ankle pumps x20  Therapeutic Exercise Activity 2: glute set x20  Therapeutic Exercise Activity 3: quad set x20  Therapeutic Exercise Activity 4: SAQ x15  Therapeutic Exercise Activity 5: SLR x5  Therapeutic Exercise Activity 6: heel slide x10        Bed Mobility  Bed Mobility: Yes  Bed Mobility 1  Bed Mobility 1: Supine to sitting  Level of Assistance 1: Close supervision  Bed Mobility Comments 1: HOB ~45 degrees. Supervision for safety  Ambulation/Gait Training  Ambulation/Gait Training Performed: Yes  Ambulation/Gait Training 1  Surface 1: Level tile  Device 1: Rolling walker  Assistance 1:  (SBA)  Comments/Distance (ft) 1: Initially demos step to gait pattern however progresses to step through pattern. Ambulates 200' with seated rest break assisted. No LOB, denies lightheadedness.  Transfers  Transfer: Yes  Transfer 1  Technique 1: Sit to stand, Stand to sit  Transfer Device 1:  (FWW)  Transfer Level of Assistance 1: Close supervision, Modified independent  Trials/Comments 1: x2 reps; cues for hand placement initially however demos carryover.  Stairs  Stairs: Yes  Stairs  Rails 1: None (Comment)  Device 1: Single point cane  Support Devices 1:  (KI)  Assistance 1:  (SBA)  Comment/Number of Steps 1: up/down 2 steps with SPC and no rail. SBA for safety/balance. Cues for appropriate sequencing of RLE.    Outcome Measures:  [facility] Basic Mobility  Turning from your back to your side while in a flat bed without using bedrails: None  Moving from lying on your back to sitting on the side of a flat bed without using bedrails: None  Moving to and from bed to chair (including a wheelchair): A little  Standing up from a  chair using your arms (e.g. wheelchair or bedside chair): A little  To walk in hospital room: A little  Climbing 3-5 steps with railing: A little  Basic Mobility - Total Score: 20                            Goals:  Encounter Problems       Encounter Problems (Active)       PT Problem       Pt will demonstrate sup > sit and sit > sup bed mobility mod I (Progressing)       Start:  12/05/24    Expected End:  12/19/24            Pt will demo sit > stand and stand > sit transfer with FWW and mod I  (Progressing)       Start:  12/05/24    Expected End:  12/19/24            Pt will ambulate 200' with FWW and mod I, without LOB  (Progressing)       Start:  12/05/24    Expected End:  12/19/24            Pt will demo up/down 2 steps without handrail, mod I (Progressing)       Start:  12/05/24    Expected End:  12/19/24            Pt will demo R knee ROM 0-90 degrees (Progressing)       Start:  12/05/24    Expected End:  12/19/24               Pain - Adult            Education Documentation  Mobility Training, taught by Mary Rodriguez, PT at 12/5/2024  3:54 PM.  Learner: Family, Patient  Readiness: Acceptance  Method: Explanation  Response: Verbalizes Understanding  Comment: PT POC, handout, stair negotiation, assistive device recommendation

## 2024-12-05 NOTE — DISCHARGE SUMMARY
Discharge summary  This patient Jarocho Langford was admitted to the hospital on 12/5/2024  after undergoing Procedure(s) (LRB):  Arthroplasty Total Knee Robot-Assisted (Right) without complications that morning.    During the postoperative period,while in hospital, patient was medically managed by the hospitalist. Please see medial notes and H&P for patients additional diagnoses.  Ortho agrees with all medical diagnoses and treatments while patient in hospital.  No significant or unexpected findings or abnormal ortho imaging were noted during the hospital stay    Hospital course      Patient tolerated surgical procedure well and there was no complications. Patient progressed adequately through their recovery during hospital stay including PT and rehabilitation.    Patient was then D/C on  to home  in stable condition.  Patient was instructed on the use of pain medications, the signs and symptoms of infection, and was given our number to call should they have any questions or concerns following discharge.    Based on my clinical judgment, the patient was provided with a 7-day prescription for opioid medication at 30 MED, indicated for treatment of acute pain in the setting of recent surgery. OARRS report was run and has demonstrated an appropriate time course.  The patient has been provided with counseling pertaining to safe use of opioid medication.    Patient may WBAT to operative extremity with use of walker for assistance with ambulation   Mepilex dressing to be removed POD#7 and incision left open to air  Eliquis 2.5 mg BID for DVT prophylaxis started on 12/6/24 and to be taken for 28 days  Follow up with surgeon in 2 weeks

## 2024-12-05 NOTE — CARE PLAN
Problem: Pain - Adult  Goal: Verbalizes/displays adequate comfort level or baseline comfort level  12/5/2024 1614 by Ti Wick RN  Outcome: Adequate for Discharge  12/5/2024 1325 by Ti Wick RN  Outcome: Progressing  12/5/2024 1318 by Ti Wick RN  Outcome: Progressing     Problem: Safety - Adult  Goal: Free from fall injury  12/5/2024 1614 by Ti Wick RN  Outcome: Adequate for Discharge  12/5/2024 1325 by Ti Wick RN  Outcome: Progressing  12/5/2024 1318 by Ti Wick RN  Outcome: Progressing     Problem: Discharge Planning  Goal: Discharge to home or other facility with appropriate resources  12/5/2024 1614 by Ti Wick RN  Outcome: Adequate for Discharge  12/5/2024 1325 by Ti Wick RN  Outcome: Progressing  12/5/2024 1318 by Ti Wick RN  Outcome: Progressing     Problem: Chronic Conditions and Co-morbidities  Goal: Patient's chronic conditions and co-morbidity symptoms are monitored and maintained or improved  12/5/2024 1614 by Ti Wick RN  Outcome: Adequate for Discharge  12/5/2024 1325 by Ti Wick RN  Outcome: Progressing  12/5/2024 1318 by Ti Wick RN  Outcome: Progressing     Problem: Fall/Injury  Goal: Not fall by end of shift  12/5/2024 1614 by Ti Wick RN  Outcome: Adequate for Discharge  12/5/2024 1325 by Ti Wick RN  Outcome: Progressing  12/5/2024 1318 by Ti Wick RN  Outcome: Progressing  Goal: Be free from injury by end of the shift  12/5/2024 1614 by Ti Wick RN  Outcome: Adequate for Discharge  12/5/2024 1325 by Ti Wick RN  Outcome: Progressing  12/5/2024 1318 by Ti Wick RN  Outcome: Progressing  Goal: Verbalize understanding of personal risk factors for fall in the hospital  12/5/2024 1614 by Ti Wick RN  Outcome: Adequate for Discharge  12/5/2024 1325 by Ti Wick RN  Outcome: Progressing  12/5/2024 1318 by Ti Wick RN  Outcome: Progressing  Goal:  Verbalize understanding of risk factor reduction measures to prevent injury from fall in the home  12/5/2024 1614 by Ti Wick RN  Outcome: Adequate for Discharge  12/5/2024 1325 by Ti Wick RN  Outcome: Progressing  12/5/2024 1318 by Ti Wick RN  Outcome: Progressing  Goal: Use assistive devices by end of the shift  12/5/2024 1614 by Ti Wick RN  Outcome: Adequate for Discharge  12/5/2024 1325 by Ti Wick RN  Outcome: Progressing  12/5/2024 1318 by Ti Wick RN  Outcome: Progressing  Goal: Pace activities to prevent fatigue by end of the shift  12/5/2024 1614 by Ti Wick RN  Outcome: Adequate for Discharge  12/5/2024 1325 by Ti Wick RN  Outcome: Progressing  12/5/2024 1318 by Ti Wick RN  Outcome: Progressing     Problem: Skin  Goal: Decreased wound size/increased tissue granulation at next dressing change  12/5/2024 1614 by Ti Wick RN  Outcome: Adequate for Discharge  12/5/2024 1325 by Ti Wick RN  Outcome: Progressing  Goal: Participates in plan/prevention/treatment measures  12/5/2024 1614 by Ti Wick RN  Outcome: Adequate for Discharge  12/5/2024 1325 by Ti Wick RN  Outcome: Progressing  Goal: Prevent/manage excess moisture  12/5/2024 1614 by Ti Wick RN  Outcome: Adequate for Discharge  12/5/2024 1325 by Ti Wick RN  Outcome: Progressing  Goal: Prevent/minimize sheer/friction injuries  12/5/2024 1614 by Ti Wick RN  Outcome: Adequate for Discharge  12/5/2024 1325 by Ti Wick RN  Outcome: Progressing  Goal: Promote/optimize nutrition  12/5/2024 1614 by Ti Wick RN  Outcome: Adequate for Discharge  12/5/2024 1325 by Ti Wick RN  Outcome: Progressing  Flowsheets (Taken 12/5/2024 1325)  Promote/optimize nutrition: Consume > 50% meals/supplements  Goal: Promote skin healing  12/5/2024 1614 by Ti Wick RN  Outcome: Adequate for Discharge  12/5/2024 1325 by Ti Wick,  RN  Outcome: Progressing   The patient's goals for the shift include      The clinical goals for the shift include Pain management

## 2024-12-06 ENCOUNTER — HOME CARE VISIT (OUTPATIENT)
Dept: HOME HEALTH SERVICES | Facility: HOME HEALTH | Age: 69
End: 2024-12-06

## 2024-12-06 VITALS
OXYGEN SATURATION: 95 % | HEIGHT: 68 IN | WEIGHT: 278.88 LBS | TEMPERATURE: 96.8 F | HEART RATE: 80 BPM | DIASTOLIC BLOOD PRESSURE: 71 MMHG | BODY MASS INDEX: 42.27 KG/M2 | SYSTOLIC BLOOD PRESSURE: 120 MMHG | RESPIRATION RATE: 16 BRPM

## 2024-12-06 LAB
ANION GAP SERPL CALC-SCNC: 9 MMOL/L (ref 10–20)
BUN SERPL-MCNC: 21 MG/DL (ref 6–23)
CALCIUM SERPL-MCNC: 8.1 MG/DL (ref 8.6–10.3)
CHLORIDE SERPL-SCNC: 103 MMOL/L (ref 98–107)
CO2 SERPL-SCNC: 29 MMOL/L (ref 21–32)
CREAT SERPL-MCNC: 0.9 MG/DL (ref 0.5–1.3)
EGFRCR SERPLBLD CKD-EPI 2021: >90 ML/MIN/1.73M*2
ERYTHROCYTE [DISTWIDTH] IN BLOOD BY AUTOMATED COUNT: 14.2 % (ref 11.5–14.5)
GLUCOSE SERPL-MCNC: 133 MG/DL (ref 74–99)
HCT VFR BLD AUTO: 38.7 % (ref 41–52)
HGB BLD-MCNC: 13 G/DL (ref 13.5–17.5)
MCH RBC QN AUTO: 28.3 PG (ref 26–34)
MCHC RBC AUTO-ENTMCNC: 33.6 G/DL (ref 32–36)
MCV RBC AUTO: 84 FL (ref 80–100)
NRBC BLD-RTO: 0 /100 WBCS (ref 0–0)
PLATELET # BLD AUTO: 223 X10*3/UL (ref 150–450)
POTASSIUM SERPL-SCNC: 4.4 MMOL/L (ref 3.5–5.3)
RBC # BLD AUTO: 4.6 X10*6/UL (ref 4.5–5.9)
SODIUM SERPL-SCNC: 137 MMOL/L (ref 136–145)
WBC # BLD AUTO: 12.4 X10*3/UL (ref 4.4–11.3)

## 2024-12-06 PROCEDURE — 80048 BASIC METABOLIC PNL TOTAL CA: CPT | Performed by: STUDENT IN AN ORGANIZED HEALTH CARE EDUCATION/TRAINING PROGRAM

## 2024-12-06 PROCEDURE — 7100000011 HC EXTENDED STAY RECOVERY HOURLY - NURSING UNIT

## 2024-12-06 PROCEDURE — 97530 THERAPEUTIC ACTIVITIES: CPT | Mod: GP,CQ

## 2024-12-06 PROCEDURE — 2500000001 HC RX 250 WO HCPCS SELF ADMINISTERED DRUGS (ALT 637 FOR MEDICARE OP)

## 2024-12-06 PROCEDURE — 2500000002 HC RX 250 W HCPCS SELF ADMINISTERED DRUGS (ALT 637 FOR MEDICARE OP, ALT 636 FOR OP/ED)

## 2024-12-06 PROCEDURE — 97110 THERAPEUTIC EXERCISES: CPT | Mod: GP,CQ

## 2024-12-06 PROCEDURE — 2500000001 HC RX 250 WO HCPCS SELF ADMINISTERED DRUGS (ALT 637 FOR MEDICARE OP): Performed by: STUDENT IN AN ORGANIZED HEALTH CARE EDUCATION/TRAINING PROGRAM

## 2024-12-06 PROCEDURE — 2500000002 HC RX 250 W HCPCS SELF ADMINISTERED DRUGS (ALT 637 FOR MEDICARE OP, ALT 636 FOR OP/ED): Performed by: STUDENT IN AN ORGANIZED HEALTH CARE EDUCATION/TRAINING PROGRAM

## 2024-12-06 PROCEDURE — 82374 ASSAY BLOOD CARBON DIOXIDE: CPT | Performed by: STUDENT IN AN ORGANIZED HEALTH CARE EDUCATION/TRAINING PROGRAM

## 2024-12-06 PROCEDURE — 97116 GAIT TRAINING THERAPY: CPT | Mod: GP,CQ

## 2024-12-06 PROCEDURE — 36415 COLL VENOUS BLD VENIPUNCTURE: CPT | Performed by: STUDENT IN AN ORGANIZED HEALTH CARE EDUCATION/TRAINING PROGRAM

## 2024-12-06 PROCEDURE — 2500000004 HC RX 250 GENERAL PHARMACY W/ HCPCS (ALT 636 FOR OP/ED): Mod: JZ | Performed by: STUDENT IN AN ORGANIZED HEALTH CARE EDUCATION/TRAINING PROGRAM

## 2024-12-06 PROCEDURE — 85027 COMPLETE CBC AUTOMATED: CPT | Performed by: STUDENT IN AN ORGANIZED HEALTH CARE EDUCATION/TRAINING PROGRAM

## 2024-12-06 PROCEDURE — 99231 SBSQ HOSP IP/OBS SF/LOW 25: CPT

## 2024-12-06 RX ADMIN — PANTOPRAZOLE SODIUM 40 MG: 40 TABLET, DELAYED RELEASE ORAL at 06:19

## 2024-12-06 RX ADMIN — BUPROPION HYDROCHLORIDE 150 MG: 150 TABLET, EXTENDED RELEASE ORAL at 09:47

## 2024-12-06 RX ADMIN — ACETAMINOPHEN 650 MG: 325 TABLET ORAL at 05:11

## 2024-12-06 RX ADMIN — KETOROLAC TROMETHAMINE 15 MG: 30 INJECTION, SOLUTION INTRAMUSCULAR at 05:10

## 2024-12-06 RX ADMIN — ACETAMINOPHEN 650 MG: 325 TABLET ORAL at 00:17

## 2024-12-06 RX ADMIN — OXYCODONE 10 MG: 5 TABLET ORAL at 09:47

## 2024-12-06 RX ADMIN — OXYCODONE 10 MG: 5 TABLET ORAL at 14:58

## 2024-12-06 RX ADMIN — APIXABAN 2.5 MG: 5 TABLET, FILM COATED ORAL at 09:47

## 2024-12-06 RX ADMIN — DOCUSATE SODIUM 100 MG: 100 CAPSULE, LIQUID FILLED ORAL at 09:47

## 2024-12-06 RX ADMIN — DILTIAZEM HYDROCHLORIDE 180 MG: 180 CAPSULE, COATED, EXTENDED RELEASE ORAL at 09:47

## 2024-12-06 RX ADMIN — OXYCODONE 10 MG: 5 TABLET ORAL at 00:26

## 2024-12-06 RX ADMIN — ACETAMINOPHEN 650 MG: 325 TABLET ORAL at 12:12

## 2024-12-06 RX ADMIN — KETOROLAC TROMETHAMINE 15 MG: 30 INJECTION, SOLUTION INTRAMUSCULAR at 00:17

## 2024-12-06 RX ADMIN — TRANEXAMIC ACID 1950 MG: 650 TABLET ORAL at 05:12

## 2024-12-06 RX ADMIN — VENLAFAXINE HYDROCHLORIDE 150 MG: 150 CAPSULE, EXTENDED RELEASE ORAL at 09:47

## 2024-12-06 RX ADMIN — CEFAZOLIN SODIUM 2 G: 2 INJECTION, SOLUTION INTRAVENOUS at 00:16

## 2024-12-06 RX ADMIN — OXYCODONE 10 MG: 5 TABLET ORAL at 05:11

## 2024-12-06 RX ADMIN — TAMSULOSIN HYDROCHLORIDE: 0.4 CAPSULE ORAL at 09:47

## 2024-12-06 ASSESSMENT — COGNITIVE AND FUNCTIONAL STATUS - GENERAL
DAILY ACTIVITIY SCORE: 21
MOBILITY SCORE: 20
CLIMB 3 TO 5 STEPS WITH RAILING: A LITTLE
STANDING UP FROM CHAIR USING ARMS: A LITTLE
HELP NEEDED FOR BATHING: A LITTLE
WALKING IN HOSPITAL ROOM: A LITTLE
MOBILITY SCORE: 20
STANDING UP FROM CHAIR USING ARMS: A LITTLE
MOVING TO AND FROM BED TO CHAIR: A LITTLE
CLIMB 3 TO 5 STEPS WITH RAILING: A LITTLE
DAILY ACTIVITIY SCORE: 24
WALKING IN HOSPITAL ROOM: A LITTLE
MOBILITY SCORE: 24
TOILETING: A LITTLE
MOVING TO AND FROM BED TO CHAIR: A LITTLE
DRESSING REGULAR LOWER BODY CLOTHING: A LITTLE

## 2024-12-06 ASSESSMENT — PAIN SCALES - GENERAL
PAINLEVEL_OUTOF10: 4
PAINLEVEL_OUTOF10: 7
PAINLEVEL_OUTOF10: 9
PAINLEVEL_OUTOF10: 7
PAINLEVEL_OUTOF10: 6
PAINLEVEL_OUTOF10: 9
PAINLEVEL_OUTOF10: 7
PAINLEVEL_OUTOF10: 7

## 2024-12-06 ASSESSMENT — PAIN - FUNCTIONAL ASSESSMENT
PAIN_FUNCTIONAL_ASSESSMENT: 0-10

## 2024-12-06 ASSESSMENT — PAIN DESCRIPTION - DESCRIPTORS
DESCRIPTORS: SORE
DESCRIPTORS: SORE;ACHING

## 2024-12-06 ASSESSMENT — PAIN DESCRIPTION - LOCATION
LOCATION: KNEE
LOCATION: HIP
LOCATION: HIP

## 2024-12-06 ASSESSMENT — PAIN DESCRIPTION - ORIENTATION
ORIENTATION: RIGHT
ORIENTATION: RIGHT

## 2024-12-06 ASSESSMENT — ACTIVITIES OF DAILY LIVING (ADL)
HOME_MANAGEMENT_TIME_ENTRY: 13
BATHING_ASSISTANCE: STAND BY

## 2024-12-06 ASSESSMENT — PAIN SCALES - PAIN ASSESSMENT IN ADVANCED DEMENTIA (PAINAD): TOTALSCORE: MEDICATION (SEE MAR);COLD APPLIED

## 2024-12-06 NOTE — CARE PLAN
Problem: Pain - Adult  Goal: Verbalizes/displays adequate comfort level or baseline comfort level  Outcome: Progressing     Problem: Safety - Adult  Goal: Free from fall injury  Outcome: Progressing     Problem: Discharge Planning  Goal: Discharge to home or other facility with appropriate resources  Outcome: Progressing     Problem: Chronic Conditions and Co-morbidities  Goal: Patient's chronic conditions and co-morbidity symptoms are monitored and maintained or improved  Outcome: Progressing     Problem: Fall/Injury  Goal: Not fall by end of shift  Outcome: Progressing  Goal: Be free from injury by end of the shift  Outcome: Progressing  Goal: Verbalize understanding of personal risk factors for fall in the hospital  Outcome: Progressing  Goal: Verbalize understanding of risk factor reduction measures to prevent injury from fall in the home  Outcome: Progressing  Goal: Use assistive devices by end of the shift  Outcome: Progressing  Goal: Pace activities to prevent fatigue by end of the shift  Outcome: Progressing     Problem: Skin  Goal: Decreased wound size/increased tissue granulation at next dressing change  Outcome: Progressing  Goal: Participates in plan/prevention/treatment measures  Outcome: Progressing  Goal: Prevent/manage excess moisture  Outcome: Progressing  Goal: Prevent/minimize sheer/friction injuries  Outcome: Progressing  Goal: Promote/optimize nutrition  Outcome: Progressing  Goal: Promote skin healing  Outcome: Progressing   The patient's goals for the shift include      The clinical goals for the shift include Pain management    Over the shift, the patient did not make progress toward the following goals. Barriers to progression include . Recommendations to address these barriers include .

## 2024-12-06 NOTE — PROGRESS NOTES
Physical Therapy    Physical Therapy Treatment    Patient Name: Jarocho Langford  MRN: [id number]  Today's Date: 12/6/2024  Time Calculation  Start Time: 1436  Stop Time: 1501  Time Calculation (min): 25 min     602/602-A    Assessment/Plan   PT Assessment  PT Assessment Results: Decreased range of motion, Decreased mobility, Orthopedic restrictions, Pain  Rehab Prognosis: Good  Evaluation/Treatment Tolerance: Patient tolerated treatment well  Strengths: Housing layout, Support and attitude of living partners, Capable of completing ADLs semi/independent  End of Session Communication: Bedside nurse  Assessment Comment: pt participating well and progressing  towards goals  End of Session Patient Position: Up in chair, Alarm on     Treatment/Interventions: Bed mobility, Transfer training, Gait training, Balance training, Neuromuscular re-education, Stair training, Strengthening, Endurance training, Range of motion, Therapeutic exercise, Therapeutic activity, Home exercise program  PT Plan: Ongoing PT  PT Frequency: BID  PT Discharge Recommendations: Low intensity level of continued care    PT Recommended Transfer Status: Stand by assist, Assistive device    General Visit Information:   PT  Visit  PT Received On: 12/06/24  General  Reason for Referral: R TKR  Family/Caregiver Present: Yes  Caregiver Feedback: spouse present and supportive (spouse had questions about how to set up outpatient therapy, pt states MD recomended outpatient as soon as pt felt comfortable)  Prior to Session Communication: Bedside nurse (cleared to participate)  Patient Position Received: Up in chair, Alarm on  General Comment: agreeable to participate, pt and spouse deny home going concerns    General Observations:        Subjective     Precautions:  Precautions  LE Weight Bearing Status: Weight Bearing as Tolerated  Medical Precautions: Fall precautions  Post-Surgical Precautions: Right total knee precautions  Braces Applied: KI donned when  OOB  Precautions Comment: no KI  secondary to good quad control    Vital Signs:     Objective     Pain:  Pain Assessment  Pain Assessment: 0-10  0-10 (Numeric) Pain Score: 9  Pain Type: Surgical pain, Acute pain  Pain Location: Knee  Pain Orientation: Right  Pain Descriptors: Sore  Pain Interventions: Cold applied    Cognition:  Cognition  Overall Cognitive Status: Within Functional Limits       Extremity/Trunk Assessments:     RLE   RLE : Exceptions to WFL  AROM RLE (degrees)  R Knee Flexion 0-130: 89 (measured seated)  R Knee Extension 0-130: 3 (measured in longsit)       Activity Tolerance:  Activity Tolerance  Endurance: Endurance does not limit participation in activity    Treatments:  Therapeutic Exercise  Therapeutic Exercise Performed:  (reviewed HEP with pt and spouse , pt demonstrates good understanding)  Therapeutic Exercise Activity 1: Pt performed supine ther ex including ankle pumps, quad sets, glut sets B/L LEs and heel slides and SAQ B  LE 15 to 20 reps x1, SLR 5 reps x2. Cues for technique and breathing.  Therapeutic Exercise Activity 2: Pt performed seated ther ex including heel/toe raises and hip ABD, LAQ, heel slides, and marches 20 reps x1. Reviewed home exercise program. Educated on goal for 2-3 sets of 20 reps to tolerance. Pt with good understanding.        Bed Mobility  Bed Mobility: Yes (transfers sit <--> supine with S and good technique)  Ambulation/Gait Training  Ambulation/Gait Training Performed: Yes  Ambulation/Gait Training 1  Surface 1: Level tile  Device 1: Rolling walker  Comments/Distance (ft) 1: ambulating 100 feet with WW and Mod I using slow steady step to gait pattern progressing to step through gait pattern . pt reporting increased pain vs AM  Transfers  Transfer: Yes  Transfer 1  Technique 1: Sit to stand, Stand to sit  Trials/Comments 1: with WW and Mod I and verbal cues  Stairs  Stairs: Yes  Stairs  Comment/Number of Steps 1: up and  down 5 stairs 2 times  with use  of   SPC and hand held assist , using step to gait pattern and  good sequencing and safety       Outcome Measures:     [facility] Basic Mobility  Turning from your back to your side while in a flat bed without using bedrails: None  Moving from lying on your back to sitting on the side of a flat bed without using bedrails: None  Moving to and from bed to chair (including a wheelchair): A little  Standing up from a chair using your arms (e.g. wheelchair or bedside chair): A little  To walk in hospital room: A little  Climbing 3-5 steps with railing: A little  Basic Mobility - Total Score: 20       EDUCATION:       Encounter Problems       Encounter Problems (Active)       PT Problem       Pt will demonstrate sup > sit and sit > sup bed mobility mod I (Met)       Start:  12/05/24    Expected End:  12/19/24    Resolved:  12/06/24         Pt will demo sit > stand and stand > sit transfer with FWW and mod I  (Met)       Start:  12/05/24    Expected End:  12/19/24    Resolved:  12/06/24         Pt will ambulate 200' with FWW and mod I, without LOB  (Progressing)       Start:  12/05/24    Expected End:  12/19/24            Pt will demo up/down 2 steps without handrail, mod I (Met)       Start:  12/05/24    Expected End:  12/19/24    Resolved:  12/06/24         Pt will demo R knee ROM 0-90 degrees (Progressing)       Start:  12/05/24    Expected End:  12/19/24               Pain - Adult

## 2024-12-06 NOTE — CARE PLAN
Problem: Pain - Adult  Goal: Verbalizes/displays adequate comfort level or baseline comfort level  12/6/2024 1506 by Ti Wick RN  Outcome: Adequate for Discharge  12/6/2024 1024 by Ti Wick RN  Outcome: Progressing  12/6/2024 1023 by Ti Wick RN  Outcome: Progressing     Problem: Safety - Adult  Goal: Free from fall injury  12/6/2024 1506 by Ti Wick RN  Outcome: Adequate for Discharge  12/6/2024 1024 by Ti Wick RN  Outcome: Progressing  12/6/2024 1023 by Ti Wick RN  Outcome: Progressing     Problem: Discharge Planning  Goal: Discharge to home or other facility with appropriate resources  12/6/2024 1506 by Ti Wick RN  Outcome: Adequate for Discharge  12/6/2024 1024 by Ti Wick RN  Outcome: Progressing  12/6/2024 1023 by Ti Wick RN  Outcome: Progressing     Problem: Chronic Conditions and Co-morbidities  Goal: Patient's chronic conditions and co-morbidity symptoms are monitored and maintained or improved  12/6/2024 1506 by Ti Wick RN  Outcome: Adequate for Discharge  12/6/2024 1024 by Ti Wick RN  Outcome: Progressing  12/6/2024 1023 by Ti Wick RN  Outcome: Progressing     Problem: Fall/Injury  Goal: Not fall by end of shift  12/6/2024 1506 by Ti Wick RN  Outcome: Adequate for Discharge  12/6/2024 1024 by Ti Wick RN  Outcome: Progressing  12/6/2024 1023 by Ti Wick RN  Outcome: Progressing  Goal: Be free from injury by end of the shift  12/6/2024 1506 by Ti Wick RN  Outcome: Adequate for Discharge  12/6/2024 1024 by Ti Wick RN  Outcome: Progressing  12/6/2024 1023 by Ti Wick RN  Outcome: Progressing  Goal: Verbalize understanding of personal risk factors for fall in the hospital  12/6/2024 1506 by Ti Wick RN  Outcome: Adequate for Discharge  12/6/2024 1024 by Ti Wick RN  Outcome: Progressing  12/6/2024 1023 by Ti Wick, RN  Outcome: Progressing  Goal:  Verbalize understanding of risk factor reduction measures to prevent injury from fall in the home  12/6/2024 1506 by Ti Wick RN  Outcome: Adequate for Discharge  12/6/2024 1024 by Ti Wick RN  Outcome: Progressing  12/6/2024 1023 by Ti Wick RN  Outcome: Progressing  Goal: Use assistive devices by end of the shift  12/6/2024 1506 by Ti Wick RN  Outcome: Adequate for Discharge  12/6/2024 1024 by Ti Wick RN  Outcome: Progressing  12/6/2024 1023 by Ti Wick RN  Outcome: Progressing  Goal: Pace activities to prevent fatigue by end of the shift  12/6/2024 1506 by Ti Wick RN  Outcome: Adequate for Discharge  12/6/2024 1024 by Ti Wick RN  Outcome: Progressing  12/6/2024 1023 by Ti Wick RN  Outcome: Progressing     Problem: Skin  Goal: Decreased wound size/increased tissue granulation at next dressing change  12/6/2024 1506 by Ti Wick RN  Outcome: Adequate for Discharge  12/6/2024 1024 by Ti Wick RN  Outcome: Progressing  12/6/2024 1023 by Ti Wick RN  Outcome: Progressing  Goal: Participates in plan/prevention/treatment measures  12/6/2024 1506 by Ti Wick RN  Outcome: Adequate for Discharge  12/6/2024 1024 by Ti Wick RN  Outcome: Progressing  12/6/2024 1023 by Ti Wick RN  Outcome: Progressing  Goal: Prevent/manage excess moisture  12/6/2024 1506 by Ti Wick RN  Outcome: Adequate for Discharge  12/6/2024 1024 by Ti Wick RN  Outcome: Progressing  12/6/2024 1023 by Ti Wick RN  Outcome: Progressing  Goal: Prevent/minimize sheer/friction injuries  12/6/2024 1506 by Ti Wick RN  Outcome: Adequate for Discharge  12/6/2024 1024 by Ti Wick RN  Outcome: Progressing  12/6/2024 1023 by Ti Wick RN  Outcome: Progressing  Goal: Promote/optimize nutrition  12/6/2024 1506 by Ti Wick RN  Outcome: Adequate for Discharge  12/6/2024 1024 by Ti Wick RN  Outcome:  Progressing  12/6/2024 1023 by Ti Wick RN  Outcome: Progressing  Goal: Promote skin healing  12/6/2024 1506 by Ti Wick RN  Outcome: Adequate for Discharge  12/6/2024 1024 by Ti Wick RN  Outcome: Progressing  12/6/2024 1023 by Ti Wick RN  Outcome: Progressing   The patient's goals for the shift include      The clinical goals for the shift include Pain management

## 2024-12-06 NOTE — PROGRESS NOTES
Physical Therapy    Physical Therapy Treatment    Patient Name: Jarocho Langford  MRN: [id number]  Today's Date: 12/6/2024  Time Calculation  Start Time: 0858  Stop Time: 0952  Time Calculation (min): 54 min     602/602-A    Assessment/Plan   PT Assessment  PT Assessment Results: Decreased range of motion, Decreased mobility, Orthopedic restrictions, Pain  Rehab Prognosis: Good  Evaluation/Treatment Tolerance: Patient tolerated treatment well  Strengths: Housing layout, Support and attitude of living partners, Capable of completing ADLs semi/independent  End of Session Communication: Bedside nurse  Assessment Comment: pt participating well and progressing  towards goals  End of Session Patient Position: Up in chair, Alarm on     Treatment/Interventions: Bed mobility, Transfer training, Gait training, Balance training, Neuromuscular re-education, Stair training, Strengthening, Endurance training, Range of motion, Therapeutic exercise, Therapeutic activity, Home exercise program  PT Plan: Ongoing PT  PT Frequency: BID  PT Discharge Recommendations: Low intensity level of continued care    PT Recommended Transfer Status: Stand by assist, Assistive device    General Visit Information:   PT  Visit  PT Received On: 12/06/24  General  Reason for Referral: R TKR  Family/Caregiver Present: No  Prior to Session Communication: Bedside nurse (cleared to participate)  Patient Position Received: Up in chair, Alarm on  General Comment: agreeable to participate, denies  any home going concerns    General Observations:        Subjective     Precautions:  Precautions  LE Weight Bearing Status: Weight Bearing as Tolerated  Medical Precautions: Fall precautions  Post-Surgical Precautions: Right total knee precautions  Braces Applied: KI donned when OOB  Precautions Comment: no KI  secondary to good quad control    Vital Signs:     Objective     Pain:  Pain Assessment  Pain Assessment: 0-10  0-10 (Numeric) Pain Score: 6  Pain Type:  Surgical pain, Acute pain  Pain Location: Knee (and quad)  Pain Orientation: Right  Pain Descriptors: Sore, Aching  Pain Interventions: Cold applied    Cognition:  Cognition  Overall Cognitive Status: Within Functional Limits       RLE   RLE : Exceptions to WFL  AROM RLE (degrees)  R Knee Flexion 0-130: 89 (measured seated)  R Knee Extension 0-130: 3 (measured in longsit)       Activity Tolerance:  Activity Tolerance  Endurance: Endurance does not limit participation in activity    Treatments:  Therapeutic Exercise  Therapeutic Exercise Performed: Yes  Therapeutic Exercise Activity 1: Pt performed supine ther ex including ankle pumps, quad sets, glut sets B/L LEs and heel slides and SAQ B  LE 15 to 20 reps x1, SLR 5 reps x2. Cues for technique and breathing.  Therapeutic Exercise Activity 2: Pt performed seated ther ex including heel/toe raises and hip ABD, LAQ, heel slides, and marches 20 reps x1. Reviewed home exercise program. Educated on goal for 2-3 sets of 20 reps to tolerance. Pt with good understanding.        Bed Mobility  Bed Mobility: No (seated upon arrival , denies any cncerns,states that he has a recliner to sleep in if  he needs to)  Ambulation/Gait Training  Ambulation/Gait Training Performed: Yes  Ambulation/Gait Training 1  Surface 1: Level tile  Device 1: Rolling walker  Comments/Distance (ft) 1: ambualting 100 feet x 2 with W W andMod I usingslow steady step through gait pattern  Transfers  Transfer: Yes  Transfer 1  Technique 1: Sit to stand, Stand to sit  Trials/Comments 1: with WW and Mod I and verbal cues  Stairs  Stairs: Yes  Stairs  Comment/Number of Steps 1: up and  down 5 stairs 2 times  with use  of  SPC and hand held assist , using step to gait pattern and  good sequencing and safety          Outcome Measures:     [facility] Basic Mobility  Turning from your back to your side while in a flat bed without using bedrails: None  Moving from lying on your back to sitting on the side of a flat bed  without using bedrails: None  Moving to and from bed to chair (including a wheelchair): A little  Standing up from a chair using your arms (e.g. wheelchair or bedside chair): A little  To walk in hospital room: A little  Climbing 3-5 steps with railing: A little  Basic Mobility - Total Score: 20         EDUCATION:       Encounter Problems       Encounter Problems (Active)       PT Problem       Pt will demonstrate sup > sit and sit > sup bed mobility mod I (Progressing)       Start:  12/05/24    Expected End:  12/19/24            Pt will demo sit > stand and stand > sit transfer with FWW and mod I  (Met)       Start:  12/05/24    Expected End:  12/19/24    Resolved:  12/06/24         Pt will ambulate 200' with FWW and mod I, without LOB  (Progressing)       Start:  12/05/24    Expected End:  12/19/24            Pt will demo up/down 2 steps without handrail, mod I (Met)       Start:  12/05/24    Expected End:  12/19/24    Resolved:  12/06/24         Pt will demo R knee ROM 0-90 degrees (Progressing)       Start:  12/05/24    Expected End:  12/19/24               Pain - Adult

## 2024-12-06 NOTE — PROGRESS NOTES
"Occupational Therapy    Evaluation/Treatment    Patient Name: Jarocho Langford \"Jin\"  MRN: [id number]  : 1955  Today's Date: 24  Time Calculation  Start Time: 721  Stop Time: 749  Time Calculation (min): 28 min     602/602-A    Assessment:  OT Assessment: pt presents with anticipated impairments in aDLS and functional mobility, completing ADLS at SBA to indep level, no additional OT indicated at this time  Prognosis: Good  Barriers to Discharge: None  End of Session Communication: PCT/NA/CTA (re: pt urinated and needs post void bladder scan per ortho NP)  End of Session Patient Position: Bed, 2 rail up, Alarm off, not on at start of session (call light in reach, pt in bed in prep for bladder scan)  OT Assessment Results: Decreased ADL status, Decreased endurance, Decreased functional mobility  Prognosis: Good  Barriers to Discharge: None    Plan:  No Skilled OT: No acute OT goals identified  OT Frequency: OT eval only  OT Discharge Recommendations: No OT needed after discharge, No further acute OT  OT Recommended Transfer Status: Stand by assist  OT - OK to Discharge: Yes     Subjective     Current Problem:  1. S/P total knee arthroplasty, right  apixaban (Eliquis) 2.5 mg tablet    cyclobenzaprine (Flexeril) 10 mg tablet    docusate sodium (Colace) 100 mg capsule    oxyCODONE (Roxicodone) 5 mg immediate release tablet    Referral to Home Health      2. Unilateral primary osteoarthritis, right knee              General:   OT Received On: 24  General  Reason for Referral: s/p R TKA  Referred By: Arnaud PT/OT eval and treat  Past Medical History Relevant to Rehab: anxiety, BPH, GERD, HTN, asthma, depression, RUSH, lumbar radiculopathy  Family/Caregiver Present: No  Patient Position Received: Up in chair, Alarm off, not on at start of session  General Comment: Pt s/p elective R TKA 24 by Dr. Lares.    Precautions:  LE Weight Bearing Status: Weight Bearing as Tolerated (R LE)  Medical " Precautions: Fall precautions  Precautions Comment: pt demo + SLR, no KI on during OT eval    Vital Signs:  SpO2:  (room air)    Pain:  Pain Assessment  Pain Assessment: 0-10  0-10 (Numeric) Pain Score: 4  Pain Type: Acute pain, Surgical pain  Pain Location: Knee  Pain Orientation: Right  Objective     Cognition:  Overall Cognitive Status: Within Functional Limits             Home Living:  Home Living Comments: pt lives with spouse, 1 story, 2 AMAN no HR. Has walk in shower no seat or grab bars. Pt's son will also be staying with pt at [place] to assist as needed. Owns reacher, raised toilet seat    Prior Function:  Prior Function Comments: Pt indep with ADLS, IADLS, amb without AD, owns ww and cane. Drives. No falls. Works as a            Activities of Daily Living:   Eating Assistance: Independent  Grooming Assistance: Independent  Bathing Assistance: Stand by  UE Dressing Assistance: Independent  LE Dressing Assistance: Stand by  LE Dressing Deficit:  (doff/toribio socks with use of reacher and sock aid)  Toileting Assistance with Device: Stand by  Functional Assistance:  (pt ambulated with ww 20' in room with SBA)                         Activity Tolerance:  Endurance:  (fair)         Bed Mobility/Transfers: Bed Mobility  Bed Mobility:  (sit to sup SBA, HOB 20)  Transfers  Transfer:  (sit to stand at chair and toilet SBA, stand to sit at toilet CGA, L hand on grab bar, pt reports having a sink with vanity in front of toilet at home that he can use to assist with toilet transfers.)                Balance:  Static Sitting: good  Dynamic Sitting: good -  Static Standing: fair +  Dynamic Standing: fair+         Vision:Vision - Basic Assessment  Current Vision: No visual deficits        Sensation:  Light Touch: No apparent deficits    Strength:  Strength Comments: B UE 4/5 throughout                     Extremities: RUE   RUE : Within Functional Limits and LUE   LUE: Within Functional Limits    Outcome  Measures: [facility] Daily Activity  Putting on and taking off regular lower body clothing: A little  Bathing (including washing, rinsing, drying): A little  Putting on and taking off regular upper body clothing: None  Toileting, which includes using toilet, bedpan or urinal: A little  Taking care of personal grooming such as brushing teeth: None  Eating Meals: None  Daily Activity - Total Score: 21          EDUCATION:  Education  Education Comment: OT educated pt on LB dressing strategies with use of dressing stick and sock  aid, instructed in safe toilet transfer, pt receptive to education, no additional OT indicated at this time

## 2024-12-06 NOTE — PROGRESS NOTES
12/06/24 1220   Discharge Planning   Home or Post Acute Services In home services   Type of Home Care Services Home OT;Home PT   Expected Discharge Disposition Home H     Pt is s/p POD #1 Elective Right total knee arthroplasty 12/5/24 with Dr. Joao Lares. Pt is Weight-bearing as tolerated to operative extremity with use of walker for assistance with ambulating. Pt will DC on PO Eliquis 2.5 mg BID for DVT prophylaxis for 28 days -To start POD #1. [facility] scores are PT (20) OT (21) recommending continued therapy at low level/intensity. Pt discharge preference remains home with [facility]. Demographics verified, pt states best phone is wife Ade [phone]. [facility]  notified that pt was originally planned for same day DC yesterday but DC was held d/t urinary retention. Urology consulted, per CNP plan for discharge today if urinary retention improves. [facility] intake notified.

## 2024-12-06 NOTE — DISCHARGE SUMMARY
Discharge summary  This patient Jarocho Langford was admitted to the hospital on 12/5/2024  after undergoing Procedure(s) (LRB):  Arthroplasty Total Knee Robot-Assisted (Right) without complications that morning.    During the postoperative period,while in hospital, patient was medically managed by the hospitalist. Please see medial notes and H&P for patients additional diagnoses.  Ortho agrees with all medical diagnoses and treatments while patient in hospital.  No significant or unexpected findings or abnormal ortho imaging were noted during the hospital stay    Hospital course      Patient tolerated surgical procedure well and there was no complications. Patient progressed adequately through their recovery during hospital stay including PT and rehabilitation.    Patient with urinary retention post operatively in setting of hx BPH, narcotics, anesthesia, and decreased mobility post operatively. Symptoms improved and patient voiding adequately. Continue home medications. Can follow up with urology on outpatient basis.     Patient was then D/C on  to home  in stable condition.  Patient was instructed on the use of pain medications, the signs and symptoms of infection, and was given our number to call should they have any questions or concerns following discharge.    Based on my clinical judgment, the patient was provided with a 7-day prescription for opioid medication at 30 MED, indicated for treatment of acute pain in the setting of recent surgery. OARRS report was run and has demonstrated an appropriate time course.  The patient has been provided with counseling pertaining to safe use of opioid medication.    Patient may WBAT to operative extremity with use of walker for assistance with ambulation   Mepilex dressing to be removed POD#7 and incision left open to air  Eliquis 2.5 mg BID for DVT prophylaxis started on 12/6/24 and to be taken for 28 days  Follow up with surgeon in 2 weeks

## 2024-12-06 NOTE — NURSING NOTE
AVS printed and reviewed with patient. All belonging sent with patient. New medication discussed with patient.Pt has understanding of meds and discharge instructions. Nurse had already gone over discharge instructions with pt. Earlier. IV removed.

## 2024-12-06 NOTE — CARE PLAN
Problem: Pain - Adult  Goal: Verbalizes/displays adequate comfort level or baseline comfort level  12/6/2024 1024 by Ti Wick RN  Outcome: Progressing  12/6/2024 1023 by Ti Wick RN  Outcome: Progressing     Problem: Safety - Adult  Goal: Free from fall injury  12/6/2024 1024 by Ti Wick RN  Outcome: Progressing  12/6/2024 1023 by Ti Wick RN  Outcome: Progressing     Problem: Discharge Planning  Goal: Discharge to home or other facility with appropriate resources  12/6/2024 1024 by Ti Wick RN  Outcome: Progressing  12/6/2024 1023 by Ti Wick RN  Outcome: Progressing     Problem: Chronic Conditions and Co-morbidities  Goal: Patient's chronic conditions and co-morbidity symptoms are monitored and maintained or improved  12/6/2024 1024 by Ti Wick RN  Outcome: Progressing  12/6/2024 1023 by Ti Wick RN  Outcome: Progressing     Problem: Fall/Injury  Goal: Not fall by end of shift  12/6/2024 1024 by Ti Wick RN  Outcome: Progressing  12/6/2024 1023 by Ti Wick RN  Outcome: Progressing  Goal: Be free from injury by end of the shift  12/6/2024 1024 by Ti Wick RN  Outcome: Progressing  12/6/2024 1023 by Ti Wick RN  Outcome: Progressing  Goal: Verbalize understanding of personal risk factors for fall in the hospital  12/6/2024 1024 by Ti Wick RN  Outcome: Progressing  12/6/2024 1023 by Ti Wick RN  Outcome: Progressing  Goal: Verbalize understanding of risk factor reduction measures to prevent injury from fall in the home  12/6/2024 1024 by Ti Wick RN  Outcome: Progressing  12/6/2024 1023 by Ti Wick RN  Outcome: Progressing  Goal: Use assistive devices by end of the shift  12/6/2024 1024 by Ti Wick RN  Outcome: Progressing  12/6/2024 1023 by Ti Wick RN  Outcome: Progressing  Goal: Pace activities to prevent fatigue by end of the shift  12/6/2024 1024 by Ti Wick RN  Outcome:  Progressing  12/6/2024 1023 by Ti Wick RN  Outcome: Progressing     Problem: Skin  Goal: Decreased wound size/increased tissue granulation at next dressing change  12/6/2024 1024 by Ti Wick RN  Outcome: Progressing  12/6/2024 1023 by Ti Wick RN  Outcome: Progressing  Goal: Participates in plan/prevention/treatment measures  12/6/2024 1024 by Ti Wick RN  Outcome: Progressing  12/6/2024 1023 by Ti Wick RN  Outcome: Progressing  Goal: Prevent/manage excess moisture  12/6/2024 1024 by Ti Wick RN  Outcome: Progressing  12/6/2024 1023 by Ti Wick RN  Outcome: Progressing  Goal: Prevent/minimize sheer/friction injuries  12/6/2024 1024 by Ti Wick RN  Outcome: Progressing  12/6/2024 1023 by Ti Wick RN  Outcome: Progressing  Goal: Promote/optimize nutrition  12/6/2024 1024 by Ti Wick RN  Outcome: Progressing  12/6/2024 1023 by Ti Wick RN  Outcome: Progressing  Goal: Promote skin healing  12/6/2024 1024 by Ti Wick RN  Outcome: Progressing  12/6/2024 1023 by Ti Wick RN  Outcome: Progressing   The patient's goals for the shift include      The clinical goals for the shift include Pain management

## 2024-12-06 NOTE — PROGRESS NOTES
Orthopedic Surgery Progress Note   TKA    Subjective:     Post-Operative Day # 1   Status Post right Total Knee Arthroplasty    Systemic or Specific Complaints: No Complaints    Objective:     Visit Vitals  /71 (Patient Position: Sitting)   Pulse 80   Temp 36 °C (96.8 °F)   Resp 16       General: alert and oriented, in no acute distress, appears stated age, and cooperative   Wound: no erythema, no edema, no drainage, and dressing remains clean, dry, and intact   Motion: Painful range of Motion   DVT Exam: No evidence of DVT seen on physical exam.  Negative Phyllis's sign.  No significant calf/ankle edema.       right knee swollen but thigh soft to palpation. Strong equal dorsi/plantar flexion bilaterally. Good distal pulses bilaterally.      Data Review  Recent Results (from the past 24 hours)   CBC    Collection Time: 12/06/24  4:21 AM   Result Value Ref Range    WBC 12.4 (H) 4.4 - 11.3 x10*3/uL    nRBC 0.0 0.0 - 0.0 /100 WBCs    RBC 4.60 4.50 - 5.90 x10*6/uL    Hemoglobin 13.0 (L) 13.5 - 17.5 g/dL    Hematocrit 38.7 (L) 41.0 - 52.0 %    MCV 84 80 - 100 fL    MCH 28.3 26.0 - 34.0 pg    MCHC 33.6 32.0 - 36.0 g/dL    RDW 14.2 11.5 - 14.5 %    Platelets 223 150 - 450 x10*3/uL   Basic metabolic panel    Collection Time: 12/06/24  4:21 AM   Result Value Ref Range    Glucose 133 (H) 74 - 99 mg/dL    Sodium 137 136 - 145 mmol/L    Potassium 4.4 3.5 - 5.3 mmol/L    Chloride 103 98 - 107 mmol/L    Bicarbonate 29 21 - 32 mmol/L    Anion Gap 9 (L) 10 - 20 mmol/L    Urea Nitrogen 21 6 - 23 mg/dL    Creatinine 0.90 0.50 - 1.30 mg/dL    eGFR >90 >60 mL/min/1.73m*2    Calcium 8.1 (L) 8.6 - 10.3 mg/dL         Assessment:     Status Post right Total Knee Arthroplasty. Doing well postoperatively.     Acute postoperative pain: Reports mild to moderate pain to operative extremity. Exacerbated by movement, relieved by rest ice and pain medication.  Continue current pain management.     Patient with post op urinary retention in  setting of hx BPH. Home medications resumed. He was voiding frequently immediately post op with PVR >600.    Continue bladder scan post void. Can be discharged if PVR <200cc .    Addendum: Urinary retention improving. Voided 250 ml with . Patient okay to be discharged home today. Continue home medications and can follow up with urology on outpatient basis.     Plan:      1.  Continues current post-op course   2.  Continue Deep venous thrombosis prophylaxis: Eliquis 2.5 mg BID for 28 days  3.  Continue physical therapy: Weight-bearing as tolerated with use of walker for assistance with ambulation   4.  Continue Pain Control: scripts sent  5.  Discharge planning: patient plans to return to home with  home care at discharge, orders placed. SW and TCC following for discharge planning. Anticipate discharge today if urinary retention improves.   6.  Follow up in 2 weeks    ONEIDA Wilson   12/6/2024 7:26 AM

## 2024-12-07 ENCOUNTER — HOME CARE VISIT (OUTPATIENT)
Dept: HOME HEALTH SERVICES | Facility: HOME HEALTH | Age: 69
End: 2024-12-07
Payer: COMMERCIAL

## 2024-12-07 VITALS
TEMPERATURE: 99 F | WEIGHT: 280 LBS | HEART RATE: 78 BPM | DIASTOLIC BLOOD PRESSURE: 78 MMHG | SYSTOLIC BLOOD PRESSURE: 118 MMHG | BODY MASS INDEX: 42.44 KG/M2 | HEIGHT: 68 IN

## 2024-12-07 PROCEDURE — 169592 NO-PAY CLAIM PROCEDURE

## 2024-12-07 PROCEDURE — G0151 HHCP-SERV OF PT,EA 15 MIN: HCPCS

## 2024-12-07 SDOH — HEALTH STABILITY: PHYSICAL HEALTH
EXERCISE COMMENTS: INSTRUCTED IN AND GIVEN WRITTEN PROGRAM. SUPINE ANKLE P DORSI, QUAD ISOMET, SLR, TKE. SEATED HEEL SLDIES, KNEE EXT STRETCH

## 2024-12-07 SDOH — ECONOMIC STABILITY: HOUSING INSECURITY: HOME SAFETY: INSTRUCTED TO PICK UP MULTIPLRE THROW RUGS

## 2024-12-07 ASSESSMENT — ACTIVITIES OF DAILY LIVING (ADL)
AMBULATION ASSISTANCE ON FLAT SURFACES: 1
OASIS_M1830: 03
ENTERING_EXITING_HOME: CONTACT GUARD ASSIST

## 2024-12-07 ASSESSMENT — ENCOUNTER SYMPTOMS
PAIN SEVERITY GOAL: 2/10
PAIN LOCATION: RIGHT KNEE
PERSON REPORTING PAIN: PATIENT
SUBJECTIVE PAIN PROGRESSION: UNCHANGED
LOWEST PAIN SEVERITY IN PAST 24 HOURS: 9/10
HIGHEST PAIN SEVERITY IN PAST 24 HOURS: 10/10
PAIN: 1

## 2024-12-08 NOTE — HOME HEALTH
pt lives with wife in 2 story home, 2 stairs to enter front entrance. bedrm and full bath on 1st flr. pt id self. wife present during session. pt hopes to start out pt. p.t. soon as has 2 dogs that are difficult to manage when clinicians are in house. goals for p.t.are to improve walking, decrease pain. has appt with dr briggs [id number]. pt ambul without device worked as  pre r tka [id number]. plans to rtn to work in about 6 wks.

## 2024-12-09 ENCOUNTER — HOME CARE VISIT (OUTPATIENT)
Dept: HOME HEALTH SERVICES | Facility: HOME HEALTH | Age: 69
End: 2024-12-09
Payer: COMMERCIAL

## 2024-12-09 ENCOUNTER — APPOINTMENT (OUTPATIENT)
Dept: HOME HEALTH SERVICES | Facility: HOME HEALTH | Age: 69
End: 2024-12-09
Payer: COMMERCIAL

## 2024-12-09 ENCOUNTER — TELEPHONE (OUTPATIENT)
Dept: PRIMARY CARE | Facility: CLINIC | Age: 69
End: 2024-12-09
Payer: COMMERCIAL

## 2024-12-09 VITALS
OXYGEN SATURATION: 99 % | TEMPERATURE: 97.5 F | HEART RATE: 95 BPM | DIASTOLIC BLOOD PRESSURE: 58 MMHG | SYSTOLIC BLOOD PRESSURE: 100 MMHG

## 2024-12-09 PROCEDURE — G0157 HHC PT ASSISTANT EA 15: HCPCS | Mod: CQ

## 2024-12-09 SDOH — HEALTH STABILITY: PHYSICAL HEALTH
EXERCISE COMMENTS: AQ, SLR, HEEL SLIDES, WITH INSTRUCTIONS FOR ALIGNMENT, PACING, BREATHING OUT WITH EXERTION AND USE OF AVAILABLE RANGE 5X3 SETS.

## 2024-12-09 SDOH — HEALTH STABILITY: PHYSICAL HEALTH
EXERCISE COMMENTS: STRETCH AND MANUAL THERAPY TO ANTERIOR/POSTERIOR KNEE WITH REINFORCEMENT FOR TIMING AND INTENSITY OF STRETCH, MUSCLE ACTIVITY THROUGH AVAILABLE RANGE KNEE FLEXION/EXTENSION, AAROM MEASURES 9-75 DEGREES.   COMPLETED SUPINE ANKLE PUMPS, QUAD SETTING, S

## 2024-12-09 ASSESSMENT — ENCOUNTER SYMPTOMS
PAIN: 1
HIGHEST PAIN SEVERITY IN PAST 24 HOURS: 8/10
PAIN LOCATION - PAIN SEVERITY: 6/10
PAIN LOCATION: RIGHT KNEE
PERSON REPORTING PAIN: PATIENT

## 2024-12-10 ENCOUNTER — HOME CARE VISIT (OUTPATIENT)
Dept: HOME HEALTH SERVICES | Facility: HOME HEALTH | Age: 69
End: 2024-12-10
Payer: COMMERCIAL

## 2024-12-10 ENCOUNTER — TELEPHONE (OUTPATIENT)
Dept: ORTHOPEDIC SURGERY | Facility: CLINIC | Age: 69
End: 2024-12-10
Payer: COMMERCIAL

## 2024-12-10 PROCEDURE — G0152 HHCP-SERV OF OT,EA 15 MIN: HCPCS

## 2024-12-10 ASSESSMENT — ENCOUNTER SYMPTOMS
HIGHEST PAIN SEVERITY IN PAST 24 HOURS: 10/10
PAIN LOCATION - PAIN QUALITY: ACHE
LOWEST PAIN SEVERITY IN PAST 24 HOURS: 7/10
PAIN: 1
SUBJECTIVE PAIN PROGRESSION: UNCHANGED
PERSON REPORTING PAIN: PATIENT
PAIN LOCATION - PAIN SEVERITY: 8/10
PAIN LOCATION: RIGHT KNEE

## 2024-12-10 ASSESSMENT — ACTIVITIES OF DAILY LIVING (ADL)
TOILETING: 1
TOILETING: SUPERVISION
PHYSICAL TRANSFERS ASSESSED: 1
CURRENT_FUNCTION: STAND BY ASSIST

## 2024-12-10 NOTE — TELEPHONE ENCOUNTER
Jarocho had TKA on 12/05/2024 at [facility], and he is in a lot of pain and would like to get something stronger for the pain

## 2024-12-10 NOTE — TELEPHONE ENCOUNTER
Jarocho called this morning and has concerns about the pressure around his knee cap, I did suggest him come into the walk in clinic today, but he would like a call to just see if this is normal pain or something he should come in for. Also would like to have something stronger for pain. PT saw him in the house yesterday and stated that he may benefit for something stronger.

## 2024-12-11 SDOH — ECONOMIC STABILITY: HOUSING INSECURITY: HOME SAFETY: 2 STORY HOME WITH SPOUSE, MAIN FLOOR BED/BATH WITH WALK IN SHOWER.

## 2024-12-12 ENCOUNTER — APPOINTMENT (OUTPATIENT)
Facility: CLINIC | Age: 69
End: 2024-12-12
Payer: COMMERCIAL

## 2024-12-12 ENCOUNTER — HOME CARE VISIT (OUTPATIENT)
Dept: HOME HEALTH SERVICES | Facility: HOME HEALTH | Age: 69
End: 2024-12-12
Payer: COMMERCIAL

## 2024-12-12 ENCOUNTER — OFFICE VISIT (OUTPATIENT)
Dept: ORTHOPEDIC SURGERY | Facility: CLINIC | Age: 69
End: 2024-12-12
Payer: COMMERCIAL

## 2024-12-12 ENCOUNTER — HOSPITAL ENCOUNTER (OUTPATIENT)
Dept: RADIOLOGY | Facility: HOSPITAL | Age: 69
Discharge: HOME | End: 2024-12-12
Payer: COMMERCIAL

## 2024-12-12 VITALS
OXYGEN SATURATION: 98 % | HEART RATE: 104 BPM | DIASTOLIC BLOOD PRESSURE: 70 MMHG | SYSTOLIC BLOOD PRESSURE: 138 MMHG | TEMPERATURE: 98.8 F

## 2024-12-12 DIAGNOSIS — M79.89 LEG SWELLING: ICD-10-CM

## 2024-12-12 DIAGNOSIS — M79.661 RIGHT CALF PAIN: ICD-10-CM

## 2024-12-12 PROCEDURE — 93971 EXTREMITY STUDY: CPT

## 2024-12-12 PROCEDURE — G0157 HHC PT ASSISTANT EA 15: HCPCS | Mod: CQ

## 2024-12-12 PROCEDURE — 99024 POSTOP FOLLOW-UP VISIT: CPT | Performed by: STUDENT IN AN ORGANIZED HEALTH CARE EDUCATION/TRAINING PROGRAM

## 2024-12-12 RX ORDER — CEPHALEXIN 500 MG/1
500 CAPSULE ORAL 3 TIMES DAILY
Qty: 30 CAPSULE | Refills: 0 | Status: SHIPPED | OUTPATIENT
Start: 2024-12-12 | End: 2024-12-22

## 2024-12-12 RX ORDER — CEPHALEXIN 500 MG/1
500 CAPSULE ORAL 4 TIMES DAILY
Qty: 40 CAPSULE | Refills: 0 | Status: SHIPPED | OUTPATIENT
Start: 2024-12-12 | End: 2024-12-22

## 2024-12-12 SDOH — HEALTH STABILITY: PHYSICAL HEALTH
EXERCISE COMMENTS: SUPINE AP, QS, SAQ, SLR, LEG FLEXION/EXTENSION RLE WITH INSTRUCTIONS/REINFORCEMENT FOR ALIGNMENT AND TECHNIQUE, BREATHING OUT WITH EXERTION 5X3 SETS.

## 2024-12-12 ASSESSMENT — ENCOUNTER SYMPTOMS
PAIN: 1
HIGHEST PAIN SEVERITY IN PAST 24 HOURS: 10/10
PERSON REPORTING PAIN: PATIENT
PAIN LOCATION - PAIN SEVERITY: 7/10
PAIN LOCATION: RIGHT KNEE

## 2024-12-12 NOTE — PROGRESS NOTES
Acute Injury Established Patient Visit    HPI: Jarocho is a 69 y.o.male who presents today with new complaints of severe right knee pain, right calf pain, calf swelling, erythema, warmth status post right knee TKA with Dr. Joao Lares on 12/5/2024.  He has a history of DVT on the left leg.  He has been on Eliquis for this, and just return to blood thinners this week.  He is not currently on any antibiotics.  He denies any fever.  He denies any chest pain, chills, trouble breathing, palpitations, and cough.    Plan: For this concern for DVT and cellulitis, but likely hematoma, after review by Dr. Joao Lares and Dr. Pedro Escobedo, we will start him on 10 days of Keflex and Bactrim, obtain a stat duplex ultrasound of the right lower extremity to rule out blood clot, and have him follow-up with his surgeon in the next few days.  If he has any other complaints or concerns, or develops systemic signs of infection including fever, he should go to the emergency department right away.    ADDENDUM:  Spoke with patient's wife this morning regarding duplex ultrasound results, which were negative for acute DVT, although peroneal veins were suboptimally visualized.  Patient is feeling better today.  He did well overnight.  Pain is controlled.  He has an appointment with Dr. Joao Lares on Monday and is aware that if they have any issues during the day today, they can return to the walk-in clinic, as well as contact our surgeon on-call over the weekend, or worst-case scenario go to the emergency department.  Addressed all questions and concerns.  Follow-up as scheduled on Monday.    Assessment:   Problem List Items Addressed This Visit    None  Visit Diagnoses       Right calf pain        Relevant Medications    cephalexin (Keflex) 500 mg capsule    cephalexin (Keflex) 500 mg capsule    Other Relevant Orders    Lower extremity venous duplex right    Leg swelling        Relevant Medications    cephalexin (Keflex) 500 mg  capsule    cephalexin (Keflex) 500 mg capsule    Other Relevant Orders    Lower extremity venous duplex right            Diagnostics: Reviewed all relevant imaging including x-ray, MRI, CT, and US.      Procedure:  Procedures    Physical Exam:  GENERAL:  No obvious acute distress.  NEURO:  Distally neurovascularly intact.  Sensation intact to light touch.  Extremity: Right knee examination shows:  Skin is intact; anterior incision looks well-approximated with no signs of infection or drainage and no active bleeding;  Significant erythema and warmth into the calf and posterior leg which appears to be hematoma;  Swelling and bruising into the calf and posterior knee region;      Orders Placed This Encounter    Lower extremity venous duplex right    cephalexin (Keflex) 500 mg capsule    cephalexin (Keflex) 500 mg capsule      At the conclusion of the visit there were no further questions by the patient/family regarding their plan of care.  Patient was instructed to call or return with any issues, questions, or concerns regarding their injury and/or treatment plan described above.     12/12/24 at 4:00 PM - Lalo Rivero,     Office: [phone]    This note was prepared using voice recognition software.  The details of this note are correct and have been reviewed, and corrected to the best of my ability.  Some grammatical errors may persist related to the Dragon software.

## 2024-12-13 DIAGNOSIS — Z96.651 S/P TOTAL KNEE ARTHROPLASTY, RIGHT: ICD-10-CM

## 2024-12-13 RX ORDER — OXYCODONE HYDROCHLORIDE 5 MG/1
5 TABLET ORAL EVERY 6 HOURS PRN
Qty: 28 TABLET | Refills: 0 | Status: SHIPPED | OUTPATIENT
Start: 2024-12-13 | End: 2024-12-20 | Stop reason: SDUPTHER

## 2024-12-13 RX ORDER — CYCLOBENZAPRINE HCL 10 MG
10 TABLET ORAL 2 TIMES DAILY PRN
Qty: 20 TABLET | Refills: 0 | Status: SHIPPED | OUTPATIENT
Start: 2024-12-13 | End: 2025-01-02

## 2024-12-13 RX ORDER — CYCLOBENZAPRINE HCL 10 MG
10 TABLET ORAL 3 TIMES DAILY PRN
Qty: 21 TABLET | Refills: 0 | Status: SHIPPED | OUTPATIENT
Start: 2024-12-13 | End: 2024-12-20

## 2024-12-13 RX ORDER — OXYCODONE AND ACETAMINOPHEN 5; 325 MG/1; MG/1
1 TABLET ORAL EVERY 6 HOURS PRN
Qty: 28 TABLET | Refills: 0 | Status: SHIPPED | OUTPATIENT
Start: 2024-12-13 | End: 2024-12-20

## 2024-12-16 ENCOUNTER — OFFICE VISIT (OUTPATIENT)
Dept: ORTHOPEDIC SURGERY | Facility: CLINIC | Age: 69
End: 2024-12-16
Payer: COMMERCIAL

## 2024-12-16 ENCOUNTER — TELEPHONE (OUTPATIENT)
Dept: ORTHOPEDIC SURGERY | Facility: CLINIC | Age: 69
End: 2024-12-16

## 2024-12-16 DIAGNOSIS — L03.90 CELLULITIS, UNSPECIFIED CELLULITIS SITE: Primary | ICD-10-CM

## 2024-12-16 DIAGNOSIS — M79.89 LEG SWELLING: ICD-10-CM

## 2024-12-16 DIAGNOSIS — M79.661 RIGHT CALF PAIN: ICD-10-CM

## 2024-12-16 PROCEDURE — 99211 OFF/OP EST MAY X REQ PHY/QHP: CPT | Performed by: STUDENT IN AN ORGANIZED HEALTH CARE EDUCATION/TRAINING PROGRAM

## 2024-12-16 RX ORDER — CEPHALEXIN 500 MG/1
500 CAPSULE ORAL 4 TIMES DAILY
Qty: 28 CAPSULE | Refills: 0 | Status: SHIPPED | OUTPATIENT
Start: 2024-12-16 | End: 2024-12-23

## 2024-12-16 RX ORDER — SULFAMETHOXAZOLE AND TRIMETHOPRIM 800; 160 MG/1; MG/1
1 TABLET ORAL 2 TIMES DAILY
Qty: 14 TABLET | Refills: 0 | Status: SHIPPED | OUTPATIENT
Start: 2024-12-16 | End: 2024-12-16 | Stop reason: SDUPTHER

## 2024-12-16 RX ORDER — CEPHALEXIN 500 MG/1
500 CAPSULE ORAL 4 TIMES DAILY
Qty: 28 CAPSULE | Refills: 0 | Status: SHIPPED | OUTPATIENT
Start: 2024-12-16 | End: 2024-12-16 | Stop reason: SDUPTHER

## 2024-12-16 RX ORDER — SULFAMETHOXAZOLE AND TRIMETHOPRIM 800; 160 MG/1; MG/1
1 TABLET ORAL 2 TIMES DAILY
Qty: 14 TABLET | Refills: 0 | Status: SHIPPED | OUTPATIENT
Start: 2024-12-16

## 2024-12-16 NOTE — PROGRESS NOTES
"    Chief Complaint   Patient presents with    Right Lower Leg - Follow-up     Saw RW 12/11/2024, concern for DVT and cellulitis but likely hematoma. Keflex given       HPI  Patient presents today for follow up of his right leg.  Patient presented last week for early follow-up due to concerns for leg swelling as well as erythema about his lower leg.  Did see Dr. Arevalo initiated on oral antibiotics as well as an ultrasound.  This was negative for DVT..  Currently on Eliquis      Past Medical History:   Diagnosis Date    Acute gastritis without bleeding 02/18/2020    Acute gastritis without hemorrhage, unspecified gastritis type    Anxiety     Arthritis     BPH (benign prostatic hyperplasia)     Cervical disc disease     fusion and plates    Chronic ethmoidal sinusitis 12/10/2020    Chronic ethmoidal sinusitis    Chronic frontal sinusitis 10/22/2020    Chronic frontal sinusitis    Chronic rhinitis 10/23/2020    Chronic rhinosinusitis    Colon polyp     removed during colonoscopy    COVID-19 10/14/2022    Positive self-administered antigen test for COVID-19    DVT (deep venous thrombosis) (Multi) 09/27/2023    right leg    Fever, unspecified 11/12/2019    Fever and chills    GERD (gastroesophageal reflux disease)     Hypertension     Hypertrophy of nasal turbinates 12/10/2020    Nasal turbinate hypertrophy    Irritable bowel syndrome     \"slight case\"    Lumbago with sciatica, right side 05/07/2019    Chronic right-sided low back pain with right-sided sciatica    Nasal congestion 10/14/2022    Nasal congestion with rhinorrhea    Nephrolithiasis     Other chest pain 07/28/2021    Chest discomfort    Other conditions influencing health status 01/20/2020    History of cough    Other specified disorders of nose and nasal sinuses 12/08/2017    Nasal vestibulitis    Personal history of other benign neoplasm 07/21/2017    History of other benign neoplasm    Personal history of other diseases of the musculoskeletal system and " connective tissue 09/04/2019    History of muscle weakness    Personal history of other diseases of the respiratory system     History of chronic sinusitis    Personal history of other diseases of the respiratory system 12/08/2017    History of chronic rhinitis    Personal history of other diseases of the respiratory system 12/10/2020    History of chronic rhinitis    Personal history of other specified conditions 01/12/2021    History of diarrhea    Personal history of other specified conditions 12/08/2017    History of epistaxis    Personal history of other specified conditions 08/25/2021    History of chronic fatigue    Personal history of other specified conditions 08/25/2021    History of dysphagia    Personal history of urinary (tract) infections 07/28/2021    History of urinary tract infection    Pneumonia, unspecified organism 11/22/2019    Pneumonia of right lower lobe due to infectious organism    Shortness of breath 10/14/2022    Shortness of breath on exertion    Sinusitis     Sleep apnea     does not use CPAP sleeps in recliner chair    Status post cervical spinal fusion 09/27/2023    Tear of lateral meniscus of knee 03/07/2017       Medication Documentation Review Audit       Reviewed by Candida Guadalupe OT (Occupational Therapist) on 12/10/24 at 1332      Medication Order Taking? Sig Documenting Provider Last Dose Status   acetaminophen (Tylenol) 500 mg tablet [id number] Yes Take 2 tablets (1,000 mg) by mouth every 8 hours if needed for mild pain (1 - 3). Grace Haider MD 12/4/2024  3:00 PM Active   albuterol (Ventolin HFA) 90 mcg/actuation inhaler [id number] Yes Inhale 2 puffs every 4 hours if needed for wheezing. Jim Farfan DO 12/4/2024  9:00 PM Active   amLODIPine-benazepriL (Lotrel) 5-10 mg capsule [id number] Yes TAKE 1 CAPSULE DAILY Jim Farfan DO 12/4/2024  8:00 AM Active   apixaban (Eliquis) 2.5 mg tablet [id number] Yes Take 1 tablet (2.5 mg) by mouth every 12 hours for 28 days.  Do not start before December 6, 2024. ONEIDA Wilson  Active   buPROPion SR (Wellbutrin SR) 150 mg 12 hr tablet [id number] Yes TAKE 1 TABLET TWICE A DAY Jim Farfan DO 12/4/2024 Morning Active   clonazePAM (KlonoPIN) 1 mg tablet [id number] Yes Take 1 tablet (1 mg) by mouth once daily as needed for anxiety. Jim Farfan DO 12/4/2024 10:00 PM Active   cyclobenzaprine (Flexeril) 10 mg tablet [id number] Yes Take 1 tablet (10 mg) by mouth 3 times a day as needed for muscle spasms for up to 7 days. ONEIDA Wilson  Active   dicyclomine (Bentyl) 20 mg tablet [id number] Yes Take 1 tablet (20 mg) by mouth every 6 hours. Leigha Guo MD 12/4/2024  8:00 AM Active   dilTIAZem XR (Dilacor XR) 180 mg 24 hr capsule [id number] Yes TAKE 1 CAPSULE DAILY Jim Farfan DO 12/4/2024 Active   docusate sodium (Colace) 100 mg capsule [id number] Yes Take 1 capsule (100 mg) by mouth 2 times a day for 10 days. ONEIDA Wilson  Active   dutasteride-tamsulosin 0.5-0.4 mg capsule, ER multiphase 24 hr [id number] Yes TAKE 1 CAPSULE DAILY Jim Farfan DO 12/4/2024 Morning Active   meloxicam (Mobic) 15 mg tablet [id number] Yes TAKE 1 TABLET DAILY Jim Farfan DO Past Month Active   mometasone furoate, bulk, 100 % powder [id number] Yes Compound 2 mg capsule to be added to sinus rinse twice daily. Pete Lovell MD Not Taking Active   naloxone (Narcan) 4 mg/0.1 mL nasal spray [id number] Yes Instill 1 spray intranasally for opioid overdose; repeat in 5 minutes if no response.   Patient not taking: Reported on 12/5/2024    Willy Polanco MD MPH Not Taking Active   oxybutynin XL (Ditropan XL) 10 mg 24 hr tablet [id number] Yes Take 1 tablet (10 mg) by mouth once daily. Do not crush, chew, or split. Jim Farfan DO 12/4/2024  8:00 AM Active   oxyCODONE (Roxicodone) 5 mg immediate release tablet [id number] Yes Take 1 tablet (5 mg) by mouth every 6 hours if needed for severe pain (7 - 10) for up to  7 days. Saniya Cooper, APRN-CNP  Active   pyridoxine (Vitamin B-6) 100 mg tablet [id number] Yes TAKE ONE TABLET BY MOUTH ONCE A DAY AS DIRECTED Jim Farfan DO 12/4/2024  8:00 AM Active   RABEprazole (Aciphex) EC tablet [id number] Yes Take 1 tablet (20 mg) by mouth once daily. Historical Provider, MD 12/4/2024  8:00 AM Active   semaglutide 0.25 mg or 0.5 mg (2 mg/3 mL) pen injector [id number] Yes Inject 0.5 mg under the skin 1 (one) time per week. Jim Farfan DO Taking Active   triamcinolone (Kenalog) 0.1 % cream [id number] Yes APPLY A THIN LAYER TO AFFECTED AREA(S) TWICE DAILY AS NEEDED. Jim Farfan DO Past Week Active   venlafaxine XR (Effexor-XR) 75 mg 24 hr capsule [id number] Yes TAKE 2 CAPSULES DAILY Jim Farfan DO 12/4/2024  8:00 AM Active   Vitamin D3 50 mcg (2,000 unit) capsule [id number] Yes TAKE ONE CAPSULE BY MOUTH EVERY DAY Jim Farfan DO 12/4/2024  8:00 AM Active                    No Known Allergies    Social History     Socioeconomic History    Marital status:      Spouse name: Not on file    Number of children: Not on file    Years of education: Not on file    Highest education level: Not on file   Occupational History    Not on file   Tobacco Use    Smoking status: Never     Passive exposure: Never    Smokeless tobacco: Never   Vaping Use    Vaping status: Never Used   Substance and Sexual Activity    Alcohol use: Not Currently     Comment: SOCIAL DRINKER    Drug use: Not Currently     Comment: 50+ years ago    Sexual activity: Yes     Partners: Female   Other Topics Concern    Not on file   Social History Narrative    Not on file     Social Drivers of Health     Financial Resource Strain: Low Risk  (12/5/2024)    Overall Financial Resource Strain (CARDIA)     Difficulty of Paying Living Expenses: Not hard at all   Food Insecurity: No Food Insecurity (12/5/2024)    Hunger Vital Sign     Worried About Running Out of Food in the Last Year: Never true     Ran Out of Food  in the Last Year: Never true   Transportation Needs: No Transportation Needs (12/7/2024)    OASIS : Transportation     Lack of Transportation (Medical): No     Lack of Transportation (Non-Medical): No     Patient Unable or Declines to Respond: No   Physical Activity: Not on file   Stress: Not on file   Social Connections: Feeling Socially Integrated (12/7/2024)    OASIS : Social Isolation     Frequency of experiencing loneliness or isolation: Never   Intimate Partner Violence: Not At Risk (12/5/2024)    Humiliation, Afraid, Rape, and Kick questionnaire     Fear of Current or Ex-Partner: No     Emotionally Abused: No     Physically Abused: No     Sexually Abused: No   Housing Stability: Low Risk  (12/5/2024)    Housing Stability Vital Sign     Unable to Pay for Housing in the Last Year: No     Number of Times Moved in the Last Year: 0     Homeless in the Last Year: No       Past Surgical History:   Procedure Laterality Date    ANTERIOR CERVICAL DISCECTOMY W/ FUSION      CERVICAL FUSION      CHOLECYSTECTOMY      COLONOSCOPY      CYSTOSCOPY      with stent placement then removal    SINUS SURGERY      had surgery 3-22-24    TONSILLECTOMY      VASECTOMY      WISDOM TOOTH EXTRACTION Bilateral           Review of Systems   GENERAL: Negative  CV: NEGATIVE  RESPIRATORY: Negative  GI: Negative  MUSCULOSKELETAL: See HPI  SKIN: Negative  NEURO:  Negative     Physical Exam:  General: No acute distress alert and orient x3, alert and cooperative  HEENT: Normocephalic atraumatic.  Pupils round and reactive.  Trachea midline  Cardiovascular: Regular rate and rhythm.  Respiratory: Bilateral chest rise.  Breathing unlabored.  Abdomen: Nontender nondistended no rebounding.  See formal musculoskeletal below:    Affected Extremity:  Focused examination of the right knee: Skin clean dry and intact no drainage about the knee there is some erythema about the lower leg that extends up to the mid shin.  This is improving per wife.   Negative Phyllis foot is warm well-perfused neurovascular intact compartments soft and compressible    Diagnostics:  Lower extremity venous duplex right    Result Date: 12/12/2024  Interpreted By:  Luis Landin, STUDY: [facility] LOWER EXTREMITY VENOUS DUPLEX RIGHT;  12/12/2024 4:22 pm   INDICATION: Signs/Symptoms:swelling.   ,M79.661 Pain in right lower leg,M79.89 Other specified soft tissue disorders   COMPARISON: None.   ACCESSION NUMBER(S): [id number]   ORDERING CLINICIAN: ESTEFANIA BUTTS   TECHNIQUE: Grayscale, color and spectral Doppler sonographic images of the right lower extremity deep venous system. The left common femoral vein was imaged for comparison.   FINDINGS: THIGH VEINS: There is normal compressibility of the right common femoral vein, saphenous-femoral junction, femoral vein and popliteal vein. There is normal spontaneous and phasic variation by spectral doppler.   CALF VEINS: The posterior tibial demonstrate normal color flow and compressibility. The peroneal veins were suboptimally visualized for adequate assessment.   CONTRALATERAL COMPARISON: The left common femoral vein is patent.   OTHER FINDINGS: None.       Excluding the peroneal veins, which were suboptimally visualized, no evidence of acute DVT in the right lower extremity.     MACRO: None.   Signed by: Luis Landin 12/12/2024 5:02 PM Dictation workstation:   [id number]    XR knee right 1-2 views    Result Date: 12/5/2024  Interpreted By:  Dustin Frank, STUDY: XR KNEE RIGHT 1-2 VIEWS;  12/5/2024 11:17 am   INDICATION: Signs/Symptoms:Post op knee.     COMPARISON: Pre-surgical Right knee series, 1 November 2024   ACCESSION NUMBER(S): [id number]   ORDERING CLINICIAN: SHERRY CHILD   TECHNIQUE: Frontal and cross-table lateral views of the right knee obtained portably in the immediate or near-immediate postoperative setting   FINDINGS: No acute unexpected radiographic findings shortly after right total knee arthroplasty; refer to the  operative report       As above     MACRO: None   Signed by: Dustin Frank 12/5/2024 11:41 AM Dictation workstation:   [id number]       Procedures  Procedures     Assessment:  69-year-old male with right leg cellulitis status post right total knee arthroplasty 12/5/2024    Treatment plan:  Will continue with oral antibiotics  Patient improving per wife.  Will perform a repeat physical exam on Friday to ensure no interval worsening  We will add Bactrim to regimen  If he is having any worsening pain redness he will return to clinic sooner  Discussed that if erythema was not improving may benefit from IV antibiotics.  Prior incision is pristine.    All of the patient's questions concerns answered

## 2024-12-16 NOTE — TELEPHONE ENCOUNTER
Patient called, he was just seen and he stated his medications prescribed today were sent to the wrong pharmacy.  They need to go to [facility] in [place].

## 2024-12-18 ENCOUNTER — HOME CARE VISIT (OUTPATIENT)
Dept: HOME HEALTH SERVICES | Facility: HOME HEALTH | Age: 69
End: 2024-12-18
Payer: COMMERCIAL

## 2024-12-18 VITALS
OXYGEN SATURATION: 99 % | SYSTOLIC BLOOD PRESSURE: 126 MMHG | TEMPERATURE: 98.4 F | HEART RATE: 98 BPM | DIASTOLIC BLOOD PRESSURE: 70 MMHG

## 2024-12-18 DIAGNOSIS — F41.9 ANXIETY: ICD-10-CM

## 2024-12-18 DIAGNOSIS — Z79.899 MEDICATION MANAGEMENT: ICD-10-CM

## 2024-12-18 DIAGNOSIS — J45.909 ASTHMA, UNSPECIFIED ASTHMA SEVERITY, UNSPECIFIED WHETHER COMPLICATED, UNSPECIFIED WHETHER PERSISTENT (HHS-HCC): ICD-10-CM

## 2024-12-18 PROCEDURE — G0157 HHC PT ASSISTANT EA 15: HCPCS | Mod: CQ

## 2024-12-18 RX ORDER — CLONAZEPAM 1 MG/1
TABLET ORAL
Refills: 0 | OUTPATIENT
Start: 2024-12-18

## 2024-12-18 RX ORDER — ALBUTEROL SULFATE 90 UG/1
2 INHALANT RESPIRATORY (INHALATION) EVERY 4 HOURS PRN
Qty: 18 G | Refills: 3 | Status: SHIPPED | OUTPATIENT
Start: 2024-12-18

## 2024-12-18 SDOH — HEALTH STABILITY: PHYSICAL HEALTH
EXERCISE COMMENTS: STRETCH AND MANUAL THERAPY TO ANTERIOR/POSTERIOR KNEE WITH REINFORCEMENT FOR TIMING AND INTENSITY OF STRETCH, MUSCLE ACTIVITY THROUGH AVAILABLE RANGE KNEE FLEXION/EXTENSION AAROM MEASURES 2-84 DEGREES.

## 2024-12-20 ENCOUNTER — HOME CARE VISIT (OUTPATIENT)
Dept: HOME HEALTH SERVICES | Facility: HOME HEALTH | Age: 69
End: 2024-12-20
Payer: COMMERCIAL

## 2024-12-20 ENCOUNTER — APPOINTMENT (OUTPATIENT)
Dept: ORTHOPEDIC SURGERY | Facility: CLINIC | Age: 69
End: 2024-12-20
Payer: COMMERCIAL

## 2024-12-20 ENCOUNTER — HOSPITAL ENCOUNTER (OUTPATIENT)
Dept: RADIOLOGY | Facility: HOSPITAL | Age: 69
Discharge: HOME | End: 2024-12-20
Payer: COMMERCIAL

## 2024-12-20 DIAGNOSIS — Z96.651 S/P TOTAL KNEE ARTHROPLASTY, RIGHT: ICD-10-CM

## 2024-12-20 DIAGNOSIS — L03.90 CELLULITIS, UNSPECIFIED CELLULITIS SITE: ICD-10-CM

## 2024-12-20 PROCEDURE — 73562 X-RAY EXAM OF KNEE 3: CPT | Mod: RT

## 2024-12-20 RX ORDER — OXYCODONE HYDROCHLORIDE 5 MG/1
5 TABLET ORAL EVERY 6 HOURS PRN
Qty: 28 TABLET | Refills: 0 | Status: SHIPPED | OUTPATIENT
Start: 2024-12-20 | End: 2024-12-27

## 2024-12-20 RX ORDER — OXYCODONE HYDROCHLORIDE 5 MG/1
5 TABLET ORAL EVERY 6 HOURS PRN
Qty: 28 TABLET | Refills: 0 | Status: CANCELLED | OUTPATIENT
Start: 2024-12-20 | End: 2024-12-27

## 2024-12-20 NOTE — PROGRESS NOTES
"    Chief Complaint   Patient presents with    Right Lower Leg - Follow-up     Saw RW 12/11/2024, concern for DVT and cellulitis but likely hematoma. Keflex given  DOS:12/05/2024  (Left leg) Cellulitis  12/16/2024- started Bactrim/Keflex       HPI  69-year-old male status post total knee arthroplasty date of surgery 12/5/2024 did have early follow-up due to cellulitis of his lower extremity has been taking Bactrim Keflex this has seemed to resolve his problem.  Presents today for classic 1 week follow-up status post total knee arthroplasty        Past Medical History:   Diagnosis Date    Acute gastritis without bleeding 02/18/2020    Acute gastritis without hemorrhage, unspecified gastritis type    Anxiety     Arthritis     BPH (benign prostatic hyperplasia)     Cervical disc disease     fusion and plates    Chronic ethmoidal sinusitis 12/10/2020    Chronic ethmoidal sinusitis    Chronic frontal sinusitis 10/22/2020    Chronic frontal sinusitis    Chronic rhinitis 10/23/2020    Chronic rhinosinusitis    Colon polyp     removed during colonoscopy    COVID-19 10/14/2022    Positive self-administered antigen test for COVID-19    DVT (deep venous thrombosis) (Multi) 09/27/2023    right leg    Fever, unspecified 11/12/2019    Fever and chills    GERD (gastroesophageal reflux disease)     Hypertension     Hypertrophy of nasal turbinates 12/10/2020    Nasal turbinate hypertrophy    Irritable bowel syndrome     \"slight case\"    Lumbago with sciatica, right side 05/07/2019    Chronic right-sided low back pain with right-sided sciatica    Nasal congestion 10/14/2022    Nasal congestion with rhinorrhea    Nephrolithiasis     Other chest pain 07/28/2021    Chest discomfort    Other conditions influencing health status 01/20/2020    History of cough    Other specified disorders of nose and nasal sinuses 12/08/2017    Nasal vestibulitis    Personal history of other benign neoplasm 07/21/2017    History of other benign neoplasm    " Personal history of other diseases of the musculoskeletal system and connective tissue 09/04/2019    History of muscle weakness    Personal history of other diseases of the respiratory system     History of chronic sinusitis    Personal history of other diseases of the respiratory system 12/08/2017    History of chronic rhinitis    Personal history of other diseases of the respiratory system 12/10/2020    History of chronic rhinitis    Personal history of other specified conditions 01/12/2021    History of diarrhea    Personal history of other specified conditions 12/08/2017    History of epistaxis    Personal history of other specified conditions 08/25/2021    History of chronic fatigue    Personal history of other specified conditions 08/25/2021    History of dysphagia    Personal history of urinary (tract) infections 07/28/2021    History of urinary tract infection    Pneumonia, unspecified organism 11/22/2019    Pneumonia of right lower lobe due to infectious organism    Shortness of breath 10/14/2022    Shortness of breath on exertion    Sinusitis     Sleep apnea     does not use CPAP sleeps in recliner chair    Status post cervical spinal fusion 09/27/2023    Tear of lateral meniscus of knee 03/07/2017       Medication Documentation Review Audit       Reviewed by Geoffrey Persaud MA (Medical Assistant) on 12/20/24 at 1038      Medication Order Taking? Sig Documenting Provider Last Dose Status   acetaminophen (Tylenol) 500 mg tablet [id number] No Take 2 tablets (1,000 mg) by mouth every 8 hours if needed for mild pain (1 - 3). Grace Haider MD 12/4/2024  3:00 PM Active   Discontinued 12/18/24 1714   albuterol 90 mcg/actuation inhaler [id number]  Inhale 2 puffs every 4 hours if needed for wheezing. Jim Farfan DO  Active   amLODIPine-benazepriL (Lotrel) 5-10 mg capsule [id number] No TAKE 1 CAPSULE DAILY   Patient taking differently: TAKE 1 CAPSULE DAILY BY MOUTH    Jim Farfan DO 12/4/2024  8:00  AM Active   apixaban (Eliquis) 2.5 mg tablet [id number]  Take 1 tablet (2.5 mg) by mouth every 12 hours for 28 days. Do not start before 2024. ONEIDA Wilson  Active   buPROPion SR (Wellbutrin SR) 150 mg 12 hr tablet [id number] No TAKE 1 TABLET TWICE A DAY Jim Farfan DO 2024 Morning Active     Discontinued 24 1316   cephalexin (Keflex) 500 mg capsule [id number]  Take 1 capsule (500 mg) by mouth 3 times a day for 10 days. Lalo Rivero DO  Active     Discontinued 24 1622   cephalexin (Keflex) 500 mg capsule [id number]  Take 1 capsule (500 mg) by mouth 4 times a day for 7 days. Yang Desai PA-C  Active   clonazePAM (KlonoPIN) 1 mg tablet [id number] No Take 1 tablet (1 mg) by mouth once daily as needed for anxiety. Jim Farfan DO 2024 10:00 PM Active   cyclobenzaprine (Flexeril) 10 mg tablet [id number]  Take 1 tablet (10 mg) by mouth 3 times a day as needed for muscle spasms for up to 7 days. Leopoldo Olson PA-C  Active   cyclobenzaprine (Flexeril) 10 mg tablet [id number]  Take 1 tablet (10 mg) by mouth 2 times a day as needed for muscle spasms for up to 20 days. Joao Lares MD  Active   dicyclomine (Bentyl) 20 mg tablet [id number] No Take 1 tablet (20 mg) by mouth every 6 hours. Leigha Guo MD 2024  8:00 AM Active   dilTIAZem XR (Dilacor XR) 180 mg 24 hr capsule [id number] No TAKE 1 CAPSULE DAILY   Patient taking differently: TAKE 1 CAPSULE DAILY BY MOUTH    Jim Farfan DO 2024 Active   docusate sodium (Colace) 100 mg capsule [id number]  Take 1 capsule (100 mg) by mouth 2 times a day for 10 days. ONEIDA Wilson   12/15/24 2359   dutasteride-tamsulosin 0.5-0.4 mg capsule, ER multiphase 24 hr [id number] No TAKE 1 CAPSULE DAILY Jim Farfan DO 2024 Morning Active   meloxicam (Mobic) 15 mg tablet [id number] No TAKE 1 TABLET DAILY Jim Farfan DO Past Month Active   mometasone furoate, bulk, 100 % powder [id number]  No Compound 2 mg capsule to be added to sinus rinse twice daily. Pete Lovell MD Not Taking Active   naloxone (Narcan) 4 mg/0.1 mL nasal spray [id number] No Instill 1 spray intranasally for opioid overdose; repeat in 5 minutes if no response.   Patient not taking: Reported on 2024    Willy Polanco MD [facility] Not Taking Active   oxybutynin XL (Ditropan XL) 10 mg 24 hr tablet [id number] No Take 1 tablet (10 mg) by mouth once daily. Do not crush, chew, or split. Jim Farfan DO 2024  8:00 AM Active   oxyCODONE (Roxicodone) 5 mg immediate release tablet [id number]  Take 1 tablet (5 mg) by mouth every 6 hours if needed for severe pain (7 - 10) for up to 7 days. Joao Lares MD  Active   oxyCODONE-acetaminophen (Percocet) 5-325 mg tablet [id number]  Take 1 tablet by mouth every 6 hours if needed for severe pain (7 - 10) for up to 7 days. Leopoldo Olson PA-C  Active   oxymetazoline 0.05 % nasal spray [id number] No Administer 2 sprays into each nostril every 12 hours if needed (nose bleeds) for up to 3 days. Do not use for more than 3 days. Grace Haider MD Taking  24 3623   pyridoxine (Vitamin B-6) 100 mg tablet [id number] No TAKE ONE TABLET BY MOUTH ONCE A DAY AS DIRECTED Jim Farfan DO 2024  8:00 AM Active   RABEprazole (Aciphex) EC tablet [id number] No Take 1 tablet (20 mg) by mouth once daily. Leigha Guo MD 2024  8:00 AM Active   semaglutide 0.25 mg or 0.5 mg (2 mg/3 mL) pen injector [id number] No Inject 0.5 mg under the skin 1 (one) time per week. Jim Farfan DO Taking Active     Discontinued 24 1622   sulfamethoxazole-trimethoprim (Bactrim DS) 800-160 mg tablet [id number]  Take 1 tablet by mouth 2 times a day. Yang Desai PA-C  Active   triamcinolone (Kenalog) 0.1 % cream [id number] No APPLY A THIN LAYER TO AFFECTED AREA(S) TWICE DAILY AS NEEDED. Jim Farfan DO Past Week Active   venlafaxine XR (Effexor-XR) 75 mg 24 hr capsule [id number] No  TAKE 2 CAPSULES DAILY Jim Farfan, DO 12/4/2024  8:00 AM Active   Vitamin D3 50 mcg (2,000 unit) capsule [id number] No TAKE ONE CAPSULE BY MOUTH EVERY DAY Jim Farfan, DO 12/4/2024  8:00 AM Active                    No Known Allergies    Social History     Socioeconomic History    Marital status:      Spouse name: Not on file    Number of children: Not on file    Years of education: Not on file    Highest education level: Not on file   Occupational History    Not on file   Tobacco Use    Smoking status: Never     Passive exposure: Never    Smokeless tobacco: Never   Vaping Use    Vaping status: Never Used   Substance and Sexual Activity    Alcohol use: Not Currently     Comment: SOCIAL DRINKER    Drug use: Not Currently     Comment: 50+ years ago    Sexual activity: Yes     Partners: Female   Other Topics Concern    Not on file   Social History Narrative    Not on file     Social Drivers of Health     Financial Resource Strain: Low Risk  (12/5/2024)    Overall Financial Resource Strain (CARDIA)     Difficulty of Paying Living Expenses: Not hard at all   Food Insecurity: No Food Insecurity (12/5/2024)    Hunger Vital Sign     Worried About Running Out of Food in the Last Year: Never true     Ran Out of Food in the Last Year: Never true   Transportation Needs: No Transportation Needs (12/7/2024)    OASIS : Transportation     Lack of Transportation (Medical): No     Lack of Transportation (Non-Medical): No     Patient Unable or Declines to Respond: No   Physical Activity: Not on file   Stress: Not on file   Social Connections: Feeling Socially Integrated (12/7/2024)    OASIS : Social Isolation     Frequency of experiencing loneliness or isolation: Never   Intimate Partner Violence: Not At Risk (12/5/2024)    Humiliation, Afraid, Rape, and Kick questionnaire     Fear of Current or Ex-Partner: No     Emotionally Abused: No     Physically Abused: No     Sexually Abused: No   Housing Stability:  Low Risk  (12/5/2024)    Housing Stability Vital Sign     Unable to Pay for Housing in the Last Year: No     Number of Times Moved in the Last Year: 0     Homeless in the Last Year: No       Past Surgical History:   Procedure Laterality Date    ANTERIOR CERVICAL DISCECTOMY W/ FUSION      CERVICAL FUSION      CHOLECYSTECTOMY      COLONOSCOPY      CYSTOSCOPY      with stent placement then removal    SINUS SURGERY      had surgery 3-22-24    TONSILLECTOMY      VASECTOMY      WISDOM TOOTH EXTRACTION Bilateral           Review of Systems   GENERAL: Negative  CV: NEGATIVE  RESPIRATORY: Negative  GI: Negative  MUSCULOSKELETAL: See HPI  SKIN: Negative  NEURO:  Negative     Physical Exam:  General: No acute distress alert and orient x3, alert and cooperative  HEENT: Normocephalic atraumatic.  Pupils round and reactive.  Trachea midline  Cardiovascular: Regular rate and rhythm.  Respiratory: Bilateral chest rise.  Breathing unlabored.  Abdomen: Nontender nondistended no rebounding.  See formal musculoskeletal below:    Affected Extremity:  Focused examination of the right knee: Skin clean dry and intact no drainage about the knee there is some erythema about the lower leg that extends up to the mid shin.  This is improving per wife.  Negative Phyllis foot is warm well-perfused neurovascular intact compartments soft and compressible    Diagnostics:  Lower extremity venous duplex right    Result Date: 12/12/2024  Interpreted By:  Luis Landin, STUDY: [facility] LOWER EXTREMITY VENOUS DUPLEX RIGHT;  12/12/2024 4:22 pm   INDICATION: Signs/Symptoms:swelling.   ,M79.661 Pain in right lower leg,M79.89 Other specified soft tissue disorders   COMPARISON: None.   ACCESSION NUMBER(S): [id number]   ORDERING CLINICIAN: ESTEFANIA BUTTS   TECHNIQUE: Grayscale, color and spectral Doppler sonographic images of the right lower extremity deep venous system. The left common femoral vein was imaged for comparison.   FINDINGS: THIGH VEINS: There  is normal compressibility of the right common femoral vein, saphenous-femoral junction, femoral vein and popliteal vein. There is normal spontaneous and phasic variation by spectral doppler.   CALF VEINS: The posterior tibial demonstrate normal color flow and compressibility. The peroneal veins were suboptimally visualized for adequate assessment.   CONTRALATERAL COMPARISON: The left common femoral vein is patent.   OTHER FINDINGS: None.       Excluding the peroneal veins, which were suboptimally visualized, no evidence of acute DVT in the right lower extremity.     MACRO: None.   Signed by: Luis Landin 12/12/2024 5:02 PM Dictation workstation:   [id number]    XR knee right 1-2 views    Result Date: 12/5/2024  Interpreted By:  Dustin Frank, STUDY: XR KNEE RIGHT 1-2 VIEWS;  12/5/2024 11:17 am   INDICATION: Signs/Symptoms:Post op knee.     COMPARISON: Pre-surgical Right knee series, 1 November 2024   ACCESSION NUMBER(S): [id number]   ORDERING CLINICIAN: SHERRY CHILD   TECHNIQUE: Frontal and cross-table lateral views of the right knee obtained portably in the immediate or near-immediate postoperative setting   FINDINGS: No acute unexpected radiographic findings shortly after right total knee arthroplasty; refer to the operative report       As above     MACRO: None   Signed by: Dustin Frank 12/5/2024 11:41 AM Dictation workstation:   [id number]    3 views collected in clinic today: Status post right total knee arthroplasty appropriate place moderate suprapatellar effusion as expected no signs of hardware failure or loosening       Procedures  Procedures     Assessment:  69-year-old male with resolving right leg cellulitis he is status post total knee arthroplasty 12/5/2024    Treatment plan:  Will finish oral antibiotic regimen cellulitis has improved  Difficult to elucidate if this was from a hematoma versus cellulitis therefore will finish his antibiotic regimen  Discussed activities to avoid  We will  continue with physical therapy  Follow-up in 4 weeks  We will provide refill for pain medicine  All of the patient's questions concerns answered

## 2024-12-20 NOTE — Clinical Note
pt texted p.t. and [facility] home care dc visit that was sched for today. stated he wasnt feeling well. instructed to cont with hep until he starts out pt. p.t. [phone]

## 2024-12-21 ASSESSMENT — ACTIVITIES OF DAILY LIVING (ADL)
HOME_HEALTH_OASIS: 01
OASIS_M1830: 02

## 2024-12-27 ENCOUNTER — EVALUATION (OUTPATIENT)
Dept: PHYSICAL THERAPY | Facility: HOSPITAL | Age: 69
End: 2024-12-27
Payer: MEDICARE

## 2024-12-27 DIAGNOSIS — G89.29 CHRONIC PAIN OF RIGHT KNEE: Primary | ICD-10-CM

## 2024-12-27 DIAGNOSIS — R29.898 WEAKNESS OF RIGHT LOWER EXTREMITY: ICD-10-CM

## 2024-12-27 DIAGNOSIS — M25.561 CHRONIC PAIN OF RIGHT KNEE: Primary | ICD-10-CM

## 2024-12-27 PROCEDURE — 97161 PT EVAL LOW COMPLEX 20 MIN: CPT | Mod: GP | Performed by: PHYSICAL THERAPIST

## 2024-12-27 PROCEDURE — 97110 THERAPEUTIC EXERCISES: CPT | Mod: GP | Performed by: PHYSICAL THERAPIST

## 2024-12-27 ASSESSMENT — PAIN - FUNCTIONAL ASSESSMENT: PAIN_FUNCTIONAL_ASSESSMENT: 0-10

## 2024-12-27 ASSESSMENT — PAIN SCALES - GENERAL: PAINLEVEL_OUTOF10: 3

## 2024-12-27 NOTE — PROGRESS NOTES
Physical Therapy    Physical Therapy Evaluation and Treatment      Patient Name: Jarocho Langford  MRN: [id number]  Today's Date: 12/27/2024    Time Entry:   Time Calculation  Start Time: 0830  Stop Time: 0915  Time Calculation (min): 45 min  PT Evaluation Time Entry  PT Evaluation (Low) Time Entry: 20  PT Therapeutic Procedures Time Entry  Therapeutic Exercise Time Entry: 20                   Assessment:  This 70 yo pleasant male is s/p Rt TKR, MARY on 12/5/24.  He just finished up with his home health PT.  He's walking into the clinic today with his wheeled walker.  He brought in his st cane.  He had a hematoma/cellulitis after his surgery.  Negative testing for DVT.  Decreased Rt knee ROM, strength, balance, gait and overall function at this time.  Some difficulty noted doing his instrumental ADL's and work activities around the house.  He's currently off work.  All questions answered.  He has an established HEP from his previous home health PT.        Plan:  Continue with skilled PT      Current Problem:   1. Chronic pain of right knee  Follow Up In Physical Therapy      2. Weakness of right lower extremity  Follow Up In Physical Therapy          Subjective  Pt reports mild Rt knee pain and stiffness.    Pain:  Pain Assessment  Pain Assessment: 0-10  0-10 (Numeric) Pain Score: 3  Pain Type: Surgical pain, Chronic pain  Pain Location: Knee  Pain Orientation: Right    Objective     AROM:  0 to 70 degrees of flexion    Strength:  Rt knee 4/5, Rt hip 4-/5    Posture:  Rt knee/LE moderate edema    Palpation:  Tightness in his Rt LE    Functional Mobility:  Independent with transfers    Balance:  Fair standing balance    Special Tests:  NT    Outcome Measures:  Other Measures  Lower Extremity Funtional Score (LEFS): 27/80     Treatments:    Heel slides with strap, 30 reps *  Walking with st cane  NuStep, seat 11, 10 minutes  SLR's, flex/abduction, 20 reps         Goals:    0/10  Rt knee pain.  Rt knee AROM 0 - 110-120  degrees of flexion.  Rt knee/LE 5/5 strength grossly throughout.  Minimal antalgia to normal gait pattern with least restrictive device.  Pt walk up and down a flight of stairs with 1 rail with reciprocal gait pattern.  No difficulty or pain performing basic and instrumental ADL's and work activities around the house.  Independent with HEP.

## 2024-12-31 ENCOUNTER — TREATMENT (OUTPATIENT)
Dept: PHYSICAL THERAPY | Facility: HOSPITAL | Age: 69
End: 2024-12-31
Payer: MEDICARE

## 2024-12-31 DIAGNOSIS — R29.898 WEAKNESS OF RIGHT LOWER EXTREMITY: ICD-10-CM

## 2024-12-31 DIAGNOSIS — M25.561 CHRONIC PAIN OF RIGHT KNEE: Primary | ICD-10-CM

## 2024-12-31 DIAGNOSIS — G89.29 CHRONIC PAIN OF RIGHT KNEE: Primary | ICD-10-CM

## 2024-12-31 PROCEDURE — 97110 THERAPEUTIC EXERCISES: CPT | Mod: GP,CQ

## 2024-12-31 ASSESSMENT — PAIN - FUNCTIONAL ASSESSMENT: PAIN_FUNCTIONAL_ASSESSMENT: 0-10

## 2024-12-31 ASSESSMENT — PAIN SCALES - GENERAL: PAINLEVEL_OUTOF10: 3

## 2024-12-31 NOTE — PROGRESS NOTES
Physical Therapy Treatment    Patient Name: Jarocho Langford  MRN: [id number]  Today's Date: 12/31/2024  Time Calculation  Start Time: 0825  Stop Time: 0915  Time Calculation (min): 50 min  PT Therapeutic Procedures Time Entry  Therapeutic Exercise Time Entry: 45       Current Problem  1. Chronic pain of right knee        2. Weakness of right lower extremity            Subjective   General   Pt stating persistent pain, difficulty falling/staying asleep  Insurance   Visit 2  MMOSUP TO FOLLOW MEDICARE   Precautions     Pain  Pain Assessment: 0-10  0-10 (Numeric) Pain Score: 3  Pain Type: Surgical pain  Pain Location: Knee  Pain Orientation: Right    Objective   R knee ROM 0 to 90-95 degrees assisted  Treatments:  NuStep, seat 11, 10 minutes  Heel slides with strap, 20 reps *  Quad sets with heel prop, 20 reps  SLR's, flex/abduction, 20 reps  LAQ's x20  Squats x20  HR/TR x20  Standing marches x20  Standing HSC x20  Standing hip abd/ext x10    Assessment   Pt enters clinic with mild antalgia and SPC, but able to ambulate throughout clinic IND. Tolerated introduction several ROM and strength ex's to progress knee mobility, quad and glute strength. Knee is progressing well with improved flexion over previous visit.   Plan:  Cont to progress strength and endurance     OP EDUCATION:   Access Code: [email or web address]  URL: [email or web address]  Date: 12/31/2024  Prepared by: Nuno aFir    Exercises  - Quad Setting and Stretching  - 2-3 x daily - 7 x weekly - 2-3 sets - 10 reps - 3-5 seconds hold  - Supine Heel Slide with Strap  - 2-3 x daily - 7 x weekly - 1-2 sets - 10 reps - 5-10 seconds hold  - Long Sitting Calf Stretch with Strap  - 2-3 x daily - 7 x weekly - 1-2 sets - 10 reps - 5-10 seconds hold  - Supine Bridge  - 1-2 x daily - 7 x weekly - 2-3 sets - 10 reps - 3-5 seconds hold  - Active Straight Leg Raise with Quad Set  - 1-2 x daily - 7 x weekly - 2-3 sets - 10 reps  - Sidelying Hip Abduction  - 1-2 x  daily - 7 x weekly - 2-3 sets - 10 reps  - Clamshell with Resistance  - 1-2 x daily - 7 x weekly - 2-3 sets - 10 reps  - Seated Long Arc Quad  - 1-2 x daily - 7 x weekly - 2-3 sets - 10 reps - 3-5 seconds hold  - Mini Squat with Counter Support  - 1-2 x daily - 7 x weekly - 2-3 sets - 10 reps  - Standing 3-Way Leg Reach with Resistance at Ankles and Counter Support  - 1-2 x daily - 7 x weekly - 2-3 sets - 10 reps  - Heel Toe Raises with Counter Support  - 1-2 x daily - 7 x weekly - 2-3 sets - 10 reps  - Standing Single Leg Stance with Counter Support  - 1-2 x daily - 7 x weekly - 1 sets - 5-10 reps - 10-20 seconds hold  - Standing March with Counter Support  - 1-2 x daily - 7 x weekly - 2-3 sets - 10 reps  - Standing Knee Flexion AROM with Chair Support  - 1-2 x daily - 7 x weekly - 2-3 sets - 10 reps    Goals:

## 2025-01-02 ENCOUNTER — APPOINTMENT (OUTPATIENT)
Dept: PHYSICAL THERAPY | Facility: HOSPITAL | Age: 70
End: 2025-01-02
Payer: COMMERCIAL

## 2025-01-03 ENCOUNTER — TREATMENT (OUTPATIENT)
Dept: PHYSICAL THERAPY | Facility: HOSPITAL | Age: 70
End: 2025-01-03
Payer: MEDICARE

## 2025-01-03 DIAGNOSIS — M25.561 CHRONIC PAIN OF RIGHT KNEE: ICD-10-CM

## 2025-01-03 DIAGNOSIS — G89.29 CHRONIC PAIN OF RIGHT KNEE: ICD-10-CM

## 2025-01-03 DIAGNOSIS — R29.898 WEAKNESS OF RIGHT LOWER EXTREMITY: ICD-10-CM

## 2025-01-03 PROCEDURE — 97140 MANUAL THERAPY 1/> REGIONS: CPT | Mod: GP | Performed by: PHYSICAL THERAPIST

## 2025-01-03 PROCEDURE — 97110 THERAPEUTIC EXERCISES: CPT | Mod: GP | Performed by: PHYSICAL THERAPIST

## 2025-01-03 ASSESSMENT — PAIN SCALES - GENERAL: PAINLEVEL_OUTOF10: 2

## 2025-01-03 ASSESSMENT — PAIN - FUNCTIONAL ASSESSMENT: PAIN_FUNCTIONAL_ASSESSMENT: 0-10

## 2025-01-03 NOTE — PROGRESS NOTES
Physical Therapy    Physical Therapy Treatment    Patient Name: Jarocho Langford  MRN: [id number]  Today's Date: 1/3/2025    Time Entry:   Time Calculation  Start Time: 1045  Stop Time: 1130  Time Calculation (min): 45 min     PT Therapeutic Procedures Time Entry  Manual Therapy Time Entry: 10  Therapeutic Exercise Time Entry: 35                   Assessment:  Pt walking into the clinic today without assistive device with mild antalgia.  He continues with edema and tightness in his Rt LE.  ROM to same as his last reading last treatment, 0 to 90 degrees with PROM noting pain at end range.  Initiated some standing strengthening ex's.  Pt given HEP and RTB.  He's icing at home.  Pt ok to start driving.        Plan:  Continue with skilled PT      Current Problem  1. Chronic pain of right knee  Follow Up In Physical Therapy      2. Weakness of right lower extremity  Follow Up In Physical Therapy          Subjective  Pt reports mild knee pain and more tightness than pain.    Pain  Pain Assessment  Pain Assessment: 0-10  0-10 (Numeric) Pain Score: 2  Pain Type: Surgical pain  Pain Location: Knee  Pain Orientation: Right    Objective     R knee ROM 0 to 90 degrees passively    Treatments:    NuStep, seat 11, 10 minutes  Heel slides with strap, 30 reps *  Quad sets with heel prop, 20 reps - NP  SLR's, flex/abduction, 20 reps  LAQ's x20  Squats x20  HR/TR's, 1/2 roll, 30 reps   Standing marches, 30 reps   Standing HSC, 30 reps   Standing hip 3-way, RTB, 30 reps *    PROM/patellar mobs

## 2025-01-07 ENCOUNTER — TREATMENT (OUTPATIENT)
Dept: PHYSICAL THERAPY | Facility: HOSPITAL | Age: 70
End: 2025-01-07
Payer: MEDICARE

## 2025-01-07 DIAGNOSIS — M25.561 CHRONIC PAIN OF RIGHT KNEE: Primary | ICD-10-CM

## 2025-01-07 DIAGNOSIS — I87.2 VENOUS STASIS DERMATITIS OF LOWER EXTREMITY: ICD-10-CM

## 2025-01-07 DIAGNOSIS — G89.29 CHRONIC PAIN OF RIGHT KNEE: Primary | ICD-10-CM

## 2025-01-07 DIAGNOSIS — R29.898 WEAKNESS OF RIGHT LOWER EXTREMITY: ICD-10-CM

## 2025-01-07 PROCEDURE — 97110 THERAPEUTIC EXERCISES: CPT | Mod: GP,CQ

## 2025-01-07 RX ORDER — TRIAMCINOLONE ACETONIDE 1 MG/G
CREAM TOPICAL
Qty: 454 G | Refills: 11 | Status: SHIPPED | OUTPATIENT
Start: 2025-01-07

## 2025-01-07 ASSESSMENT — PAIN SCALES - GENERAL: PAINLEVEL_OUTOF10: 5 - MODERATE PAIN

## 2025-01-07 ASSESSMENT — PAIN - FUNCTIONAL ASSESSMENT: PAIN_FUNCTIONAL_ASSESSMENT: 0-10

## 2025-01-07 NOTE — PROGRESS NOTES
"Physical Therapy Treatment    Patient Name: Jarocho Langford  MRN: [id number]  Today's Date: 1/7/2025     PT Therapeutic Procedures Time Entry  Therapeutic Exercise Time Entry: 40                   Current Problem  1. Chronic pain of right knee  Follow Up In Physical Therapy      2. Weakness of right lower extremity  Follow Up In Physical Therapy          Insurance   MEDICARE/ [employer] 2230($0 USED ) COPAY 0       Subjective   Pt stating he started back to work yesterday and has been sore.      Pain  Pain Assessment: 0-10  0-10 (Numeric) Pain Score: 5 - Moderate pain  Pain Type: Surgical pain  Pain Location: Knee  Pain Orientation: Right      Objective   R knee AAROM 97    Treatments:  NuStep, seat 11, 10 minutes  Heel slides with strap, 30 reps *  Quad sets with heel prop, 20 reps - NP  SLR's, flex/abduction, 20 reps  LAQ's x20  Squats x20  HR/TR's, 1/2 roll, 30 reps   Standing marches, 20 reps   Standing HSC, 20 reps   Standing hip 3-way, RTB, 20 reps *  Hip flexor stretch with strap 30\"x2      PROM/patellar mobs    Assessment:  Talked with pt about scar massage and compression socks to help with edema especially with returning to work. Decreased reps with exercises per pt request. Held new exercises today due to increased pain today.     Plan:  Cont to progress ROM       "

## 2025-01-09 ENCOUNTER — TREATMENT (OUTPATIENT)
Dept: PHYSICAL THERAPY | Facility: HOSPITAL | Age: 70
End: 2025-01-09
Payer: MEDICARE

## 2025-01-09 DIAGNOSIS — M25.561 CHRONIC PAIN OF RIGHT KNEE: Primary | ICD-10-CM

## 2025-01-09 DIAGNOSIS — G89.29 CHRONIC PAIN OF RIGHT KNEE: Primary | ICD-10-CM

## 2025-01-09 DIAGNOSIS — R29.898 WEAKNESS OF RIGHT LOWER EXTREMITY: ICD-10-CM

## 2025-01-09 PROCEDURE — 97110 THERAPEUTIC EXERCISES: CPT | Mod: GP,CQ

## 2025-01-09 ASSESSMENT — PAIN - FUNCTIONAL ASSESSMENT: PAIN_FUNCTIONAL_ASSESSMENT: 0-10

## 2025-01-09 ASSESSMENT — PAIN SCALES - GENERAL: PAINLEVEL_OUTOF10: 4

## 2025-01-09 NOTE — PROGRESS NOTES
"Physical Therapy Treatment    Patient Name: Jarocho Langford  MRN: [id number]  Today's Date: 1/9/2025     PT Therapeutic Procedures Time Entry  Therapeutic Exercise Time Entry: 43                   Current Problem  1. Chronic pain of right knee        2. Weakness of right lower extremity            Insurance   MEDICARE/ [employer] 2230($0 USED ) COPAY 0         Subjective   Pt stating work has been killing him. Noticed pain in his groin now.     Pain  Pain Assessment: 0-10  0-10 (Numeric) Pain Score: 4  Pain Type: Surgical pain  Pain Location: Knee  Pain Orientation: Right      Objective   R knee AAROM/PROM 100  Treatments:  NuStep, seat 11, 10 minutes  Heel slides with strap, 30 reps *  Quad sets with heel prop, 20 reps - NP  SLR's, flex/abduction, ---  LAQ's --  Squats x30  HR/TR's, 1/2 roll, 30 reps   Step ups F 4in x15, 6in x5  Step ups L 6in x15  Standing marches, 30 reps   Standing HSC, 30 reps   Standing hip 3-way, RTB, 20 reps *  Hip flexor stretch with strap 30\"     PROM/patellar mobs    Assessment:  Added step ups and hamstring stretch, verbal cues on sequencing with step ups. Pt continues to make gains in his ROM into flexion. No increase in pain reported after treatment.     Plan:  Add wt with marches and HSC     "

## 2025-01-13 ENCOUNTER — TREATMENT (OUTPATIENT)
Dept: PHYSICAL THERAPY | Facility: HOSPITAL | Age: 70
End: 2025-01-13
Payer: COMMERCIAL

## 2025-01-13 ENCOUNTER — TELEPHONE (OUTPATIENT)
Dept: ORTHOPEDIC SURGERY | Facility: CLINIC | Age: 70
End: 2025-01-13
Payer: COMMERCIAL

## 2025-01-13 DIAGNOSIS — R29.898 WEAKNESS OF RIGHT LOWER EXTREMITY: ICD-10-CM

## 2025-01-13 DIAGNOSIS — G89.29 CHRONIC PAIN OF RIGHT KNEE: Primary | ICD-10-CM

## 2025-01-13 DIAGNOSIS — M25.561 CHRONIC PAIN OF RIGHT KNEE: Primary | ICD-10-CM

## 2025-01-13 PROCEDURE — 97110 THERAPEUTIC EXERCISES: CPT | Mod: GP,CQ

## 2025-01-13 PROCEDURE — 97140 MANUAL THERAPY 1/> REGIONS: CPT | Mod: GP,CQ

## 2025-01-13 ASSESSMENT — PAIN - FUNCTIONAL ASSESSMENT: PAIN_FUNCTIONAL_ASSESSMENT: 0-10

## 2025-01-13 ASSESSMENT — PAIN SCALES - GENERAL: PAINLEVEL_OUTOF10: 4

## 2025-01-13 NOTE — PROGRESS NOTES
Physical Therapy Treatment    Patient Name: Jarocho Langford  MRN: [id number]  Today's Date: 1/13/2025     PT Therapeutic Procedures Time Entry  Manual Therapy Time Entry: 15  Therapeutic Exercise Time Entry: 28                   Current Problem  1. Chronic pain of right knee  Follow Up In Physical Therapy      2. Weakness of right lower extremity  Follow Up In Physical Therapy          Insurance   MEDICARE/ [employer] 2230($0 USED ) COPAY 0       Subjective   Pt stating he has been having some pain above his incision, stating there is an indentation and it feels like sharp pain across it.     Pain  Pain Assessment: 0-10  0-10 (Numeric) Pain Score: 4  Pain Location: Knee  Pain Orientation: Right      Objective   R knee AAROM 95, PROM 100  Treatments:  NuStep, seat 11, 10 minutes  Heel slides with strap, 30 reps *  Quad sets with heel prop, 20 reps - NP  SLR's, flex/abduction, ---  LAQ's --  Squats x30  HR/TR's, 1/2 roll, --  Step ups F/L 6in 2x10  Standing marches, 30 reps   Standing HSC, 30 reps   Standing hip 3-way, GTB, 20 reps *  Hip flexor stretch with strap--    Manual:  STM/IASTM to distal quad  Assessment:  Pt ambulating with antaglic gait pattern. A slight decrease noted in ROM today. STM performed to decrease tightness and pain with tenderness reported. Continued with PROM into flexion to improve ROM. Progressed resistance with hip strengthening with good tolerance.     Plan:  Try stairs next visit

## 2025-01-14 DIAGNOSIS — Z96.651 S/P TOTAL KNEE ARTHROPLASTY, RIGHT: ICD-10-CM

## 2025-01-14 RX ORDER — CYCLOBENZAPRINE HCL 10 MG
10 TABLET ORAL NIGHTLY PRN
Qty: 20 TABLET | Refills: 0 | Status: SHIPPED | OUTPATIENT
Start: 2025-01-14 | End: 2025-02-03

## 2025-01-15 ENCOUNTER — APPOINTMENT (OUTPATIENT)
Facility: CLINIC | Age: 70
End: 2025-01-15
Payer: COMMERCIAL

## 2025-01-16 ENCOUNTER — TREATMENT (OUTPATIENT)
Dept: PHYSICAL THERAPY | Facility: HOSPITAL | Age: 70
End: 2025-01-16
Payer: COMMERCIAL

## 2025-01-16 DIAGNOSIS — G89.29 CHRONIC PAIN OF RIGHT KNEE: Primary | ICD-10-CM

## 2025-01-16 DIAGNOSIS — R29.898 WEAKNESS OF RIGHT LOWER EXTREMITY: ICD-10-CM

## 2025-01-16 DIAGNOSIS — M25.561 CHRONIC PAIN OF RIGHT KNEE: Primary | ICD-10-CM

## 2025-01-16 PROCEDURE — 97110 THERAPEUTIC EXERCISES: CPT | Mod: GP,CQ

## 2025-01-16 PROCEDURE — 97140 MANUAL THERAPY 1/> REGIONS: CPT | Mod: GP,CQ

## 2025-01-16 ASSESSMENT — PAIN SCALES - GENERAL: PAINLEVEL_OUTOF10: 2

## 2025-01-16 ASSESSMENT — PAIN - FUNCTIONAL ASSESSMENT: PAIN_FUNCTIONAL_ASSESSMENT: 0-10

## 2025-01-16 NOTE — PROGRESS NOTES
"Physical Therapy Treatment    Patient Name: Jarocho Langford  MRN: [id number]  Today's Date: 1/16/2025     PT Therapeutic Procedures Time Entry  Manual Therapy Time Entry: 10  Therapeutic Exercise Time Entry: 32                   Current Problem  1. Chronic pain of right knee        2. Weakness of right lower extremity            Insurance   MEDICARE/ [employer] 2230($0 USED ) COPAY 0         Subjective   Pt stating he continues to be frustrated with his progress, has been getting medial knee pain. Also reporting hamstring pain.     Pain  Pain Assessment: 0-10  0-10 (Numeric) Pain Score: 2  Pain Location: Knee  Pain Orientation: Right      Objective   R knee AAROM 102    Treatments:  NuStep, seat 11, 10 minutes  Heel slides with strap, 30 reps *  Quad sets with heel prop, 20 reps - NP  SLR's, flex/abduction, ---  LAQ's --  Squats x30  HR/TR's, 1/2 roll, --  Step ups F/L 8 in x15  Standing marches, 20 reps 2#  Standing HSC, 20 reps 2#  Standing hip 3-way, GTB, 20 reps *  Stairs x3  SLS 10\"x10    Manual:  PROM  Assessment:  Pt displays improved form noted with squats. Added wt with marches and HSC. Able to ascend/descend stairs in a reciprocal pattern. Progressed step height with step ups. Continued with PROM into flexion to increase ROM.     Plan:  Cont to progress ROM        "

## 2025-01-17 ENCOUNTER — OFFICE VISIT (OUTPATIENT)
Dept: ORTHOPEDIC SURGERY | Facility: CLINIC | Age: 70
End: 2025-01-17
Payer: COMMERCIAL

## 2025-01-17 ENCOUNTER — APPOINTMENT (OUTPATIENT)
Facility: CLINIC | Age: 70
End: 2025-01-17
Payer: COMMERCIAL

## 2025-01-17 DIAGNOSIS — Z96.651 S/P TOTAL KNEE ARTHROPLASTY, RIGHT: ICD-10-CM

## 2025-01-17 DIAGNOSIS — S83.411A SPRAIN OF MEDIAL COLLATERAL LIGAMENT OF RIGHT KNEE, INITIAL ENCOUNTER: Primary | ICD-10-CM

## 2025-01-17 NOTE — PROGRESS NOTES
Acute Injury Established Patient Visit    HPI: Jarocho is a 69 y.o.male who presents today with new complaints of right knee pain.  He is a patient of Dr. Joao Lares status post right total knee replacement 6 weeks ago on 12/5/2024.  Of note, I had seen him within a week of his surgery for worsening pain, hematoma, and concern for possible infection, he was started on Keflex and Bactrim, stopped his blood thinner, and improved vastly.  He has been back to some physical therapy and work part-time.  He had been doing well until yesterday when he began to have some medial knee pain.  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  He also had physical therapy yesterday.  They were concerned about this new pain, and suggested that he come in for further evaluation.  He has been limping and has been hard to walk.  He has no pain at rest.  He has no significant erythema, warmth, drainage, or fever.  Pain is only when he puts weight on it.  He has not tried much for this.      Plan: For this right MCL sprain, likely grade 1, and much less concern for any other infection or any other process at this time, place him in a hinged knee brace, increase his meloxicam to 15 mg daily with food, continue with conservative treatment measures including rest, ice, elevation, activity modifications, and follow-up with Dr. Lares as scheduled in 1 week.    Assessment:   Problem List Items Addressed This Visit       S/P total knee arthroplasty, right     Other Visit Diagnoses       Sprain of medial collateral ligament of right knee, initial encounter    -  Primary    Relevant Orders    Knee Brace, Hinged            Diagnostics: Reviewed all relevant imaging including x-ray, MRI, CT, and US.      Procedure:  Procedures    Physical Exam:  GENERAL:  No obvious acute distress.  NEURO:  Distally neurovascularly  intact.  Sensation intact to light touch.  Extremity: Right knee examination shows:  Skin is intact; anterior incision is well-healed with no signs of surrounding erythema, warmth, or drainage;  No erythema or warmth;  No edema or ecchymosis;  No effusion;  Can flex the left knee to 130 degrees;  Full extension at 0 degrees;  No pain over the patella;  Negative patellar grind test;  TENDER over the medial joint line;  No pain over the lateral joint line;  No pain over the patellar or quadricep tendon;  No pain over the proximal tibia;  No pain over the popliteal fossa;  POSITIVE valgus stress test;  Negative varus stress test;  Negative Rico's test medially with no instability;  Negative Rico's test laterally with no instability;  Negative Lachman's test;  Patellar and quadricep mechanism intact;  Negative anterior and posterior drawer test;  Negative patellar apprehension test;  Distal pulses are palpable;  Neurovascularly intact; and  Walking with no significant antalgic gait.    Orders Placed This Encounter    Knee Brace, Hinged      At the conclusion of the visit there were no further questions by the patient/family regarding their plan of care.  Patient was instructed to call or return with any issues, questions, or concerns regarding their injury and/or treatment plan described above.     01/17/25 at 11:35 AM - Lalo Rivero,     Office: [phone]    This note was prepared using voice recognition software.  The details of this note are correct and have been reviewed, and corrected to the best of my ability.  Some grammatical errors may persist related to the Dragon software.

## 2025-01-20 ENCOUNTER — OFFICE VISIT (OUTPATIENT)
Dept: ORTHOPEDIC SURGERY | Facility: CLINIC | Age: 70
End: 2025-01-20
Payer: COMMERCIAL

## 2025-01-20 ENCOUNTER — APPOINTMENT (OUTPATIENT)
Dept: PHYSICAL THERAPY | Facility: HOSPITAL | Age: 70
End: 2025-01-20
Payer: COMMERCIAL

## 2025-01-20 ENCOUNTER — HOSPITAL ENCOUNTER (OUTPATIENT)
Dept: RADIOLOGY | Facility: CLINIC | Age: 70
Discharge: HOME | End: 2025-01-20
Payer: COMMERCIAL

## 2025-01-20 ENCOUNTER — APPOINTMENT (OUTPATIENT)
Facility: CLINIC | Age: 70
End: 2025-01-20
Payer: COMMERCIAL

## 2025-01-20 DIAGNOSIS — R29.898 WEAKNESS OF RIGHT LOWER EXTREMITY: ICD-10-CM

## 2025-01-20 DIAGNOSIS — M25.561 CHRONIC PAIN OF RIGHT KNEE: Primary | ICD-10-CM

## 2025-01-20 DIAGNOSIS — G89.29 CHRONIC PAIN OF RIGHT KNEE: Primary | ICD-10-CM

## 2025-01-20 DIAGNOSIS — Z96.651 S/P TOTAL KNEE ARTHROPLASTY, RIGHT: Primary | ICD-10-CM

## 2025-01-20 DIAGNOSIS — Z96.651 S/P TOTAL KNEE ARTHROPLASTY, RIGHT: ICD-10-CM

## 2025-01-20 LAB
CLARITY FLD: ABNORMAL
COLOR FLD: ABNORMAL
RBC # FLD AUTO: ABNORMAL /UL
WBC # FLD AUTO: 439 /UL

## 2025-01-20 PROCEDURE — 20610 DRAIN/INJ JOINT/BURSA W/O US: CPT | Mod: RT | Performed by: STUDENT IN AN ORGANIZED HEALTH CARE EDUCATION/TRAINING PROGRAM

## 2025-01-20 PROCEDURE — 87205 SMEAR GRAM STAIN: CPT | Performed by: STUDENT IN AN ORGANIZED HEALTH CARE EDUCATION/TRAINING PROGRAM

## 2025-01-20 PROCEDURE — 73562 X-RAY EXAM OF KNEE 3: CPT | Mod: RT

## 2025-01-20 PROCEDURE — 89050 BODY FLUID CELL COUNT: CPT | Performed by: STUDENT IN AN ORGANIZED HEALTH CARE EDUCATION/TRAINING PROGRAM

## 2025-01-20 PROCEDURE — 99211 OFF/OP EST MAY X REQ PHY/QHP: CPT | Mod: 25 | Performed by: STUDENT IN AN ORGANIZED HEALTH CARE EDUCATION/TRAINING PROGRAM

## 2025-01-20 PROCEDURE — 89060 EXAM SYNOVIAL FLUID CRYSTALS: CPT | Performed by: STUDENT IN AN ORGANIZED HEALTH CARE EDUCATION/TRAINING PROGRAM

## 2025-01-20 PROCEDURE — 73562 X-RAY EXAM OF KNEE 3: CPT | Mod: RIGHT SIDE | Performed by: STUDENT IN AN ORGANIZED HEALTH CARE EDUCATION/TRAINING PROGRAM

## 2025-01-20 PROCEDURE — 99024 POSTOP FOLLOW-UP VISIT: CPT | Performed by: STUDENT IN AN ORGANIZED HEALTH CARE EDUCATION/TRAINING PROGRAM

## 2025-01-20 RX ORDER — CEPHALEXIN 500 MG/1
500 CAPSULE ORAL 4 TIMES DAILY
Qty: 28 CAPSULE | Refills: 0 | Status: SHIPPED | OUTPATIENT
Start: 2025-01-20

## 2025-01-20 NOTE — PROGRESS NOTES
Chief Complaint   Patient presents with    Right Knee - Follow-up     TKR 12/5 2024, saw RW 1/17/2025 for pain       History of Present Illness  Jarocho is a 69-year-old male presenting today for early follow-up evaluation of his right knee.  He is status post right total knee arthroplasty 12/5/2024.  Postoperatively was seen early as well by Dr. Rivero at 1 week postop for worsening pain, hematoma, and concern for possible infection.  Was started on Keflex and Bactrim which he improved quickly.  Has been working with physical therapy and making improvements with range of motion and has been generally pain-free up until this past week.  States he had physical therapy and was sore afterwards but not out of proportion to what he was used to.  Did not recall any fall or injury or specific inciting event.  Did not do any new types of exercises with physical therapy that he can recall.  Then next morning he was unable to put full weight on his knee due to this medially based pain.  Was seen by Dr. Rivero again on 1/17/2025 and diagnosed with right MCL sprain.  Did increase his meloxicam to 15 mg daily.  Has been icing and elevating it.  No pain at rest.  Only endorsing pain with ambulation bearing weight on this extremity.  Continues to deny any numbness or tingling to the distal lower extremity.  Denies any calf pain.  No fevers or chills     Exam  Mild swelling  Healthy incision, no erythema or drainage  ROM: 0-1 05  + Plantar/dorsiflexion  Negative Phyllis test     X-Ray    XR knee right 3 views    Result Date: 1/20/2025  Interpreted By:  Sherry Child III, STUDY: XR KNEE RIGHT 3 VIEWS; ;  1/20/2025 2:59 pm   INDICATION: Signs/Symptoms:pain.   ,Z96.651 Presence of right artificial knee joint   COMPARISON: None.   ACCESSION NUMBER(S): [id number]   ORDERING CLINICIAN: SHERRY CHILD   FINDINGS: Three views weight-bearing right knee: Status post total knee arthroplasty appropriately placed no dillon-implant fracture or  lucency       Status post right total knee arthroplasty     MACRO: None   Signed by: Joao Lares III 1/20/2025 3:11 PM Dictation workstation:   [id number]    L Inj/Asp: R knee on 1/20/2025 4:53 PM  Indications: pain  Details: 22 G needle, anterolateral approach  Aspirate: 12 mL blood-tinged and serous  Consent was given by the patient. Immediately prior to procedure a time out was called to verify the correct patient, procedure, equipment, support staff and site/side marked as required. Patient was prepped and draped in the usual sterile fashion.              Assessment  Patient status post right total knee arthroplasty, 6 weeks out     Plan  Discussed analgesics, encouraging non-opioid modalities.  Encouraged ice, rest.  Discussed importance of ROM/ formal physical therapy.  Reviewed dental prophylaxis.  Discussed with him that this new onset pain might be due to overuse.  He has returned to work and has been working very diligently with physical therapy making significant improvements however may have overdone it causing this pain.    X-rays negative for acute abnormality or hardware failure.  Did discuss the continuation of meloxicam 15 mg daily as well as Tylenol accompanied by ice rest and elevation.  Did suggest possibly taking a week off of PT to allow him to rest given that he is not able to take any more time off of work.  Given patient's warmth about the knee and slight redness about the anterior knee did discuss with him the option of joint aspiration as well as risk and benefits.  he wishes to proceed with joint aspiration  Follow-up in 6 weeks    Yang Desai PA-C     In a face to face encounter, I evaluated the patient and performed a physical examination, discussed pertinent diagnostic studies if indicated and discussed diagnosis and management strategies with both the patient and physician assistant / nurse practitioner.  I reviewed the PA/NP's note and agree with the documented findings and plan  of care.     69-year-old male status post total knee arthroplasty 6 weeks prior by me.  He had insidious onset worsening knee pain for the past 4 days time this began right after physical therapy.  He does have scant bit of erythema about the anterior aspect of his knee we did review x-rays today which shows appropriate placement of components.  Given constellation of findings we did proceed with aspiration he does have scheduled follow-up this week and will discuss these results at this point in time.  Will also provide prescription for Keflex given scant bit of anterior knee erythema.  Regarding physical therapy I am going to have him hold this for right now of this pain to subside.    Joao Lares III, MD

## 2025-01-21 LAB — CRYSTALS FLD MICRO: NORMAL

## 2025-01-23 ENCOUNTER — APPOINTMENT (OUTPATIENT)
Dept: PHYSICAL THERAPY | Facility: HOSPITAL | Age: 70
End: 2025-01-23
Payer: COMMERCIAL

## 2025-01-24 ENCOUNTER — APPOINTMENT (OUTPATIENT)
Dept: ORTHOPEDIC SURGERY | Facility: CLINIC | Age: 70
End: 2025-01-24
Payer: COMMERCIAL

## 2025-01-24 DIAGNOSIS — Z96.651 S/P TOTAL KNEE ARTHROPLASTY, RIGHT: Primary | ICD-10-CM

## 2025-01-24 DIAGNOSIS — M17.12 PRIMARY OSTEOARTHRITIS OF LEFT KNEE: ICD-10-CM

## 2025-01-24 LAB
BACTERIA FLD CULT: NORMAL
GRAM STN SPEC: NORMAL
GRAM STN SPEC: NORMAL

## 2025-01-24 ASSESSMENT — PAIN - FUNCTIONAL ASSESSMENT: PAIN_FUNCTIONAL_ASSESSMENT: 0-10

## 2025-01-24 ASSESSMENT — PAIN SCALES - GENERAL
PAINLEVEL_OUTOF10: 3
PAINLEVEL_OUTOF10: 3

## 2025-01-24 NOTE — PROGRESS NOTES
Chief Complaint   Patient presents with    Right Knee - Follow-up     TKR 12/5/2024, saw RW 1/17/2025 for pain  S/P ASPIRATION 1/20/25       History of Present Illness  Jarocho is a 69-year-old male presenting today for early follow-up evaluation of his right knee.  He is status post right total knee arthroplasty 12/5/2024.  Postoperatively was seen early as well by Dr. Rivero at 1 week postop for worsening pain, hematoma, and concern for possible infection.  Was started on Keflex and Bactrim which he improved quickly.     Did have scant bit of erythema about his incision last visit given constellation of pain and erythema I did proceed with aspiration knee presents today for discussion of these results    Also has longstanding left knee pain with attempted activity modifications oral anti-inflammatories injections known history of severe tricompartmental knee arthritis.      From  Mild swelling  Healthy incision, no erythema or drainage  ROM: 0-1 05  + Plantar/dorsiflexion  Negative Phyllis test    Left knee:  Skin healthy and intact  No gross swelling or ecchymosis  Alignment: No  Effusion: Mild  ROM: 10-1 20  Crepitance with range of motion  No pain with internal rotation of the hip  Tenderness to palpation: Medial joint line     No laxity to valgus stress  No laxity to varus stress  Negative Lachman´s test  Negative posterior drawer test  Mild pain with Rico´s test     Neurovascular exam normal distally  2+ DP pulse and good cap refill     X-Ray    XR knee right 3 views    Result Date: 1/20/2025  Interpreted By:  Sherry Child III, STUDY: XR KNEE RIGHT 3 VIEWS; ;  1/20/2025 2:59 pm   INDICATION: Signs/Symptoms:pain.   ,Z96.651 Presence of right artificial knee joint   COMPARISON: None.   ACCESSION NUMBER(S): [id number]   ORDERING CLINICIAN: SHERRY CHILD   FINDINGS: Three views weight-bearing right knee: Status post total knee arthroplasty appropriately placed no dillon-implant fracture or lucency        Status post right total knee arthroplasty     MACRO: None   Signed by: Joao Lares III 1/20/2025 3:11 PM Dictation workstation:   [id number]    Procedures       Assessment  Patient status post right total knee arthroplasty, 6 weeks out, left knee arthritis     Plan  Physical exam findings discussed with Jarocho ospina  Aspiration results reviewed as well.  Cultures no growth to date, cell count 439.  Low concern for infection in the joint at this point in time  Okay to resume physical therapy  He does have occasional mechanical type symptoms about his right knee will continue with expectant management of these.  As he is only 6 weeks time.  Discussed activities to avoid as well as importance of using pain as a guide regarding physical therapy  Regarding his left knee, will submit approval for gel injection therapy

## 2025-01-24 NOTE — LETTER
January 24, 2025     Patient: Jarocho Langford   YOB: 1955   Date of Visit: 1/24/2025       To Whom It May Concern:    Jarocho Langford was seen in my clinic on 1/24/2025 at 10:45 am. Please excuse Jarocho for his absence from work on this day to make the appointment.    If you have any questions or concerns, please don't hesitate to call.         Sincerely,         Joao Lares MD        CC: No Recipients

## 2025-01-27 ENCOUNTER — APPOINTMENT (OUTPATIENT)
Dept: PHYSICAL THERAPY | Facility: HOSPITAL | Age: 70
End: 2025-01-27
Payer: COMMERCIAL

## 2025-01-29 ENCOUNTER — APPOINTMENT (OUTPATIENT)
Dept: PHYSICAL THERAPY | Facility: HOSPITAL | Age: 70
End: 2025-01-29
Payer: COMMERCIAL

## 2025-02-03 ENCOUNTER — TREATMENT (OUTPATIENT)
Dept: PHYSICAL THERAPY | Facility: HOSPITAL | Age: 70
End: 2025-02-03
Payer: COMMERCIAL

## 2025-02-03 DIAGNOSIS — R29.898 WEAKNESS OF RIGHT LOWER EXTREMITY: ICD-10-CM

## 2025-02-03 DIAGNOSIS — M25.561 CHRONIC PAIN OF RIGHT KNEE: ICD-10-CM

## 2025-02-03 DIAGNOSIS — G89.29 CHRONIC PAIN OF RIGHT KNEE: ICD-10-CM

## 2025-02-03 PROCEDURE — 97110 THERAPEUTIC EXERCISES: CPT | Mod: GP | Performed by: PHYSICAL THERAPIST

## 2025-02-03 ASSESSMENT — PAIN - FUNCTIONAL ASSESSMENT: PAIN_FUNCTIONAL_ASSESSMENT: 0-10

## 2025-02-03 ASSESSMENT — PAIN SCALES - GENERAL: PAINLEVEL_OUTOF10: 3

## 2025-02-03 NOTE — PROGRESS NOTES
"Physical Therapy    Physical Therapy Treatment    Patient Name: Jarocho Langford  MRN: [id number]  Today's Date: 2/3/2025    Time Entry:   Time Calculation  Start Time: 0245  Stop Time: 0328  Time Calculation (min): 43 min     PT Therapeutic Procedures Time Entry  Therapeutic Exercise Time Entry: 43                   Assessment:  He's walking into the clinic today with minimal antalgia without assistive device.  He was off for a couple weeks secondary to having a flare up and increased pain to where he couldn't walk without his walker.  He's been back to work for 2 weeks and not having too many issues.      Plan:  Continue skilled PT      Current Problem  1. Chronic pain of right knee  Follow Up In Physical Therapy      2. Weakness of right lower extremity  Follow Up In Physical Therapy            Subjective  Pt states his Rt nee is feeling better overall but still feels tight.    8/12 treatments    Pain  Pain Assessment  Pain Assessment: 0-10  0-10 (Numeric) Pain Score: 3  Pain Type: Chronic pain  Pain Location: Knee  Pain Orientation: Right    Objective     Rt knee AROM: 0 to 100 degrees of flexion.  PROM to 105 degrees of flexon    Treatments:    NuStep, seat 11, 10 minutes  Heel slides with strap, 30 reps *  Squats x30  HR/TR's, 1/2 roll, 30 reps  Step ups F/L 8 in x15  Standing marches, 20 reps 3#  Standing HSC, 20 reps 3#  Standing hip 3-way, GTB, 30 reps *  Stairs, reciprocal with 1 rail, 6 reps   SLS 10\"x10     Manual:  PROM - NP    "

## 2025-02-10 ENCOUNTER — TELEPHONE (OUTPATIENT)
Dept: PHYSICAL THERAPY | Facility: HOSPITAL | Age: 70
End: 2025-02-10
Payer: COMMERCIAL

## 2025-02-10 ENCOUNTER — APPOINTMENT (OUTPATIENT)
Dept: PHYSICAL THERAPY | Facility: HOSPITAL | Age: 70
End: 2025-02-10
Payer: COMMERCIAL

## 2025-02-10 NOTE — TELEPHONE ENCOUNTER
[facility] APPT CANC NOTICE FOR APPT SCHED 02/10/25 W/NO REASON LISTED. NO FURTHER ACTION; PT IS SCHED W/PROVIDER 02/17/25

## 2025-02-12 ENCOUNTER — APPOINTMENT (OUTPATIENT)
Dept: PRIMARY CARE | Facility: CLINIC | Age: 70
End: 2025-02-12
Payer: COMMERCIAL

## 2025-02-12 ENCOUNTER — OFFICE VISIT (OUTPATIENT)
Dept: ORTHOPEDIC SURGERY | Facility: CLINIC | Age: 70
End: 2025-02-12
Payer: COMMERCIAL

## 2025-02-12 VITALS
DIASTOLIC BLOOD PRESSURE: 62 MMHG | SYSTOLIC BLOOD PRESSURE: 112 MMHG | TEMPERATURE: 97.7 F | BODY MASS INDEX: 43.5 KG/M2 | RESPIRATION RATE: 16 BRPM | WEIGHT: 287 LBS | OXYGEN SATURATION: 96 % | HEIGHT: 68 IN | HEART RATE: 90 BPM

## 2025-02-12 DIAGNOSIS — M17.12 PRIMARY OSTEOARTHRITIS OF LEFT KNEE: Primary | ICD-10-CM

## 2025-02-12 DIAGNOSIS — J45.20 MILD INTERMITTENT ASTHMA WITHOUT COMPLICATION (HHS-HCC): ICD-10-CM

## 2025-02-12 DIAGNOSIS — I10 PRIMARY HYPERTENSION: ICD-10-CM

## 2025-02-12 DIAGNOSIS — E66.813 CLASS 3 SEVERE OBESITY DUE TO EXCESS CALORIES WITH SERIOUS COMORBIDITY AND BODY MASS INDEX (BMI) OF 40.0 TO 44.9 IN ADULT: ICD-10-CM

## 2025-02-12 DIAGNOSIS — F41.9 ANXIETY: ICD-10-CM

## 2025-02-12 DIAGNOSIS — E53.1 VITAMIN B6 DEFICIENCY NEUROPATHY (MULTI): ICD-10-CM

## 2025-02-12 DIAGNOSIS — Z12.5 SPECIAL SCREENING EXAMINATION FOR NEOPLASM OF PROSTATE: ICD-10-CM

## 2025-02-12 DIAGNOSIS — E55.9 VITAMIN D DEFICIENCY: ICD-10-CM

## 2025-02-12 DIAGNOSIS — E66.01 CLASS 3 SEVERE OBESITY DUE TO EXCESS CALORIES WITH SERIOUS COMORBIDITY AND BODY MASS INDEX (BMI) OF 40.0 TO 44.9 IN ADULT: ICD-10-CM

## 2025-02-12 DIAGNOSIS — Z00.00 ROUTINE GENERAL MEDICAL EXAMINATION AT A HEALTH CARE FACILITY: Primary | ICD-10-CM

## 2025-02-12 DIAGNOSIS — Z79.899 MEDICATION MANAGEMENT: ICD-10-CM

## 2025-02-12 DIAGNOSIS — G63 VITAMIN B6 DEFICIENCY NEUROPATHY (MULTI): ICD-10-CM

## 2025-02-12 PROBLEM — M23.40 BODY, LOOSE, KNEE: Status: RESOLVED | Noted: 2023-09-27 | Resolved: 2025-02-12

## 2025-02-12 PROBLEM — R09.82 PND (POST-NASAL DRIP): Status: RESOLVED | Noted: 2023-09-27 | Resolved: 2025-02-12

## 2025-02-12 PROBLEM — J32.2 CHRONIC ETHMOIDAL SINUSITIS: Status: RESOLVED | Noted: 2024-03-07 | Resolved: 2025-02-12

## 2025-02-12 PROBLEM — R13.10 DYSPHAGIA: Status: RESOLVED | Noted: 2023-09-27 | Resolved: 2025-02-12

## 2025-02-12 PROBLEM — J32.3 CHRONIC SPHENOIDAL SINUSITIS: Status: RESOLVED | Noted: 2024-03-07 | Resolved: 2025-02-12

## 2025-02-12 PROBLEM — R29.898 WEAKNESS OF RIGHT LOWER EXTREMITY: Status: RESOLVED | Noted: 2024-12-27 | Resolved: 2025-02-12

## 2025-02-12 PROBLEM — R09.81 NASAL CONGESTION: Status: RESOLVED | Noted: 2024-02-12 | Resolved: 2025-02-12

## 2025-02-12 LAB
AMPHETAMINES UR QL: NEGATIVE NG/ML
BARBITURATES UR QL: NEGATIVE NG/ML
BENZODIAZ UR QL: NEGATIVE NG/ML
BZE UR QL: NEGATIVE NG/ML
CREAT UR-MCNC: 88 MG/DL
DRUG SCREEN COMMENT UR-IMP: ABNORMAL
METHADONE UR QL: NEGATIVE NG/ML
OPIATES UR QL: NEGATIVE NG/ML
OXIDANTS UR QL: NEGATIVE MCG/ML
OXYCODONE UR QL: NEGATIVE NG/ML
PCP UR QL: NEGATIVE NG/ML
PH UR: 5.7 [PH] (ref 4.5–9)
QUEST NOTES AND COMMENTS: ABNORMAL
THC UR QL: POSITIVE NG/ML
THC UR-MCNC: 137 NG/ML

## 2025-02-12 PROCEDURE — 99211 OFF/OP EST MAY X REQ PHY/QHP: CPT | Mod: 25 | Performed by: STUDENT IN AN ORGANIZED HEALTH CARE EDUCATION/TRAINING PROGRAM

## 2025-02-12 PROCEDURE — 2500000004 HC RX 250 GENERAL PHARMACY W/ HCPCS (ALT 636 FOR OP/ED): Performed by: STUDENT IN AN ORGANIZED HEALTH CARE EDUCATION/TRAINING PROGRAM

## 2025-02-12 PROCEDURE — 3078F DIAST BP <80 MM HG: CPT | Performed by: FAMILY MEDICINE

## 2025-02-12 PROCEDURE — 3074F SYST BP LT 130 MM HG: CPT | Performed by: FAMILY MEDICINE

## 2025-02-12 PROCEDURE — 1036F TOBACCO NON-USER: CPT | Performed by: FAMILY MEDICINE

## 2025-02-12 PROCEDURE — 1159F MED LIST DOCD IN RCRD: CPT | Performed by: FAMILY MEDICINE

## 2025-02-12 PROCEDURE — 99397 PER PM REEVAL EST PAT 65+ YR: CPT | Performed by: FAMILY MEDICINE

## 2025-02-12 PROCEDURE — 1160F RVW MEDS BY RX/DR IN RCRD: CPT | Performed by: FAMILY MEDICINE

## 2025-02-12 PROCEDURE — 20610 DRAIN/INJ JOINT/BURSA W/O US: CPT | Mod: LT | Performed by: STUDENT IN AN ORGANIZED HEALTH CARE EDUCATION/TRAINING PROGRAM

## 2025-02-12 PROCEDURE — 3008F BODY MASS INDEX DOCD: CPT | Performed by: FAMILY MEDICINE

## 2025-02-12 RX ORDER — VENLAFAXINE HYDROCHLORIDE 75 MG/1
150 CAPSULE, EXTENDED RELEASE ORAL DAILY
Qty: 180 CAPSULE | Refills: 3 | Status: SHIPPED | OUTPATIENT
Start: 2025-02-12

## 2025-02-12 RX ORDER — LIDOCAINE HYDROCHLORIDE 10 MG/ML
4 INJECTION, SOLUTION INFILTRATION; PERINEURAL
Status: COMPLETED | OUTPATIENT
Start: 2025-02-12 | End: 2025-02-12

## 2025-02-12 RX ORDER — CLONAZEPAM 1 MG/1
1 TABLET ORAL DAILY PRN
Qty: 30 TABLET | Refills: 2 | Status: SHIPPED | OUTPATIENT
Start: 2025-02-12 | End: 2025-05-13

## 2025-02-12 RX ORDER — TRIAMCINOLONE ACETONIDE 40 MG/ML
40 INJECTION, SUSPENSION INTRA-ARTICULAR; INTRAMUSCULAR
Status: COMPLETED | OUTPATIENT
Start: 2025-02-12 | End: 2025-02-12

## 2025-02-12 RX ADMIN — Medication 16 MG: at 12:14

## 2025-02-12 RX ADMIN — LIDOCAINE HYDROCHLORIDE 4 ML: 10 INJECTION, SOLUTION INFILTRATION; PERINEURAL at 12:14

## 2025-02-12 RX ADMIN — TRIAMCINOLONE ACETONIDE 40 MG: 40 INJECTION, SUSPENSION INTRA-ARTICULAR; INTRAMUSCULAR at 12:14

## 2025-02-12 ASSESSMENT — ENCOUNTER SYMPTOMS
AGITATION: 0
BRUISES/BLEEDS EASILY: 0
SINUS PRESSURE: 0
EYE REDNESS: 0
CONSTIPATION: 0
PHOTOPHOBIA: 0
HYPERTENSION: 1
BLOOD IN STOOL: 0
FREQUENCY: 0
VOICE CHANGE: 0
MYALGIAS: 0
NECK STIFFNESS: 1
CONFUSION: 0
UNEXPECTED WEIGHT CHANGE: 0
LIGHT-HEADEDNESS: 0
HEMATURIA: 0
ABDOMINAL PAIN: 0
TROUBLE SWALLOWING: 0
NUMBNESS: 0
ADENOPATHY: 0
COUGH: 0
FATIGUE: 1
POLYDIPSIA: 0
VOMITING: 0
DYSURIA: 0
ACTIVITY CHANGE: 0
APPETITE CHANGE: 0
DIAPHORESIS: 0
CHILLS: 0
SLEEP DISTURBANCE: 0
WEAKNESS: 0
EYE DISCHARGE: 0
BACK PAIN: 0
ARTHRALGIAS: 0
FACIAL ASYMMETRY: 0
DIARRHEA: 0
CHEST TIGHTNESS: 0
RHINORRHEA: 0
SORE THROAT: 0
JOINT SWELLING: 0
WHEEZING: 0
EYE PAIN: 0
CHOKING: 0
NAUSEA: 0
DYSPHORIC MOOD: 0
FEVER: 0
TREMORS: 0
EYE ITCHING: 0
FLANK PAIN: 0
SPEECH DIFFICULTY: 0
ABDOMINAL DISTENTION: 0
SEIZURES: 0
NERVOUS/ANXIOUS: 0
HALLUCINATIONS: 0
DIZZINESS: 0
WOUND: 0

## 2025-02-12 NOTE — PROGRESS NOTES
History of Present Illness  Patient returns today for an injection with viscosupplementation. The patient endorsing knee pain refractory to cortisone injections and oral medications     Review of Systems   GENERAL: Negative for malaise, significant weight loss, fever  MUSCULOSKELETAL: see HPI  NEURO:  Negative     Exam  Trace effusion  Tenderness over medial and lateral joint line     Assessment:  Patient with known osteoarthritis of the knee     Plan:  Visco injection provided, patient tolerated it well. See procedure note.     Injection performed as detailed in the procedure note below.      PROCEDURE NOTE:  Physician:Joao Lares III, MD     Prior to the procedure, the patient's allergies were reviewed. The procedure site was marked and time out performed. The procedure team checked for proper functioning of devices and supplies to be used for the procedure. The procedure team reviewed the relevant diagnostic tests pertinent to the procedure. The risks, benefits and anticipated outcomes of the procedure, the risks and benefits of the alternatives to the procedure, and the roles and tasks of the personnel to be involved were discussed with the patient, the patient verbalized understanding and consenting to the procedure.        Procedure details:  The injection site was prepped in the usual sterile manner.   Ethyl chloride mist spray was used to numb the skin.    Synvisc was injected into the left knee.  After the injection, a bandage was placed over the injection site.   The patient tolerated the procedure well with no adverse effects.   Post-injection instructions were reviewed with the patient and the patient voiced understanding of these instructions.      L Inj/Asp: L knee on 2/12/2025 12:14 PM  Indications: pain  Details: 22 G needle, anterolateral approach  Medications: 40 mg triamcinolone acetonide 40 mg/mL; 4 mL lidocaine 10 mg/mL (1 %); 16 mg hylan 16 mg/2 mL  Consent was given by the patient.  Immediately prior to procedure a time out was called to verify the correct patient, procedure, equipment, support staff and site/side marked as required. Patient was prepped and draped in the usual sterile fashion.

## 2025-02-12 NOTE — PROGRESS NOTES
Subjective   Patient ID: Jarocho Langford is a 69 y.o. male who presents for 6 month follow up (Review labs) and Med Management (CSM: Klonopin).    Subjective  Jarocho Langford is a 69 y.o. male and is here for a comprehensive physical exam. The patient reports see below.    Do you take any herbs or supplements that were not prescribed by a doctor? no  Are you taking calcium supplements? no  Are you taking aspirin daily? no      History:  Date last PSA: 9/15/2024         OARRS:  No data recorded  I have personally reviewed the OARRS report for Jarocho Langford. I have considered the risks of abuse, dependence, addiction and diversion and I believe that it is clinically appropriate for Jarocho Langford to be prescribed this medication    Is the patient prescribed a combination of a benzodiazepine and opioid?  No    Last Urine Drug Screen / ordered today: Yes-to be done along with next set of labs in 6 months  Recent Results (from the past 8760 hours)  -Drug Screen, Urine With Reflex to Confirmation:   Collection Time: 08/10/24 10:24 AM       Result                      Value             Ref Range           Amphetamine Screen, Ur*                       Presumptive *   Presumptive Negative       Barbiturate Screen, Ur*                       Presumptive *   Presumptive Negative       Benzodiazepines Screen*                       Presumptive *   Presumptive Negative       Cannabinoid Screen, Ur*                       Presumptive *   Presumptive Negative       Cocaine Metabolite Scr*                       Presumptive *   Presumptive Negative       Fentanyl Screen, Urine                        Presumptive *   Presumptive Negative       Opiate Screen, Urine                          Presumptive *   Presumptive Negative       Oxycodone Screen, Urine                       Presumptive *   Presumptive Negative       PCP Screen, Urine                             Presumptive *   Presumptive Negative       Methadone Screen, Urine                        Presumptive *   Presumptive Negative  Results are as expected.         Controlled Substance Agreement:  Date of the Last Agreement: 8/14/2024  Reviewed Controlled Substance Agreement including but not limited to the benefits, risks, and alternatives to treatment with a Controlled Substance medication(s).    Benzodiazepines:  What is the patient's goal of therapy?  Reduction of anxiety  Is this being achieved with current treatment?  Yes    YENNY-7:  No data recorded    Activities of Daily Living:   Is your overall impression that this patient is benefiting (symptom reduction outweighs side effects) from benzodiazepine therapy? Yes     1. Physical Functioning: Same  2. Family Relationship: Same  3. Social Relationship: Same  4. Mood: Same  5. Sleep Patterns: Same  6. Overall Function: Same      Anxiety  Presents for follow-up visit. Patient reports no confusion, dizziness, nausea, nervous/anxious behavior or suicidal ideas. The quality of sleep is good.     His past medical history is significant for asthma.   Hypertension  Associated symptoms include anxiety.   Asthma  There is no cough or wheezing. Pertinent negatives include no appetite change, ear pain, fever, myalgias, postnasal drip, rhinorrhea, sneezing, sore throat or trouble swallowing. His past medical history is significant for asthma.        Review of Systems   Constitutional:  Positive for fatigue. Negative for activity change, appetite change, chills, diaphoresis, fever and unexpected weight change.   HENT:  Negative for congestion, ear pain, hearing loss, nosebleeds, postnasal drip, rhinorrhea, sinus pressure, sneezing, sore throat, tinnitus, trouble swallowing and voice change.    Eyes:  Negative for photophobia, pain, discharge, redness, itching and visual disturbance.   Respiratory:  Negative for cough, choking, chest tightness and wheezing.    Cardiovascular:  Positive for leg swelling.   Gastrointestinal:  Negative for abdominal  "distention, abdominal pain, blood in stool, constipation, diarrhea, nausea and vomiting.   Endocrine: Negative for cold intolerance, heat intolerance, polydipsia and polyuria.   Genitourinary:  Negative for dysuria, flank pain, frequency, hematuria and urgency.   Musculoskeletal:  Positive for neck stiffness. Negative for arthralgias, back pain, joint swelling and myalgias.   Skin:  Negative for rash and wound.   Allergic/Immunologic: Negative for immunocompromised state.   Neurological:  Negative for dizziness, tremors, seizures, syncope, facial asymmetry, speech difficulty, weakness, light-headedness and numbness.   Hematological:  Negative for adenopathy. Does not bruise/bleed easily.   Psychiatric/Behavioral:  Negative for agitation, behavioral problems, confusion, dysphoric mood, hallucinations, self-injury, sleep disturbance and suicidal ideas. The patient is not nervous/anxious.        Objective   /62   Pulse 90   Temp 36.5 °C (97.7 °F) (Temporal)   Resp 16   Ht 1.727 m (5' 8\")   Wt 130 kg (287 lb)   SpO2 96%   BMI 43.64 kg/m²   Physical Exam  Constitutional:       General: He is not in acute distress.     Appearance: He is not ill-appearing or diaphoretic.   HENT:      Head: Normocephalic and atraumatic.      Right Ear: External ear normal.      Left Ear: External ear normal.      Nose: Nose normal. No rhinorrhea.   Eyes:      General: Lids are normal. No scleral icterus.        Right eye: No discharge.         Left eye: No discharge.      Conjunctiva/sclera: Conjunctivae normal.   Cardiovascular:      Rate and Rhythm: Normal rate and regular rhythm.      Pulses: Normal pulses.      Heart sounds: No murmur heard.  Pulmonary:      Effort: Pulmonary effort is normal. No respiratory distress.      Breath sounds: No decreased breath sounds, wheezing, rhonchi or rales.   Abdominal:      General: Bowel sounds are normal. There is no distension.      Palpations: Abdomen is soft. There is no mass.      " Tenderness: There is no abdominal tenderness. There is no guarding or rebound.   Musculoskeletal:         General: No swelling, tenderness or deformity.      Cervical back: No rigidity or tenderness.      Right lower leg: No edema.      Left lower leg: No edema.   Lymphadenopathy:      Cervical: No cervical adenopathy.      Upper Body:      Right upper body: No supraclavicular adenopathy.      Left upper body: No supraclavicular adenopathy.   Skin:     General: Skin is warm and dry.      Coloration: Skin is not jaundiced or pale.      Findings: No erythema, lesion or rash.   Neurological:      General: No focal deficit present.      Mental Status: He is alert and oriented to person, place, and time.      Sensory: No sensory deficit.      Motor: No weakness or tremor.      Coordination: Coordination normal.      Gait: Gait normal.   Psychiatric:         Mood and Affect: Mood normal. Affect is not inappropriate.         Behavior: Behavior normal.         Assessment/Plan   Diagnoses and all orders for this visit:  Routine general medical examination at a health care facility  -     Follow Up In [facility] - PCP - Established; Future  Primary hypertension  -     Follow Up In [facility] - [facility] - Health Maintenance  -     CBC and Auto Differential; Future  -     Comprehensive Metabolic Panel; Future  -     Lipid Panel; Future  -     TSH with reflex to Free T4 if abnormal; Future  -     Magnesium; Future  -     Follow Up In [facility] - PCP - Established; Future  Anxiety  -     Follow Up In [facility] - [facility] - [facility]  -     venlafaxine XR (Effexor-XR) 75 mg 24 hr capsule; Take 2 capsules (150 mg) by mouth once daily.  -     clonazePAM (KlonoPIN) 1 mg tablet; Take 1 tablet (1 mg) by mouth once daily as needed for anxiety.  -     Drug Screen, Urine With Reflex to Confirmation; Future  -     Follow Up In [facility] - PCP - Established; Future  -     Follow Up In  [facility]; Future  Medication management  -     Follow Up In [facility] Health Maintenance  -     clonazePAM (KlonoPIN) 1 mg tablet; Take 1 tablet (1 mg) by mouth once daily as needed for anxiety.  -     Drug Screen, Urine With Reflex to Confirmation; Future  -     Follow Up In [facility]; Future  -     Follow Up In [facility]; Future  Class 3 severe obesity due to excess calories with serious comorbidity and body mass index (BMI) of 40.0 to 44.9 in adult  -     Follow Up In [facility]  -     Follow Up In [facility]; Future  Vitamin D deficiency  -     Follow Up In [facility]  -     Comprehensive Metabolic Panel; Future  -     Magnesium; Future  -     Vitamin D 25-Hydroxy,Total (for eval of Vitamin D levels); Future  -     Follow Up In [facility]; Future  Vitamin B6 deficiency neuropathy (Multi)  -     Follow Up In [facility]  -     Follow Up In [facility]; Future  Mild intermittent asthma without complication (HHS-HCC)  -     CBC and Auto Differential; Future  -     Magnesium; Future  -     Follow Up In [facility]; Future  Special screening examination for neoplasm of prostate  -     Prostate Specific Antigen, Screen; Future  -     Follow Up In [facility]; Future        2. Patient Counseling:  --Nutrition: Stressed importance of moderation in sodium/caffeine intake, saturated fat and cholesterol, caloric balance, sufficient intake of fresh fruits, vegetables, fiber, calcium, iron.  --Exercise: Stressed the importance of regular exercise.   --Substance Abuse: Discussed cessation/primary prevention of tobacco, alcohol, or other drug use;  driving or other dangerous activities under the influence; availability of treatment for abuse.   --Injury prevention: Discussed safety belts, safety helmets, smoke detector, smoking near bedding or upholstery.   --Dental health: Discussed importance of regular tooth brushing, flossing, and dental visits.  --Immunizations reviewed.  --Discussed benefits of screening colonoscopy.  Due 4/2025.  Already got reminder from [facility] a  3. Discussed the patient's BMI with him.  The BMI is above average. The patient received Current weight: 130 kg (287 lb)  Weight change since last visit (-) denotes wt loss 7 lbs   Weight loss needed to achieve BMI 25: 122.9 Lbs  Weight loss needed to achieve BMI 30: 90.1 Lbs    Provided instructions on dietary changes  Provided instructions on exercise  Advised to Increase physical activity because they have an above normal BMI.  4. Follow up 6 months with lab

## 2025-02-16 LAB
25(OH)D3+25(OH)D2 SERPL-MCNC: 36 NG/ML (ref 30–100)
ALBUMIN SERPL-MCNC: 4.3 G/DL (ref 3.6–5.1)
ALP SERPL-CCNC: 105 U/L (ref 35–144)
ALT SERPL-CCNC: 20 U/L (ref 9–46)
ANION GAP SERPL CALCULATED.4IONS-SCNC: 10 MMOL/L (CALC) (ref 7–17)
AST SERPL-CCNC: 18 U/L (ref 10–35)
BASOPHILS # BLD AUTO: 49 CELLS/UL (ref 0–200)
BASOPHILS NFR BLD AUTO: 0.5 %
BILIRUB SERPL-MCNC: 0.3 MG/DL (ref 0.2–1.2)
BUN SERPL-MCNC: 23 MG/DL (ref 7–25)
CALCIUM SERPL-MCNC: 9.3 MG/DL (ref 8.6–10.3)
CHLORIDE SERPL-SCNC: 104 MMOL/L (ref 98–110)
CHOLEST SERPL-MCNC: 178 MG/DL
CHOLEST/HDLC SERPL: 2.9 (CALC)
CO2 SERPL-SCNC: 26 MMOL/L (ref 20–32)
CREAT SERPL-MCNC: 0.87 MG/DL (ref 0.7–1.35)
EGFRCR SERPLBLD CKD-EPI 2021: 93 ML/MIN/1.73M2
EOSINOPHIL # BLD AUTO: 252 CELLS/UL (ref 15–500)
EOSINOPHIL NFR BLD AUTO: 2.6 %
ERYTHROCYTE [DISTWIDTH] IN BLOOD BY AUTOMATED COUNT: 13.5 % (ref 11–15)
GLUCOSE SERPL-MCNC: 83 MG/DL (ref 65–99)
HCT VFR BLD AUTO: 44.7 % (ref 38.5–50)
HDLC SERPL-MCNC: 62 MG/DL
HGB BLD-MCNC: 14.6 G/DL (ref 13.2–17.1)
LDLC SERPL CALC-MCNC: 92 MG/DL (CALC)
LYMPHOCYTES # BLD AUTO: 2134 CELLS/UL (ref 850–3900)
LYMPHOCYTES NFR BLD AUTO: 22 %
MAGNESIUM SERPL-MCNC: 2.1 MG/DL (ref 1.5–2.5)
MCH RBC QN AUTO: 27.6 PG (ref 27–33)
MCHC RBC AUTO-ENTMCNC: 32.7 G/DL (ref 32–36)
MCV RBC AUTO: 84.5 FL (ref 80–100)
MONOCYTES # BLD AUTO: 873 CELLS/UL (ref 200–950)
MONOCYTES NFR BLD AUTO: 9 %
NEUTROPHILS # BLD AUTO: 6392 CELLS/UL (ref 1500–7800)
NEUTROPHILS NFR BLD AUTO: 65.9 %
NONHDLC SERPL-MCNC: 116 MG/DL (CALC)
PLATELET # BLD AUTO: 279 THOUSAND/UL (ref 140–400)
PMV BLD REES-ECKER: 9.6 FL (ref 7.5–12.5)
POTASSIUM SERPL-SCNC: 4.1 MMOL/L (ref 3.5–5.3)
PROT SERPL-MCNC: 6.8 G/DL (ref 6.1–8.1)
PYRIDOXAL PHOS SERPL-MCNC: 57.9 NG/ML (ref 2.1–21.7)
RBC # BLD AUTO: 5.29 MILLION/UL (ref 4.2–5.8)
SODIUM SERPL-SCNC: 140 MMOL/L (ref 135–146)
TRIGL SERPL-MCNC: 139 MG/DL
TSH SERPL-ACNC: 4.12 MIU/L (ref 0.4–4.5)
VIT B12 SERPL-MCNC: 315 PG/ML (ref 200–1100)
WBC # BLD AUTO: 9.7 THOUSAND/UL (ref 3.8–10.8)

## 2025-02-17 ENCOUNTER — APPOINTMENT (OUTPATIENT)
Dept: PHYSICAL THERAPY | Facility: HOSPITAL | Age: 70
End: 2025-02-17
Payer: COMMERCIAL

## 2025-02-17 ENCOUNTER — TELEPHONE (OUTPATIENT)
Dept: PHYSICAL THERAPY | Facility: HOSPITAL | Age: 70
End: 2025-02-17
Payer: COMMERCIAL

## 2025-02-17 NOTE — TELEPHONE ENCOUNTER
[facility] APPT CANC NOTICE FOR APPT SCHED 02/17/25 W/NO REASON LISTED. NO FURTHER ACTION; PT IS SCHED W/PROVIDER 02/20/25

## 2025-02-19 ENCOUNTER — OFFICE VISIT (OUTPATIENT)
Dept: ORTHOPEDIC SURGERY | Facility: CLINIC | Age: 70
End: 2025-02-19
Payer: COMMERCIAL

## 2025-02-19 DIAGNOSIS — M54.30 SCIATICA, UNSPECIFIED LATERALITY: ICD-10-CM

## 2025-02-19 DIAGNOSIS — M17.12 PRIMARY OSTEOARTHRITIS OF LEFT KNEE: Primary | ICD-10-CM

## 2025-02-19 PROCEDURE — 1159F MED LIST DOCD IN RCRD: CPT | Performed by: STUDENT IN AN ORGANIZED HEALTH CARE EDUCATION/TRAINING PROGRAM

## 2025-02-19 PROCEDURE — 2500000004 HC RX 250 GENERAL PHARMACY W/ HCPCS (ALT 636 FOR OP/ED): Performed by: STUDENT IN AN ORGANIZED HEALTH CARE EDUCATION/TRAINING PROGRAM

## 2025-02-19 PROCEDURE — 1036F TOBACCO NON-USER: CPT | Performed by: STUDENT IN AN ORGANIZED HEALTH CARE EDUCATION/TRAINING PROGRAM

## 2025-02-19 PROCEDURE — 20610 DRAIN/INJ JOINT/BURSA W/O US: CPT | Mod: LT | Performed by: STUDENT IN AN ORGANIZED HEALTH CARE EDUCATION/TRAINING PROGRAM

## 2025-02-19 PROCEDURE — 99211 OFF/OP EST MAY X REQ PHY/QHP: CPT | Mod: 25 | Performed by: STUDENT IN AN ORGANIZED HEALTH CARE EDUCATION/TRAINING PROGRAM

## 2025-02-19 RX ORDER — CYCLOBENZAPRINE HCL 10 MG
10 TABLET ORAL NIGHTLY PRN
Qty: 20 TABLET | Refills: 0 | Status: SHIPPED | OUTPATIENT
Start: 2025-02-19

## 2025-02-19 RX ORDER — LIDOCAINE HYDROCHLORIDE 10 MG/ML
4 INJECTION, SOLUTION INFILTRATION; PERINEURAL
Status: COMPLETED | OUTPATIENT
Start: 2025-02-19 | End: 2025-02-19

## 2025-02-19 RX ORDER — TRIAMCINOLONE ACETONIDE 40 MG/ML
40 INJECTION, SUSPENSION INTRA-ARTICULAR; INTRAMUSCULAR
Status: COMPLETED | OUTPATIENT
Start: 2025-02-19 | End: 2025-02-19

## 2025-02-19 RX ADMIN — TRIAMCINOLONE ACETONIDE 40 MG: 40 INJECTION, SUSPENSION INTRA-ARTICULAR; INTRAMUSCULAR at 16:22

## 2025-02-19 RX ADMIN — Medication 16 MG: at 16:22

## 2025-02-19 RX ADMIN — LIDOCAINE HYDROCHLORIDE 4 ML: 10 INJECTION, SOLUTION INFILTRATION; PERINEURAL at 16:22

## 2025-02-19 NOTE — PROGRESS NOTES
History of Present Illness  Patient returns today for an injection with viscosupplementation. The patient endorsing knee pain refractory to cortisone injections and oral medications     Review of Systems   GENERAL: Negative for malaise, significant weight loss, fever  MUSCULOSKELETAL: see HPI  NEURO:  Negative     Exam  Trace effusion  Tenderness over medial and lateral joint line     Assessment:  Patient with known osteoarthritis of the knee     Plan:  Visco injection provided, patient tolerated it well. See procedure note.     Injection performed as detailed in the procedure note below.      PROCEDURE NOTE:  Physician:Joao Lares III, MD     Prior to the procedure, the patient's allergies were reviewed. The procedure site was marked and time out performed. The procedure team checked for proper functioning of devices and supplies to be used for the procedure. The procedure team reviewed the relevant diagnostic tests pertinent to the procedure. The risks, benefits and anticipated outcomes of the procedure, the risks and benefits of the alternatives to the procedure, and the roles and tasks of the personnel to be involved were discussed with the patient, the patient verbalized understanding and consenting to the procedure.        Procedure details:  The injection site was prepped in the usual sterile manner.   Ethyl chloride mist spray was used to numb the skin.    Synvisc was injected into the left knee.  After the injection, a bandage was placed over the injection site.   The patient tolerated the procedure well with no adverse effects.   Post-injection instructions were reviewed with the patient and the patient voiced understanding of these instructions.      L Inj/Asp: L knee on 2/19/2025 4:22 PM  Indications: pain  Details: 22 G needle, anterolateral approach  Medications: 40 mg triamcinolone acetonide 40 mg/mL; 4 mL lidocaine 10 mg/mL (1 %); 16 mg hylan 16 mg/2 mL  Consent was given by the patient.  Immediately prior to procedure a time out was called to verify the correct patient, procedure, equipment, support staff and site/side marked as required. Patient was prepped and draped in the usual sterile fashion.

## 2025-02-20 ENCOUNTER — TELEPHONE (OUTPATIENT)
Dept: PHYSICAL THERAPY | Facility: HOSPITAL | Age: 70
End: 2025-02-20
Payer: COMMERCIAL

## 2025-02-20 ENCOUNTER — APPOINTMENT (OUTPATIENT)
Dept: PHYSICAL THERAPY | Facility: HOSPITAL | Age: 70
End: 2025-02-20
Payer: COMMERCIAL

## 2025-02-20 NOTE — TELEPHONE ENCOUNTER
APPT CANC NOTICE [facility] FOR RECHECK SCHED 02/20 W/NO REASON LISTED. PT IS SCHED W/ORTHO 02/26. NO FURTHER ACTION TAKEN

## 2025-02-24 ENCOUNTER — APPOINTMENT (OUTPATIENT)
Dept: PHYSICAL THERAPY | Facility: HOSPITAL | Age: 70
End: 2025-02-24
Payer: COMMERCIAL

## 2025-02-26 ENCOUNTER — OFFICE VISIT (OUTPATIENT)
Dept: ORTHOPEDIC SURGERY | Facility: CLINIC | Age: 70
End: 2025-02-26
Payer: COMMERCIAL

## 2025-02-26 DIAGNOSIS — M17.12 PRIMARY OSTEOARTHRITIS OF LEFT KNEE: Primary | ICD-10-CM

## 2025-02-26 PROCEDURE — 1036F TOBACCO NON-USER: CPT | Performed by: STUDENT IN AN ORGANIZED HEALTH CARE EDUCATION/TRAINING PROGRAM

## 2025-02-26 PROCEDURE — 2500000004 HC RX 250 GENERAL PHARMACY W/ HCPCS (ALT 636 FOR OP/ED): Mod: JZ | Performed by: STUDENT IN AN ORGANIZED HEALTH CARE EDUCATION/TRAINING PROGRAM

## 2025-02-26 PROCEDURE — 99211 OFF/OP EST MAY X REQ PHY/QHP: CPT | Mod: 25 | Performed by: STUDENT IN AN ORGANIZED HEALTH CARE EDUCATION/TRAINING PROGRAM

## 2025-02-26 PROCEDURE — 1159F MED LIST DOCD IN RCRD: CPT | Performed by: STUDENT IN AN ORGANIZED HEALTH CARE EDUCATION/TRAINING PROGRAM

## 2025-02-26 PROCEDURE — 20610 DRAIN/INJ JOINT/BURSA W/O US: CPT | Mod: LT | Performed by: STUDENT IN AN ORGANIZED HEALTH CARE EDUCATION/TRAINING PROGRAM

## 2025-02-26 RX ADMIN — Medication 16 MG: at 12:42

## 2025-02-26 RX ADMIN — Medication 16.8 MG: at 12:42

## 2025-02-26 NOTE — PROGRESS NOTES
History of Present Illness  Patient returns today for an injection with viscosupplementation. The patient endorsing knee pain refractory to cortisone injections and oral medications     Review of Systems   GENERAL: Negative for malaise, significant weight loss, fever  MUSCULOSKELETAL: see HPI  NEURO:  Negative     Exam  Trace effusion  Tenderness over medial and lateral joint line     Assessment:  Patient with known osteoarthritis of the knee     Plan:  Visco injection provided, patient tolerated it well. See procedure note.     Injection performed as detailed in the procedure note below.      PROCEDURE NOTE:  Physician:Joao Lares III, MD     Prior to the procedure, the patient's allergies were reviewed. The procedure site was marked and time out performed. The procedure team checked for proper functioning of devices and supplies to be used for the procedure. The procedure team reviewed the relevant diagnostic tests pertinent to the procedure. The risks, benefits and anticipated outcomes of the procedure, the risks and benefits of the alternatives to the procedure, and the roles and tasks of the personnel to be involved were discussed with the patient, the patient verbalized understanding and consenting to the procedure.        Procedure details:  The injection site was prepped in the usual sterile manner.   Ethyl chloride mist spray was used to numb the skin.    Synvisc was injected into the left knee.  After the injection, a bandage was placed over the injection site.   The patient tolerated the procedure well with no adverse effects.   Post-injection instructions were reviewed with the patient and the patient voiced understanding of these instructions.      L Inj/Asp: L knee on 2/26/2025 12:42 PM  Indications: pain  Details: 21 G needle, anterolateral approach  Medications: 16.8 mg sodium hyaluronate 16.8 mg/2 mL; 16 mg hylan 16 mg/2 mL  Outcome: tolerated well, no immediate complications  Procedure,  treatment alternatives, risks and benefits explained, specific risks discussed. Consent was given by the patient. Immediately prior to procedure a time out was called to verify the correct patient, procedure, equipment, support staff and site/side marked as required. Patient was prepped and draped in the usual sterile fashion.

## 2025-03-14 ENCOUNTER — OFFICE VISIT (OUTPATIENT)
Age: 70
End: 2025-03-14
Payer: COMMERCIAL

## 2025-03-14 VITALS — TEMPERATURE: 97.7 F | BODY MASS INDEX: 42.44 KG/M2 | WEIGHT: 280 LBS | HEIGHT: 68 IN

## 2025-03-14 DIAGNOSIS — M20.42 HAMMER TOE OF LEFT FOOT: ICD-10-CM

## 2025-03-14 DIAGNOSIS — L60.3 NAIL DYSTROPHY: ICD-10-CM

## 2025-03-14 DIAGNOSIS — S90.222A SUBUNGUAL HEMATOMA OF SECOND TOE OF LEFT FOOT, INITIAL ENCOUNTER: ICD-10-CM

## 2025-03-14 DIAGNOSIS — S90.222A SUBUNGUAL HEMATOMA OF THIRD TOE OF LEFT FOOT, INITIAL ENCOUNTER: ICD-10-CM

## 2025-03-14 DIAGNOSIS — L03.032 CELLULITIS OF SECOND TOE OF LEFT FOOT: Primary | ICD-10-CM

## 2025-03-14 PROCEDURE — 3017F COLORECTAL CA SCREEN DOC REV: CPT | Performed by: PODIATRIST

## 2025-03-14 PROCEDURE — 1123F ACP DISCUSS/DSCN MKR DOCD: CPT | Performed by: PODIATRIST

## 2025-03-14 PROCEDURE — 1036F TOBACCO NON-USER: CPT | Performed by: PODIATRIST

## 2025-03-14 PROCEDURE — G8417 CALC BMI ABV UP PARAM F/U: HCPCS | Performed by: PODIATRIST

## 2025-03-14 PROCEDURE — G8427 DOCREV CUR MEDS BY ELIG CLIN: HCPCS | Performed by: PODIATRIST

## 2025-03-14 PROCEDURE — 99213 OFFICE O/P EST LOW 20 MIN: CPT | Performed by: PODIATRIST

## 2025-03-14 ASSESSMENT — ENCOUNTER SYMPTOMS
SHORTNESS OF BREATH: 0
NAUSEA: 0
VOMITING: 0
BACK PAIN: 1

## 2025-03-14 NOTE — PROGRESS NOTES
[facility], [facility]  [address] [place] [address]  Dept: [phone]  Loc: [phone]       Mo Nobles  (1955)    3/14/25    Subjective     Mo Nobles is 69 y.o. male who complains today of:    Chief Complaint   Patient presents with    Nail Problem    Toe Pain     Left 2nd toe       Toe Pain   Associated symptoms include numbness.       History of Present Illness  The patient is a 69-year-old male who presents with an infection of the left second toenail.    He first noticed the infection on Monday due to swelling and has had pus expressed from the nail fold. He reports no pain in the affected toe, which he attributes to the drainage of the infection. The onset of the infection was sudden, characterized by swelling and redness around the cuticle. He has been applying gentamicin cream twice daily, once in the morning and once after showering. He does not recall any recent trauma to the toe and did not engage in extensive walking or wear dress shoes prior to the onset of symptoms. He typically wears tennis shoes. He is not currently on any anticoagulant therapy. He experiences discomfort when wearing composite work shoes due to friction against the top of his toe. He also reports difficulty wearing socks due to irritation at the tips of his toes, regardless of the type of sock. He has attempted to alleviate this discomfort by wearing diabetic socks but has found no relief. He tends to remove his socks immediately upon returning home.    Supplemental Information  He recently had a knee replacement.    FAMILY HISTORY  His mother had hammertoe.    MEDICATIONS  Current: gentamicin    Review of Systems   Constitutional:  Negative for chills and fever.   HENT:  Negative for hearing loss.    Respiratory:  Negative for shortness of breath.    Cardiovascular:  Negative for chest pain.   Gastrointestinal:

## 2025-03-23 DIAGNOSIS — M54.16 LUMBAR RADICULOPATHY: ICD-10-CM

## 2025-03-24 RX ORDER — MELOXICAM 15 MG/1
15 TABLET ORAL DAILY
Qty: 90 TABLET | Refills: 3 | Status: SHIPPED | OUTPATIENT
Start: 2025-03-24

## 2025-04-04 DIAGNOSIS — I10 PRIMARY HYPERTENSION: ICD-10-CM

## 2025-04-04 RX ORDER — AMLODIPINE AND BENAZEPRIL HYDROCHLORIDE 5; 10 MG/1; MG/1
CAPSULE ORAL
Qty: 90 CAPSULE | Refills: 3 | Status: SHIPPED | OUTPATIENT
Start: 2025-04-04

## 2025-04-09 DIAGNOSIS — I10 PRIMARY HYPERTENSION: ICD-10-CM

## 2025-04-11 RX ORDER — DILTIAZEM HYDROCHLORIDE 180 MG/1
CAPSULE, EXTENDED RELEASE ORAL
Qty: 90 CAPSULE | Refills: 3 | Status: SHIPPED | OUTPATIENT
Start: 2025-04-11

## 2025-04-25 ENCOUNTER — HOSPITAL ENCOUNTER (OUTPATIENT)
Dept: RADIOLOGY | Facility: HOSPITAL | Age: 70
Discharge: HOME | End: 2025-04-25
Payer: COMMERCIAL

## 2025-04-25 ENCOUNTER — APPOINTMENT (OUTPATIENT)
Dept: ORTHOPEDIC SURGERY | Facility: CLINIC | Age: 70
End: 2025-04-25
Payer: COMMERCIAL

## 2025-04-25 ENCOUNTER — OFFICE VISIT (OUTPATIENT)
Age: 70
End: 2025-04-25
Payer: COMMERCIAL

## 2025-04-25 VITALS
TEMPERATURE: 97.4 F | OXYGEN SATURATION: 98 % | WEIGHT: 280 LBS | HEIGHT: 68 IN | HEART RATE: 88 BPM | BODY MASS INDEX: 42.44 KG/M2

## 2025-04-25 DIAGNOSIS — Z96.651 S/P TOTAL KNEE ARTHROPLASTY, RIGHT: Primary | ICD-10-CM

## 2025-04-25 DIAGNOSIS — L60.0 INGROWING NAIL: ICD-10-CM

## 2025-04-25 DIAGNOSIS — M25.561 ACUTE PAIN OF RIGHT KNEE: ICD-10-CM

## 2025-04-25 DIAGNOSIS — M20.5X2 HALLUX LIMITUS, LEFT: ICD-10-CM

## 2025-04-25 DIAGNOSIS — M79.675 PAIN IN TOE OF LEFT FOOT: Primary | ICD-10-CM

## 2025-04-25 DIAGNOSIS — Z96.651 S/P TOTAL KNEE ARTHROPLASTY, RIGHT: ICD-10-CM

## 2025-04-25 DIAGNOSIS — M20.42 HAMMER TOE OF LEFT FOOT: ICD-10-CM

## 2025-04-25 PROCEDURE — 73562 X-RAY EXAM OF KNEE 3: CPT | Mod: RT

## 2025-04-25 PROCEDURE — 1123F ACP DISCUSS/DSCN MKR DOCD: CPT | Performed by: PODIATRIST

## 2025-04-25 PROCEDURE — 1036F TOBACCO NON-USER: CPT | Performed by: PODIATRIST

## 2025-04-25 PROCEDURE — G8427 DOCREV CUR MEDS BY ELIG CLIN: HCPCS | Performed by: PODIATRIST

## 2025-04-25 PROCEDURE — 99213 OFFICE O/P EST LOW 20 MIN: CPT | Performed by: PODIATRIST

## 2025-04-25 PROCEDURE — 3017F COLORECTAL CA SCREEN DOC REV: CPT | Performed by: PODIATRIST

## 2025-04-25 PROCEDURE — G8417 CALC BMI ABV UP PARAM F/U: HCPCS | Performed by: PODIATRIST

## 2025-04-25 RX ORDER — CHOLECALCIFEROL (VITAMIN D3) 50 MCG
50 TABLET ORAL DAILY
COMMUNITY
Start: 2025-04-18

## 2025-04-25 ASSESSMENT — ENCOUNTER SYMPTOMS
SHORTNESS OF BREATH: 0
NAUSEA: 0
BACK PAIN: 0
VOMITING: 0

## 2025-04-25 NOTE — PROGRESS NOTES
present.      Left foot: Decreased range of motion. Deformity present.   Feet:      Right foot:      Protective Sensation: 10 sites tested.  10 sites sensed.      Skin integrity: No erythema or warmth.      Left foot:      Protective Sensation: 10 sites tested.  10 sites sensed.      Skin integrity: Erythema present. No warmth.      Toenail Condition: Left toenails are ingrown.      Comments: Rectus foot type noted bilaterally.  Manual muscle strength is graded at 5/5 for all groups tested bilateral foot.  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  Semirigid hammertoe deformity noted to the second toe bilaterally and the third toe left foot.  Diminished left first MPJ dorsiflexion noted, this is exacerbated with simulated weightbearing.        Lymphadenopathy:      Comments: Popliteal lymph nodes are soft and nontender.   Skin:     Capillary Refill: Capillary refill takes less than 2 seconds.      Comments: There is pain with compression of the left hallux nail plate at the lateral nail border, slant back debridement reveals no underlying laceration or abscess formation.  The left second toenail has been avulsed, there is dried blood/blood tingeing noted to the exposed nailbed.  Normal skin texture noted bilaterally.  Increased skin turgor noted bilaterally.  Webspace 1-4 is dry and intact bilateral foot.      Neurological:      Mental Status: He is alert and oriented to person, place, and time.      Deep Tendon Reflexes: Babinski sign absent on the right side. Babinski sign absent on the left side.      Reflex Scores:       Patellar reflexes are 2+ on the right side and 2+ on the left side.       Achilles reflexes are 2+ on the right side and 2+ on the left side.     Comments: Light touch and protective sensation is intact bilaterally.  Vibratory sensation is intact bilaterally.  Sharp versus dull discrimination is intact bilaterally.  No ankle

## 2025-04-25 NOTE — PROGRESS NOTES
Chief Complaint   Patient presents with    Left Knee - Injections     S/P INJ Synvisc #3 2/26/25    Right Knee - Pain     C/O PAIN X ONE WEEK GETTING WORSE, S/P TKR 12/5/24  X-RAY TODAY       History of Present Illness  Jarocho is a 69-year-old male presenting today for early follow-up evaluation of his right knee.  He is status post right total knee arthroplasty 12/5/2024.  Postoperatively was seen early as well by Dr. Rivero at 1 week postop for worsening pain, hematoma, and concern for possible infection.  Was started on Keflex and Bactrim which he improved quickly.     Did have scant bit of erythema about his incision postoperatively constellation of pain and erythema I did proceed with aspiration knee which was consistent with 400 WBCs.    Pain about his right knee did seem to spontaneously improve however over the past 3 to 4 weeks he has noticed he has had return of pain about his knee.  Over the last week or 2 it has been increasingly worsening.  No fevers or chills.    Focused examination of the right knee: Overlying skin incision well-healed no surrounding erythema no appreciable warmth comparison the contralateral side range of motion 0-1 20.  Stable to varus and valgus stress stable to anterior posterior drawer.  Good patellar tracking.  Mild if any effusion.  Moderate edema about the foot and ankle baseline.  Neurovascular intact      X-Ray    X-rays collected in clinic today my independent interpretation as follows: Status post total knee arthroplasty appropriate placed no obvious dillon-implant fracture lucency    Procedures       Assessment  Patient status post right total knee arthroplasty, 5 months out with painful total knee     Plan  Will obtain ESR CRP CBC to help rule out prosthetic joint infection  We will follow-up next week for discussion of these results  Discussed activities to avoid as well as importance of using pain as a guide  Hard to elucidate source of pain with stable appearing  components.  Will identify if we need to aspirate his knee again or not.

## 2025-04-26 LAB
CRP SERPL-MCNC: NORMAL MG/L
ERYTHROCYTE [DISTWIDTH] IN BLOOD BY AUTOMATED COUNT: 14 % (ref 11–15)
ERYTHROCYTE [SEDIMENTATION RATE] IN BLOOD BY WESTERGREN METHOD: 2 MM/H
HCT VFR BLD AUTO: 43.1 % (ref 38.5–50)
HGB BLD-MCNC: 14.4 G/DL (ref 13.2–17.1)
MCH RBC QN AUTO: 27.6 PG (ref 27–33)
MCHC RBC AUTO-ENTMCNC: 33.4 G/DL (ref 32–36)
MCV RBC AUTO: 82.6 FL (ref 80–100)
PLATELET # BLD AUTO: 265 THOUSAND/UL (ref 140–400)
PMV BLD REES-ECKER: 10.1 FL (ref 7.5–12.5)
RBC # BLD AUTO: 5.22 MILLION/UL (ref 4.2–5.8)
WBC # BLD AUTO: 10.2 THOUSAND/UL (ref 3.8–10.8)

## 2025-04-28 ENCOUNTER — OFFICE VISIT (OUTPATIENT)
Dept: ORTHOPEDIC SURGERY | Facility: CLINIC | Age: 70
End: 2025-04-28
Payer: COMMERCIAL

## 2025-04-28 ENCOUNTER — HOSPITAL ENCOUNTER (OUTPATIENT)
Dept: RADIOLOGY | Facility: CLINIC | Age: 70
Discharge: HOME | End: 2025-04-28
Payer: COMMERCIAL

## 2025-04-28 DIAGNOSIS — Z96.651 S/P TOTAL KNEE ARTHROPLASTY, RIGHT: Primary | ICD-10-CM

## 2025-04-28 DIAGNOSIS — Z96.651 S/P TOTAL KNEE ARTHROPLASTY, RIGHT: ICD-10-CM

## 2025-04-28 DIAGNOSIS — M25.561 ACUTE PAIN OF RIGHT KNEE: ICD-10-CM

## 2025-04-28 LAB
CRP SERPL-MCNC: 7.2 MG/L
ERYTHROCYTE [DISTWIDTH] IN BLOOD BY AUTOMATED COUNT: 14 % (ref 11–15)
ERYTHROCYTE [SEDIMENTATION RATE] IN BLOOD BY WESTERGREN METHOD: 2 MM/H
HCT VFR BLD AUTO: 43.1 % (ref 38.5–50)
HGB BLD-MCNC: 14.4 G/DL (ref 13.2–17.1)
MCH RBC QN AUTO: 27.6 PG (ref 27–33)
MCHC RBC AUTO-ENTMCNC: 33.4 G/DL (ref 32–36)
MCV RBC AUTO: 82.6 FL (ref 80–100)
PLATELET # BLD AUTO: 265 THOUSAND/UL (ref 140–400)
PMV BLD REES-ECKER: 10.1 FL (ref 7.5–12.5)
RBC # BLD AUTO: 5.22 MILLION/UL (ref 4.2–5.8)
WBC # BLD AUTO: 10.2 THOUSAND/UL (ref 3.8–10.8)

## 2025-04-28 PROCEDURE — 99213 OFFICE O/P EST LOW 20 MIN: CPT | Performed by: STUDENT IN AN ORGANIZED HEALTH CARE EDUCATION/TRAINING PROGRAM

## 2025-04-28 PROCEDURE — 73700 CT LOWER EXTREMITY W/O DYE: CPT | Mod: RT

## 2025-04-28 NOTE — LETTER
April 28, 2025     Patient: Jarocho Langford   YOB: 1955   Date of Visit: 4/28/2025       To Whom It May Concern:    Jarocho Langford was seen in clinic today 4/28/2025, please excuse him from work to attend appointment.    If you have any questions or concerns, please don't hesitate to call.         Sincerely,        Joao Lares MD    CC: No Recipients

## 2025-04-28 NOTE — PROGRESS NOTES
Chief Complaint   Patient presents with    Right Knee - Follow-up     Lab results       History of Present Illness  Jarocho is a 69-year-old male presenting today for early follow-up evaluation of his right knee.  He is status post right total knee arthroplasty 12/5/2024.  Postoperatively was seen early as well by Dr. Rivero at 1 week postop for worsening pain, hematoma, and concern for possible infection.  Was started on Keflex and Bactrim which he improved quickly.     Did have scant bit of erythema about his incision postoperatively constellation of pain and erythema I did proceed with aspiration knee which was consistent with 400 WBCs.    Pain about his right knee did seem to spontaneously improve however over the past 3 to 4 weeks he has noticed he has had return of pain about his knee.     Since last visit on Friday the pain is mildly improved.  I did recommend he proceed with ESR CRP CBC to rule out infection.  Patient presents today for discussion of these results      Focused examination of the right knee: Overlying skin incision well-healed no surrounding erythema no appreciable warmth comparison the contralateral side range of motion 0-1 20.  Stable to varus and valgus stress stable to anterior posterior drawer.  Good patellar tracking.  Mild if any effusion.  Moderate edema about the foot and ankle baseline.  Neurovascular intact      X-Ray    X-rays collected in clinic today my independent interpretation as follows: Status post total knee arthroplasty appropriate placed no obvious dillon-implant fracture lucency    Procedures       Assessment  Patient status post right total knee arthroplasty, 5 months out with painful total knee     Plan  CRP is still pending we will call patient after this has returned with  ESR and CBC within normal limits  , Obtain a CT scan of the knee to ensure no other obvious form of internal derangement  After further elucidation of source of this pain patient did state that he  Subq hormone pellet implantation     Date/Time 9/21/2023 8:30 AM     Performed by  Jessika Khoury MD   Authorized by Jessika Khoury MD     Universal Protocol   Consent: Verbal consent obtained. Written consent obtained. Risks and benefits: risks, benefits and alternatives were discussed  Consent given by: patient  Patient understanding: patient states understanding of the procedure being performed  Patient consent: the patient's understanding of the procedure matches consent given  Procedure consent: procedure consent matches procedure scheduled  Required items: required blood products, implants, devices, and special equipment available  Patient identity confirmed: verbally with patient        Local anesthesia used: yes      Anesthesia: local infiltration     Anesthesia   Local anesthesia used: yes  Local Anesthetic: lidocaine 1% with epinephrine  Anesthetic total: 10 mL     Sedation   Patient sedated: no        Specimen: no    Culture: no   Procedure Details   Procedure Notes: With the patient in left lateral decubitus position with knees to chest right buttocks up the right buttocks was prepped and draped in the usual sterile fashion. The anesthetic mixture as noted above was injected into the dermis and then the subcutaneous tissue in an inverted V fashion with the dermal injection being at the apex of the V.  Using 11 scalpel blade a stab incision was made at the apex of the inverted V and then the delivery trocar was placed down each arm of the V delivering 6 pellets down each arm a total of 12 pellets. There is no bleeding or complication. The trocars were removed and sterile dressings were applied. The patient will return again in 4 months for Testopel administration prior to which time repeat blood work as ordered will take place. The patient will contact us if he has any ill effects from the present administration.   The patient had no complications and recovered uneventfully leaving the did use his knee and moving a dog bowl and this did seem to flareup his pain especially.  This does sound like a flare after total knee arthroplasty I do think this will likely improve with time however we will confirm this with CT as well as final collection of CRP.  Low concern for infectious etiology at this time   office in good condition.   Patient tolerance: patient tolerated the procedure well with no immediate complications

## 2025-04-28 NOTE — Clinical Note
April 28, 2025     Patient: Jarocho Langford   YOB: 1955   Date of Visit: 4/28/2025       To Whom It May Concern:    It is my medical opinion that Jarocho Langford {Work release (duty restriction):42705}.    If you have any questions or concerns, please don't hesitate to call.         Sincerely,        Joao Lares MD    CC: No Recipients

## 2025-04-30 ENCOUNTER — OFFICE VISIT (OUTPATIENT)
Dept: ORTHOPEDIC SURGERY | Facility: CLINIC | Age: 70
End: 2025-04-30
Payer: COMMERCIAL

## 2025-04-30 DIAGNOSIS — Z96.651 S/P TOTAL KNEE ARTHROPLASTY, RIGHT: Primary | ICD-10-CM

## 2025-04-30 PROCEDURE — 99213 OFFICE O/P EST LOW 20 MIN: CPT | Performed by: STUDENT IN AN ORGANIZED HEALTH CARE EDUCATION/TRAINING PROGRAM

## 2025-04-30 NOTE — PROGRESS NOTES
Chief Complaint   Patient presents with    Right Knee - Follow-up     CT Results today        History of Present Illness  Jarocho is a 69-year-old male presenting today for early follow-up evaluation of his right knee.  He is status post right total knee arthroplasty 12/5/2024.  Postoperatively was seen early as well by Dr. Rivero at 1 week postop for worsening pain, hematoma, and concern for possible infection.  Was started on Keflex and Bactrim which he improved quickly.     Did have scant bit of erythema about his incision postoperatively constellation of pain and erythema I did proceed with aspiration knee which was consistent with 400 WBCs.    Pain about his right knee did seem to spontaneously improve however over the past 3 to 4 weeks he has noticed he has had return of pain about his knee.     Since last visit on Friday the pain is mildly improved.  I did recommend he proceed with ESR CRP CBC to rule out infection.  These returned within normal limits      Presents today for discussion of CT results    Focused examination of the right knee: Overlying skin incision well-healed no surrounding erythema no appreciable warmth comparison the contralateral side range of motion 0-1 20.  Stable to varus and valgus stress stable to anterior posterior drawer.  Good patellar tracking.  Mild if any effusion.  Moderate edema about the foot and ankle baseline.  Neurovascular intact      X-Ray    CT knee right wo IV contrast  Result Date: 4/28/2025  Interpreted By:  Ravin Shahid, STUDY: CT of the left knee   without intravenous contrast dated  4/28/2025.   INDICATION: Signs/Symptoms:pain   COMPARISON: None.   ACCESSION NUMBER(S): [id number]   ORDERING CLINICIAN: SHERRY CHILD   TECHNIQUE: Axial CT of the   left knee was performed  without intravenous contrast.  Sagittal and coronal 2 dimensional reformats were obtained.   FINDINGS: OSSEOUS STRUCTURES AND JOINTS:   No fracture or dislocation is evident. There is a total  knee arthroplasty. Hardware is intact. There is a small volume knee joint effusion.   MUSCLES AND TENDONS:   There is a degree of generalized atrophy in the musculature.   ASSOCIATED SOFT TISSUES:   Increased density is seen in Hoffa's fat pad which may be related to scarring. Varicose veins are seen in the subcutaneous tissues. Reticular stranding is seen in the subcutaneous tissues greatest laterally scarring is seen in the anterior thigh from prior surgery.       1. Total knee arthroplasty without osseous injury or hardware complication evident. 2. Small volume knee joint effusion. 3. Reticular stranding in the subcutaneous tissues which could represent lymphedema although knowledge of clinical findings of cellulitis may be helpful. Of note some of the edema is in the region of varicose veins and any knowledge of clinical findings to suggest thrombophlebitis may also be helpful.   MACRO: none   Signed by: aRvin Shahid 4/28/2025 4:11 PM Dictation workstation:   [id number]    XR knee right 3 views  Result Date: 4/28/2025  Interpreted By:  Alin Camp, STUDY: XR KNEE RIGHT 3 VIEWS;  4/25/2025 2:37 pm   INDICATION: Signs/Symptoms:pain.   ,Z96.651 Presence of right artificial knee joint,M25.561 Pain in right knee   COMPARISON: None.   ACCESSION NUMBER(S): [id number]   ORDERING CLINICIAN: SHERRY CHILD   FINDINGS: No acute fracture is identified. No dislocation is seen. There are no lytic or blastic lesions. Right knee arthroplasty is intact. No surrounding lucencies.       Intact right knee arthroplasty.   MACRO: None.   Signed by: Alni Camp 4/28/2025 9:56 AM Dictation workstation:   [id number]        Procedures       Assessment  Patient status post right total knee arthroplasty, 5 months out with painful total knee     Plan  I did review CT as well as lab findings with Jarocho ospina.  These are all within normal limits.  Appropriate position of hardware.  CT reviewed my independent interpretation as follows:  Status post total knee arthroplasty no obvious dillon-implant fracture or lucency.  Appropriate hardware position.  Discussed that full recovery typically does take 1 year discussed activities to avoid as well as importance of using pain as a guide  Follow-up in 2 months

## 2025-04-30 NOTE — LETTER
April 30, 2025     Patient: Jarocho Langford   YOB: 1955   Date of Visit: 4/30/2025       To Whom It May Concern:    It is my medical opinion that Jarocho Langford may return to work on 4/30/2025.    If you have any questions or concerns, please don't hesitate to call.         Sincerely,        Joao Lares MD    CC: No Recipients

## 2025-05-12 ENCOUNTER — APPOINTMENT (OUTPATIENT)
Facility: CLINIC | Age: 70
End: 2025-05-12
Payer: COMMERCIAL

## 2025-05-20 DIAGNOSIS — N40.1 BENIGN PROSTATIC HYPERPLASIA (BPH) WITH URINARY URGENCY: ICD-10-CM

## 2025-05-20 DIAGNOSIS — R39.15 BENIGN PROSTATIC HYPERPLASIA (BPH) WITH URINARY URGENCY: ICD-10-CM

## 2025-05-23 ENCOUNTER — APPOINTMENT (OUTPATIENT)
Dept: ORTHOPEDIC SURGERY | Facility: CLINIC | Age: 70
End: 2025-05-23
Payer: COMMERCIAL

## 2025-05-23 ENCOUNTER — APPOINTMENT (OUTPATIENT)
Dept: RADIOLOGY | Facility: HOSPITAL | Age: 70
End: 2025-05-23
Payer: COMMERCIAL

## 2025-05-23 ENCOUNTER — HOSPITAL ENCOUNTER (OUTPATIENT)
Dept: RADIOLOGY | Facility: HOSPITAL | Age: 70
Discharge: HOME | End: 2025-05-23
Payer: COMMERCIAL

## 2025-05-23 DIAGNOSIS — Z96.659 PAINFUL TOTAL KNEE REPLACEMENT, INITIAL ENCOUNTER: Primary | ICD-10-CM

## 2025-05-23 DIAGNOSIS — Z96.651 S/P TOTAL KNEE ARTHROPLASTY, RIGHT: ICD-10-CM

## 2025-05-23 DIAGNOSIS — T84.84XA PAINFUL TOTAL KNEE REPLACEMENT, INITIAL ENCOUNTER: Primary | ICD-10-CM

## 2025-05-23 PROCEDURE — 73562 X-RAY EXAM OF KNEE 3: CPT | Mod: RT

## 2025-05-23 PROCEDURE — 1159F MED LIST DOCD IN RCRD: CPT | Performed by: STUDENT IN AN ORGANIZED HEALTH CARE EDUCATION/TRAINING PROGRAM

## 2025-05-23 PROCEDURE — 99213 OFFICE O/P EST LOW 20 MIN: CPT | Performed by: STUDENT IN AN ORGANIZED HEALTH CARE EDUCATION/TRAINING PROGRAM

## 2025-05-23 NOTE — PROGRESS NOTES
Chief Complaint   Patient presents with    Right Knee - Follow-up     S/P TKA 12/5/24       History of Present Illness  Jarocho is a 69-year-old male presenting today for early follow-up evaluation of his right knee.  He is status post right total knee arthroplasty 12/5/2024.  Postoperatively was seen early as well by Dr. Rivero at 1 week postop for worsening pain, hematoma, and concern for possible infection he was placed on oral antibiotics which did resolve erythema.    Did have scant bit of erythema about his incision postoperatively constellation of pain and erythema I did proceed with aspiration knee which was consistent with 400 WBCs.    He did have return of pain unfortunately did obtain ESR CRP CBC these were all within normal limits.  Therefore I did recommend we continue with expectant management.    Last visit I did recommend we proceed with conservative treatment continued expectant management.  Patient continues to have pain and discomfort about his knee despite activity modifications.  Presents today again for early follow-up.  He is 5-month status post total knee arthroplasty.    Of note he has no pain when he is standing notices some pain and discomfort particularly when he actively flexes and extends his knee.      Focused examination of the right knee: Overlying skin incision well-healed no surrounding erythema no appreciable warmth comparison the contralateral side range of motion 0-1 20.  Stable to varus and valgus stress stable to anterior posterior drawer.  Good patellar tracking.  Mild if any effusion.  Moderate edema about the foot and ankle baseline.  Neurovascular intact.  Slight decrease in sensation about the lateral base of the incision as expected.  Mild tenderness palpation distal lateral femur.      X-Ray    CT knee right wo IV contrast  Result Date: 4/28/2025  Interpreted By:  Ravin Shahid, STUDY: CT of the left knee   without intravenous contrast dated  4/28/2025.   INDICATION:  Signs/Symptoms:pain   COMPARISON: None.   ACCESSION NUMBER(S): [id number]   ORDERING CLINICIAN: SHERRY CHILD   TECHNIQUE: Axial CT of the   left knee was performed  without intravenous contrast.  Sagittal and coronal 2 dimensional reformats were obtained.   FINDINGS: OSSEOUS STRUCTURES AND JOINTS:   No fracture or dislocation is evident. There is a total knee arthroplasty. Hardware is intact. There is a small volume knee joint effusion.   MUSCLES AND TENDONS:   There is a degree of generalized atrophy in the musculature.   ASSOCIATED SOFT TISSUES:   Increased density is seen in Hoffa's fat pad which may be related to scarring. Varicose veins are seen in the subcutaneous tissues. Reticular stranding is seen in the subcutaneous tissues greatest laterally scarring is seen in the anterior thigh from prior surgery.       1. Total knee arthroplasty without osseous injury or hardware complication evident. 2. Small volume knee joint effusion. 3. Reticular stranding in the subcutaneous tissues which could represent lymphedema although knowledge of clinical findings of cellulitis may be helpful. Of note some of the edema is in the region of varicose veins and any knowledge of clinical findings to suggest thrombophlebitis may also be helpful.   MACRO: none   Signed by: Ravin Shahid 4/28/2025 4:11 PM Dictation workstation:   [id number]    XR knee right 3 views  Result Date: 4/28/2025  Interpreted By:  lAin Camp, STUDY: XR KNEE RIGHT 3 VIEWS;  4/25/2025 2:37 pm   INDICATION: Signs/Symptoms:pain.   ,Z96.651 Presence of right artificial knee joint,M25.561 Pain in right knee   COMPARISON: None.   ACCESSION NUMBER(S): [id number]   ORDERING CLINICIAN: SHERRY CHILD   FINDINGS: No acute fracture is identified. No dislocation is seen. There are no lytic or blastic lesions. Right knee arthroplasty is intact. No surrounding lucencies.       Intact right knee arthroplasty.   MACRO: None.   Signed by: Alin Camp 4/28/2025 9:56  AM Dictation workstation:   [id number]        Procedures       Assessment  Patient status post right total knee arthroplasty, 5 months out with painful total knee     Plan  I am going to initiate dedicated physical therapy again for Jarocho working on IT band stretching, range of motion as well as strengthening.  He did graduate this rather quickly after surgery.  I did also discuss his case with my joints colleagues  Will likely refer patient for second opinion at [facility] with my colleague Dr. Tim Moss to evaluate patient's knee.  Discussed with patient that after total knee arthroplasty full recovery can take 1 year and that with press-fit components and ingrowth may take some time.  Could use a bone scan to help trend over time if inconclusive progress.

## 2025-05-27 DIAGNOSIS — Z96.651 S/P TOTAL KNEE ARTHROPLASTY, RIGHT: ICD-10-CM

## 2025-05-27 DIAGNOSIS — Z96.659 PAINFUL TOTAL KNEE REPLACEMENT, INITIAL ENCOUNTER: ICD-10-CM

## 2025-05-27 DIAGNOSIS — T84.84XA PAINFUL TOTAL KNEE REPLACEMENT, INITIAL ENCOUNTER: ICD-10-CM

## 2025-06-02 ENCOUNTER — LAB (OUTPATIENT)
Dept: LAB | Facility: HOSPITAL | Age: 70
End: 2025-06-02
Payer: COMMERCIAL

## 2025-06-02 ENCOUNTER — APPOINTMENT (OUTPATIENT)
Dept: RADIOLOGY | Facility: HOSPITAL | Age: 70
End: 2025-06-02
Payer: COMMERCIAL

## 2025-06-02 DIAGNOSIS — R39.15 BENIGN PROSTATIC HYPERPLASIA (BPH) WITH URINARY URGENCY: ICD-10-CM

## 2025-06-02 DIAGNOSIS — N40.1 BENIGN PROSTATIC HYPERPLASIA (BPH) WITH URINARY URGENCY: ICD-10-CM

## 2025-06-02 PROCEDURE — 88112 CYTOPATH CELL ENHANCE TECH: CPT

## 2025-06-02 PROCEDURE — 76770 US EXAM ABDO BACK WALL COMP: CPT | Performed by: RADIOLOGY

## 2025-06-02 PROCEDURE — 76770 US EXAM ABDO BACK WALL COMP: CPT

## 2025-06-03 ENCOUNTER — APPOINTMENT (OUTPATIENT)
Dept: PRIMARY CARE | Facility: CLINIC | Age: 70
End: 2025-06-03
Payer: COMMERCIAL

## 2025-06-03 VITALS
HEIGHT: 68 IN | OXYGEN SATURATION: 96 % | HEART RATE: 91 BPM | BODY MASS INDEX: 44.42 KG/M2 | DIASTOLIC BLOOD PRESSURE: 79 MMHG | WEIGHT: 293.1 LBS | RESPIRATION RATE: 16 BRPM | SYSTOLIC BLOOD PRESSURE: 151 MMHG | TEMPERATURE: 98.9 F

## 2025-06-03 DIAGNOSIS — F41.9 ANXIETY: ICD-10-CM

## 2025-06-03 DIAGNOSIS — I10 PRIMARY HYPERTENSION: ICD-10-CM

## 2025-06-03 DIAGNOSIS — N40.1 BENIGN PROSTATIC HYPERPLASIA (BPH) WITH URINARY URGENCY: ICD-10-CM

## 2025-06-03 DIAGNOSIS — R39.15 BENIGN PROSTATIC HYPERPLASIA (BPH) WITH URINARY URGENCY: ICD-10-CM

## 2025-06-03 DIAGNOSIS — Z79.899 MEDICATION MANAGEMENT: ICD-10-CM

## 2025-06-03 DIAGNOSIS — E55.9 VITAMIN D DEFICIENCY: Primary | ICD-10-CM

## 2025-06-03 LAB
25(OH)D3+25(OH)D2 SERPL-MCNC: 40 NG/ML (ref 30–100)
ALBUMIN SERPL-MCNC: 4.3 G/DL (ref 3.6–5.1)
ALP SERPL-CCNC: 83 U/L (ref 35–144)
ALT SERPL-CCNC: 22 U/L (ref 9–46)
AMPHETAMINES UR QL: NEGATIVE NG/ML
ANION GAP SERPL CALCULATED.4IONS-SCNC: 8 MMOL/L (CALC) (ref 7–17)
AST SERPL-CCNC: 19 U/L (ref 10–35)
BARBITURATES UR QL: NEGATIVE NG/ML
BASOPHILS # BLD AUTO: 37 CELLS/UL (ref 0–200)
BASOPHILS NFR BLD AUTO: 0.4 %
BENZODIAZ UR QL: NEGATIVE NG/ML
BILIRUB SERPL-MCNC: 0.4 MG/DL (ref 0.2–1.2)
BUN SERPL-MCNC: 16 MG/DL (ref 7–25)
BZE UR QL: NEGATIVE NG/ML
CALCIUM SERPL-MCNC: 8.7 MG/DL (ref 8.6–10.3)
CHLORIDE SERPL-SCNC: 105 MMOL/L (ref 98–110)
CHOLEST SERPL-MCNC: 166 MG/DL
CHOLEST/HDLC SERPL: 3.3 (CALC)
CO2 SERPL-SCNC: 27 MMOL/L (ref 20–32)
CREAT SERPL-MCNC: 0.86 MG/DL (ref 0.7–1.28)
CREAT UR-MCNC: 135.5 MG/DL
EGFRCR SERPLBLD CKD-EPI 2021: 93 ML/MIN/1.73M2
EOSINOPHIL # BLD AUTO: 177 CELLS/UL (ref 15–500)
EOSINOPHIL NFR BLD AUTO: 1.9 %
ERYTHROCYTE [DISTWIDTH] IN BLOOD BY AUTOMATED COUNT: 14.5 % (ref 11–15)
GLUCOSE SERPL-MCNC: 91 MG/DL (ref 65–99)
HCT VFR BLD AUTO: 43.8 % (ref 38.5–50)
HDLC SERPL-MCNC: 51 MG/DL
HGB BLD-MCNC: 14.4 G/DL (ref 13.2–17.1)
LDLC SERPL CALC-MCNC: 96 MG/DL (CALC)
LYMPHOCYTES # BLD AUTO: 2344 CELLS/UL (ref 850–3900)
LYMPHOCYTES NFR BLD AUTO: 25.2 %
MAGNESIUM SERPL-MCNC: 2 MG/DL (ref 1.5–2.5)
MCH RBC QN AUTO: 27.2 PG (ref 27–33)
MCHC RBC AUTO-ENTMCNC: 32.9 G/DL (ref 32–36)
MCV RBC AUTO: 82.6 FL (ref 80–100)
METHADONE UR QL: NEGATIVE NG/ML
MONOCYTES # BLD AUTO: 818 CELLS/UL (ref 200–950)
MONOCYTES NFR BLD AUTO: 8.8 %
NEUTROPHILS # BLD AUTO: 5924 CELLS/UL (ref 1500–7800)
NEUTROPHILS NFR BLD AUTO: 63.7 %
NONHDLC SERPL-MCNC: 115 MG/DL (CALC)
OPIATES UR QL: NEGATIVE NG/ML
OXIDANTS UR QL: NEGATIVE MCG/ML
OXYCODONE UR QL: NEGATIVE NG/ML
PCP UR QL: NEGATIVE NG/ML
PH UR: 6.2 [PH] (ref 4.5–9)
PLATELET # BLD AUTO: 262 THOUSAND/UL (ref 140–400)
PMV BLD REES-ECKER: 9.6 FL (ref 7.5–12.5)
POTASSIUM SERPL-SCNC: 4 MMOL/L (ref 3.5–5.3)
PROT SERPL-MCNC: 6.5 G/DL (ref 6.1–8.1)
PSA SERPL-MCNC: 0.29 NG/ML
QUEST NOTES AND COMMENTS: NORMAL
RBC # BLD AUTO: 5.3 MILLION/UL (ref 4.2–5.8)
SODIUM SERPL-SCNC: 140 MMOL/L (ref 135–146)
THC UR QL: NEGATIVE NG/ML
TRIGL SERPL-MCNC: 91 MG/DL
TSH SERPL-ACNC: 2.69 MIU/L (ref 0.4–4.5)
WBC # BLD AUTO: 9.3 THOUSAND/UL (ref 3.8–10.8)

## 2025-06-03 PROCEDURE — 3008F BODY MASS INDEX DOCD: CPT | Performed by: FAMILY MEDICINE

## 2025-06-03 PROCEDURE — 1036F TOBACCO NON-USER: CPT | Performed by: FAMILY MEDICINE

## 2025-06-03 PROCEDURE — 3078F DIAST BP <80 MM HG: CPT | Performed by: FAMILY MEDICINE

## 2025-06-03 PROCEDURE — 1159F MED LIST DOCD IN RCRD: CPT | Performed by: FAMILY MEDICINE

## 2025-06-03 PROCEDURE — 3077F SYST BP >= 140 MM HG: CPT | Performed by: FAMILY MEDICINE

## 2025-06-03 PROCEDURE — 99214 OFFICE O/P EST MOD 30 MIN: CPT | Performed by: FAMILY MEDICINE

## 2025-06-03 RX ORDER — CLONAZEPAM 1 MG/1
1 TABLET ORAL DAILY PRN
Qty: 30 TABLET | Refills: 2 | Status: SHIPPED | OUTPATIENT
Start: 2025-06-03 | End: 2025-09-01

## 2025-06-03 ASSESSMENT — ENCOUNTER SYMPTOMS
DIZZINESS: 0
EYE DISCHARGE: 0
ACTIVITY CHANGE: 0
NERVOUS/ANXIOUS: 0
CHILLS: 0
MYALGIAS: 0
WOUND: 0
EYE PAIN: 0
CONFUSION: 0
DIAPHORESIS: 0
CHEST TIGHTNESS: 0
ABDOMINAL PAIN: 0
EYE REDNESS: 0
UNEXPECTED WEIGHT CHANGE: 0
FATIGUE: 1
HEMATURIA: 0
BLOOD IN STOOL: 0
FACIAL ASYMMETRY: 0
SPEECH DIFFICULTY: 0
ABDOMINAL DISTENTION: 0
DYSURIA: 0
NECK STIFFNESS: 1
NUMBNESS: 0
TROUBLE SWALLOWING: 0
DYSPHORIC MOOD: 0
PHOTOPHOBIA: 0
COUGH: 0
FREQUENCY: 0
HALLUCINATIONS: 0
SEIZURES: 0
APPETITE CHANGE: 0
ARTHRALGIAS: 0
LIGHT-HEADEDNESS: 0
VOICE CHANGE: 0
DIARRHEA: 0
RHINORRHEA: 0
ADENOPATHY: 0
TREMORS: 0
SORE THROAT: 0
CHOKING: 0
HYPERTENSION: 1
FEVER: 0
EYE ITCHING: 0
AGITATION: 0
JOINT SWELLING: 0
CONSTIPATION: 0
BRUISES/BLEEDS EASILY: 0
SLEEP DISTURBANCE: 0
POLYDIPSIA: 0
VOMITING: 0
WEAKNESS: 0
BACK PAIN: 0
SINUS PRESSURE: 0
FLANK PAIN: 0
NAUSEA: 0
WHEEZING: 0

## 2025-06-03 NOTE — PROGRESS NOTES
Subjective   Patient ID: Jarocho Langford is a 70 y.o. male who presents for Med Management (CSM: Klonopin ).    OARRS:  No data recorded  I have personally reviewed the OARRS report for Jarocho Langford. I have considered the risks of abuse, dependence, addiction and diversion and I believe that it is clinically appropriate for Jarocho Langford to be prescribed this medication    Is the patient prescribed a combination of a benzodiazepine and opioid?  No    Last Urine Drug Screen / ordered today: Yes  Recent Results (from the past 8760 hours)  -Drug Screen, Urine With Reflex to Confirmation:   Collection Time: 02/10/25  4:08 PM       Result                      Value             Ref Range           Amphetamines                NEGATIVE          <500 ng/mL          Barbiturates                NEGATIVE          <300 ng/mL          Benzodiazepines             NEGATIVE          <100 ng/mL          Cocaine Metabolite          NEGATIVE          <150 ng/mL          Marijuana Metabolite        POSITIVE (A)      <20 ng/mL           Marijuana Metabolite        137 (H)           <5 ng/mL            Marijuana Comments                                                Methadone Metabolite        NEGATIVE          <100 ng/mL          Opiates                     NEGATIVE          <100 ng/mL          Oxycodone                   NEGATIVE          <100 ng/mL          Phencyclidine               NEGATIVE          <25 ng/mL           Creatinine                  88.0              > or = 20.0 *       pH                          5.7               4.5 - 9.0           Oxidant                     NEGATIVE          <200 mcg/mL         Notes and Comments                                           Results are as expected.         Controlled Substance Agreement:  Date of the Last Agreement: Today  Reviewed Controlled Substance Agreement including but not limited to the benefits, risks, and alternatives to treatment with a Controlled Substance  medication(s).    Benzodiazepines:  What is the patient's goal of therapy?  Reduction of anxiety  Is this being achieved with current treatment?  Yes    YENNY-7:  No data recorded    Activities of Daily Living:   Is your overall impression that this patient is benefiting (symptom reduction outweighs side effects) from benzodiazepine therapy? Yes     1. Physical Functioning: Same  2. Family Relationship: Same  3. Social Relationship: Same  4. Mood: Same  5. Sleep Patterns: Same  6. Overall Function: Same      Anxiety  Presents for follow-up visit. Patient reports no confusion, dizziness, nausea, nervous/anxious behavior or suicidal ideas. The quality of sleep is good.     His past medical history is significant for asthma.   Hypertension  Associated symptoms include anxiety.   Asthma  There is no cough or wheezing. Pertinent negatives include no appetite change, ear pain, fever, myalgias, postnasal drip, rhinorrhea, sneezing, sore throat or trouble swallowing. His past medical history is significant for asthma.        Review of Systems   Constitutional:  Positive for fatigue. Negative for activity change, appetite change, chills, diaphoresis, fever and unexpected weight change.   HENT:  Negative for congestion, ear pain, hearing loss, nosebleeds, postnasal drip, rhinorrhea, sinus pressure, sneezing, sore throat, tinnitus, trouble swallowing and voice change.    Eyes:  Negative for photophobia, pain, discharge, redness, itching and visual disturbance.   Respiratory:  Negative for cough, choking, chest tightness and wheezing.    Cardiovascular:  Positive for leg swelling.   Gastrointestinal:  Negative for abdominal distention, abdominal pain, blood in stool, constipation, diarrhea, nausea and vomiting.   Endocrine: Negative for cold intolerance, heat intolerance, polydipsia and polyuria.   Genitourinary:  Negative for dysuria, flank pain, frequency, hematuria and urgency.   Musculoskeletal:  Positive for neck stiffness.  "Negative for arthralgias, back pain, joint swelling and myalgias.   Skin:  Negative for rash and wound.   Allergic/Immunologic: Negative for immunocompromised state.   Neurological:  Negative for dizziness, tremors, seizures, syncope, facial asymmetry, speech difficulty, weakness, light-headedness and numbness.   Hematological:  Negative for adenopathy. Does not bruise/bleed easily.   Psychiatric/Behavioral:  Negative for agitation, behavioral problems, confusion, dysphoric mood, hallucinations, self-injury, sleep disturbance and suicidal ideas. The patient is not nervous/anxious.        Objective   /79 (BP Location: Right arm, Patient Position: Sitting, BP Cuff Size: Large adult)   Pulse 91   Temp 37.2 °C (98.9 °F) (Temporal)   Resp 16   Ht 1.727 m (5' 8\")   Wt 133 kg (293 lb 1.6 oz)   SpO2 96%   BMI 44.57 kg/m²   Physical Exam  Constitutional:       General: He is not in acute distress.     Appearance: He is not ill-appearing or diaphoretic.   HENT:      Head: Normocephalic and atraumatic.      Right Ear: External ear normal.      Left Ear: External ear normal.      Nose: Nose normal. No rhinorrhea.   Eyes:      General: Lids are normal. No scleral icterus.        Right eye: No discharge.         Left eye: No discharge.      Conjunctiva/sclera: Conjunctivae normal.   Cardiovascular:      Rate and Rhythm: Normal rate and regular rhythm.      Pulses: Normal pulses.      Heart sounds: No murmur heard.  Pulmonary:      Effort: Pulmonary effort is normal. No respiratory distress.      Breath sounds: No decreased breath sounds, wheezing, rhonchi or rales.   Abdominal:      General: Bowel sounds are normal. There is no distension.      Palpations: Abdomen is soft. There is no mass.      Tenderness: There is no abdominal tenderness. There is no guarding or rebound.   Musculoskeletal:         General: No swelling, tenderness or deformity.      Cervical back: No rigidity or tenderness.      Right lower leg: No " edema.      Left lower leg: No edema.   Lymphadenopathy:      Cervical: No cervical adenopathy.      Upper Body:      Right upper body: No supraclavicular adenopathy.      Left upper body: No supraclavicular adenopathy.   Skin:     General: Skin is warm and dry.      Coloration: Skin is not jaundiced or pale.      Findings: No erythema, lesion or rash.   Neurological:      General: No focal deficit present.      Mental Status: He is alert and oriented to person, place, and time.      Sensory: No sensory deficit.      Motor: No weakness or tremor.      Coordination: Coordination normal.      Gait: Gait normal.   Psychiatric:         Mood and Affect: Mood normal. Affect is not inappropriate.         Behavior: Behavior normal.         Assessment/Plan   Diagnoses and all orders for this visit:  Vitamin D deficiency  -     Comprehensive Metabolic Panel; Future  -     TSH with reflex to Free T4 if abnormal; Future  -     Vitamin D 25-Hydroxy,Total (for eval of Vitamin D levels); Future  -     Follow Up In [facility] Maintenance; Future  Anxiety  -     Follow Up In [facility] Primary [facility] PCP [facility]  -     clonazePAM (KlonoPIN) 1 mg tablet; Take 1 tablet (1 mg) by mouth once daily as needed for anxiety.  -     Follow Up In [facility]  PCP - Established; Future  -     Follow Up In [facility] PCP  Established; Future  -     Drug Screen, Urine With Reflex to Confirmation; Future  -     Follow Up In [facility] Primary [facility] Health Maintenance; Future  Medication management  -     Follow Up In [facility]  -     clonazePAM (KlonoPIN) 1 mg tablet; Take 1 tablet (1 mg) by mouth once daily as needed for anxiety.  -     Follow Up In [facility]  PCP - Established; Future  -     Follow Up In [facility]; Future  -     Drug Screen, Urine With Reflex to Confirmation; Future  -     Follow Up In [facility] - [facility] - Health Maintenance; Future  Primary hypertension  -     CBC and Auto Differential; Future  -     Lipid Panel; Future  -     Comprehensive Metabolic Panel; Future  -     Magnesium; Future  -     TSH with reflex to Free T4 if abnormal; Future  -     Follow Up In [facility] - [facility] - [facility]; Future  Benign prostatic hyperplasia (BPH) with urinary urgency  -     Prostate Specific Antigen, Screen; Future  -     Follow Up In [facility] - [facility] Health Maintenance; Future

## 2025-06-04 LAB
APPEARANCE UR: CLEAR
BACTERIA UR CULT: NORMAL
BILIRUB UR QL STRIP: NEGATIVE
COLOR UR: YELLOW
GLUCOSE UR QL STRIP: NEGATIVE
HGB UR QL STRIP: NEGATIVE
KETONES UR QL STRIP: NEGATIVE
LEUKOCYTE ESTERASE UR QL STRIP: NEGATIVE
NITRITE UR QL STRIP: NEGATIVE
PH UR STRIP: 6 [PH] (ref 5–8)
PROT UR QL STRIP: NEGATIVE
SP GR UR STRIP: 1.02 (ref 1–1.03)

## 2025-06-05 LAB
LABORATORY COMMENT REPORT: NORMAL
LABORATORY COMMENT REPORT: NORMAL
PATH REPORT.FINAL DX SPEC: NORMAL
PATH REPORT.GROSS SPEC: NORMAL
PATH REPORT.RELEVANT HX SPEC: NORMAL
PATH REPORT.TOTAL CANCER: NORMAL

## 2025-06-12 DIAGNOSIS — G47.33 OSA (OBSTRUCTIVE SLEEP APNEA): ICD-10-CM

## 2025-06-12 DIAGNOSIS — E66.813 CLASS 3 SEVERE OBESITY DUE TO EXCESS CALORIES WITH SERIOUS COMORBIDITY AND BODY MASS INDEX (BMI) OF 40.0 TO 44.9 IN ADULT: Primary | ICD-10-CM

## 2025-06-17 DIAGNOSIS — N40.1 BENIGN PROSTATIC HYPERPLASIA (BPH) WITH URINARY URGENCY: ICD-10-CM

## 2025-06-17 DIAGNOSIS — R39.15 BENIGN PROSTATIC HYPERPLASIA (BPH) WITH URINARY URGENCY: ICD-10-CM

## 2025-06-17 RX ORDER — OXYBUTYNIN CHLORIDE 10 MG/1
10 TABLET, EXTENDED RELEASE ORAL DAILY
Qty: 90 TABLET | Refills: 3 | Status: SHIPPED | OUTPATIENT
Start: 2025-06-17

## 2025-06-18 ENCOUNTER — TELEPHONE (OUTPATIENT)
Dept: ORTHOPEDIC SURGERY | Facility: CLINIC | Age: 70
End: 2025-06-18
Payer: COMMERCIAL

## 2025-06-18 DIAGNOSIS — M54.30 SCIATICA, UNSPECIFIED LATERALITY: ICD-10-CM

## 2025-06-18 DIAGNOSIS — M79.661 RIGHT CALF PAIN: ICD-10-CM

## 2025-06-18 DIAGNOSIS — Z96.651 S/P TOTAL KNEE ARTHROPLASTY, RIGHT: Primary | ICD-10-CM

## 2025-06-18 DIAGNOSIS — M17.12 PRIMARY OSTEOARTHRITIS OF LEFT KNEE: ICD-10-CM

## 2025-06-18 DIAGNOSIS — T84.84XA PAINFUL TOTAL KNEE REPLACEMENT, INITIAL ENCOUNTER: ICD-10-CM

## 2025-06-18 DIAGNOSIS — Z96.659 PAINFUL TOTAL KNEE REPLACEMENT, INITIAL ENCOUNTER: ICD-10-CM

## 2025-06-18 RX ORDER — CYCLOBENZAPRINE HCL 10 MG
10 TABLET ORAL NIGHTLY PRN
Qty: 20 TABLET | Refills: 0 | Status: SHIPPED | OUTPATIENT
Start: 2025-06-18

## 2025-07-01 ENCOUNTER — TELEPHONE (OUTPATIENT)
Dept: ORTHOPEDIC SURGERY | Facility: CLINIC | Age: 70
End: 2025-07-01
Payer: COMMERCIAL

## 2025-07-01 DIAGNOSIS — Z96.651 S/P TOTAL KNEE ARTHROPLASTY, RIGHT: ICD-10-CM

## 2025-07-02 ENCOUNTER — APPOINTMENT (OUTPATIENT)
Dept: PHYSICAL THERAPY | Facility: HOSPITAL | Age: 70
End: 2025-07-02
Payer: COMMERCIAL

## 2025-07-02 RX ORDER — AMOXICILLIN 500 MG/1
TABLET, FILM COATED ORAL
Qty: 4 TABLET | Refills: 3 | Status: SHIPPED | OUTPATIENT
Start: 2025-07-02

## 2025-07-10 ENCOUNTER — APPOINTMENT (OUTPATIENT)
Dept: ORTHOPEDIC SURGERY | Facility: CLINIC | Age: 70
End: 2025-07-10
Payer: COMMERCIAL

## 2025-07-14 ENCOUNTER — APPOINTMENT (OUTPATIENT)
Dept: PHYSICAL THERAPY | Facility: HOSPITAL | Age: 70
End: 2025-07-14
Payer: COMMERCIAL

## 2025-07-16 ENCOUNTER — EVALUATION (OUTPATIENT)
Dept: PHYSICAL THERAPY | Facility: HOSPITAL | Age: 70
End: 2025-07-16
Payer: COMMERCIAL

## 2025-07-16 DIAGNOSIS — M62.838 MUSCLE SPASM: ICD-10-CM

## 2025-07-16 DIAGNOSIS — M25.561 CHRONIC PAIN OF RIGHT KNEE: Primary | ICD-10-CM

## 2025-07-16 DIAGNOSIS — G89.29 CHRONIC PAIN OF RIGHT KNEE: Primary | ICD-10-CM

## 2025-07-16 PROCEDURE — 97110 THERAPEUTIC EXERCISES: CPT | Mod: GP

## 2025-07-16 PROCEDURE — 97161 PT EVAL LOW COMPLEX 20 MIN: CPT | Mod: GP

## 2025-07-16 ASSESSMENT — ENCOUNTER SYMPTOMS
DEPRESSION: 0
OCCASIONAL FEELINGS OF UNSTEADINESS: 0
LOSS OF SENSATION IN FEET: 0

## 2025-07-16 ASSESSMENT — PAIN SCALES - GENERAL: PAINLEVEL_OUTOF10: 2

## 2025-07-16 ASSESSMENT — PAIN - FUNCTIONAL ASSESSMENT: PAIN_FUNCTIONAL_ASSESSMENT: 0-10

## 2025-07-16 NOTE — PROGRESS NOTES
Physical Therapy Evaluation and Treatment      Patient Name: Jarocho Langford  MRN: [id number]  Today's Date: 7/16/2025    Time Entry:   Time Calculation  Start Time: 0415  Stop Time: 0455  Time Calculation (min): 40 min  PT Evaluation Time Entry  PT Evaluation (Low) Time Entry: 30  PT Therapeutic Procedures Time Entry  Therapeutic Exercise Time Entry: 10                 INS [facility] BMN PT OT COMBO COPAY 0   (671) COVERAGE 80 OOP [phone]) 3400(1652)                                 NO AUTH REQ   Visit #1     Assessment:    Jarocho Langford is a 70 y.o.  male presenting with c/o continued R knee pain s/p MARY R TKR performed 12/5/24. Upon examination patient demonstrates impaired gait, txfers with pain, strength, ROM, mm length and sig inc tone in R LE that is TTP. Functional limitations and participations restrictions include sitting, walking, static standing, ADLs, lifting heavy items, working (deskwork), stairs .  The clinical presentation of this patient is stable and their history and examination findings are consistent with a low complexity evaluation with good rehab potential. Pt will benefit from skilled PT intervention 1 x/week for 8 weeks to address limitations listed above and achieve desired goals.      Plan:  OP PT Plan  Treatment/Interventions: Aquatic therapy, Cryotherapy, Dry needling, Education/ Instruction, Electrical stimulation, Hot pack, Gait training, Manual therapy, Neuromuscular re-education, Self care/ home management, Taping techniques, Therapeutic activities, Therapeutic exercises, Vasopneumatic device  PT Plan: Skilled PT  PT Frequency: 1 time per week  Duration: 8 weeks  Number of Treatments Authorized: MMO [employer] BMN PT OT COMBO COPAY 0   (671) COVERAGE 80 OOP 1700(1525) 3400(1652)                                  NO AUTH REQ  Rehab Potential: Good  Plan of Care Agreement: Patient    Current Problem:   1. Chronic pain of right knee        2. Muscle spasm             Subjective    General:  Pt s/p R press-fit TKA performed 12/5/24 by Dr. Lares. Pt's initial healing complicated by hematoma/cellulitis.  Pt was previously seen by Kimo Figueroa, PT 2/3/25 and pt cancelled FU visits without reasoning listed in MyChart. Pt then saw therapist in [place] per Dr. Lares's recommendation and returns to  today 2/2 continued pain.     Reports horrible issues with his L knee and has spine issues as well which limits his ability to do most things.   When he sits on chair and puts pressure on posterior thigh, he has horrible pains in posterior and anterior thigh that travels into the knee.     Pt is seeing Dr. Moya for his back 2/2 radiculopathy.      General  Reason for Referral: S/P total knee arthroplasty, right (Z96.651), Painful total knee replacement, initial encounter (T84.84XA, Z96.659), Right calf pain (M79.661)  Referred By: Dr. Lares  Pain (0-10)         Location: R knee and thigh (achy, tight)         Best: 0/10 recliner, ice        Worst: 5/10 sitting, walking, static standing, ADLs, lifting heavy items, working (deskwork), stairs         Current: 2/10        Imaging: Y - CT demos no evidence of hardware failure, small joint effusion, possibly lymphedema from hx of cellulitis and surrounding varicose veins may suggest thrombophlebitis.   Numbness/Tingling: numbness/tingling down lateral thigh and lateral incision   Changes in bowel/bladder: denies     PMH: OA   Surgical: hx of upper cervical fusion, R TKR, cholecystectomy, cystoscopy, colonoscopy, sinus sx, tonsillectomy, vasectomy, wisdom tooth extraction     Occupation:    Home setup: 2 story but first floor living with 3 AMAN without HR, denies GB in bathroom   Previously had PT: Y    Goals for PT: Get R knee better so he can get L knee replaced     Precautions:  Precautions  STEADI Fall Risk Score (The score of 4 or more indicates an increased risk of falling): 2  Pain:  Pain  Assessment  Pain Assessment: 0-10  0-10 (Numeric) Pain Score: 2  Pain Type: Chronic pain  Pain Location: Knee  Pain Orientation: Right  Prior Level of Function:   IND     Objective     Gait: moderate antalgia and B trendelenberg   Txfers: independent   Stairs: B HR support reciprocal     MMT:           Hip Flex       L    4     R 4          Hip Ext        functionally weak           Hip ABD       L     4    R 4 in sitting           Hip ADD       L     4    R 4 in sitting           Knee Flex     L     4     R 5          Knee Ext       L    4+     R 5          Ankle DF      L     5    R 5          Ankle PF       L     5    R 5    ROM:          Knee Flex     L    97 AROM with pain  / 100 PROM   R 105          Knee Ext      L    0     R 0    Muscle Length Tests:           Jb Test  + ITB, rec fem  on R     Special tests: NT    Palpation: significant increased tone in R quad, GS complex, ITB     Edema: none       Outcome Measures:  Other Measures  Lower Extremity Funtional Score (LEFS): 46/80     Treatments:  Access Code: [email or web address]  URL: [email or web address]  Date: 07/16/2025  Prepared by: Mayela Jimenez    Exercises  - Modified Jb Stretch  - 1 x daily - 7 x weekly - 2 sets - 60sec hold  - Supine Heel Slide with Strap  - 1 x daily - 7 x weekly - 3 sets - 10 reps  - Seated Knee Flexion AAROM  - 1 x daily - 7 x weekly - 3 sets - 10 reps  - Roller Massage Elongated IT Band Release  - 1 x daily - 7 x weekly - 3 sets - 10 reps  - Seated Hamstring Stretch  - 1 x daily - 7 x weekly - 2 sets - 30-60sec hold      Goals:  STGs: 4 weeks     Pt will report 75% reduction in pain during ADLs to improve tolerance to household activities.    Pt will demonstrate symmetrical AROM without pain for unlimited ability to perform home household/recreational/occupational tasks and to allow for correct mechanics with functional mobility.    Pt will demonstrate independence and report compliance with HEP to  facilitate independent rehab program upon discharge.    LTGs: 8 weeks    Pt will improve LEFS score by at least 9 points (minimal clinically important difference) in order to reflect increased ease in completing ADL's/IADL's. Baseline on 46/80     Pt to increase core/B LE strength in deficient muscles by >/= 1/2 MMT grade to improve dynamic stability during household/recreational/occupational tasks.    Pt will complete sit <> stand transfers using BUE, equal weight bearing on LEs, without pain or increase in time to complete, and no major LOB at completion of transfer during 3/3 trials in order to enhance functional mobility and safety.    Pt will ambulate with IND/mod IND using SC/WW/rollator on even surfaces without distance restriction, pain, major deviation or instability to improve participation in household/recreational/occupational duties.    Pt will be able to ascend/descend > or equal to 8 steps with/without use of unilateral/bilateral handrail reciprocally without pain/difficulty in order for patient to be able to ambulate safely on all levels of home and in the community.

## 2025-07-19 ENCOUNTER — APPOINTMENT (OUTPATIENT)
Dept: UROLOGY | Facility: CLINIC | Age: 70
End: 2025-07-19
Payer: COMMERCIAL

## 2025-07-19 VITALS
SYSTOLIC BLOOD PRESSURE: 131 MMHG | BODY MASS INDEX: 44.1 KG/M2 | HEIGHT: 68 IN | DIASTOLIC BLOOD PRESSURE: 70 MMHG | HEART RATE: 112 BPM | WEIGHT: 291 LBS | RESPIRATION RATE: 20 BRPM

## 2025-07-19 DIAGNOSIS — N40.1 BENIGN PROSTATIC HYPERPLASIA (BPH) WITH URINARY URGENCY: ICD-10-CM

## 2025-07-19 DIAGNOSIS — R39.15 BENIGN PROSTATIC HYPERPLASIA (BPH) WITH URINARY URGENCY: Primary | ICD-10-CM

## 2025-07-19 DIAGNOSIS — R39.15 BENIGN PROSTATIC HYPERPLASIA (BPH) WITH URINARY URGENCY: ICD-10-CM

## 2025-07-19 DIAGNOSIS — N40.1 BENIGN PROSTATIC HYPERPLASIA (BPH) WITH URINARY URGENCY: Primary | ICD-10-CM

## 2025-07-19 PROCEDURE — 3075F SYST BP GE 130 - 139MM HG: CPT | Performed by: UROLOGY

## 2025-07-19 PROCEDURE — 1159F MED LIST DOCD IN RCRD: CPT | Performed by: UROLOGY

## 2025-07-19 PROCEDURE — 3078F DIAST BP <80 MM HG: CPT | Performed by: UROLOGY

## 2025-07-19 PROCEDURE — 99204 OFFICE O/P NEW MOD 45 MIN: CPT | Performed by: UROLOGY

## 2025-07-19 PROCEDURE — 1036F TOBACCO NON-USER: CPT | Performed by: UROLOGY

## 2025-07-19 PROCEDURE — 3008F BODY MASS INDEX DOCD: CPT | Performed by: UROLOGY

## 2025-07-19 PROCEDURE — 1160F RVW MEDS BY RX/DR IN RCRD: CPT | Performed by: UROLOGY

## 2025-07-19 RX ORDER — NITROFURANTOIN MACROCRYSTALS 50 MG/1
50 CAPSULE ORAL EVERY 12 HOURS
Qty: 4 CAPSULE | Refills: 0 | Status: SHIPPED | OUTPATIENT
Start: 2025-07-19 | End: 2025-07-21

## 2025-07-19 ASSESSMENT — ENCOUNTER SYMPTOMS
DEPRESSION: 0
OCCASIONAL FEELINGS OF UNSTEADINESS: 0
LOSS OF SENSATION IN FEET: 0

## 2025-07-19 ASSESSMENT — PATIENT HEALTH QUESTIONNAIRE - PHQ9
1. LITTLE INTEREST OR PLEASURE IN DOING THINGS: NOT AT ALL
SUM OF ALL RESPONSES TO PHQ9 QUESTIONS 1 AND 2: 0
2. FEELING DOWN, DEPRESSED OR HOPELESS: NOT AT ALL

## 2025-07-19 NOTE — PROGRESS NOTES
Pt denies any pain today. States has not had any recent hospital admits or surgeries since their last visit. Denies any concerns about falling or safety. MG

## 2025-07-19 NOTE — LETTER
"July 19, 2025     Jim Farfan DO  [address] [address]    Patient: Jarocho Langford   YOB: 1955   Date of Visit: 7/19/2025       Dear Dr. Jim Farfan DO:    Thank you for referring Jarocho Langford to me for evaluation. Below are my notes for this consultation.  If you have questions, please do not hesitate to call me. I look forward to following your patient along with you.       Sincerely,     Titi Roldan MD      CC: No Recipients  ______________________________________________________________________________________    70-year-old white male self-referred for \"BPH, frequency, urgency\".  Previously under the care of Dr. Kolton Aguila in January 29, 2021.  February 10, 2023 he underwent urodynamic studies showing decreased flow of stream with BPH of 50 g.  Creatinine 0.87.  On September 16, 2023, his PSA was 0.36.  He is a  at [employer].  No family history of prostate or breast cancer.  He has never smoked cigarettes.    OZEMPIC    JEROME    July 19, 2025, patient arrives alone.  He is presently on Jerome and oxybutynin 10 mg ER with very little change in his frequency and urgency.  We discussed BPH with retention.  We reviewed his test results which shows no evidence of blood in the urine or urinary tract infection.  Urine cytology was atypical.  Ultrasound does not show any hydronephrosis.  3.2 cm simple left renal cyst was identified.  We will proceed with cystoscopy, flow studies and a discussion of treatment options.  He is on maximum medical therapy and may require minimally invasive prostate surgery.       "

## 2025-07-19 NOTE — PATIENT INSTRUCTIONS
It was very nice to meet you today.  We had a long discussion regarding an enlarged prostate with urinary retention.  You are now on maximum therapy with Aura which is a combination of Flomax and Avodart, in addition to oxybutynin.  You are still experiencing urinary frequency and urgency.  Your urine testing shows no evidence of blood or infection.  You do have some irregular cells which we will evaluate.  Your PSA is normal at 0.23.  Your ultrasound shows a simple cyst in the left kidney.  We will proceed with a cystoscopy, flow studies and a discussion of treatment options.

## 2025-07-19 NOTE — PROGRESS NOTES
"70-year-old white male self-referred for \"BPH, frequency, urgency\".  Previously under the care of Dr. Kolton Aguila in January 29, 2021.  February 10, 2023 he underwent urodynamic studies showing decreased flow of stream with BPH of 50 g.  Creatinine 0.87.  On September 16, 2023, his PSA was 0.36.  He is a  at a [employer].  No family history of prostate or breast cancer.  He has never smoked cigarettes.    TIFFANIE CANO    July 19, 2025, patient arrives alone.  He is presently on Aura and oxybutynin 10 mg ER with very little change in his frequency and urgency.  We discussed BPH with retention.  We reviewed his test results which shows no evidence of blood in the urine or urinary tract infection.  Urine cytology was atypical.  Ultrasound does not show any hydronephrosis.  3.2 cm simple left renal cyst was identified.  We will proceed with cystoscopy, flow studies and a discussion of treatment options.  He is on maximum medical therapy and may require minimally invasive prostate surgery.  "

## 2025-07-21 ENCOUNTER — TELEPHONE (OUTPATIENT)
Dept: PHYSICAL THERAPY | Facility: HOSPITAL | Age: 70
End: 2025-07-21
Payer: COMMERCIAL

## 2025-07-21 ENCOUNTER — TREATMENT (OUTPATIENT)
Dept: PHYSICAL THERAPY | Facility: HOSPITAL | Age: 70
End: 2025-07-21
Payer: COMMERCIAL

## 2025-07-21 DIAGNOSIS — M25.561 CHRONIC PAIN OF RIGHT KNEE: Primary | ICD-10-CM

## 2025-07-21 DIAGNOSIS — G89.29 CHRONIC PAIN OF RIGHT KNEE: Primary | ICD-10-CM

## 2025-07-21 DIAGNOSIS — M62.838 MUSCLE SPASM: ICD-10-CM

## 2025-07-21 NOTE — PROGRESS NOTES
Physical Therapy Treatment  7/21/2025  Patient Name: Jarocho Langford  MRN: [id number]  Current Problem  1. Chronic pain of right knee          Insurance   M[employer]  Visit 2  Subjective   Pt continues to have quite a bit of low back and B knee pain.        Objective    Treatments  Prone over pillow, 3 minutes  -HSC, 10 reps x 2  LAQ's, 4 lbs, 10 reps x 2  Heel taps, 2 in, 10 reps x 2  Seated, clamshells, 10 reps x 2  Seated HS stretch, 30 sec holds, 2 reps    Manual  STM paraspinals and B glutes  Tibial mobs, PROM R knee  Assessment  Pt with good reduction familiar lumbar tightness as well as improved mobility R knee with extension forces lumbar spine and light quad/HS strengthening ex's. Updated HEP, discussed importance avoiding prolonged excessive repetitive forward flexion lumbar spine.  Plan:  [1] Quad strength   [2] lumbar and knee ROM   [3]  [4]    OP EDUCATION:    Goals:       Time Calculation  Start Time: 0345  Stop Time: 0425  Time Calculation (min): 40 min  PT Therapeutic Procedures Time Entry  Therapeutic Exercise Time Entry: 38

## 2025-07-21 NOTE — TELEPHONE ENCOUNTER
PROVIDERS SCHED NEEDS TO BE TIGHTENED; PC MADE TO PT TO ADV OPEN SLOT AVAIL 2:45P & 3:30P. LMOM TO RTN CALL TO ADV IF HE CAN COME IN EARLIER

## 2025-07-23 DIAGNOSIS — E53.1 VITAMIN B6 DEFICIENCY NEUROPATHY (MULTI): ICD-10-CM

## 2025-07-23 DIAGNOSIS — G63 VITAMIN B6 DEFICIENCY NEUROPATHY (MULTI): ICD-10-CM

## 2025-07-23 DIAGNOSIS — E55.9 VITAMIN D DEFICIENCY: Primary | ICD-10-CM

## 2025-07-23 RX ORDER — PYRIDOXINE HCL (VITAMIN B6) 100 MG
100 TABLET ORAL DAILY
Qty: 90 TABLET | Refills: 0 | Status: SHIPPED | OUTPATIENT
Start: 2025-07-23

## 2025-07-23 RX ORDER — CHOLECALCIFEROL (VITAMIN D3) 50 MCG
50 TABLET ORAL DAILY
Qty: 90 TABLET | Refills: 0 | Status: SHIPPED | OUTPATIENT
Start: 2025-07-23

## 2025-07-28 ENCOUNTER — APPOINTMENT (OUTPATIENT)
Dept: UROLOGY | Facility: CLINIC | Age: 70
End: 2025-07-28
Payer: COMMERCIAL

## 2025-07-28 ENCOUNTER — APPOINTMENT (OUTPATIENT)
Dept: PHYSICAL THERAPY | Facility: HOSPITAL | Age: 70
End: 2025-07-28
Payer: COMMERCIAL

## 2025-07-28 VITALS
SYSTOLIC BLOOD PRESSURE: 153 MMHG | BODY MASS INDEX: 44.52 KG/M2 | WEIGHT: 292.77 LBS | HEART RATE: 92 BPM | RESPIRATION RATE: 18 BRPM | DIASTOLIC BLOOD PRESSURE: 78 MMHG

## 2025-07-28 DIAGNOSIS — R35.0 FREQUENCY OF URINATION: ICD-10-CM

## 2025-07-28 DIAGNOSIS — R39.15 BENIGN PROSTATIC HYPERPLASIA (BPH) WITH URINARY URGENCY: Primary | ICD-10-CM

## 2025-07-28 DIAGNOSIS — N40.1 BENIGN PROSTATIC HYPERPLASIA WITH URINARY RETENTION: ICD-10-CM

## 2025-07-28 DIAGNOSIS — M25.561 CHRONIC PAIN OF RIGHT KNEE: Primary | ICD-10-CM

## 2025-07-28 DIAGNOSIS — N40.1 BENIGN PROSTATIC HYPERPLASIA (BPH) WITH URINARY URGENCY: Primary | ICD-10-CM

## 2025-07-28 DIAGNOSIS — R82.89 ABNORMAL URINE CYTOLOGY: ICD-10-CM

## 2025-07-28 DIAGNOSIS — R33.8 BENIGN PROSTATIC HYPERPLASIA WITH URINARY RETENTION: ICD-10-CM

## 2025-07-28 DIAGNOSIS — G89.29 CHRONIC PAIN OF RIGHT KNEE: Primary | ICD-10-CM

## 2025-07-28 LAB
POC APPEARANCE, URINE: CLEAR
POC BILIRUBIN, URINE: NEGATIVE
POC BLOOD, URINE: ABNORMAL
POC COLOR, URINE: YELLOW
POC GLUCOSE, URINE: NEGATIVE MG/DL
POC KETONES, URINE: NEGATIVE MG/DL
POC LEUKOCYTES, URINE: NEGATIVE
POC NITRITE,URINE: NEGATIVE
POC PH, URINE: 7 PH
POC PROTEIN, URINE: NEGATIVE MG/DL
POC SPECIFIC GRAVITY, URINE: 1.01
POC UROBILINOGEN, URINE: 0.2 EU/DL

## 2025-07-28 PROCEDURE — 52000 CYSTOURETHROSCOPY: CPT | Performed by: UROLOGY

## 2025-07-28 PROCEDURE — 99214 OFFICE O/P EST MOD 30 MIN: CPT | Performed by: UROLOGY

## 2025-07-28 PROCEDURE — 51798 US URINE CAPACITY MEASURE: CPT | Performed by: UROLOGY

## 2025-07-28 PROCEDURE — 81003 URINALYSIS AUTO W/O SCOPE: CPT | Performed by: UROLOGY

## 2025-07-28 NOTE — PATIENT INSTRUCTIONS
It was very nice to see you again today.  We had a long discussion regarding an enlarged prostate with urinary retention.  You are now on maximum therapy with Aura which is a combination of Flomax and Avodart, in addition to daily oxybutynin.  You are still experiencing urinary frequency and urgency.  Your urine testing shows no evidence of blood or infection.  You do have some irregular cells which we will evaluate.  Your PSA is normal at 0.23.  Your ultrasound shows a simple cyst in the left kidney.  Today you successfully completed a cystoscopy, flow studies and a discussion of treatment options.  You do have an enlarged prostate without any evidence of stones or tumors within the bladder.  I will refer you to Dr. August Murray for further evaluation and possible minimally invasive prostate surgery.     This note was created with voice-recognition software and was not corrected for typographical or grammatical errors.

## 2025-07-28 NOTE — PROGRESS NOTES
"Patient ID: Ramana Causey is a 70 y.o. male.  Pt denies any pain today. States has not had any recent hospital admits or surgeries since their last visit. Denies any concerns about falling or safety. JML      Procedures  Pt took macrodantin 50mg po as prescribed  Anesthesia: Local 2% Lidocaine  Instruments: 6F flexible disposable cystoscope  Pt brought to procedure room and placed in supine lithotomy position. Pt draped and prepped in normal sterile fashion. 10ml lidocaine instilled into urethral meatus. Pt tolerated well    I was present as chaperone for the entirety of the procedure   Анна Maher  Cystoscopy performed by Dr. Titi Roldan  Bedside \"Time Out\" Verification   Today's Date: 7/28/2025 . I attest that this time out verification took place prior to the procedure.   Procedure: cysto   RN/LPN/MA:SUNSHINE   Provider: [facility].   Verified By: RN/LPN/MA,   SUNSHINE and Provider.   Prior to the start of the procedure a time out was taken and the following were verified: the identity of the patient using two patient identifiers, the correct procedure, the correct site marked as indicated, the correct positioning for the patient and the correct equipment was obtained.   Cystoscopy - female  MALE RAMANA CAUSEY  identified using two (2) forms of identification.   Procedure: diagnostic cystourethroscopy.  Procedure Note: Time Started:  Time Completed:  STARTED: 1:00PM COMPLETED: 1:03 PM  Indications for procedure: irritable voiding symptoms.   Discussed with patient: Risks, benefits, and alternative were discussed in detail. Patient appears to understand and agrees to proceed. Patient has signed the procedure consent form.    CYSTOSCOPY:    Cystoscopy today reveals a normal distal urethra and external sphincter.  Prostatic urethra is moderately obstructed and the median lobe is mildly elevated.  Once inside the bladder, no evidence of stones, lesions or tumors.  PVR of 46 cc.    "

## 2025-07-28 NOTE — LETTER
"July 28, 2025     Jim Farfan DO  [address] [facility] [address]  [facility] [address]    Patient: Jarocho Langford   YOB: 1955   Date of Visit: 7/28/2025       Dear Dr. Jim Farfan DO:    Thank you for referring Jarocho Langford to me for evaluation. Below are my notes for this consultation.  If you have questions, please do not hesitate to call me. I look forward to following your patient along with you.       Sincerely,     Titi Roldan MD      CC: No Recipients  ______________________________________________________________________________________    70-year-old white male self-referred for \"BPH, frequency, urgency\".  Previously under the care of Dr. Kolton Aguila in January 29, 2021.  February 10, 2023 he underwent urodynamic studies showing decreased flow of stream with BPH of 50 g.  Creatinine 0.87.  On September 16, 2023, his PSA was 0.36.  He is a  at [employer].  No family history of prostate or breast cancer.  He has never smoked cigarettes.     OZEMPIC     JEROME     July 19, 2025, patient arrives alone.  He is presently on Jerome and oxybutynin 10 mg ER with very little change in his frequency and urgency.  We discussed BPH with retention.  We reviewed his test results which shows no evidence of blood in the urine or urinary tract infection.  Urine cytology was atypical.  Ultrasound does not show any hydronephrosis.  3.2 cm simple left renal cyst was identified.  We will proceed with cystoscopy, flow studies and a discussion of treatment options.  He is on maximum medical therapy and may require minimally invasive prostate surgery.    July 28, 2025, cystoscopy, flow studies and a discussion of treatment options.  Cystoscopy today reveals a moderately obstructed prostatic urethra with mildly elevated median lobe.  Normal-appearing bladder without stones or lesions.  PVR 46 cc.  He continues on daily Jerome and also oxybutynin 10 mg ER.  He is complaining of urinary frequency " "every 45 minutes and nighttime urination, nocturia, x 4.  Although he does not have a severely obstructed prostate or high residual, and his symptoms are quite significant.  I will refer him to Dr. August Murray for further evaluation and treatment.    PLAN:    #1 refer to Dr. August Murray for for BPH with retention, refractory to medication.    Patient ID: Ramana Causey is a 70 y.o. male.  Pt denies any pain today. States has not had any recent hospital admits or surgeries since their last visit. Denies any concerns about falling or safety. JML      Procedures  Pt took macrodantin 50mg po as prescribed  Anesthesia: Local 2% Lidocaine  Instruments: 6F flexible disposable cystoscope  Pt brought to procedure room and placed in supine lithotomy position. Pt draped and prepped in normal sterile fashion. 10ml lidocaine instilled into urethral meatus. Pt tolerated well    I was present as chaperone for the entirety of the procedure   Анна Maher  Cystoscopy performed by Dr. Titi Roldan  Bedside \"Time Out\" Verification   Today's Date: 7/28/2025 . I attest that this time out verification took place prior to the procedure.   Procedure: cysto   RN/LPN/MA:SUNSHINE   Provider: [facility].   Verified By: RN/LPN/MA,   SUNSHINE and Provider.   Prior to the start of the procedure a time out was taken and the following were verified: the identity of the patient using two patient identifiers, the correct procedure, the correct site marked as indicated, the correct positioning for the patient and the correct equipment was obtained.   Cystoscopy - female  MALE RAMANA CAUSEY  identified using two (2) forms of identification.   Procedure: diagnostic cystourethroscopy.  Procedure Note: Time Started:  Time Completed:  STARTED: 1:00PM COMPLETED: 1:03 PM  Indications for procedure: irritable voiding symptoms.   Discussed with patient: Risks, benefits, and alternative were discussed in detail. Patient appears to understand and agrees to proceed. " Patient has signed the procedure consent form.    CYSTOSCOPY:    Cystoscopy today reveals a normal distal urethra and external sphincter.  Prostatic urethra is moderately obstructed and the median lobe is mildly elevated.  Once inside the bladder, no evidence of stones, lesions or tumors.  PVR of 46 cc.

## 2025-07-28 NOTE — PROGRESS NOTES
"70-year-old white male self-referred for \"BPH, frequency, urgency\".  Previously under the care of Dr. Kolton Aguila in January 29, 2021.  February 10, 2023 he underwent urodynamic studies showing decreased flow of stream with BPH of 50 g.  Creatinine 0.87.  On September 16, 2023, his PSA was 0.36.  He is a  at a [employer].  No family history of prostate or breast cancer.  He has never smoked cigarettes.     OZEMPIC     JEROME     July 19, 2025, patient arrives alone.  He is presently on Jerome and oxybutynin 10 mg ER with very little change in his frequency and urgency.  We discussed BPH with retention.  We reviewed his test results which shows no evidence of blood in the urine or urinary tract infection.  Urine cytology was atypical.  Ultrasound does not show any hydronephrosis.  3.2 cm simple left renal cyst was identified.  We will proceed with cystoscopy, flow studies and a discussion of treatment options.  He is on maximum medical therapy and may require minimally invasive prostate surgery.    July 28, 2025, cystoscopy, flow studies and a discussion of treatment options.  Cystoscopy today reveals a moderately obstructed prostatic urethra with mildly elevated median lobe.  Normal-appearing bladder without stones or lesions.  PVR 46 cc.  He continues on daily Jerome and also oxybutynin 10 mg ER.  He is complaining of urinary frequency every 45 minutes and nighttime urination, nocturia, x 4.  Although he does not have a severely obstructed prostate or high residual, and his symptoms are quite significant.  I will refer him to Dr. August Murray for further evaluation and treatment.    PLAN:    #1 refer to Dr. August Murray for for BPH with retention, refractory to medication.  "

## 2025-07-30 ENCOUNTER — TELEPHONE (OUTPATIENT)
Dept: PHYSICAL THERAPY | Facility: HOSPITAL | Age: 70
End: 2025-07-30
Payer: COMMERCIAL

## 2025-07-30 DIAGNOSIS — G89.29 CHRONIC PAIN OF RIGHT KNEE: Primary | ICD-10-CM

## 2025-07-30 DIAGNOSIS — M25.561 CHRONIC PAIN OF RIGHT KNEE: Primary | ICD-10-CM

## 2025-07-30 NOTE — TELEPHONE ENCOUNTER
PC MADE TO PT ON WAIT LIST TO ADV OPEN SLOTS AVAIL TODAY W/OMERO @ 3P. ADV WAIT  LIST STATES 3:45P OR AFTER, BUT I CALLED TO ADV OPEN SLOT IN CASE IF SCHED HAS CAHNGEDF TODAY; LMOM IN REFERENCE

## 2025-08-06 ENCOUNTER — TREATMENT (OUTPATIENT)
Dept: PHYSICAL THERAPY | Facility: HOSPITAL | Age: 70
End: 2025-08-06
Payer: COMMERCIAL

## 2025-08-06 DIAGNOSIS — G89.29 CHRONIC PAIN OF RIGHT KNEE: Primary | ICD-10-CM

## 2025-08-06 DIAGNOSIS — M62.838 MUSCLE SPASM: ICD-10-CM

## 2025-08-06 DIAGNOSIS — M25.561 CHRONIC PAIN OF RIGHT KNEE: Primary | ICD-10-CM

## 2025-08-06 PROCEDURE — 97530 THERAPEUTIC ACTIVITIES: CPT | Mod: GP

## 2025-08-06 PROCEDURE — 97110 THERAPEUTIC EXERCISES: CPT | Mod: GP

## 2025-08-06 NOTE — PROGRESS NOTES
Physical Therapy Treatment  8/6/2025  Patient Name: Jarocho Langford  MRN: [id number]  Current Problem  1. Chronic pain of right knee  Follow Up In Physical Therapy      2. Muscle spasm  Follow Up In Physical Therapy        Insurance   [employer]  Visit 3  Subjective   Pt reports his L knee and back have been killing him but all the exercises for the knee is helping. Reports he is ready to be DC from therapy to focus on his L knee and back. Will be seeing neurology soon to talk about back and decide what to do about is other knee.         Objective  Gait: moderate antalgia and R trendelenberg   Txfers: independent   Stairs: B HR support reciprocal      MMT:           Hip Flex       L    5     R 5          Hip Ext        functionally weak           Hip ABD       L     4+    R 4+ in sitting           Hip ADD       L     4+    R 4+ in sitting           Knee Flex     L     5     R 5          Knee Ext       L    4+     R 5          Ankle DF      L     5    R 5          Ankle PF       L     5    R 5     ROM:          Knee Flex     L    100 AROM with pain in supine  / 105 PROM   R 105          Knee Ext      L    0     R 0     Special tests: NT     Palpation: significant increased tone in R quad, GS complex, ITB      Edema: none     Treatments  TherEx:   LAQ 2x10 with 5# AW 2x10   HSC 2x10 with 5# AW 2x10   SLR with 2# AW 2x10   SL hip ABD with 2# AW 2x10  Seated knee flexion AAROM 2x10     Manual  PROM knee flexion     TherAct:   Reassessment performed today to assess progress towards goals. Pt educated on findings of reassessment, plan of care, HEP and goals to promote pt progress for reduced s/s. See ortho section for updated objective measures.    Assessment  Pt has attended 3 consecutive physical therapy sessions 2/2 R knee pain and stiffness.   This pt has shown improvement in subjective reports of pain and tightness, knee ROM, strength and functional mobility.   This pt continues to show decreased knee ROM and gait  likely due to pain in LB and L knee.   Functional limitations include: NONE being restricted by R knee at this time. Pt is being DC from therapy services at this time 2/2 self reports of readiness for DC.       Plan:  DC with HEP       Goals:   Goals:  STGs: 4 weeks      Pt will report 75% reduction in pain during ADLs to improve tolerance to household activities. GOAL MET  Reports no more than 3/10 if really pushing it.      Pt will demonstrate symmetrical AROM without pain for unlimited ability to perform home household/recreational/occupational tasks and to allow for correct mechanics with functional mobility.     Pt will demonstrate independence and report compliance with HEP to facilitate independent rehab program upon discharge. GOAL MET      LTGs: 8 weeks     Pt will improve LEFS score by at least 9 points (minimal clinically important difference) in order to reflect increased ease in completing ADL's/IADL's. Baseline on 46/80 / GOAL MET score of 67/80     Pt to increase core/B LE strength in deficient muscles by >/= 1/2 MMT grade to improve dynamic stability during household/recreational/occupational tasks. GOAL MET      Pt will complete sit <> stand transfers using BUE, equal weight bearing on LEs, without pain or increase in time to complete, and no major LOB at completion of transfer during 3/3 trials in order to enhance functional mobility and safety. GOAL MET      Pt will ambulate with IND/mod IND using SC/WW/rollator on even surfaces without distance restriction, pain, major deviation or instability to improve participation in household/recreational/occupational duties. PROGRESSING - likely restriction      Pt will be able to ascend/descend > or equal to 8 steps with/without use of unilateral/bilateral handrail reciprocally without pain/difficulty in order for patient to be able to ambulate safely on all levels of home and in the community. GOAL MET     Time Calculation  Start Time: 1554  Stop Time:  1622  Time Calculation (min): 28 min  PT Therapeutic Procedures Time Entry  Therapeutic Exercise Time Entry: 15  Manual Therapy Time Entry: 4  Therapeutic Activity Time Entry: 9

## 2025-08-13 ENCOUNTER — APPOINTMENT (OUTPATIENT)
Dept: PHYSICAL THERAPY | Facility: HOSPITAL | Age: 70
End: 2025-08-13
Payer: COMMERCIAL

## 2025-08-13 DIAGNOSIS — G89.29 CHRONIC PAIN OF RIGHT KNEE: Primary | ICD-10-CM

## 2025-08-13 DIAGNOSIS — M25.561 CHRONIC PAIN OF RIGHT KNEE: Primary | ICD-10-CM

## 2025-08-19 ENCOUNTER — APPOINTMENT (OUTPATIENT)
Dept: PRIMARY CARE | Facility: CLINIC | Age: 70
End: 2025-08-19
Payer: COMMERCIAL

## 2025-08-20 ENCOUNTER — APPOINTMENT (OUTPATIENT)
Dept: PHYSICAL THERAPY | Facility: HOSPITAL | Age: 70
End: 2025-08-20
Payer: COMMERCIAL

## 2025-08-20 DIAGNOSIS — M25.561 CHRONIC PAIN OF RIGHT KNEE: Primary | ICD-10-CM

## 2025-08-20 DIAGNOSIS — G89.29 CHRONIC PAIN OF RIGHT KNEE: Primary | ICD-10-CM

## 2025-08-25 ENCOUNTER — HOSPITAL ENCOUNTER (OUTPATIENT)
Dept: RADIOLOGY | Facility: CLINIC | Age: 70
Discharge: HOME | End: 2025-08-25
Payer: COMMERCIAL

## 2025-08-25 ENCOUNTER — OFFICE VISIT (OUTPATIENT)
Facility: CLINIC | Age: 70
End: 2025-08-25
Payer: COMMERCIAL

## 2025-08-25 ENCOUNTER — TELEPHONE (OUTPATIENT)
Age: 70
End: 2025-08-25

## 2025-08-25 VITALS
BODY MASS INDEX: 44.03 KG/M2 | DIASTOLIC BLOOD PRESSURE: 86 MMHG | HEART RATE: 97 BPM | WEIGHT: 290.5 LBS | HEIGHT: 68 IN | TEMPERATURE: 97.7 F | SYSTOLIC BLOOD PRESSURE: 130 MMHG | RESPIRATION RATE: 16 BRPM

## 2025-08-25 DIAGNOSIS — M54.16 LUMBAR RADICULOPATHY: ICD-10-CM

## 2025-08-25 DIAGNOSIS — M54.16 LUMBAR RADICULOPATHY: Primary | ICD-10-CM

## 2025-08-25 PROCEDURE — 99214 OFFICE O/P EST MOD 30 MIN: CPT | Performed by: STUDENT IN AN ORGANIZED HEALTH CARE EDUCATION/TRAINING PROGRAM

## 2025-08-25 PROCEDURE — 3075F SYST BP GE 130 - 139MM HG: CPT | Performed by: STUDENT IN AN ORGANIZED HEALTH CARE EDUCATION/TRAINING PROGRAM

## 2025-08-25 PROCEDURE — 72114 X-RAY EXAM L-S SPINE BENDING: CPT

## 2025-08-25 PROCEDURE — 1159F MED LIST DOCD IN RCRD: CPT | Performed by: STUDENT IN AN ORGANIZED HEALTH CARE EDUCATION/TRAINING PROGRAM

## 2025-08-25 PROCEDURE — 72114 X-RAY EXAM L-S SPINE BENDING: CPT | Performed by: RADIOLOGY

## 2025-08-25 PROCEDURE — 1125F AMNT PAIN NOTED PAIN PRSNT: CPT | Performed by: STUDENT IN AN ORGANIZED HEALTH CARE EDUCATION/TRAINING PROGRAM

## 2025-08-25 PROCEDURE — 99212 OFFICE O/P EST SF 10 MIN: CPT

## 2025-08-25 PROCEDURE — 3008F BODY MASS INDEX DOCD: CPT | Performed by: STUDENT IN AN ORGANIZED HEALTH CARE EDUCATION/TRAINING PROGRAM

## 2025-08-25 PROCEDURE — 3079F DIAST BP 80-89 MM HG: CPT | Performed by: STUDENT IN AN ORGANIZED HEALTH CARE EDUCATION/TRAINING PROGRAM

## 2025-08-25 PROCEDURE — 1036F TOBACCO NON-USER: CPT | Performed by: STUDENT IN AN ORGANIZED HEALTH CARE EDUCATION/TRAINING PROGRAM

## 2025-08-25 ASSESSMENT — PATIENT HEALTH QUESTIONNAIRE - PHQ9
2. FEELING DOWN, DEPRESSED OR HOPELESS: NOT AT ALL
1. LITTLE INTEREST OR PLEASURE IN DOING THINGS: NOT AT ALL
SUM OF ALL RESPONSES TO PHQ9 QUESTIONS 1 AND 2: 0

## 2025-08-25 ASSESSMENT — PAIN SCALES - GENERAL: PAINLEVEL_OUTOF10: 7

## 2025-08-27 ENCOUNTER — APPOINTMENT (OUTPATIENT)
Dept: PHYSICAL THERAPY | Facility: HOSPITAL | Age: 70
End: 2025-08-27
Payer: COMMERCIAL

## 2025-08-27 DIAGNOSIS — G89.29 CHRONIC PAIN OF RIGHT KNEE: Primary | ICD-10-CM

## 2025-08-27 DIAGNOSIS — M25.561 CHRONIC PAIN OF RIGHT KNEE: Primary | ICD-10-CM

## 2025-09-02 ENCOUNTER — APPOINTMENT (OUTPATIENT)
Dept: PRIMARY CARE | Facility: CLINIC | Age: 70
End: 2025-09-02
Payer: COMMERCIAL

## 2025-09-02 ASSESSMENT — ENCOUNTER SYMPTOMS
LIGHT-HEADEDNESS: 0
BRUISES/BLEEDS EASILY: 0
HALLUCINATIONS: 0
CHOKING: 0
FATIGUE: 1
ACTIVITY CHANGE: 0
SLEEP DISTURBANCE: 0
FACIAL ASYMMETRY: 0
ARTHRALGIAS: 0
VOICE CHANGE: 0
POLYDIPSIA: 0
AGITATION: 0
WEAKNESS: 0
NERVOUS/ANXIOUS: 0
CONFUSION: 0
JOINT SWELLING: 0
SEIZURES: 0
DIARRHEA: 0
HEMATURIA: 0
WOUND: 0
NECK STIFFNESS: 1
CHILLS: 0
NUMBNESS: 0
BACK PAIN: 0
ADENOPATHY: 0
HYPERTENSION: 1
ABDOMINAL PAIN: 0
ABDOMINAL DISTENTION: 0
DYSURIA: 0
FLANK PAIN: 0
PHOTOPHOBIA: 0
NAUSEA: 0
BLOOD IN STOOL: 0
EYE REDNESS: 0
DIZZINESS: 0
VOMITING: 0
TREMORS: 0
SPEECH DIFFICULTY: 0
CHEST TIGHTNESS: 0
FREQUENCY: 0
EYE DISCHARGE: 0
SINUS PRESSURE: 0
EYE PAIN: 0
DYSPHORIC MOOD: 0
UNEXPECTED WEIGHT CHANGE: 0
CONSTIPATION: 0
DIAPHORESIS: 0
EYE ITCHING: 0

## 2025-09-04 ENCOUNTER — HOSPITAL ENCOUNTER (OUTPATIENT)
Dept: GENERAL RADIOLOGY | Age: 70
Discharge: HOME | End: 2025-09-06
Attending: PAIN MEDICINE
Payer: COMMERCIAL

## 2025-09-04 ENCOUNTER — OFFICE VISIT (OUTPATIENT)
Age: 70
End: 2025-09-04
Payer: COMMERCIAL

## 2025-09-04 VITALS
TEMPERATURE: 97.7 F | OXYGEN SATURATION: 97 % | HEART RATE: 76 BPM | SYSTOLIC BLOOD PRESSURE: 130 MMHG | DIASTOLIC BLOOD PRESSURE: 74 MMHG | WEIGHT: 275 LBS | HEIGHT: 68 IN | BODY MASS INDEX: 41.68 KG/M2

## 2025-09-04 DIAGNOSIS — M16.11 PRIMARY OSTEOARTHRITIS OF RIGHT HIP: ICD-10-CM

## 2025-09-04 DIAGNOSIS — M54.16 LUMBAR RADICULOPATHY: Primary | ICD-10-CM

## 2025-09-04 PROCEDURE — G8417 CALC BMI ABV UP PARAM F/U: HCPCS | Performed by: PAIN MEDICINE

## 2025-09-04 PROCEDURE — 3075F SYST BP GE 130 - 139MM HG: CPT | Performed by: PAIN MEDICINE

## 2025-09-04 PROCEDURE — 99204 OFFICE O/P NEW MOD 45 MIN: CPT | Performed by: PAIN MEDICINE

## 2025-09-04 PROCEDURE — 73502 X-RAY EXAM HIP UNI 2-3 VIEWS: CPT

## 2025-09-04 PROCEDURE — G8427 DOCREV CUR MEDS BY ELIG CLIN: HCPCS | Performed by: PAIN MEDICINE

## 2025-09-04 PROCEDURE — 3078F DIAST BP <80 MM HG: CPT | Performed by: PAIN MEDICINE

## 2025-09-04 PROCEDURE — 1036F TOBACCO NON-USER: CPT | Performed by: PAIN MEDICINE

## 2025-09-04 PROCEDURE — 1123F ACP DISCUSS/DSCN MKR DOCD: CPT | Performed by: PAIN MEDICINE

## 2025-09-04 PROCEDURE — 3017F COLORECTAL CA SCREEN DOC REV: CPT | Performed by: PAIN MEDICINE

## 2025-09-04 RX ORDER — DEXLANSOPRAZOLE 60 MG/1
60 CAPSULE, DELAYED RELEASE ORAL DAILY
COMMUNITY

## 2025-12-02 ENCOUNTER — APPOINTMENT (OUTPATIENT)
Dept: PRIMARY CARE | Facility: CLINIC | Age: 70
End: 2025-12-02
Payer: COMMERCIAL

## 2025-12-05 ENCOUNTER — APPOINTMENT (OUTPATIENT)
Dept: NEUROSURGERY | Facility: CLINIC | Age: 70
End: 2025-12-05
Payer: COMMERCIAL

## (undated) DEVICE — BLADE, INFERIOR TURBINATE

## (undated) DEVICE — IMMOBILIZER, KNEE, 19IN, ADJUSTABLE FOAM, W/ ELASTIC STRAPS

## (undated) DEVICE — WRAP, DEMAYO, LEG, STERILE

## (undated) DEVICE — STRAP, VELCRO, BODY, 4 X 60IN, NS

## (undated) DEVICE — SYRINGE, 50 CC, LUER LOCK

## (undated) DEVICE — APPLICATOR, CHLORAPREP, W/ORANGE TINT, 26ML

## (undated) DEVICE — SOLUTION, IRRIGATION, STERILE WATER, 1000 ML, POUR BOTTLE

## (undated) DEVICE — GLOVE, SURGICAL, PROTEXIS PI , 7.5, PF, LF

## (undated) DEVICE — SOLUTION, INJECTION, USP, SODIUM CHLORIDE 0.9%, .9, NACL, 1000 ML, BAG

## (undated) DEVICE — TRACKING KIT, TM KNEE PROCEDURES, VIZADISC

## (undated) DEVICE — CAUTERY, PENCIL, PUSH BUTTON, SMOKE EVAC, 70MM

## (undated) DEVICE — DRAPE, FLUID WARMER

## (undated) DEVICE — MANIFOLD, 4 PORT NEPTUNE STANDARD

## (undated) DEVICE — PADDING, CAST, SPECIALIST, 6 IN X 4 YD, STERILE

## (undated) DEVICE — NEEDLE, SAFETY, 18 G X 1.5 IN

## (undated) DEVICE — ELECTRODE, ELECTROSURGICAL, COAGULATOR, W/SUCTION, HANDSWITCHING, 8 FR, 6 IN

## (undated) DEVICE — DRESSING, GAUZE, SPONGE, 12 PLY, 4 X 4 IN, PLASTIC POUCH, STRL 10PK

## (undated) DEVICE — SUTURE, VICRYL PLUS, 1, 8X18IN, CTX, CR, VIOLET, BRAIDED

## (undated) DEVICE — CHECKPOINT KIT, FEMORAL/ TIBIAL

## (undated) DEVICE — SOLUTION, TOPICAL, ALCOHOL, ISOPROPYL 70%, 16 OZ

## (undated) DEVICE — PIN, BONE, 4MM X 110MM, STERILE

## (undated) DEVICE — HOOD, SURGICAL, FLYTE, T7

## (undated) DEVICE — COVER, TABLE, 44 X 75 IN, DISPOSABLE, LF, STERILE

## (undated) DEVICE — SUTURE, VICRYL 2-0, TAPER POINT, CT-1 UNDYED 27 INCH

## (undated) DEVICE — CUP, MEDICINE, GRADUATED, 2 OZ, PLASTIC, DISP, LF

## (undated) DEVICE — DRESSING, HEMOPORE, 8CM

## (undated) DEVICE — ADHESIVE, SKIN, DERMABOND ADVANCED, 15CM, PEN-STYLE

## (undated) DEVICE — WOUND SYSTEM, DEBRIDEMENT & CLEANING, O.R DUOPAK

## (undated) DEVICE — COVER, MAYO STAND, W/PAD, 23 IN, DISPOSABLE, PLASTIC, LF, STERILE

## (undated) DEVICE — TRACKER, INSTRUMENT

## (undated) DEVICE — REST, HEAD, BAGEL, 9 IN

## (undated) DEVICE — BLADE, SAW, SAGITTAL, 21 X 84.5 X 0.89 MM, STAINLESS STEEL, STERILE

## (undated) DEVICE — Device

## (undated) DEVICE — BATH BLANKET STERILE

## (undated) DEVICE — BLADE, SHAVER, TRICUT, STRAIGHT SHOT MAGNUM, 4 MM, STERILE

## (undated) DEVICE — DRAPE KIT, RIO

## (undated) DEVICE — SUTURE, MONOCRYL, 4-0, 27 IN, PS-2, UNDYED

## (undated) DEVICE — TOWEL, SURGICAL, NEURO, O/R, 16 X 26, BLUE, STERILE

## (undated) DEVICE — PIN, BONE, 4MM X 140MM, STERILE

## (undated) DEVICE — TRAP, SPECIMEN, NEPTUNE QUICK COLLECTOR

## (undated) DEVICE — GLOVE, SURGICAL, PROTEXIS PI BLUE W/NEUTHERA, 8.0, PF, LF

## (undated) DEVICE — STRAP, ARM BOARD, 32 X 1.5

## (undated) DEVICE — TRACKER, STINGRAY, NON-INVASIVE, AXIEM STEALTH NAVIGATION

## (undated) DEVICE — BANDAGE, ELASTIC, 6 X 10YD, BEIGE, LF

## (undated) DEVICE — BLADE, MAKO, SAGITTAL, STANDARD

## (undated) DEVICE — BLADE, GEN COATED 2.75, LF

## (undated) DEVICE — DRAPE, SHEET, THREE QUARTER, FAN FOLD, 57 X 77 IN

## (undated) DEVICE — TUBE, SALEM SUMP, 16 FR X 48IN, ENFIT

## (undated) DEVICE — BOWL, BASIN, 32 OZ, STERILE

## (undated) DEVICE — DRESSING, MEPILEX BORDER, POST-OP AG, 4 X 12 IN

## (undated) DEVICE — TOWEL PACK, STERILE, 16X24, XRAY DETECTABLE, BLUE, 4/PK

## (undated) DEVICE — COVER, CART, 45 X 27 X 48 IN, CLEAR

## (undated) DEVICE — ELECTRODE, ELECTROSURGICAL, BLADE, EXTENDED